# Patient Record
Sex: MALE | Race: WHITE | NOT HISPANIC OR LATINO | Employment: OTHER | URBAN - METROPOLITAN AREA
[De-identification: names, ages, dates, MRNs, and addresses within clinical notes are randomized per-mention and may not be internally consistent; named-entity substitution may affect disease eponyms.]

---

## 2017-01-13 ENCOUNTER — ALLSCRIPTS OFFICE VISIT (OUTPATIENT)
Dept: OTHER | Facility: OTHER | Age: 72
End: 2017-01-13

## 2017-01-13 LAB
BILIRUB UR QL STRIP: NORMAL
CLARITY UR: NORMAL
COLOR UR: YELLOW
GLUCOSE (HISTORICAL): NORMAL
HGB UR QL STRIP.AUTO: NORMAL
KETONES UR STRIP-MCNC: NORMAL MG/DL
LEUKOCYTE ESTERASE UR QL STRIP: NORMAL
NITRITE UR QL STRIP: NORMAL
PH UR STRIP.AUTO: 5 [PH]
PROT UR STRIP-MCNC: NORMAL MG/DL
SP GR UR STRIP.AUTO: 1
UROBILINOGEN UR QL STRIP.AUTO: NORMAL

## 2017-03-03 LAB
INR PPP: 3
PROTHROMBIN TIME: 30.3 SEC (ref 9–11.5)

## 2017-04-12 ENCOUNTER — ALLSCRIPTS OFFICE VISIT (OUTPATIENT)
Dept: OTHER | Facility: OTHER | Age: 72
End: 2017-04-12

## 2017-08-04 LAB
INR PPP: 1.8
PROTHROMBIN TIME: 18 SEC (ref 9–11.5)

## 2017-09-01 LAB
INR PPP: 1.9
PROTHROMBIN TIME: 19.5 SEC (ref 9–11.5)

## 2017-09-30 LAB
INR PPP: 1.7
PROTHROMBIN TIME: 17.6 SEC (ref 9–11.5)

## 2017-10-28 LAB
INR PPP: 1.2
PROTHROMBIN TIME: 12.8 SEC (ref 9–11.5)

## 2017-11-18 LAB
INR PPP: 1.9
PROTHROMBIN TIME: 19.4 SEC (ref 9–11.5)

## 2017-12-11 ENCOUNTER — APPOINTMENT (EMERGENCY)
Dept: CT IMAGING | Facility: HOSPITAL | Age: 72
End: 2017-12-11
Payer: COMMERCIAL

## 2017-12-11 ENCOUNTER — APPOINTMENT (EMERGENCY)
Dept: RADIOLOGY | Facility: HOSPITAL | Age: 72
End: 2017-12-11
Payer: COMMERCIAL

## 2017-12-11 ENCOUNTER — HOSPITAL ENCOUNTER (EMERGENCY)
Facility: HOSPITAL | Age: 72
Discharge: HOME | End: 2017-12-11
Attending: EMERGENCY MEDICINE | Admitting: EMERGENCY MEDICINE
Payer: COMMERCIAL

## 2017-12-11 VITALS
TEMPERATURE: 98 F | SYSTOLIC BLOOD PRESSURE: 144 MMHG | OXYGEN SATURATION: 95 % | RESPIRATION RATE: 18 BRPM | HEART RATE: 88 BPM | DIASTOLIC BLOOD PRESSURE: 74 MMHG

## 2017-12-11 DIAGNOSIS — S09.90XA CLOSED HEAD INJURY, INITIAL ENCOUNTER: ICD-10-CM
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
## 2017-12-11 DIAGNOSIS — S00.81XA ABRASION OF FACE, INITIAL ENCOUNTER: ICD-10-CM

## 2017-12-11 DIAGNOSIS — M25.562 LEFT KNEE PAIN: ICD-10-CM

## 2017-12-11 DIAGNOSIS — S80.212A ABRASION OF LEFT KNEE, INITIAL ENCOUNTER: ICD-10-CM

## 2017-12-11 LAB
APTT PPP: 38 SECONDS (ref 23–35)
INR PPP: 2.64 (ref 0.86–1.16)
PROTHROMBIN TIME: 28.5 SECONDS (ref 12.1–14.4)

## 2017-12-11 PROCEDURE — 85610 PROTHROMBIN TIME: CPT | Performed by: EMERGENCY MEDICINE

## 2017-12-11 PROCEDURE — 70450 CT HEAD/BRAIN W/O DYE: CPT

## 2017-12-11 PROCEDURE — 85730 THROMBOPLASTIN TIME PARTIAL: CPT | Performed by: EMERGENCY MEDICINE

## 2017-12-11 PROCEDURE — 90715 TDAP VACCINE 7 YRS/> IM: CPT | Performed by: EMERGENCY MEDICINE

## 2017-12-11 PROCEDURE — 99284 EMERGENCY DEPT VISIT MOD MDM: CPT

## 2017-12-11 PROCEDURE — 90471 IMMUNIZATION ADMIN: CPT

## 2017-12-11 PROCEDURE — 36415 COLL VENOUS BLD VENIPUNCTURE: CPT | Performed by: EMERGENCY MEDICINE

## 2017-12-11 PROCEDURE — 72125 CT NECK SPINE W/O DYE: CPT

## 2017-12-11 PROCEDURE — 73560 X-RAY EXAM OF KNEE 1 OR 2: CPT

## 2017-12-11 RX ORDER — GINSENG 100 MG
1 CAPSULE ORAL ONCE
Status: COMPLETED | OUTPATIENT
Start: 2017-12-11 | End: 2017-12-11

## 2017-12-11 RX ADMIN — BACITRACIN ZINC 1 SMALL APPLICATION: 500 OINTMENT TOPICAL at 22:25

## 2017-12-11 RX ADMIN — TETANUS TOXOID, REDUCED DIPHTHERIA TOXOID AND ACELLULAR PERTUSSIS VACCINE, ADSORBED 0.5 ML: 5; 2.5; 8; 8; 2.5 SUSPENSION INTRAMUSCULAR at 22:25

## 2017-12-12 NOTE — ED ATTENDING ATTESTATION
IAldona Shone, DO, saw and evaluated the patient  I have discussed the patient with the resident/non-physician practitioner and agree with the resident's/non-physician practitioner's findings, Plan of Care, and MDM as documented in the resident's/non-physician practitioner's note, except where noted  All available labs and Radiology studies were reviewed  At this point I agree with the current assessment done in the Emergency Department  I have conducted an independent evaluation of this patient a history and physical is as follows:    Pt seen and examined  Pt is a 70 yo male on coumadin who was ambulating outside when he missed a curb and tripped and fell  Hit L knee and has superficial abrasion but full ROM and hit forehead/bridge of nose  NO nasal deformity, no epistaxis, no LOC  Agree with CT head/csp, xray L knee, updating tetanus, checking INR  2320 - INR 2 6    2325 - CT shows 1  No evidence of acute intracranial hemorrhage  2  White matter hypodensities could represent microangiopathy and/or old lacunar infarcts although these are nonspecific and other white matter etiologies cannot be excluded  3  Since the prior exam, there is a new hypodensity in the right external capsule/caudate extending to the right putamen likely age-indeterminate (but most likely old) lacunar infarct  2342 - CT csp shows 1  There is anterior vertebral body fusion from C4 to C7  There is posterior laminectomy changes at C4-C6  Severe multilevel facet arthropathy and degenerative changes limits evaluation for nondisplaced fracture  If there is high level clinical concern, follow-up imaging can be performed as clinically warranted  No displaced fracture identified  If there is focal neurologic deficit, follow-up with cervical spine MRI can BE obtained  2  Severe multilevel cervical degenerative changes are noted suspected to be causing multilevel neural foraminal narrowing   There is calcification/ossification of the anterior longitudinal ligament with bridging osteophytes at C3-T1  NO fracture noted on xray  Remains neuro intact and at baseline per pt and relatives in room  Will d/c home with instructions to hold tomorrows coumadin, return if any change in mental status, confusion, or other concerns as he may need repeat CT head       Final diagnoses:   Accident due to mechanical fall without injury, initial encounter   Closed head injury, initial encounter   Left knee pain   Abrasion of face, initial encounter   Abrasion of left knee, initial encounter     Critical Care Time  CritCare Time

## 2017-12-12 NOTE — DISCHARGE INSTRUCTIONS
Please hold Coumadin x1 dose then resume as you were instructed to by providing physician  Please return with uncontrollable nausea vomiting or any new or worsening symptoms  Head Injury   WHAT YOU NEED TO KNOW:   A head injury is most often caused by a blow to the head  This may occur from a fall, bicycle injury, sports injury, being struck in the head, or a motor vehicle accident  DISCHARGE INSTRUCTIONS:   Call 911 or have someone else call for any of the following:   · You cannot be woken  · You have a seizure  · You stop responding to others or you faint  · You have blurry or double vision  · Your speech becomes slurred or confused  · You have arm or leg weakness, loss of feeling, or new problems with coordination  · Your pupils are larger than usual or one pupil is a different size than the other  · You have blood or clear fluid coming out of your ears or nose  Return to the emergency department if:   · You have repeated or forceful vomiting  · You feel confused  · Your headache gets worse or becomes severe  · You or someone caring for you notices that you are harder to wake than usual   Contact your healthcare provider if:   · Your symptoms last longer than 6 weeks after the injury  · You have questions or concerns about your condition or care  Medicines:   · Acetaminophen  decreases pain  Acetaminophen is available without a doctor's order  Ask how much to take and how often to take it  Follow directions  Acetaminophen can cause liver damage if not taken correctly  · Take your medicine as directed  Contact your healthcare provider if you think your medicine is not helping or if you have side effects  Tell him or her if you are allergic to any medicine  Keep a list of the medicines, vitamins, and herbs you take  Include the amounts, and when and why you take them  Bring the list or the pill bottles to follow-up visits   Carry your medicine list with you in case of an emergency  Self-care:   · Rest  or do quiet activities for 24 to 48 hours  Limit your time watching TV, using the computer, or doing tasks that require a lot of thinking  Slowly return to your normal activities as directed  Do not play sports or do activities that may cause you to get hit in the head  Ask your healthcare provider when you can return to sports  · Apply ice  on your head for 15 to 20 minutes every hour or as directed  Use an ice pack, or put crushed ice in a plastic bag  Cover it with a towel before you apply it to your skin  Ice helps prevent tissue damage and decreases swelling and pain  · Have someone stay with you for 24 hours  or as directed  This person can monitor you for complications and call 350  When you are awake the person should ask you a few questions to see if you are thinking clearly  An example would be to ask your name or your address  Prevent another head injury:   · Wear a helmet that fits properly  Do this when you play sports, or ride a bike, scooter, or skateboard  Helmets help decrease your risk of a serious head injury  Talk to your healthcare provider about other ways you can protect yourself if you play sports  · Wear your seat belt every time you are in a car  This helps to decrease your risk for a head injury if you are in a car accident  Follow up with your healthcare provider as directed:  Write down your questions so you remember to ask them during your visits  © 2017 [address]  Information is for End User's use only and may not be sold, redistributed or otherwise used for commercial purposes  All illustrations and images included in CareNotes® are the copyrighted property of [employer]  or [address]  The above information is an  only  It is not intended as medical advice for individual conditions or treatments   Talk to your doctor, nurse or pharmacist before following any medical regimen to see if it is safe and effective for you  Knee Pain   WHAT YOU NEED TO KNOW:   Knee pain may start suddenly, or it may be a long-term problem  You may have pain on the side, front, or back of your knee  You may have knee stiffness and swelling  You may hear popping sounds or feel like your knee is giving way or locking up as you walk  You may feel pain when you sit, stand, walk, or climb up and down stairs  Knee pain can be caused by conditions such as obesity, inflammation, or strains or tears in ligaments or tendons  DISCHARGE INSTRUCTIONS:   Follow up with your healthcare provider within 24 hours or as directed: You may need follow-up treatments, such as steroid injections to decrease pain  Write down your questions so you remember to ask them during your visits  Self-care:   · Rest  your knee so it can heal  Limit activities that increase your pain  · Ice  can help reduce swelling  Wrap ice in a towel and put it on your knee for as long and as often as directed  · Compression  with a brace or bandage can help reduce swelling  Use a brace or bandage only as directed  · Elevation  helps decrease pain and swelling  Elevate your knee while you are sitting or lying down  Prop your leg on pillows to keep your knee above the level of your heart  Medicines:   · NSAIDs  help decrease swelling and pain or fever  This medicine is available with or without a doctor's order  NSAIDs can cause stomach bleeding or kidney problems in certain people  If you take blood thinner medicine, always ask your healthcare provider if NSAIDs are safe for you  Always read the medicine label and follow directions  · Acetaminophen  decreases pain and fever  It is available without a doctor's order  Ask how much to take and when to take it  Follow directions  Acetaminophen can cause liver damage if not taken correctly  · Take your medicine as directed    Contact your healthcare provider if you think your medicine is not helping or if you have side effects  Tell him or her if you are allergic to any medicine  Keep a list of the medicines, vitamins, and herbs you take  Include the amounts, and when and why you take them  Bring the list or the pill bottles to follow-up visits  Carry your medicine list with you in case of an emergency  Exercise as directed: You may need to see a physical therapist or do recommended exercises to improve movement and decrease your pain  You may be directed to walk, swim, or ride a bike  Follow your exercise plan exactly as directed to avoid further injury  Contact your healthcare provider if:   · You have questions or concerns about your condition or care  Return to the emergency department if:   · Your pain is worse, even after treatment  · You cannot bend or straighten your leg completely  · The swelling around your knee does not go down even with treatment  · Your knee is painful and hot to the touch  © 2017 [address] Information is for End User's use only and may not be sold, redistributed or otherwise used for commercial purposes  All illustrations and images included in CareNotes® are the copyrighted property of [employer]  or [employer]  The above information is an  only  It is not intended as medical advice for individual conditions or treatments  Talk to your doctor, nurse or pharmacist before following any medical regimen to see if it is safe and effective for you

## 2017-12-12 NOTE — ED PROVIDER NOTES
History  Chief Complaint   Patient presents with    Head Injury     states missed step, walking up stairs struck head on step  denies loc  pt on coumadin     Patient states missed step off the pavement when he therefore fell forward and struck the front of his face off the ground  Is on Coumadin and has not missed any doses; or taken any extra  He had no loss of consciousness  He has 2 family members at bedside; they state he was talking immediately  They state and he states there is no chest pain; shortness of breath lightheadedness; or any other symptoms preceding this fall; at the time happened today  Takes Coumadin for prior DVT  Denies nausea vomiting; changes in vision  Abrasion forehead and nose  No pain on palpation of the nose; no defect  Unsure when last tetanus was  GCS 15  Moves all extremities  Follows all commands  Small abrasion left knee  CT head and neck  Will update tetanus  Will check INR  Will also get plain film of left knee  Denies headache            Prior to Admission Medications   Prescriptions Last Dose Informant Patient Reported? Taking? AMLODIPINE BESYLATE PO   Yes No   Sig: Take by mouth   FLUOXETINE HCL PO   Yes No   Sig: Take by mouth   RisperiDONE (RISPERDAL PO)   Yes No   Sig: Take by mouth   TAMSULOSIN HCL PO   Yes No   Sig: Take by mouth   Warfarin Sodium (COUMADIN PO)   Yes No   Sig: Take by mouth     benztropine (COGENTIN) 2 mg tablet   Yes No   Sig: Take 2 mg by mouth 2 (two) times a day   losartan (COZAAR) 50 mg tablet   Yes No   Sig: Take 50 mg by mouth daily     trifluoperazine (STELAZINE) 5 mg tablet   Yes No   Sig: Take 5 mg by mouth 2 (two) times a day      Facility-Administered Medications: None       Past Medical History:   Diagnosis Date    Cardiac disease     Chronic pain     DVT (deep venous thrombosis) ([facility] [address] )     Hypertension        Past Surgical History:   Procedure Laterality Date    REPLACEMENT TOTAL HIP W/  RESURFACING IMPLANTS      TOTAL KNEE ARTHROPLASTY         History reviewed  No pertinent family history  I have reviewed and agree with the history as documented  Social History   Substance Use Topics    Smoking status: Never Smoker    Smokeless tobacco: Never Used    Alcohol use No        Review of Systems   Constitutional: Negative for activity change, appetite change, chills and fever  HENT: Negative for congestion, rhinorrhea and sore throat  Eyes: Negative for photophobia, pain and visual disturbance  Respiratory: Negative for cough, chest tightness, shortness of breath and wheezing  Cardiovascular: Negative for chest pain, palpitations and leg swelling  Gastrointestinal: Negative for abdominal distention, abdominal pain, constipation, diarrhea, nausea and vomiting  Genitourinary: Negative for difficulty urinating, dysuria, flank pain, frequency and hematuria  Musculoskeletal: Positive for arthralgias and myalgias  Negative for back pain, neck pain and neck stiffness  Skin: Negative for color change and rash  Neurological: Negative for seizures, syncope, speech difficulty, weakness, light-headedness and headaches  Hematological: Negative for adenopathy  Psychiatric/Behavioral: Negative for agitation, confusion, hallucinations and suicidal ideas  Physical Exam  ED Triage Vitals   Temperature Pulse Respirations Blood Pressure SpO2   12/11/17 2051 12/11/17 2051 12/11/17 2051 12/11/17 2051 12/11/17 2051   98 °F (36 7 °C) 93 18 154/71 94 %      Temp Source Heart Rate Source Patient Position - Orthostatic VS BP Location FiO2 (%)   12/11/17 2051 12/11/17 2253 12/11/17 2253 12/11/17 2253 --   Oral Monitor Lying Right arm       Pain Score       12/11/17 2051       5           Orthostatic Vital Signs  Vitals:    12/11/17 2051 12/11/17 2253   BP: 154/71 144/74   Pulse: 93 88   Patient Position - Orthostatic VS:  Lying       Physical Exam   Constitutional: He is oriented to person, place, and time   He appears well-developed and well-nourished  No distress  HENT:   Head: Normocephalic  Right Ear: External ear normal    Left Ear: External ear normal    Mouth/Throat: Oropharynx is clear and moist  No oropharyngeal exudate  Abrasion of forehead and brim of nose  Eyes: EOM are normal  Pupils are equal, round, and reactive to light  Right eye exhibits no discharge  Left eye exhibits no discharge  Full range of motion  No tenderness around nose  Neck: Normal range of motion  Neck supple  No JVD present  No tracheal deviation present  No midline tenderness   Cardiovascular: Normal rate and normal heart sounds  No murmur heard  Pulmonary/Chest: Effort normal and breath sounds normal  No respiratory distress  He has no wheezes  He has no rales  Clear bilateral   Abdominal: Soft  Bowel sounds are normal  He exhibits no distension  There is no tenderness  There is no rebound and no guarding  Nontender x4   Musculoskeletal: Normal range of motion  He exhibits no edema or deformity  Moves extremities x4  No pain with manipulation of knees  Small abrasion at left knee  Lymphadenopathy:     He has no cervical adenopathy  Neurological: He is alert and oriented to person, place, and time  No cranial nerve deficit  He exhibits normal muscle tone  Skin: Skin is warm and dry  Capillary refill takes less than 2 seconds  Rash noted  He is not diaphoretic  No erythema  Forehead/nose/left knee   Psychiatric: He has a normal mood and affect   His behavior is normal  Judgment and thought content normal        ED Medications  Medications   tetanus-diphtheria-acellular pertussis (BOOSTRIX) IM injection 0 5 mL (0 5 mL Intramuscular Given 12/11/17 2225)   bacitracin topical ointment 1 small application (1 small application Topical Given 12/11/17 2225)       Diagnostic Studies  Results Reviewed     Procedure Component Value Units Date/Time    APTT [[id number]]  (Abnormal) Collected:  12/11/17 2149    Lab Status:  Final result Specimen:  Blood from Arm, Left Updated:  12/11/17 2218     PTT 38 (H) seconds     Narrative: Therapeutic Heparin Range = 60-90 seconds    Protime-INR [[id number]]  (Abnormal) Collected:  12/11/17 2149    Lab Status:  Final result Specimen:  Blood from Arm, Left Updated:  12/11/17 2218     Protime 28 5 (H) seconds      INR 2 64 (H)                 CT spine cervical without contrast   Final Result by Lester Hendrix MD (12/11 2240)         1  There is anterior vertebral body fusion from C4 to C7  There is posterior laminectomy changes at C4-C6  Severe multilevel facet arthropathy and degenerative changes limits evaluation for nondisplaced fracture  If there is high level clinical concern,    follow-up imaging can be performed as clinically warranted  No displaced fracture identified  If there is focal neurologic deficit, follow-up with cervical spine MRI can BE obtained  2  Severe multilevel cervical degenerative changes are noted suspected to be causing multilevel neural foraminal narrowing  There is calcification/ossification of the anterior longitudinal ligament with bridging osteophytes at C3-T1  Workstation performed: [id number]         XR knee 1 or 2 vw left   ED Interpretation by Fredi Hernadez DO (12/11 2238)   Wet read: No acute findings does have calcification of the patellar tendon      CT head without contrast   Final Result by Lester Hendrix MD (12/11 2230)         1  No evidence of acute intracranial hemorrhage  2  White matter hypodensities could represent microangiopathy and/or old lacunar infarcts although these are nonspecific and other white matter etiologies cannot be excluded  3  Since the prior exam, there is a new hypodensity in the right external capsule/caudate extending to the right putamen likely age-indeterminate (but most likely old) lacunar infarct           Workstation performed: [id number]               Procedures  Procedures      Phone Consults  ED Phone Contact    ED Course  ED Course as of Dec 12 0324   Mon Dec 11, 2017   2237 INR: (!) 2 64   2238 Will advise hold 1 dose Coumadin    2242 INR: (!) 2 64                               MDM  Number of Diagnoses or Management Options  Abrasion of face, initial encounter:   Abrasion of left knee, initial encounter:   Accident due to mechanical fall without injury, initial encounter:   Closed head injury, initial encounter:   Left knee pain:   Diagnosis management comments: Fall on thinners  Abrasions to forehead  Bacitracin to area of wound  No deformity 2 abrasion of nose  CT head and neck no acute findings  Left knee x-ray no acute findings  Patient able to ambulate  Patient GCS 15  Patient instructed to hold 1 days Coumadin dose and then resume  CritCare Time    Disposition  Final diagnoses:   Accident due to mechanical fall without injury, initial encounter   Closed head injury, initial encounter   Left knee pain   Abrasion of face, initial encounter   Abrasion of left knee, initial encounter     Time reflects when diagnosis was documented in both MDM as applicable and the Disposition within this note     Time User Action Codes Description Comment    12/11/2017 10:43 PM Josefa Keto Add  Letters  XXXA] Accident due to mechanical fall without injury, initial encounter     12/11/2017 10:43 PM Josefa Keto Add [S09 90XA] Closed head injury, initial encounter     12/11/2017 10:44 PM Josefa Keto Add [M25 [id number]] Left knee pain     12/11/2017 10:46 PM Simran Innocent M Add [S00 81XA] Abrasion of face, initial encounter     12/11/2017 10:46 PM Izetta Innocent M Add [S80 212A] Abrasion of left knee, initial encounter       ED Disposition     ED Disposition Condition Comment    Discharge  Susu Blanco  discharge to home/self care      Condition at discharge: Good        Follow-up Information     Follow up With Specialties Details Why Contact Yvette Armijo  Schedule an appointment as soon as possible for a visit ER follow-up visit [address]  [phone]          Discharge Medication List as of 12/11/2017 10:44 PM      CONTINUE these medications which have NOT CHANGED    Details   AMLODIPINE BESYLATE PO Take by mouth, Until Discontinued, Historical Med      benztropine (COGENTIN) 2 mg tablet Take 2 mg by mouth 2 (two) times a day, Until Discontinued, Historical Med      FLUOXETINE HCL PO Take by mouth, Until Discontinued, Historical Med      losartan (COZAAR) 50 mg tablet Take 50 mg by mouth daily  , Until Discontinued, Historical Med      RisperiDONE (RISPERDAL PO) Take by mouth, Until Discontinued, Historical Med      TAMSULOSIN HCL PO Take by mouth, Until Discontinued, Historical Med      trifluoperazine (STELAZINE) 5 mg tablet Take 5 mg by mouth 2 (two) times a day, Until Discontinued, Historical Med      Warfarin Sodium (COUMADIN PO) Take by mouth  , Until Discontinued, Historical Med           No discharge procedures on file  ED Provider  Attending physically available and evaluated Mackenzie Chow I managed the patient along with the ED Attending      Electronically Signed by         Laura Cuellar DO  Resident  12/12/17 2994

## 2018-01-04 LAB
ALBUMIN SERPL-MCNC: 4 G/DL (ref 3.6–5.1)
ALBUMIN/GLOB SERPL: 1.5 (CALC) (ref 1–2.5)
ALP SERPL-CCNC: 88 U/L (ref 40–115)
ALT SERPL-CCNC: 24 U/L (ref 9–46)
AST SERPL-CCNC: 28 U/L (ref 10–35)
BILIRUB SERPL-MCNC: 0.4 MG/DL (ref 0.2–1.2)
BUN SERPL-MCNC: 21 MG/DL (ref 7–25)
BUN/CREAT SERPL: NORMAL (CALC) (ref 6–22)
CALCIUM SERPL-MCNC: 9.2 MG/DL (ref 8.6–10.3)
CHLORIDE SERPL-SCNC: 103 MMOL/L (ref 98–110)
CO2 SERPL-SCNC: 26 MMOL/L (ref 20–31)
CREAT SERPL-MCNC: 1.01 MG/DL (ref 0.7–1.18)
GLOBULIN SER CALC-MCNC: 2.7 G/DL (CALC) (ref 1.9–3.7)
GLUCOSE SERPL-MCNC: 79 MG/DL (ref 65–99)
INR PPP: 2.8
POTASSIUM SERPL-SCNC: 3.8 MMOL/L (ref 3.5–5.3)
PROT SERPL-MCNC: 6.7 G/DL (ref 6.1–8.1)
PROTHROMBIN TIME: 28.1 SEC (ref 9–11.5)
SERVICE CMNT-IMP: NORMAL
SL AMB EGFR AFRICAN AMERICAN: 86 ML/MIN/1.73M2
SL AMB EGFR NON AFRICAN AMERICAN: 74 ML/MIN/1.73M2
SODIUM SERPL-SCNC: 142 MMOL/L (ref 135–146)

## 2018-01-12 VITALS
WEIGHT: 205 LBS | DIASTOLIC BLOOD PRESSURE: 60 MMHG | SYSTOLIC BLOOD PRESSURE: 120 MMHG | HEART RATE: 78 BPM | RESPIRATION RATE: 18 BRPM | BODY MASS INDEX: 35 KG/M2 | HEIGHT: 64 IN

## 2018-01-13 VITALS — DIASTOLIC BLOOD PRESSURE: 80 MMHG | HEART RATE: 88 BPM | WEIGHT: 207 LBS | SYSTOLIC BLOOD PRESSURE: 150 MMHG

## 2018-01-14 NOTE — RESULT NOTES
Verified Results  CT ABDOMEN PELVIS W WO CONTRAST [id number] 11:43AM Michael Campos Order Number: [id number]   Performing Comments: CT of the abdomen and pelvis with and without IV contrast followed by delayed images  No oral contrast needed  Urogram/hematuria protocol    - Patient Instructions: To schedule this appointment, please contact Central Scheduling at    [phone]  **  [facility] **                  NOTHING TO EAT 4 HOURS PRIOR TO TEST                  CLEAR LIQUIDS ARE OK     Test Name Result Flag Reference   CT ABDOMEN PELVIS W WO CONTRAST (Report)     CT ABDOMEN AND PELVIS WITH AND WITHOUT IV CONTRAST     INDICATION: Painless hematuria     COMPARISON: Chest CT from 7/21/2016     TECHNIQUE: CT of the kidneys was performed without intravenous contrast  Dynamic postcontrast CT evaluation of the abdomen and pelvis was performed in both nephrographic and delayed phases after the administration of intravenous contrast  This    examination, like all CT scans performed in the [facility], was performed utilizing techniques to minimize radiation dose exposure, including the use of iterative reconstruction and automated exposure control  Axial, sagittal and    coronal reformatted images were submitted for interpretation  IV Contrast: iohexol (OMNIPAQUE) 350 MG/ML injection (MULTI-DOSE) 100 mL Note: (SINGLE DOSE/MULTI DOSE) information refers to the container from which the contrast was acquired  Contrast was injected one time intravenously without immediate    complication  Enteric Contrast: Enteric contrast was administered  FINDINGS:     ABDOMEN     There is a horseshoe kidney  RIGHT MOIETY AND URETER:   No solid renal mass  No detectable ureteral mass  No hydronephrosis or hydroureter  There is a 7 mm nonobstructing right renal calculus  No perinephric collection  LEFT MOIETY AND URETER:   No solid renal mass   No detectable ureteral mass  No hydronephrosis or hydroureter  No urinary tract calculi  No perinephric collection  URINARY BLADDER:   No bladder wall mass  No calculi  LUNG BASES: There is stable elevation of the left hemidiaphragm  LIVER/BILIARY TREE: There is a hepatic cyst      GALLBLADDER: No calcified gallstones  No pericholecystic inflammatory change  SPLEEN: Unremarkable  PANCREAS: Unremarkable  ADRENAL GLANDS: There is nonspecific surrounding stranding  This is of uncertain etiology though present on the prior CTs  There is a stable left adrenal calcification  No adrenal nodules are seen  STOMACH, BOWEL AND APPENDIX: There is a lipoma at the hepatic flexure of the colon  ABDOMINOPELVIC CAVITY: No ascites  No free intraperitoneal air  No lymphadenopathy  VESSELS: Unremarkable for patient's age  PELVIS     REPRODUCTIVE ORGANS: Unremarkable for patient's age  ABDOMINAL WALL/INGUINAL REGIONS: Unremarkable  OSSEOUS STRUCTURES: No acute fracture or destructive osseous lesion  IMPRESSION:     1  Horseshoe kidney with 7 mm nonobstructing right sided calculus  Otherwise normal urinary tract  2  Nonspecific bilateral periadrenal stranding, stable the old uncertain etiology as the adrenals are unremarkable  3  Stable elevation of the left hemidiaphragm  4  Colonic lipoma         Workstation performed: [id number]     Signed by:   Onel Diehl MD   12/22/16

## 2018-01-17 NOTE — RESULT NOTES
Verified Results  (1) BASIC METABOLIC PROFILE [id number] 03:16PM Shade Bobby Order Number: [id number]     Test Name Result Flag Reference   GLUCOSE,RANDM 87 mg/dL     If the patient is fasting, the ADA then defines impaired fasting glucose as > 100 mg/dL and diabetes as > or equal to 123 mg/dL  SODIUM 146 mmol/L H 136-145   POTASSIUM 4 5 mmol/L  3 5-5 3   CHLORIDE 105 mmol/L  100-108   CARBON DIOXIDE 34 mmol/L H 21-32   ANION GAP (CALC) 7 mmol/L  4-13   BLOOD UREA NITROGEN 15 mg/dL  5-25   CREATININE 0 90 mg/dL  0 60-1 30   Standardized to IDMS reference method   CALCIUM 9 3 mg/dL  8 3-10 1   eGFR Non-African American      >60 0 ml/min/1 [address] Disease Education Program recommendations are as follows:  GFR calculation is accurate only with a steady state creatinine  Chronic Kidney disease less than 60 ml/min/1 73 sq  meters  Kidney failure less than 15 ml/min/1 73 sq  meters  (1) PSA (SCREEN) (Dx V76 44 Screen for Prostate Cancer) [id number] 03:16PM Shade Bobyb Order Number: [id number]     Test Name Result Flag Reference   PROSTATE SPECIFIC ANTIGEN 0 2 ng/mL  0 0-4 0   American Urological Association Guidelines define biochemical recurrence of prostate cancer as a detectable or rising PSA value post-radical prostatectomy that is greater than or equal to 0 2 ng/mL with a second confirmatory level of greater than or equal to 0 2 ng/mL  - Patient Instructions: This test is non-fasting  Please drink two glasses of water morning of bloodwork  - Patient Instructions: This test is non-fasting  Please drink two glasses of water morning of bloodwork

## 2018-02-10 LAB
INR PPP: 3.7
PROTHROMBIN TIME: 37.7 SEC (ref 9–11.5)

## 2018-02-15 LAB
INR PPP: 4.2
PROTHROMBIN TIME: 42.5 SEC (ref 9–11.5)

## 2018-03-01 LAB
INR PPP: 2.9
PROTHROMBIN TIME: 29.5 SEC (ref 9–11.5)

## 2018-04-05 LAB
INR PPP: 3.3
PROTHROMBIN TIME: 33.9 SEC (ref 9–11.5)

## 2018-05-04 LAB
INR PPP: 1.5
PROTHROMBIN TIME: 14.9 SEC (ref 9–11.5)

## 2018-05-24 LAB
INR PPP: 3.2
PROTHROMBIN TIME: 32.3 SEC (ref 9–11.5)

## 2018-06-01 LAB
PT - I.N. RATIO (HISTORICAL): 1.8 RATIO (INR) (ref 0.89–1.1)
PT, PATIENT (HISTORICAL): 18.3 SEC (ref 9.5–11.6)

## 2018-06-11 LAB
AMORPHOUS MATERIAL (HISTORICAL): ABNORMAL
ANION GAP SERPL CALCULATED.3IONS-SCNC: 9 MMOL/L (ref 5–14)
BACTERIA UR QL AUTO: ABNORMAL
BILIRUB UR QL STRIP: NEGATIVE MG/DL
BUN SERPL-MCNC: 15 MG/DL (ref 5–25)
CALCIUM SERPL-MCNC: 9.3 MG/DL (ref 8.4–10.2)
CASTS/CASTS TYPE (HISTORICAL): ABNORMAL /LPF
CHLORIDE SERPL-SCNC: 101 MEQ/L (ref 97–108)
CLARITY UR: CLEAR
CO2 SERPL-SCNC: 33 MMOL/L (ref 22–30)
COLOR UR: ABNORMAL
CREATINE, SERUM (HISTORICAL): 0.78 MG/DL (ref 0.7–1.5)
CRYSTAL TYPE (HISTORICAL): ABNORMAL /HPF
EGFR (HISTORICAL): >60 ML/MIN/1.73 M2
GLUCOSE SERPL-MCNC: 98 MG/DL (ref 70–99)
GLUCOSE UR STRIP-MCNC: NEGATIVE MG/DL
HGB UR QL STRIP.AUTO: ABNORMAL
KETONES UR STRIP-MCNC: NEGATIVE MG/DL
LEUKOCYTE ESTERASE UR QL STRIP: NEGATIVE
MUCOUS THREADS URNS QL MICRO: ABNORMAL
NITRITE UR QL STRIP: NEGATIVE
NON-SQ EPI CELLS URNS QL MICRO: ABNORMAL
OTHER STN SPEC: ABNORMAL
PH UR STRIP.AUTO: 7 [PH] (ref 4.5–8)
POTASSIUM SERPL-SCNC: 3.8 MEQ/L (ref 3.6–5)
PROT UR STRIP-MCNC: NEGATIVE MG/DL
PT - I.N. RATIO (HISTORICAL): 3.9 RATIO (INR) (ref 0.89–1.1)
PT, PATIENT (HISTORICAL): 38 SEC (ref 9.5–11.6)
RBC #/AREA URNS AUTO: 2 /HPF
SODIUM SERPL-SCNC: 143 MEQ/L (ref 137–147)
SP GR UR STRIP.AUTO: 1.01 (ref 1–1.04)
TSH SERPL DL<=0.05 MIU/L-ACNC: 3.08 UIU/ML (ref 0.47–4.68)
UROBILINOGEN UR QL STRIP.AUTO: NEGATIVE MG/DL (ref 0–1)
WBC #/AREA URNS AUTO: ABNORMAL /HPF

## 2018-06-19 LAB
ABSOL LYMPHOCYTES (HISTORICAL): 1 K/UL (ref 0.5–4)
BASOPHILS # BLD AUTO: 0 K/UL (ref 0–0.1)
BASOPHILS # BLD AUTO: 1 % (ref 0–1)
DEPRECATED RDW RBC AUTO: 15 %
EOSINOPHIL # BLD AUTO: 0.2 K/UL (ref 0–0.4)
EOSINOPHIL NFR BLD AUTO: 3 % (ref 0–6)
FOLATE SERPL-MCNC: >20 NG/ML
HCT VFR BLD AUTO: 43.5 % (ref 41–53)
HGB BLD-MCNC: 14.3 G/DL (ref 13.5–17.5)
LYMPHOCYTES NFR BLD AUTO: 19 % (ref 25–45)
MCH RBC QN AUTO: 29.4 PG (ref 26–34)
MCHC RBC AUTO-ENTMCNC: 32.9 % (ref 31–36)
MCV RBC AUTO: 90 FL (ref 80–100)
MONOCYTES # BLD AUTO: 0.5 K/UL (ref 0.2–0.9)
MONOCYTES NFR BLD AUTO: 10 % (ref 1–10)
NEUTROPHILS ABS COUNT (HISTORICAL): 3.6 K/UL (ref 1.8–7.8)
NEUTS SEG NFR BLD AUTO: 67 % (ref 45–65)
PLATELET # BLD AUTO: 195 K/MCL (ref 150–450)
RBC # BLD AUTO: 4.87 M/MCL (ref 4.5–5.9)
VIT B12 SERPL-MCNC: 650 PG/ML (ref 239–931)
WBC # BLD AUTO: 5.3 K/MCL (ref 4.5–11)

## 2018-06-26 DIAGNOSIS — N40.0 BENIGN PROSTATIC HYPERPLASIA, UNSPECIFIED WHETHER LOWER URINARY TRACT SYMPTOMS PRESENT: Primary | ICD-10-CM

## 2018-06-26 RX ORDER — TAMSULOSIN HYDROCHLORIDE 0.4 MG/1
0.4 CAPSULE ORAL
Qty: 90 CAPSULE | Refills: 3 | Status: SHIPPED | OUTPATIENT
Start: 2018-06-26 | End: 2019-07-20 | Stop reason: SDUPTHER

## 2018-06-26 RX ORDER — TAMSULOSIN HYDROCHLORIDE 0.4 MG/1
CAPSULE ORAL
COMMUNITY
Start: 2017-05-31 | End: 2018-06-26 | Stop reason: SDUPTHER

## 2018-07-12 RX ORDER — WARFARIN SODIUM 6 MG/1
1 TABLET ORAL AS NEEDED
COMMUNITY
Start: 2018-06-04 | End: 2018-10-22 | Stop reason: SDUPTHER

## 2018-07-12 RX ORDER — WARFARIN SODIUM 5 MG/1
TABLET ORAL AS NEEDED
COMMUNITY
Start: 2018-06-04 | End: 2018-10-22 | Stop reason: SDUPTHER

## 2018-07-13 ENCOUNTER — OFFICE VISIT (OUTPATIENT)
Dept: FAMILY MEDICINE CLINIC | Facility: CLINIC | Age: 73
End: 2018-07-13
Payer: COMMERCIAL

## 2018-07-13 VITALS
DIASTOLIC BLOOD PRESSURE: 74 MMHG | HEART RATE: 87 BPM | SYSTOLIC BLOOD PRESSURE: 118 MMHG | OXYGEN SATURATION: 94 % | BODY MASS INDEX: 35.45 KG/M2 | WEIGHT: 206.5 LBS | TEMPERATURE: 97.3 F | RESPIRATION RATE: 20 BRPM

## 2018-07-13 DIAGNOSIS — G95.19 NEUROGENIC CLAUDICATION (HCC): Primary | ICD-10-CM

## 2018-07-13 DIAGNOSIS — I10 BENIGN ESSENTIAL HYPERTENSION: Chronic | ICD-10-CM

## 2018-07-13 DIAGNOSIS — R26.2 DISABILITY OF WALKING: Chronic | ICD-10-CM

## 2018-07-13 PROBLEM — I87.2 CHRONIC VENOUS STASIS DERMATITIS OF BOTH LOWER EXTREMITIES: Status: ACTIVE | Noted: 2017-04-12

## 2018-07-13 PROBLEM — E78.2 MIXED HYPERLIPIDEMIA: Status: ACTIVE | Noted: 2018-07-13

## 2018-07-13 PROCEDURE — 3078F DIAST BP <80 MM HG: CPT | Performed by: FAMILY MEDICINE

## 2018-07-13 PROCEDURE — 99214 OFFICE O/P EST MOD 30 MIN: CPT | Performed by: FAMILY MEDICINE

## 2018-07-13 PROCEDURE — 3074F SYST BP LT 130 MM HG: CPT | Performed by: FAMILY MEDICINE

## 2018-07-13 RX ORDER — AMLODIPINE BESYLATE 5 MG/1
1 TABLET ORAL DAILY
COMMUNITY
Start: 2018-06-25 | End: 2018-08-07 | Stop reason: SDUPTHER

## 2018-07-13 RX ORDER — AMANTADINE HYDROCHLORIDE 100 MG/1
1 CAPSULE, GELATIN COATED ORAL 2 TIMES DAILY
COMMUNITY
Start: 2018-07-02 | End: 2020-01-01

## 2018-07-13 RX ORDER — FLUVOXAMINE MALEATE 50 MG/1
1 TABLET, COATED ORAL DAILY
COMMUNITY
Start: 2018-06-13 | End: 2019-01-01 | Stop reason: DRUGHIGH

## 2018-07-13 NOTE — PATIENT INSTRUCTIONS
I am I evaluated the patient today including physical exam-see the results  In 2007 he had a CT scan of lumbosacral spine and showed moderate to severe spinal stenosis at that time  It certainly probably has progressed in the last 11 years and I think he does have neurogenic claudication  He has no evidence of peripheral arterial disease  He has venous stasis dermatitis but that is from his chronic edema in his lower extremities  I think his gait imbalance is multifactorial and related to his psych medications as well as aging and he probably has some peripheral neuropathy  I agree with his use of a single-point cane and he has to be careful about falls  His blood pressure is under good control and has no reason to change his medications

## 2018-07-13 NOTE — PROGRESS NOTES
Assessment/Plan:    Neurogenic claudication  Has seen Dr Lomas about this  He is scheduled for an MRI of the LSS  Disability of walking  Walks with SPC  Labs reviewed today - all are WNL  Benign essential hypertension  Does not do HBPM  BP=WNL today  No change in the amlodipine and losartan  Diagnoses and all orders for this visit:    Neurogenic claudication    Benign essential hypertension    Disability of walking  Comments:  Gets symptoms of weakness and numbness of his legs when walking a block or less  However, also has a feeling of IMBALANCE when transfering & walking  Other orders  -     warfarin (COUMADIN) 6 mg tablet; Take 1 tablet by mouth as needed  -     warfarin (COUMADIN) 5 mg tablet; as needed  -     amLODIPine (NORVASC) 5 mg tablet; Take 1 tablet by mouth daily  -     fluvoxaMINE (LUVOX) 50 mg tablet; Take 1 tablet by mouth daily Take 1 tablet by oral route 5 times a day   -     amantadine (SYMMETREL) 100 mg capsule; Take 1 capsule by mouth 2 (two) times a day            Subjective:     Chief Complaint   Patient presents with    Weakness - Generalized    Cerumen Impaction        Patient ID: Amanda Gipson  is a 71 y o  male  HPI    The following portions of the patient's history were reviewed and updated as appropriate: allergies, current medications, past family history, past medical history, past social history, past surgical history and problem list     Review of Systems      Objective:  Vitals:    07/13/18 1054   BP: 118/74   BP Location: Left arm   Patient Position: Sitting   Cuff Size: Standard   Pulse: 87   Resp: 20   Temp: (!) 97 3 °F (36 3 °C)   TempSrc: Temporal   SpO2: 94%   Weight: 93 7 kg (206 lb 8 oz)      Physical Exam   Constitutional: He is oriented to person, place, and time  He appears well-developed and well-nourished  HENT:   Head: Normocephalic     Right Ear: External ear normal    Left Ear: External ear normal    Nose: Nose normal    Mouth/Throat: Oropharynx is clear and moist    Eyes: Conjunctivae are normal  Pupils are equal, round, and reactive to light  Right eye exhibits no discharge  Left eye exhibits no discharge  Neck: Normal range of motion  Neck supple  No thyromegaly present  Cardiovascular: Normal rate, regular rhythm and normal heart sounds  No murmur heard  Pulses:       Femoral pulses are 2+ on the right side, and 2+ on the left side  Popliteal pulses are 2+ on the right side, and 2+ on the left side  Dorsalis pedis pulses are 2+ on the right side, and 2+ on the left side  Posterior tibial pulses are 2+ on the right side, and 2+ on the left side  Pulmonary/Chest: Effort normal and breath sounds normal  No respiratory distress  He has no wheezes  He has no rales  Abdominal: Soft  Bowel sounds are normal  There is no tenderness  Musculoskeletal: Normal range of motion  He exhibits no edema, tenderness or deformity  Lymphadenopathy:     He has no cervical adenopathy  Neurological: He is alert and oriented to person, place, and time  He displays a negative Romberg sign  Reflex Scores:       Patellar reflexes are 3+ on the right side and 3+ on the left side  Achilles reflexes are 0 on the right side and 0 on the left side  No tremors or cogwheeling on exam    Skin: Skin is warm and dry  No rash noted  No erythema  Psychiatric: He has a normal mood and affect   His behavior is normal  Judgment and thought content normal

## 2018-07-25 ENCOUNTER — CLINICAL SUPPORT (OUTPATIENT)
Dept: FAMILY MEDICINE CLINIC | Facility: CLINIC | Age: 73
End: 2018-07-25
Payer: COMMERCIAL

## 2018-07-25 DIAGNOSIS — I82.5Y3 CHRONIC EMBOLISM AND THROMBOSIS OF DEEP VEIN OF BOTH PROXIMAL LOWER EXTREMITIES (HCC): Primary | ICD-10-CM

## 2018-07-25 LAB
INR PPP: 3.33 (ref 0.86–1.17)
PROTHROMBIN TIME: 33.8 SECONDS (ref 11.8–14.2)

## 2018-07-25 PROCEDURE — 36415 COLL VENOUS BLD VENIPUNCTURE: CPT

## 2018-07-25 PROCEDURE — 85610 PROTHROMBIN TIME: CPT | Performed by: FAMILY MEDICINE

## 2018-07-26 ENCOUNTER — TELEPHONE (OUTPATIENT)
Dept: FAMILY MEDICINE CLINIC | Facility: CLINIC | Age: 73
End: 2018-07-26

## 2018-07-26 ENCOUNTER — ANTICOAG VISIT (OUTPATIENT)
Dept: FAMILY MEDICINE CLINIC | Facility: CLINIC | Age: 73
End: 2018-07-26

## 2018-07-30 ENCOUNTER — TELEPHONE (OUTPATIENT)
Dept: FAMILY MEDICINE CLINIC | Facility: CLINIC | Age: 73
End: 2018-07-30

## 2018-07-30 NOTE — TELEPHONE ENCOUNTER
Sanaz from [facility] called she said patient is scheduled on 8/9/18 for lumbar epidural steroid injection she needs to know if his coumadin can be annie 5 days prior to the procedure sanaz can be reached at [phone] [facility]

## 2018-07-31 ENCOUNTER — TELEPHONE (OUTPATIENT)
Dept: FAMILY MEDICINE CLINIC | Facility: CLINIC | Age: 73
End: 2018-07-31

## 2018-07-31 ENCOUNTER — ANTICOAG VISIT (OUTPATIENT)
Dept: FAMILY MEDICINE CLINIC | Facility: CLINIC | Age: 73
End: 2018-07-31

## 2018-07-31 DIAGNOSIS — I82.409 DEEP VEIN THROMBOSIS (DVT) OF LOWER EXTREMITY, UNSPECIFIED CHRONICITY, UNSPECIFIED LATERALITY, UNSPECIFIED VEIN (HCC): ICD-10-CM

## 2018-07-31 NOTE — TELEPHONE ENCOUNTER
Call the patient  Continue the same dose of warfarin: 6mg PO on M-W-F and 5mg PO on the other days  Recheck PT/INR in 3 weeks

## 2018-08-01 ENCOUNTER — TELEPHONE (OUTPATIENT)
Dept: FAMILY MEDICINE CLINIC | Facility: CLINIC | Age: 73
End: 2018-08-01

## 2018-08-07 DIAGNOSIS — I10 ESSENTIAL HYPERTENSION, BENIGN: Primary | ICD-10-CM

## 2018-08-07 RX ORDER — AMLODIPINE BESYLATE 5 MG/1
5 TABLET ORAL DAILY
Qty: 90 TABLET | Refills: 3 | Status: SHIPPED | OUTPATIENT
Start: 2018-08-07 | End: 2018-12-03 | Stop reason: SDUPTHER

## 2018-08-10 DIAGNOSIS — I10 HYPERTENSION, UNSPECIFIED TYPE: Primary | ICD-10-CM

## 2018-08-12 RX ORDER — LOSARTAN POTASSIUM 50 MG/1
50 TABLET ORAL DAILY
Qty: 90 TABLET | Refills: 3 | Status: SHIPPED | OUTPATIENT
Start: 2018-08-12 | End: 2018-12-03 | Stop reason: SDUPTHER

## 2018-08-14 ENCOUNTER — CLINICAL SUPPORT (OUTPATIENT)
Dept: FAMILY MEDICINE CLINIC | Facility: CLINIC | Age: 73
End: 2018-08-14
Payer: COMMERCIAL

## 2018-08-14 ENCOUNTER — TELEPHONE (OUTPATIENT)
Dept: FAMILY MEDICINE CLINIC | Facility: CLINIC | Age: 73
End: 2018-08-14

## 2018-08-14 DIAGNOSIS — I26.99 OTHER PULMONARY EMBOLISM WITHOUT ACUTE COR PULMONALE, UNSPECIFIED CHRONICITY (HCC): Primary | ICD-10-CM

## 2018-08-14 LAB
INR PPP: 1.53 (ref 0.86–1.17)
INR PPP: 1.53 (ref 0.86–1.17)
PROTHROMBIN TIME: 18.5 SECONDS (ref 11.8–14.2)

## 2018-08-14 PROCEDURE — 85610 PROTHROMBIN TIME: CPT | Performed by: FAMILY MEDICINE

## 2018-08-14 PROCEDURE — 36415 COLL VENOUS BLD VENIPUNCTURE: CPT

## 2018-08-15 ENCOUNTER — TELEPHONE (OUTPATIENT)
Dept: FAMILY MEDICINE CLINIC | Facility: CLINIC | Age: 73
End: 2018-08-15

## 2018-08-15 ENCOUNTER — ANTICOAG VISIT (OUTPATIENT)
Dept: FAMILY MEDICINE CLINIC | Facility: CLINIC | Age: 73
End: 2018-08-15

## 2018-08-15 DIAGNOSIS — I82.409 DEEP VEIN THROMBOSIS (DVT) OF LOWER EXTREMITY, UNSPECIFIED CHRONICITY, UNSPECIFIED LATERALITY, UNSPECIFIED VEIN (HCC): ICD-10-CM

## 2018-08-27 ENCOUNTER — TELEPHONE (OUTPATIENT)
Dept: FAMILY MEDICINE CLINIC | Facility: CLINIC | Age: 73
End: 2018-08-27

## 2018-08-27 NOTE — TELEPHONE ENCOUNTER
DR Gael Alcantar [facility]  CAN HE STOP HIS COUMADIN 5 DAYS PRIOR TO EPIDURAL STEROID INJECTION on September 11 TH   ?   [address] [place] [address] Gael Alcantar [phone]

## 2018-08-28 ENCOUNTER — DOCUMENTATION (OUTPATIENT)
Dept: FAMILY MEDICINE CLINIC | Facility: CLINIC | Age: 73
End: 2018-08-28

## 2018-08-28 NOTE — PROGRESS NOTES
We had a request from [facility] concerning this patient was is who he is scheduled for bilateral L4 transforaminal DONNY on 09/11/2018  It will be done by Dr Cele Kuo  They want to withhold his warfarin for 5 days prior to the procedure and I am in agreement with that  I have sent them clearance for the surgery and the with holding of the Coumadin prior to the procedure

## 2018-09-12 ENCOUNTER — ANTICOAG VISIT (OUTPATIENT)
Dept: FAMILY MEDICINE CLINIC | Facility: CLINIC | Age: 73
End: 2018-09-12

## 2018-09-12 ENCOUNTER — CLINICAL SUPPORT (OUTPATIENT)
Dept: FAMILY MEDICINE CLINIC | Facility: CLINIC | Age: 73
End: 2018-09-12
Payer: COMMERCIAL

## 2018-09-12 ENCOUNTER — TELEPHONE (OUTPATIENT)
Dept: FAMILY MEDICINE CLINIC | Facility: CLINIC | Age: 73
End: 2018-09-12

## 2018-09-12 DIAGNOSIS — I82.409 DEEP VEIN THROMBOSIS (DVT) OF LOWER EXTREMITY, UNSPECIFIED CHRONICITY, UNSPECIFIED LATERALITY, UNSPECIFIED VEIN (HCC): ICD-10-CM

## 2018-09-12 DIAGNOSIS — I82.4Z9 DEEP VEIN THROMBOSIS (DVT) OF DISTAL VEIN OF LOWER EXTREMITY, UNSPECIFIED CHRONICITY, UNSPECIFIED LATERALITY (HCC): Primary | ICD-10-CM

## 2018-09-12 LAB
INR PPP: 1.16 (ref 0.86–1.17)
INR PPP: 1.16 (ref 0.86–1.17)
PROTHROMBIN TIME: 14.9 SECONDS (ref 11.8–14.2)

## 2018-09-12 PROCEDURE — 85610 PROTHROMBIN TIME: CPT | Performed by: FAMILY MEDICINE

## 2018-09-12 PROCEDURE — 36415 COLL VENOUS BLD VENIPUNCTURE: CPT

## 2018-09-12 NOTE — TELEPHONE ENCOUNTER
Continue the same dose of Coumadin  I understand that the warfarin was on hold for 5 days so that he could have an injection of his back  Recheck protime in 3 weeks  2

## 2018-09-21 ENCOUNTER — TELEPHONE (OUTPATIENT)
Dept: FAMILY MEDICINE CLINIC | Facility: CLINIC | Age: 73
End: 2018-09-21

## 2018-10-03 ENCOUNTER — ANTICOAG VISIT (OUTPATIENT)
Dept: FAMILY MEDICINE CLINIC | Facility: CLINIC | Age: 73
End: 2018-10-03

## 2018-10-03 ENCOUNTER — CLINICAL SUPPORT (OUTPATIENT)
Dept: FAMILY MEDICINE CLINIC | Facility: CLINIC | Age: 73
End: 2018-10-03
Payer: COMMERCIAL

## 2018-10-03 DIAGNOSIS — I82.4Y9 DEEP VEIN THROMBOSIS (DVT) OF PROXIMAL LOWER EXTREMITY, UNSPECIFIED CHRONICITY, UNSPECIFIED LATERALITY (HCC): ICD-10-CM

## 2018-10-03 DIAGNOSIS — I82.4Z9 DEEP VEIN THROMBOSIS (DVT) OF DISTAL VEIN OF LOWER EXTREMITY, UNSPECIFIED CHRONICITY, UNSPECIFIED LATERALITY (HCC): Primary | ICD-10-CM

## 2018-10-03 LAB
INR PPP: 2.78 (ref 0.86–1.17)
INR PPP: 2.78 (ref 0.86–1.17)
PROTHROMBIN TIME: 29.4 SECONDS (ref 11.8–14.2)

## 2018-10-03 PROCEDURE — 85610 PROTHROMBIN TIME: CPT | Performed by: FAMILY MEDICINE

## 2018-10-03 PROCEDURE — 36415 COLL VENOUS BLD VENIPUNCTURE: CPT

## 2018-10-04 ENCOUNTER — TELEPHONE (OUTPATIENT)
Dept: FAMILY MEDICINE CLINIC | Facility: CLINIC | Age: 73
End: 2018-10-04

## 2018-10-04 LAB — INR PPP: 2.78 (ref 0.86–1.17)

## 2018-10-12 ENCOUNTER — TELEPHONE (OUTPATIENT)
Dept: FAMILY MEDICINE CLINIC | Facility: CLINIC | Age: 73
End: 2018-10-12

## 2018-10-22 DIAGNOSIS — I82.4Z9 DEEP VEIN THROMBOSIS (DVT) OF DISTAL VEIN OF LOWER EXTREMITY, UNSPECIFIED CHRONICITY, UNSPECIFIED LATERALITY (HCC): Primary | ICD-10-CM

## 2018-10-22 RX ORDER — WARFARIN SODIUM 6 MG/1
6 TABLET ORAL AS NEEDED
Qty: 30 TABLET | Refills: 5 | Status: SHIPPED | OUTPATIENT
Start: 2018-10-22 | End: 2019-03-03 | Stop reason: ALTCHOICE

## 2018-10-29 ENCOUNTER — OFFICE VISIT (OUTPATIENT)
Dept: FAMILY MEDICINE CLINIC | Facility: CLINIC | Age: 73
End: 2018-10-29
Payer: COMMERCIAL

## 2018-10-29 VITALS
DIASTOLIC BLOOD PRESSURE: 68 MMHG | WEIGHT: 199 LBS | OXYGEN SATURATION: 97 % | BODY MASS INDEX: 34.16 KG/M2 | TEMPERATURE: 97.9 F | SYSTOLIC BLOOD PRESSURE: 126 MMHG | RESPIRATION RATE: 18 BRPM | HEART RATE: 95 BPM

## 2018-10-29 DIAGNOSIS — R26.2 DISABILITY OF WALKING: ICD-10-CM

## 2018-10-29 DIAGNOSIS — M54.2 NECK PAIN WITHOUT INJURY: ICD-10-CM

## 2018-10-29 DIAGNOSIS — F32.A CHRONIC DEPRESSION: ICD-10-CM

## 2018-10-29 DIAGNOSIS — E66.09 CLASS 1 OBESITY DUE TO EXCESS CALORIES WITHOUT SERIOUS COMORBIDITY WITH BODY MASS INDEX (BMI) OF 34.0 TO 34.9 IN ADULT: ICD-10-CM

## 2018-10-29 DIAGNOSIS — I50.32 CHRONIC DIASTOLIC HEART FAILURE (HCC): ICD-10-CM

## 2018-10-29 DIAGNOSIS — I10 BENIGN ESSENTIAL HYPERTENSION: Primary | ICD-10-CM

## 2018-10-29 DIAGNOSIS — G95.19 NEUROGENIC CLAUDICATION (HCC): ICD-10-CM

## 2018-10-29 PROCEDURE — 99214 OFFICE O/P EST MOD 30 MIN: CPT | Performed by: FAMILY MEDICINE

## 2018-10-29 RX ORDER — ACETAMINOPHEN 325 MG/1
TABLET ORAL
Qty: 100 TABLET | Refills: 0 | Status: ON HOLD | OUTPATIENT
Start: 2018-10-29 | End: 2019-03-19 | Stop reason: SDUPTHER

## 2018-10-29 NOTE — ASSESSMENT & PLAN NOTE
No HBPM  Labs reviewed from 6/2018  Patient's blood pressure is controlled  Patient denies any side effects with medications  Patient educated on the importance of weight loss, and appropriate dieting  Patient admits to be compliant with medications

## 2018-10-29 NOTE — PROGRESS NOTES
Assessment/Plan:    Benign essential hypertension  No HBPM  Labs reviewed from 6/2018  Patient's blood pressure is controlled  Patient denies any side effects with medications  Patient educated on the importance of weight loss, and appropriate dieting  Patient admits to be compliant with medications  Diastolic heart failure ([address] )  Has CELIS with mild exertion  Can walk across the room w/o CELIS, but cannot walk a block w/o SOB  Will check labs, chest x-ray, and echocardiogram   I will see him back for review  Class 1 obesity due to excess calories without serious comorbidity with body mass index (BMI) of 34 0 to 34 9 in adult  Has lost 7# over the past 3 months  Neurogenic claudication  Had seen Dr Cm José for orthopedic evaluation of his chronic low back pain  He does carry a diagnosis of spinal stenosis  Dr Cm José had ordered a MRI of lumbosacral spine  Patient was then referred to Dr Horacio Snider and Dr Clayton Rm has been giving cortisone shots into his lower back  Not much change  Disability of walking  Again c/o of this gait abnormality  Unsure how much is due to lightheadedness and how much to leg weakness or instability  Does admit to mild postural dizziness but no vertigo  Cannot walk very far due to leg weakness and CELIS  The longer that he walks, the weaker his legs feel  Had a minor fall yesterday and skinned his left knee - got out of the car and walking up the pavement, made the first step,  and fell to his side  This has happened before  No LOC  Walks with a SPC in and out of the house  He has not discussed this with PSYCH  I will see him back for further testing  I think his issues are multifactorial including deconditioning, obesity, use of psychotropic medications including major tranquilizers, and possibly peripheral neuropathy  We have checked labs on him within the last 6 months  This included a U57 level and folic acid level    These were normal   I will see him back and do a more thorough examination neurologically  Chronic depression  Followed by [facility]  Neck pain without injury  No need for further imaging  Use APAP 650mg PO BID, every day  Diagnoses and all orders for this visit:    Benign essential hypertension  -     Comprehensive metabolic panel; Future    Chronic diastolic heart failure ([address] )  -     Echo complete with contrast if indicated; Future  -     XR chest pa & lateral; Future    Class 1 obesity due to excess calories without serious comorbidity with body mass index (BMI) of 34 0 to 34 9 in adult    Neurogenic claudication  -     Protime-INR; Future    Disability of walking    Chronic depression    Neck pain without injury  -     acetaminophen (TYLENOL) 325 mg tablet; Take 2 tablets, orally, twice a day, 6 hours apart, every day  Subjective:     Chief Complaint   Patient presents with    Hypertension        Patient ID: Elsy Bowen  is a 68 y o  male  HPI    The following portions of the patient's history were reviewed and updated as appropriate: allergies, current medications, past family history, past medical history, past social history, past surgical history and problem list     Review of Systems   Constitutional: Negative for activity change, appetite change, fatigue, fever and unexpected weight change  HENT: Negative for congestion, dental problem and sneezing  Eyes: Negative for discharge and visual disturbance  Respiratory: Positive for shortness of breath  Negative for cough, chest tightness and wheezing  Cardiovascular: Positive for leg swelling  Negative for chest pain and palpitations  Gastrointestinal: Negative for abdominal pain, constipation, diarrhea, nausea and vomiting  Endocrine: Negative for polydipsia and polyuria  Genitourinary: Negative for dysuria and frequency  Musculoskeletal: Positive for gait problem and neck pain  Negative for arthralgias  Skin: Negative for rash  Allergic/Immunologic: Negative for environmental allergies and food allergies  Neurological: Positive for tremors and light-headedness  Negative for dizziness, syncope and headaches  Hematological: Negative for adenopathy  Psychiatric/Behavioral: Negative for behavioral problems and sleep disturbance  Objective:  Vitals:    10/29/18 1306   BP: 126/68   BP Location: Left arm   Patient Position: Sitting   Cuff Size: Standard   Pulse: 95   Resp: 18   Temp: 97 9 °F (36 6 °C)   TempSrc: Temporal   SpO2: 97%   Weight: 90 3 kg (199 lb)      Physical Exam   Constitutional: He is oriented to person, place, and time  He appears well-developed and well-nourished  HENT:   Head: Normocephalic  Right Ear: External ear normal    Left Ear: External ear normal    Nose: Nose normal    Mouth/Throat: Oropharynx is clear and moist    Eyes: Pupils are equal, round, and reactive to light  Conjunctivae are normal  Right eye exhibits no discharge  Left eye exhibits no discharge  Neck: Normal range of motion  Neck supple  No thyromegaly present  Cardiovascular: Normal rate, regular rhythm and normal heart sounds  No murmur heard  Pulmonary/Chest: Effort normal and breath sounds normal  No respiratory distress  He has no wheezes  He has no rales  He exhibits no tenderness  Abdominal: Soft  Bowel sounds are normal  There is no tenderness  Musculoskeletal: Normal range of motion  He exhibits edema  He exhibits no tenderness  Legs:  Lymphadenopathy:     He has no cervical adenopathy  Neurological: He is alert and oriented to person, place, and time  He displays no tremor  Minimal cogwheeling of BUE, on exam today  Skin: Skin is warm and dry  No rash noted  Psychiatric: He has a normal mood and affect   His behavior is normal  Judgment and thought content normal

## 2018-10-29 NOTE — ASSESSMENT & PLAN NOTE
Has CELIS with mild exertion  Can walk across the room w/o CELIS, but cannot walk a block w/o SOB  Will check labs, chest x-ray, and echocardiogram   I will see him back for review

## 2018-10-29 NOTE — ASSESSMENT & PLAN NOTE
Again c/o of this gait abnormality  Unsure how much is due to lightheadedness and how much to leg weakness or instability  Does admit to mild postural dizziness but no vertigo  Cannot walk very far due to leg weakness and CELIS  The longer that he walks, the weaker his legs feel  Had a minor fall yesterday and skinned his left knee - got out of the car and walking up the pavement, made the first step,  and fell to his side  This has happened before  No LOC  Walks with a SPC in and out of the house  He has not discussed this with PSYCH  I will see him back for further testing  I think his issues are multifactorial including deconditioning, obesity, use of psychotropic medications including major tranquilizers, and possibly peripheral neuropathy  We have checked labs on him within the last 6 months  This included a B14 level and folic acid level  These were normal   I will see him back and do a more thorough examination neurologically

## 2018-10-29 NOTE — ASSESSMENT & PLAN NOTE
Had seen Dr Jagdeep Lincoln for orthopedic evaluation of his chronic low back pain  He does carry a diagnosis of spinal stenosis  Dr Jagdeep Lincoln had ordered a MRI of lumbosacral spine  Patient was then referred to Dr Maira Emery and Dr Brinda Monk has been giving cortisone shots into his lower back  Not much change

## 2018-11-02 ENCOUNTER — HOSPITAL ENCOUNTER (OUTPATIENT)
Dept: RADIOLOGY | Facility: HOSPITAL | Age: 73
Discharge: HOME | End: 2018-11-02
Payer: COMMERCIAL

## 2018-11-02 DIAGNOSIS — I50.32 CHRONIC DIASTOLIC HEART FAILURE (HCC): ICD-10-CM

## 2018-11-02 PROCEDURE — 71046 X-RAY EXAM CHEST 2 VIEWS: CPT

## 2018-11-08 ENCOUNTER — HOSPITAL ENCOUNTER (OUTPATIENT)
Dept: NON INVASIVE DIAGNOSTICS | Facility: HOSPITAL | Age: 73
Discharge: HOME | End: 2018-11-08
Payer: COMMERCIAL

## 2018-11-08 DIAGNOSIS — I50.32 CHRONIC DIASTOLIC HEART FAILURE (HCC): ICD-10-CM

## 2018-11-08 PROCEDURE — 93306 TTE W/DOPPLER COMPLETE: CPT

## 2018-11-09 PROCEDURE — 93306 TTE W/DOPPLER COMPLETE: CPT | Performed by: INTERNAL MEDICINE

## 2018-11-23 ENCOUNTER — CLINICAL SUPPORT (OUTPATIENT)
Dept: FAMILY MEDICINE CLINIC | Facility: CLINIC | Age: 73
End: 2018-11-23
Payer: COMMERCIAL

## 2018-11-23 DIAGNOSIS — G95.19 NEUROGENIC CLAUDICATION (HCC): ICD-10-CM

## 2018-11-23 DIAGNOSIS — I10 BENIGN ESSENTIAL HYPERTENSION: ICD-10-CM

## 2018-11-23 LAB
ALBUMIN SERPL BCP-MCNC: 3.7 G/DL (ref 3.5–5)
ALP SERPL-CCNC: 95 U/L (ref 46–116)
ALT SERPL W P-5'-P-CCNC: 29 U/L (ref 12–78)
ANION GAP SERPL CALCULATED.3IONS-SCNC: 7 MMOL/L (ref 4–13)
AST SERPL W P-5'-P-CCNC: 30 U/L (ref 5–45)
BILIRUB SERPL-MCNC: 0.56 MG/DL (ref 0.2–1)
BUN SERPL-MCNC: 14 MG/DL (ref 5–25)
CALCIUM SERPL-MCNC: 8.7 MG/DL (ref 8.3–10.1)
CHLORIDE SERPL-SCNC: 104 MMOL/L (ref 100–108)
CO2 SERPL-SCNC: 29 MMOL/L (ref 21–32)
CREAT SERPL-MCNC: 0.9 MG/DL (ref 0.6–1.3)
GFR SERPL CREATININE-BSD FRML MDRD: 85 ML/MIN/1.73SQ M
GLUCOSE SERPL-MCNC: 95 MG/DL (ref 65–140)
INR PPP: 2.19 (ref 0.86–1.17)
INR PPP: 2.19 (ref 0.86–1.17)
POTASSIUM SERPL-SCNC: 3.8 MMOL/L (ref 3.5–5.3)
PROT SERPL-MCNC: 7.5 G/DL (ref 6.4–8.2)
PROTHROMBIN TIME: 24.4 SECONDS (ref 11.8–14.2)
SODIUM SERPL-SCNC: 140 MMOL/L (ref 136–145)

## 2018-11-23 PROCEDURE — 80053 COMPREHEN METABOLIC PANEL: CPT | Performed by: FAMILY MEDICINE

## 2018-11-23 PROCEDURE — 36415 COLL VENOUS BLD VENIPUNCTURE: CPT | Performed by: FAMILY MEDICINE

## 2018-11-23 PROCEDURE — 85610 PROTHROMBIN TIME: CPT | Performed by: FAMILY MEDICINE

## 2018-11-26 ENCOUNTER — ANTICOAG VISIT (OUTPATIENT)
Dept: FAMILY MEDICINE CLINIC | Facility: CLINIC | Age: 73
End: 2018-11-26

## 2018-11-26 DIAGNOSIS — I82.4Y9 DEEP VEIN THROMBOSIS (DVT) OF PROXIMAL LOWER EXTREMITY, UNSPECIFIED CHRONICITY, UNSPECIFIED LATERALITY (HCC): ICD-10-CM

## 2018-11-30 ENCOUNTER — OFFICE VISIT (OUTPATIENT)
Dept: FAMILY MEDICINE CLINIC | Facility: CLINIC | Age: 73
End: 2018-11-30
Payer: COMMERCIAL

## 2018-11-30 VITALS
HEART RATE: 100 BPM | WEIGHT: 193.2 LBS | BODY MASS INDEX: 33.16 KG/M2 | RESPIRATION RATE: 18 BRPM | SYSTOLIC BLOOD PRESSURE: 120 MMHG | DIASTOLIC BLOOD PRESSURE: 70 MMHG

## 2018-11-30 DIAGNOSIS — R26.2 DISABILITY OF WALKING: ICD-10-CM

## 2018-11-30 DIAGNOSIS — E66.09 CLASS 1 OBESITY DUE TO EXCESS CALORIES WITHOUT SERIOUS COMORBIDITY WITH BODY MASS INDEX (BMI) OF 34.0 TO 34.9 IN ADULT: ICD-10-CM

## 2018-11-30 DIAGNOSIS — I10 HYPERTENSION, UNSPECIFIED TYPE: ICD-10-CM

## 2018-11-30 DIAGNOSIS — I10 BENIGN ESSENTIAL HYPERTENSION: ICD-10-CM

## 2018-11-30 DIAGNOSIS — I25.10 CORONARY ARTERY DISEASE INVOLVING NATIVE CORONARY ARTERY OF NATIVE HEART WITHOUT ANGINA PECTORIS: ICD-10-CM

## 2018-11-30 DIAGNOSIS — I50.32 CHRONIC DIASTOLIC HEART FAILURE (HCC): Primary | ICD-10-CM

## 2018-11-30 DIAGNOSIS — I10 ESSENTIAL HYPERTENSION, BENIGN: ICD-10-CM

## 2018-11-30 DIAGNOSIS — F20.0 PARANOID SCHIZOPHRENIA (HCC): ICD-10-CM

## 2018-11-30 PROCEDURE — 99214 OFFICE O/P EST MOD 30 MIN: CPT | Performed by: FAMILY MEDICINE

## 2018-11-30 PROCEDURE — 3078F DIAST BP <80 MM HG: CPT | Performed by: FAMILY MEDICINE

## 2018-11-30 PROCEDURE — 3074F SYST BP LT 130 MM HG: CPT | Performed by: FAMILY MEDICINE

## 2018-11-30 PROCEDURE — 4040F PNEUMOC VAC/ADMIN/RCVD: CPT | Performed by: FAMILY MEDICINE

## 2018-11-30 PROCEDURE — 1036F TOBACCO NON-USER: CPT | Performed by: FAMILY MEDICINE

## 2018-11-30 PROCEDURE — 1160F RVW MEDS BY RX/DR IN RCRD: CPT | Performed by: FAMILY MEDICINE

## 2018-11-30 RX ORDER — BENZTROPINE MESYLATE 2 MG/1
2 TABLET ORAL DAILY
Qty: 30 TABLET | Refills: 0
Start: 2018-11-30 | End: 2019-02-18 | Stop reason: ALTCHOICE

## 2018-11-30 RX ORDER — AMLODIPINE BESYLATE 5 MG/1
5 TABLET ORAL DAILY
Qty: 90 TABLET | Refills: 0 | Status: CANCELLED | OUTPATIENT
Start: 2018-11-30

## 2018-11-30 RX ORDER — WARFARIN SODIUM 5 MG/1
TABLET ORAL
COMMUNITY
Start: 2018-11-03 | End: 2019-03-20 | Stop reason: HOSPADM

## 2018-11-30 RX ORDER — LOSARTAN POTASSIUM 50 MG/1
50 TABLET ORAL DAILY
Qty: 90 TABLET | Refills: 0 | Status: CANCELLED | OUTPATIENT
Start: 2018-11-30

## 2018-11-30 NOTE — ASSESSMENT & PLAN NOTE
Walks with a SPC  Complains of imbalance and has occasional minor fall  Legs feel weak after walking a distance but motor testing of BLE, today, shows normal muscle strength, proximally and distally  He carries a diagnosis of lumbar spinal stenosis  Imbalance can be caused by his Psych meds  I see no need for any further studies at this point in time  He is followed by Dr Justine Corral

## 2018-11-30 NOTE — ASSESSMENT & PLAN NOTE
Complaints of CELIS  Chest x-ray and echocardiogram were within normal limits  Concern is that this is an angina equivalent and to rule out significant CAD  Scheduled for pharmacologic nuclear stress test   I will call him with that result

## 2018-11-30 NOTE — PROGRESS NOTES
Assessment/Plan:    Diastolic heart failure [facility])  He tells me that his CELIS is better  On 11/8/18, had a normal CXR  The echocardiogram on 11/18/18, showed EF = 68%  No wall motion abnormalities  Stage 1 diastolic dysfunction  Benign essential hypertension  No HBPM  Avoids salt  CMP reviewed from 11/23/18  Patient's blood pressure is controlled  Patient denies any side effects with medications  Patient educated on the importance of weight loss, and appropriate dieting  Patient admits to be compliant with medications  No change in meds  Disability of walking  Walks with a SPC  Complains of imbalance and has occasional minor fall  Legs feel weak after walking a distance but motor testing of BLE, today, shows normal muscle strength, proximally and distally  He carries a diagnosis of lumbar spinal stenosis  Imbalance can be caused by his Psych meds  I see no need for any further studies at this point in time  He is followed by Dr Tahira Mckeon  Coronary artery disease involving native coronary artery of native heart without angina pectoris  Complaints of CELIS  Chest x-ray and echocardiogram were within normal limits  Concern is that this is an angina equivalent and to rule out significant CAD  Scheduled for pharmacologic nuclear stress test   I will call him with that result  Class 1 obesity due to excess calories without serious comorbidity with body mass index (BMI) of 34 0 to 34 9 in adult  Patient is educated on the importance of portion control and dieting  Patient is also educated on the importance of exercise  Patient is encouraged to walk 30 min at least 5 times a week  Discussed about benefits of losing weight  Patient acknowledges that they understood what is discussed  Diagnoses and all orders for this visit:    Chronic diastolic heart failure (HCC)    Benign essential hypertension    Paranoid schizophrenia (HCC)  -     benztropine (COGENTIN) 2 mg tablet;  Take 1 tablet (2 mg total) by mouth daily    Disability of walking    Coronary artery disease involving native coronary artery of native heart without angina pectoris    Class 1 obesity due to excess calories without serious comorbidity with body mass index (BMI) of 34 0 to 34 9 in adult    Other orders  -     warfarin (COUMADIN) 5 mg tablet;           Subjective:     Chief Complaint   Patient presents with    Follow-up     1 month        Patient ID: Zhanna Goldman  is a 68 y o  male  HPI    The following portions of the patient's history were reviewed and updated as appropriate: allergies, current medications, past family history, past medical history, past social history, past surgical history and problem list     Review of Systems   Constitutional: Negative for activity change, appetite change, fatigue, fever and unexpected weight change  HENT: Negative for congestion, dental problem and sneezing  Eyes: Negative for discharge and visual disturbance  Respiratory: Positive for shortness of breath  Negative for cough and wheezing  Cardiovascular: Negative for chest pain, palpitations and leg swelling  Gastrointestinal: Positive for constipation  Negative for abdominal pain, diarrhea, nausea and vomiting  Needs to use the Miralax that had been described  Endocrine: Negative for polydipsia and polyuria  Genitourinary: Negative for dysuria and frequency  Musculoskeletal: Positive for arthralgias  Skin: Negative for rash  Allergic/Immunologic: Negative for environmental allergies and food allergies  Neurological: Negative for headaches  Hematological: Negative for adenopathy  Psychiatric/Behavioral: Negative for behavioral problems and sleep disturbance           Objective:  Vitals:    11/30/18 1008   BP: 120/70   BP Location: Left arm   Patient Position: Sitting   Cuff Size: Standard   Pulse: 100   Resp: 18   Weight: 87 6 kg (193 lb 3 2 oz)      Physical Exam   Constitutional: He is oriented to person, place, and time  He appears well-developed and well-nourished  HENT:   Head: Normocephalic  Right Ear: External ear normal    Left Ear: External ear normal    Nose: Nose normal    Mouth/Throat: Oropharynx is clear and moist    Eyes: Pupils are equal, round, and reactive to light  Conjunctivae are normal  Right eye exhibits no discharge  Left eye exhibits no discharge  Neck: Normal range of motion  Neck supple  No thyromegaly present  Cardiovascular: Normal rate, regular rhythm and normal heart sounds  No murmur heard  Pulmonary/Chest: Effort normal and breath sounds normal  No respiratory distress  He has no wheezes  He has no rales  Abdominal: Soft  Bowel sounds are normal  There is no tenderness  Musculoskeletal: Normal range of motion  He exhibits edema  Feet:    Lymphadenopathy:     He has no cervical adenopathy  Neurological: He is alert and oriented to person, place, and time  He has normal strength  Reflex Scores:       Patellar reflexes are 2+ on the right side and 2+ on the left side  Achilles reflexes are 1+ on the right side and 1+ on the left side  Motor testing of lower extremities is within normal limits both proximally and distally in both legs  Skin: Skin is warm and dry  No rash noted  Psychiatric: He has a normal mood and affect   His behavior is normal  Judgment and thought content normal

## 2018-11-30 NOTE — ASSESSMENT & PLAN NOTE
He tells me that his CELIS is better  On 11/8/18, had a normal CXR  The echocardiogram on 11/18/18, showed EF = 68%  No wall motion abnormalities  Stage 1 diastolic dysfunction

## 2018-11-30 NOTE — ASSESSMENT & PLAN NOTE
Patient is educated on the importance of portion control and dieting  Patient is also educated on the importance of exercise  Patient is encouraged to walk 30 min at least 5 times a week  Discussed about benefits of losing weight  Patient acknowledges that they understood what is discussed

## 2018-11-30 NOTE — ASSESSMENT & PLAN NOTE
No HBPM  Avoids salt  CMP reviewed from 11/23/18  Patient's blood pressure is controlled  Patient denies any side effects with medications  Patient educated on the importance of weight loss, and appropriate dieting  Patient admits to be compliant with medications  No change in meds

## 2018-12-03 DIAGNOSIS — I10 HYPERTENSION, UNSPECIFIED TYPE: ICD-10-CM

## 2018-12-03 DIAGNOSIS — I10 ESSENTIAL HYPERTENSION, BENIGN: ICD-10-CM

## 2018-12-03 RX ORDER — AMLODIPINE BESYLATE 5 MG/1
5 TABLET ORAL DAILY
Qty: 90 TABLET | Refills: 1 | Status: SHIPPED | OUTPATIENT
Start: 2018-12-03 | End: 2019-01-01 | Stop reason: SDUPTHER

## 2018-12-03 RX ORDER — LOSARTAN POTASSIUM 50 MG/1
50 TABLET ORAL DAILY
Qty: 90 TABLET | Refills: 0 | Status: SHIPPED | OUTPATIENT
Start: 2018-12-03 | End: 2019-03-20 | Stop reason: HOSPADM

## 2018-12-06 ENCOUNTER — HOSPITAL ENCOUNTER (OUTPATIENT)
Dept: NUCLEAR MEDICINE | Facility: HOSPITAL | Age: 73
Discharge: HOME | End: 2018-12-06
Payer: COMMERCIAL

## 2018-12-06 ENCOUNTER — HOSPITAL ENCOUNTER (OUTPATIENT)
Dept: NON INVASIVE DIAGNOSTICS | Facility: HOSPITAL | Age: 73
Discharge: HOME | End: 2018-12-06
Payer: COMMERCIAL

## 2018-12-06 DIAGNOSIS — I50.32 CHRONIC DIASTOLIC HEART FAILURE (HCC): ICD-10-CM

## 2018-12-06 LAB
CHEST PAIN STATEMENT: NORMAL
MAX DIASTOLIC BP: 69 MMHG
MAX HEART RATE: 90 BPM
MAX PREDICTED HEART RATE: 148 BPM
MAX. SYSTOLIC BP: 131 MMHG
PROTOCOL NAME: NORMAL
REASON FOR TERMINATION: NORMAL
TARGET HR FORMULA: NORMAL
TEST INDICATION: NORMAL
TIME IN EXERCISE PHASE: NORMAL

## 2018-12-06 PROCEDURE — A9502 TC99M TETROFOSMIN: HCPCS

## 2018-12-06 PROCEDURE — 93016 CV STRESS TEST SUPVJ ONLY: CPT | Performed by: INTERNAL MEDICINE

## 2018-12-06 PROCEDURE — 78452 HT MUSCLE IMAGE SPECT MULT: CPT | Performed by: INTERNAL MEDICINE

## 2018-12-06 PROCEDURE — 93018 CV STRESS TEST I&R ONLY: CPT | Performed by: INTERNAL MEDICINE

## 2018-12-06 PROCEDURE — 93017 CV STRESS TEST TRACING ONLY: CPT

## 2018-12-06 PROCEDURE — 78452 HT MUSCLE IMAGE SPECT MULT: CPT

## 2018-12-06 RX ADMIN — REGADENOSON 0.4 MG: 0.08 INJECTION, SOLUTION INTRAVENOUS at 10:44

## 2018-12-18 ENCOUNTER — ANTICOAG VISIT (OUTPATIENT)
Dept: FAMILY MEDICINE CLINIC | Facility: CLINIC | Age: 73
End: 2018-12-18

## 2018-12-18 ENCOUNTER — TELEPHONE (OUTPATIENT)
Dept: FAMILY MEDICINE CLINIC | Facility: CLINIC | Age: 73
End: 2018-12-18

## 2018-12-18 DIAGNOSIS — I82.4Y9 DEEP VEIN THROMBOSIS (DVT) OF PROXIMAL LOWER EXTREMITY, UNSPECIFIED CHRONICITY, UNSPECIFIED LATERALITY (HCC): ICD-10-CM

## 2018-12-18 NOTE — TELEPHONE ENCOUNTER
Pt called back stating he has a 3 inch black and blue cory around his stomach to his back  He states he can't see the whole area  He states it does not hurt but looks bad and noticed this yesterday  He also stated he fell about a week ago laying on his side in bed and rolled off  Then about 2 nights ago he slipped while putting his slippers on  He states he did hit anything but the floor  He thinks the black and blue cory could be from his coumadin  Pt did schedule appt with Dr Renetta Medina tomorrow to get checked

## 2018-12-19 ENCOUNTER — OFFICE VISIT (OUTPATIENT)
Dept: FAMILY MEDICINE CLINIC | Facility: CLINIC | Age: 73
End: 2018-12-19
Payer: COMMERCIAL

## 2018-12-19 VITALS
SYSTOLIC BLOOD PRESSURE: 140 MMHG | WEIGHT: 200 LBS | OXYGEN SATURATION: 96 % | HEART RATE: 88 BPM | TEMPERATURE: 97.5 F | BODY MASS INDEX: 34.33 KG/M2 | DIASTOLIC BLOOD PRESSURE: 80 MMHG

## 2018-12-19 DIAGNOSIS — S30.1XXA ABDOMINAL WALL HEMATOMA, INITIAL ENCOUNTER: Primary | ICD-10-CM

## 2018-12-19 LAB
BASOPHILS # BLD AUTO: 0.05 THOUSANDS/ΜL (ref 0–0.1)
BASOPHILS NFR BLD AUTO: 1 % (ref 0–1)
EOSINOPHIL # BLD AUTO: 0.17 THOUSAND/ΜL (ref 0–0.61)
EOSINOPHIL NFR BLD AUTO: 3 % (ref 0–6)
ERYTHROCYTE [DISTWIDTH] IN BLOOD BY AUTOMATED COUNT: 14.6 % (ref 11.6–15.1)
HCT VFR BLD AUTO: 40.5 % (ref 36.5–49.3)
HGB BLD-MCNC: 12.9 G/DL (ref 12–17)
IMM GRANULOCYTES # BLD AUTO: 0.01 THOUSAND/UL (ref 0–0.2)
IMM GRANULOCYTES NFR BLD AUTO: 0 % (ref 0–2)
INR PPP: 3.74 (ref 0.86–1.17)
INR PPP: 3.74 (ref 0.86–1.17)
LYMPHOCYTES # BLD AUTO: 1.1 THOUSANDS/ΜL (ref 0.6–4.47)
LYMPHOCYTES NFR BLD AUTO: 20 % (ref 14–44)
MCH RBC QN AUTO: 29.7 PG (ref 26.8–34.3)
MCHC RBC AUTO-ENTMCNC: 31.9 G/DL (ref 31.4–37.4)
MCV RBC AUTO: 93 FL (ref 82–98)
MONOCYTES # BLD AUTO: 0.52 THOUSAND/ΜL (ref 0.17–1.22)
MONOCYTES NFR BLD AUTO: 9 % (ref 4–12)
NEUTROPHILS # BLD AUTO: 3.69 THOUSANDS/ΜL (ref 1.85–7.62)
NEUTS SEG NFR BLD AUTO: 67 % (ref 43–75)
NRBC BLD AUTO-RTO: 0 /100 WBCS
PLATELET # BLD AUTO: 250 THOUSANDS/UL (ref 149–390)
PMV BLD AUTO: 9.3 FL (ref 8.9–12.7)
PROTHROMBIN TIME: 37 SECONDS (ref 11.8–14.2)
RBC # BLD AUTO: 4.34 MILLION/UL (ref 3.88–5.62)
WBC # BLD AUTO: 5.54 THOUSAND/UL (ref 4.31–10.16)

## 2018-12-19 PROCEDURE — 99214 OFFICE O/P EST MOD 30 MIN: CPT | Performed by: FAMILY MEDICINE

## 2018-12-19 PROCEDURE — 1160F RVW MEDS BY RX/DR IN RCRD: CPT | Performed by: FAMILY MEDICINE

## 2018-12-19 PROCEDURE — 85610 PROTHROMBIN TIME: CPT | Performed by: FAMILY MEDICINE

## 2018-12-19 PROCEDURE — 36415 COLL VENOUS BLD VENIPUNCTURE: CPT | Performed by: FAMILY MEDICINE

## 2018-12-19 PROCEDURE — 85025 COMPLETE CBC W/AUTO DIFF WBC: CPT | Performed by: FAMILY MEDICINE

## 2018-12-19 PROCEDURE — 1036F TOBACCO NON-USER: CPT | Performed by: FAMILY MEDICINE

## 2018-12-19 NOTE — PATIENT INSTRUCTIONS
MEASURES THAT HELP PREVENT FALLS:    DRINK PLENTY OF FLUIDS : 2-3 QTS PER DAY : URINE SHOULD BE LIGHT COLOR  GET REGULAR EYE CHECK UP  USE NIGHT LIGHTS  ELIMINATE ALL THROW RUGS  DO SOME WALKING EVERY DAY AND BALANCE EXERCISES  USE NON SLIP FOOT WEARBATH MATS AND GRAB BARS HELP  WHEN RISING: ALWAYS WAIT FOR 15-20 SECONDS BEFORE INITIATING WALK  CONSIDER DOING YOGA OR [facility]     Call if dizziness or lightheadedness gets worse ; No warfarin until we tell you

## 2018-12-19 NOTE — PROGRESS NOTES
Assessment/Plan:    No problem-specific Assessment & Plan notes found for this encounter  Diagnoses and all orders for this visit:    Abdominal wall hematoma, initial encounter  -     CBC and differential  -     Protime-INR          Subjective:      Patient ID: Parminder Barboza  is a 68 y o  male  Patient comes for evaluation of abdominal wall hematoma  He noticed this about a week ago it does not cause him much pain 2 weeks ago he sustained a non injury is fall and 3 weeks ago a similar occasion occurred  Both times were lost footing not due to dizziness or lightheadedness  He had no syncopal episodes  He is on warfarin  He denies use of any anti-inflammatories or aspirin products  No new medications are reported and his INR has been within therapeutic range over the past several times  The following portions of the patient's history were reviewed and updated as appropriate: allergies, current medications, past family history, past medical history, past social history, past surgical history and problem list     Review of Systems   Gastrointestinal: Negative for blood in stool  Genitourinary: Negative for hematuria  Musculoskeletal:        Mild R lower lateral wall rib pain   Neurological: Negative for dizziness  Objective:  Vitals:    12/19/18 1025   BP: 140/80   BP Location: Left arm   Patient Position: Sitting   Cuff Size: Adult   Pulse: 88   Temp: 97 5 °F (36 4 °C)   SpO2: 96%   Weight: 90 7 kg (200 lb)      Physical Exam   Skin:   lg circumferential hematoma around abdome  R back and L lateral abd        No evidence of hemodynamic compromise  Will check CBC INR  Hold warfarin until further discussion

## 2018-12-20 ENCOUNTER — TELEPHONE (OUTPATIENT)
Dept: FAMILY MEDICINE CLINIC | Facility: CLINIC | Age: 73
End: 2018-12-20

## 2018-12-20 ENCOUNTER — ANTICOAG VISIT (OUTPATIENT)
Dept: FAMILY MEDICINE CLINIC | Facility: CLINIC | Age: 73
End: 2018-12-20

## 2018-12-20 DIAGNOSIS — I82.4Y9 DEEP VEIN THROMBOSIS (DVT) OF PROXIMAL LOWER EXTREMITY, UNSPECIFIED CHRONICITY, UNSPECIFIED LATERALITY (HCC): ICD-10-CM

## 2018-12-20 NOTE — PROGRESS NOTES
Left message to call back   HOLD coumadin for now then restart on Monday with 5mg daily and repeat in 2 weeks

## 2018-12-20 NOTE — TELEPHONE ENCOUNTER
Hold Coumadin for now  Restart on Monday but it a dosage of 5 mg every day  Repeat protime in 2 weeks

## 2019-01-01 ENCOUNTER — OFFICE VISIT (OUTPATIENT)
Dept: FAMILY MEDICINE CLINIC | Facility: CLINIC | Age: 74
End: 2019-01-01
Payer: COMMERCIAL

## 2019-01-01 ENCOUNTER — ANESTHESIA (OUTPATIENT)
Dept: GASTROENTEROLOGY | Facility: HOSPITAL | Age: 74
End: 2019-01-01

## 2019-01-01 ENCOUNTER — TELEPHONE (OUTPATIENT)
Dept: FAMILY MEDICINE CLINIC | Facility: CLINIC | Age: 74
End: 2019-01-01

## 2019-01-01 ENCOUNTER — ANESTHESIA EVENT (OUTPATIENT)
Dept: GASTROENTEROLOGY | Facility: HOSPITAL | Age: 74
End: 2019-01-01

## 2019-01-01 ENCOUNTER — CLINICAL SUPPORT (OUTPATIENT)
Dept: FAMILY MEDICINE CLINIC | Facility: CLINIC | Age: 74
End: 2019-01-01
Payer: COMMERCIAL

## 2019-01-01 ENCOUNTER — HOSPITAL ENCOUNTER (OUTPATIENT)
Dept: GASTROENTEROLOGY | Facility: HOSPITAL | Age: 74
Setting detail: OUTPATIENT SURGERY
Discharge: HOME | End: 2019-09-24
Attending: INTERNAL MEDICINE | Admitting: INTERNAL MEDICINE
Payer: COMMERCIAL

## 2019-01-01 ENCOUNTER — ANTICOAG VISIT (OUTPATIENT)
Dept: FAMILY MEDICINE CLINIC | Facility: CLINIC | Age: 74
End: 2019-01-01

## 2019-01-01 VITALS
TEMPERATURE: 97.9 F | HEART RATE: 74 BPM | BODY MASS INDEX: 33.89 KG/M2 | OXYGEN SATURATION: 96 % | WEIGHT: 191.3 LBS | HEIGHT: 63 IN | DIASTOLIC BLOOD PRESSURE: 74 MMHG | SYSTOLIC BLOOD PRESSURE: 128 MMHG | RESPIRATION RATE: 18 BRPM

## 2019-01-01 VITALS
HEIGHT: 63 IN | WEIGHT: 188 LBS | DIASTOLIC BLOOD PRESSURE: 58 MMHG | SYSTOLIC BLOOD PRESSURE: 124 MMHG | TEMPERATURE: 97.7 F | RESPIRATION RATE: 18 BRPM | BODY MASS INDEX: 33.31 KG/M2 | OXYGEN SATURATION: 94 % | HEART RATE: 81 BPM

## 2019-01-01 VITALS
HEIGHT: 63 IN | HEART RATE: 88 BPM | WEIGHT: 188 LBS | BODY MASS INDEX: 33.31 KG/M2 | RESPIRATION RATE: 18 BRPM | TEMPERATURE: 98 F | OXYGEN SATURATION: 94 % | DIASTOLIC BLOOD PRESSURE: 68 MMHG | SYSTOLIC BLOOD PRESSURE: 124 MMHG

## 2019-01-01 DIAGNOSIS — N40.0 BENIGN PROSTATIC HYPERPLASIA, UNSPECIFIED WHETHER LOWER URINARY TRACT SYMPTOMS PRESENT: ICD-10-CM

## 2019-01-01 DIAGNOSIS — Z86.718 HISTORY OF DVT OF LOWER EXTREMITY: ICD-10-CM

## 2019-01-01 DIAGNOSIS — K59.00 CONSTIPATION: ICD-10-CM

## 2019-01-01 DIAGNOSIS — I10 ESSENTIAL HYPERTENSION, BENIGN: ICD-10-CM

## 2019-01-01 DIAGNOSIS — I50.32 CHRONIC DIASTOLIC HEART FAILURE (HCC): Primary | ICD-10-CM

## 2019-01-01 DIAGNOSIS — I10 BENIGN ESSENTIAL HYPERTENSION: Primary | ICD-10-CM

## 2019-01-01 DIAGNOSIS — I10 ESSENTIAL HYPERTENSION: ICD-10-CM

## 2019-01-01 DIAGNOSIS — E66.09 CLASS 1 OBESITY DUE TO EXCESS CALORIES WITHOUT SERIOUS COMORBIDITY WITH BODY MASS INDEX (BMI) OF 34.0 TO 34.9 IN ADULT: ICD-10-CM

## 2019-01-01 DIAGNOSIS — Z12.11 ENCOUNTER FOR SCREENING FOR MALIGNANT NEOPLASM OF COLON: ICD-10-CM

## 2019-01-01 DIAGNOSIS — R26.2 DISABILITY OF WALKING: ICD-10-CM

## 2019-01-01 DIAGNOSIS — F20.0 PARANOID SCHIZOPHRENIA (HCC): ICD-10-CM

## 2019-01-01 DIAGNOSIS — Z86.718 HISTORY OF DVT OF LOWER EXTREMITY: Primary | ICD-10-CM

## 2019-01-01 DIAGNOSIS — R51.9 PRESSURE IN HEAD: Primary | ICD-10-CM

## 2019-01-01 DIAGNOSIS — Z79.01 ANTICOAGULATED ON WARFARIN: ICD-10-CM

## 2019-01-01 DIAGNOSIS — E87.6 HYPOKALEMIA: ICD-10-CM

## 2019-01-01 DIAGNOSIS — Z23 NEED FOR INFLUENZA VACCINATION: ICD-10-CM

## 2019-01-01 DIAGNOSIS — F32.A CHRONIC DEPRESSION: ICD-10-CM

## 2019-01-01 DIAGNOSIS — I87.2 CHRONIC VENOUS STASIS DERMATITIS OF BOTH LOWER EXTREMITIES: ICD-10-CM

## 2019-01-01 LAB
INR PPP: 2.18 (ref 0.84–1.19)
INR PPP: 2.18 (ref 0.84–1.19)
INR PPP: 2.23 (ref 0.84–1.19)
INR PPP: 2.23 (ref 0.84–1.19)
INR PPP: 2.84 (ref 0.84–1.19)
INR PPP: 2.84 (ref 0.84–1.19)
PROTHROMBIN TIME: 23.8 SECONDS (ref 11.6–14.5)
PROTHROMBIN TIME: 24.2 SECONDS (ref 11.6–14.5)
PROTHROMBIN TIME: 29.3 SECONDS (ref 11.6–14.5)

## 2019-01-01 PROCEDURE — 90662 IIV NO PRSV INCREASED AG IM: CPT

## 2019-01-01 PROCEDURE — 99214 OFFICE O/P EST MOD 30 MIN: CPT | Performed by: NURSE PRACTITIONER

## 2019-01-01 PROCEDURE — 85610 PROTHROMBIN TIME: CPT | Performed by: FAMILY MEDICINE

## 2019-01-01 PROCEDURE — 36415 COLL VENOUS BLD VENIPUNCTURE: CPT | Performed by: FAMILY MEDICINE

## 2019-01-01 PROCEDURE — 3078F DIAST BP <80 MM HG: CPT | Performed by: FAMILY MEDICINE

## 2019-01-01 PROCEDURE — 3008F BODY MASS INDEX DOCD: CPT | Performed by: FAMILY MEDICINE

## 2019-01-01 PROCEDURE — 3074F SYST BP LT 130 MM HG: CPT | Performed by: FAMILY MEDICINE

## 2019-01-01 PROCEDURE — 1160F RVW MEDS BY RX/DR IN RCRD: CPT | Performed by: NURSE PRACTITIONER

## 2019-01-01 PROCEDURE — G0008 ADMIN INFLUENZA VIRUS VAC: HCPCS

## 2019-01-01 PROCEDURE — 3074F SYST BP LT 130 MM HG: CPT | Performed by: NURSE PRACTITIONER

## 2019-01-01 PROCEDURE — 1036F TOBACCO NON-USER: CPT | Performed by: NURSE PRACTITIONER

## 2019-01-01 PROCEDURE — 99214 OFFICE O/P EST MOD 30 MIN: CPT | Performed by: FAMILY MEDICINE

## 2019-01-01 PROCEDURE — 3008F BODY MASS INDEX DOCD: CPT | Performed by: NURSE PRACTITIONER

## 2019-01-01 PROCEDURE — 36415 COLL VENOUS BLD VENIPUNCTURE: CPT

## 2019-01-01 RX ORDER — POTASSIUM CHLORIDE 750 MG/1
10 TABLET, EXTENDED RELEASE ORAL DAILY
Qty: 30 TABLET | Refills: 5 | Status: SHIPPED | OUTPATIENT
Start: 2019-01-01 | End: 2020-01-01

## 2019-01-01 RX ORDER — SODIUM CHLORIDE 9 MG/ML
125 INJECTION, SOLUTION INTRAVENOUS CONTINUOUS
Status: DISCONTINUED | OUTPATIENT
Start: 2019-01-01 | End: 2019-01-01 | Stop reason: HOSPADM

## 2019-01-01 RX ORDER — FLUPHENAZINE HYDROCHLORIDE 10 MG/1
10 TABLET ORAL DAILY
COMMUNITY

## 2019-01-01 RX ORDER — WEIGH SCALE
MISCELLANEOUS MISCELLANEOUS DAILY
Qty: 1 EACH | Refills: 0 | Status: SHIPPED | OUTPATIENT
Start: 2019-01-01

## 2019-01-01 RX ORDER — TAMSULOSIN HYDROCHLORIDE 0.4 MG/1
0.4 CAPSULE ORAL
Qty: 90 CAPSULE | Refills: 3 | Status: SHIPPED | OUTPATIENT
Start: 2019-01-01

## 2019-01-01 RX ORDER — AMLODIPINE BESYLATE 5 MG/1
TABLET ORAL
Qty: 90 TABLET | Refills: 1 | Status: SHIPPED | OUTPATIENT
Start: 2019-01-01 | End: 2020-01-01

## 2019-01-01 RX ORDER — FLUVOXAMINE MALEATE 50 MG/1
50 TABLET, COATED ORAL
COMMUNITY

## 2019-01-01 RX ORDER — PROPOFOL 10 MG/ML
INJECTION, EMULSION INTRAVENOUS AS NEEDED
Status: DISCONTINUED | OUTPATIENT
Start: 2019-01-01 | End: 2019-01-01 | Stop reason: SURG

## 2019-01-01 RX ADMIN — PROPOFOL 20 MG: 10 INJECTION, EMULSION INTRAVENOUS at 13:30

## 2019-01-01 RX ADMIN — PROPOFOL 30 MG: 10 INJECTION, EMULSION INTRAVENOUS at 13:15

## 2019-01-01 RX ADMIN — PROPOFOL 30 MG: 10 INJECTION, EMULSION INTRAVENOUS at 13:37

## 2019-01-01 RX ADMIN — PROPOFOL 30 MG: 10 INJECTION, EMULSION INTRAVENOUS at 13:24

## 2019-01-01 RX ADMIN — SODIUM CHLORIDE 125 ML/HR: 0.9 INJECTION, SOLUTION INTRAVENOUS at 12:52

## 2019-01-01 RX ADMIN — PROPOFOL 20 MG: 10 INJECTION, EMULSION INTRAVENOUS at 13:12

## 2019-01-01 RX ADMIN — PROPOFOL 30 MG: 10 INJECTION, EMULSION INTRAVENOUS at 13:19

## 2019-01-01 RX ADMIN — PROPOFOL 50 MG: 10 INJECTION, EMULSION INTRAVENOUS at 13:09

## 2019-01-02 ENCOUNTER — CLINICAL SUPPORT (OUTPATIENT)
Dept: FAMILY MEDICINE CLINIC | Facility: CLINIC | Age: 74
End: 2019-01-02
Payer: COMMERCIAL

## 2019-01-02 DIAGNOSIS — I26.99 OTHER PULMONARY EMBOLISM WITHOUT ACUTE COR PULMONALE, UNSPECIFIED CHRONICITY (HCC): Primary | ICD-10-CM

## 2019-01-02 LAB
INR PPP: 1.61 (ref 0.86–1.17)
INR PPP: 1.61 (ref 0.86–1.17)
PROTHROMBIN TIME: 19.2 SECONDS (ref 11.8–14.2)

## 2019-01-02 PROCEDURE — 85610 PROTHROMBIN TIME: CPT | Performed by: FAMILY MEDICINE

## 2019-01-02 PROCEDURE — 36415 COLL VENOUS BLD VENIPUNCTURE: CPT | Performed by: FAMILY MEDICINE

## 2019-01-03 ENCOUNTER — ANTICOAG VISIT (OUTPATIENT)
Dept: FAMILY MEDICINE CLINIC | Facility: CLINIC | Age: 74
End: 2019-01-03

## 2019-01-03 ENCOUNTER — TELEPHONE (OUTPATIENT)
Dept: FAMILY MEDICINE CLINIC | Facility: CLINIC | Age: 74
End: 2019-01-03

## 2019-01-03 DIAGNOSIS — I82.4Y9 DEEP VEIN THROMBOSIS (DVT) OF PROXIMAL LOWER EXTREMITY, UNSPECIFIED CHRONICITY, UNSPECIFIED LATERALITY (HCC): ICD-10-CM

## 2019-02-04 ENCOUNTER — TELEPHONE (OUTPATIENT)
Dept: FAMILY MEDICINE CLINIC | Facility: CLINIC | Age: 74
End: 2019-02-04

## 2019-02-04 NOTE — TELEPHONE ENCOUNTER
Call from [facility] states that pt is scheduled for a [place]umber epidural steroid injection on 02/14/19  Office wants to know if pt can hold warfarin for 5 days prior to procedure? Please advise [facility] at [phone] ext [phone]   Pt see Femi Patel thank you

## 2019-02-06 ENCOUNTER — TELEPHONE (OUTPATIENT)
Dept: FAMILY MEDICINE CLINIC | Facility: CLINIC | Age: 74
End: 2019-02-06

## 2019-02-18 ENCOUNTER — OFFICE VISIT (OUTPATIENT)
Dept: FAMILY MEDICINE CLINIC | Facility: CLINIC | Age: 74
End: 2019-02-18
Payer: COMMERCIAL

## 2019-02-18 VITALS
HEART RATE: 86 BPM | BODY MASS INDEX: 33.36 KG/M2 | WEIGHT: 200.2 LBS | TEMPERATURE: 98.1 F | DIASTOLIC BLOOD PRESSURE: 66 MMHG | HEIGHT: 65 IN | RESPIRATION RATE: 18 BRPM | SYSTOLIC BLOOD PRESSURE: 130 MMHG

## 2019-02-18 DIAGNOSIS — F20.0 PARANOID SCHIZOPHRENIA (HCC): ICD-10-CM

## 2019-02-18 DIAGNOSIS — R26.2 DISABILITY OF WALKING: ICD-10-CM

## 2019-02-18 DIAGNOSIS — I27.82 OTHER CHRONIC PULMONARY EMBOLISM WITHOUT ACUTE COR PULMONALE (HCC): ICD-10-CM

## 2019-02-18 DIAGNOSIS — I87.2 CHRONIC VENOUS STASIS DERMATITIS OF BOTH LOWER EXTREMITIES: ICD-10-CM

## 2019-02-18 DIAGNOSIS — Z11.59 SCREENING FOR VIRAL DISEASE: Primary | ICD-10-CM

## 2019-02-18 DIAGNOSIS — I50.32 CHRONIC DIASTOLIC HEART FAILURE (HCC): ICD-10-CM

## 2019-02-18 DIAGNOSIS — E66.09 CLASS 1 OBESITY DUE TO EXCESS CALORIES WITHOUT SERIOUS COMORBIDITY WITH BODY MASS INDEX (BMI) OF 34.0 TO 34.9 IN ADULT: ICD-10-CM

## 2019-02-18 DIAGNOSIS — D17.5 LIPOMA OF COLON: ICD-10-CM

## 2019-02-18 DIAGNOSIS — I10 BENIGN ESSENTIAL HYPERTENSION: ICD-10-CM

## 2019-02-18 DIAGNOSIS — I25.10 CORONARY ARTERY DISEASE INVOLVING NATIVE CORONARY ARTERY OF NATIVE HEART WITHOUT ANGINA PECTORIS: ICD-10-CM

## 2019-02-18 PROBLEM — M48.062 SPINAL STENOSIS OF LUMBAR REGION WITH NEUROGENIC CLAUDICATION: Status: ACTIVE | Noted: 2019-02-18

## 2019-02-18 LAB
EST. AVERAGE GLUCOSE BLD GHB EST-MCNC: 111 MG/DL
HBA1C MFR BLD: 5.5 % (ref 4.2–6.3)
INR PPP: 1.27 (ref 0.86–1.17)
INR PPP: 1.27 (ref 0.86–1.17)
PROTHROMBIN TIME: 16 SECONDS (ref 11.8–14.2)
TSH SERPL DL<=0.05 MIU/L-ACNC: 2.53 UIU/ML (ref 0.36–3.74)

## 2019-02-18 PROCEDURE — 1125F AMNT PAIN NOTED PAIN PRSNT: CPT | Performed by: FAMILY MEDICINE

## 2019-02-18 PROCEDURE — 1160F RVW MEDS BY RX/DR IN RCRD: CPT | Performed by: FAMILY MEDICINE

## 2019-02-18 PROCEDURE — 83036 HEMOGLOBIN GLYCOSYLATED A1C: CPT | Performed by: FAMILY MEDICINE

## 2019-02-18 PROCEDURE — 1170F FXNL STATUS ASSESSED: CPT | Performed by: FAMILY MEDICINE

## 2019-02-18 PROCEDURE — 85610 PROTHROMBIN TIME: CPT | Performed by: FAMILY MEDICINE

## 2019-02-18 PROCEDURE — 99214 OFFICE O/P EST MOD 30 MIN: CPT | Performed by: FAMILY MEDICINE

## 2019-02-18 PROCEDURE — 86803 HEPATITIS C AB TEST: CPT | Performed by: FAMILY MEDICINE

## 2019-02-18 PROCEDURE — 3075F SYST BP GE 130 - 139MM HG: CPT | Performed by: FAMILY MEDICINE

## 2019-02-18 PROCEDURE — 84443 ASSAY THYROID STIM HORMONE: CPT | Performed by: FAMILY MEDICINE

## 2019-02-18 PROCEDURE — 3078F DIAST BP <80 MM HG: CPT | Performed by: FAMILY MEDICINE

## 2019-02-18 PROCEDURE — G0438 PPPS, INITIAL VISIT: HCPCS | Performed by: FAMILY MEDICINE

## 2019-02-18 PROCEDURE — 3008F BODY MASS INDEX DOCD: CPT | Performed by: FAMILY MEDICINE

## 2019-02-18 PROCEDURE — 3044F HG A1C LEVEL LT 7.0%: CPT | Performed by: NURSE PRACTITIONER

## 2019-02-18 PROCEDURE — 36415 COLL VENOUS BLD VENIPUNCTURE: CPT | Performed by: FAMILY MEDICINE

## 2019-02-18 RX ORDER — FLUPHENAZINE HYDROCHLORIDE 5 MG/1
5 TABLET ORAL 3 TIMES DAILY
COMMUNITY
End: 2019-03-03 | Stop reason: ALTCHOICE

## 2019-02-18 NOTE — PROGRESS NOTES
Assessment/Plan:    Class 1 obesity due to excess calories without serious comorbidity with body mass index (BMI) of 34 0 to 34 9 in adult  Does not exercise  Patient is educated on the importance of portion control and dieting  Patient is also educated on the importance of exercise  Patient is encouraged to walk 30 min at least 5 times a week  Discussed about benefits of losing weight  Patient acknowledges that they understood what is discussed  Benign essential hypertension  No HBPM  Labs reviewed from 11/2018 - CBC and CMP  Blood pressure is controlled on present treatment regimen  Patient misses no doses and tolerates the meds without side effects  Patient avoids salt  Discussed diet, exercise and weight control  No change in present meds  Continue HBPM     Diastolic heart failure (HCC)  Controlled  No CELIS  Has daily edema of BLE -- sits a lot during the day  No change in present meds  Avoid salt and continue same meds  Does not have a scale at home to weight himself  Chronic venous stasis dermatitis of both lower extremities  No change  Daily edema and sits a lot during the day  Schizophrenia ([address] )  Followed regularly by Dr Nilda Galvan  Meds were changed  Has a recheck visit today  Pulmonary embolism (HCC)  No recurrence on warfarin - lifelong treatment  Disability of walking  Chronic problem and followed by Pain Management  Has had recurrent pain and radiculopathy  Had DONNY x3 - last one was in 2/2019  Has been going to PT but stopped recently -- went? ? Why the??        Diagnoses and all orders for this visit:    Screening for viral disease  -     Cancel: Hepatitis C antibody;  Future  -     Hepatitis C antibody    Class 1 obesity due to excess calories without serious comorbidity with body mass index (BMI) of 34 0 to 34 9 in adult  -     Hemoglobin A1C    Benign essential hypertension  -     TSH, 3rd generation    Coronary artery disease involving native coronary artery of native heart without angina pectoris    Chronic diastolic heart failure (HCC)    Chronic venous stasis dermatitis of both lower extremities    Paranoid schizophrenia ([facility] [address] )    Other chronic pulmonary embolism without acute cor pulmonale (HCC)  -     Protime-INR    Disability of walking    Lipoma of colon  -     Ambulatory referral to Gastroenterology; Future    Other orders  -     fluPHENAZine (PROLIXIN) 5 mg tablet; Take 5 mg by mouth 3 (three) times a day          Subjective:     Chief Complaint   Patient presents with    Medicare Wellness Visit        Patient ID: Jaki Vivar  is a 68 y o  male  HPI : Medicare AWV and multiple medical problems  The following portions of the patient's history were reviewed and updated as appropriate: allergies, current medications, past family history, past medical history, past social history, past surgical history and problem list     Review of Systems   Constitutional: Negative for activity change, appetite change, fatigue, fever and unexpected weight change  HENT: Negative for congestion, dental problem and sneezing  Eyes: Negative for discharge and visual disturbance  Respiratory: Negative for cough, chest tightness and wheezing  Cardiovascular: Positive for leg swelling  Negative for chest pain and palpitations  Gastrointestinal: Negative for abdominal pain, constipation, diarrhea, nausea and vomiting  BMs are controlled on Miralax  Endocrine: Negative for polydipsia and polyuria  Genitourinary: Negative for dysuria and frequency  Musculoskeletal: Positive for gait problem  Negative for arthralgias  Skin: Negative for rash  Allergic/Immunologic: Negative for environmental allergies and food allergies  Neurological: Negative for speech difficulty, weakness and headaches  Hematological: Negative for adenopathy  Psychiatric/Behavioral: Negative for agitation, behavioral problems and sleep disturbance           Objective:  Vitals: 02/18/19 1329   BP: 130/66   BP Location: Left arm   Patient Position: Sitting   Cuff Size: Standard   Pulse: 86   Resp: 18   Temp: 98 1 °F (36 7 °C)   Weight: 90 8 kg (200 lb 3 2 oz)   Height: 5' 4 5" (1 638 m)      Physical Exam   Constitutional: He is oriented to person, place, and time  He appears well-developed and well-nourished  HENT:   Head: Normocephalic  Nose: Nose normal    Mouth/Throat: Oropharynx is clear and moist    Eyes: Pupils are equal, round, and reactive to light  Conjunctivae are normal  Right eye exhibits no discharge  Left eye exhibits no discharge  Neck: Normal range of motion  Neck supple  No thyromegaly present  Cardiovascular: Normal rate, regular rhythm and normal heart sounds  No murmur heard  Pulmonary/Chest: Effort normal and breath sounds normal    Abdominal: Soft  Bowel sounds are normal  He exhibits no distension and no mass  There is no tenderness  There is no guarding  Musculoskeletal: Normal range of motion  He exhibits edema  Feet:    Lymphadenopathy:     He has no cervical adenopathy  Neurological: He is alert and oriented to person, place, and time  Skin: Skin is warm  Rash noted  Moderate stasis dermatitis of both lower extremities-no evidence of ulcerations or cellulitis  Psychiatric: He has a normal mood and affect  His behavior is normal      BMI Counseling: Body mass index is 33 83 kg/m²  Discussed the patient's BMI with him  The BMI is above average  BMI counseling and education was provided to the patient  Nutrition recommendations include reducing portion sizes, decreasing overall calorie intake, 3-5 servings of fruits/vegetables daily, reducing fast food intake, consuming healthier snacks, decreasing soda and/or juice intake, moderation in carbohydrate intake and reducing intake of saturated fat and trans fat  Exercise recommendations include exercising 3-5 times per week

## 2019-02-18 NOTE — ASSESSMENT & PLAN NOTE
No HBPM  Labs reviewed from 11/2018 - CBC and CMP  Blood pressure is controlled on present treatment regimen  Patient misses no doses and tolerates the meds without side effects  Patient avoids salt  Discussed diet, exercise and weight control  No change in present meds   Continue HBPM

## 2019-02-18 NOTE — ASSESSMENT & PLAN NOTE
Does not exercise  Patient is educated on the importance of portion control and dieting  Patient is also educated on the importance of exercise  Patient is encouraged to walk 30 min at least 5 times a week  Discussed about benefits of losing weight  Patient acknowledges that they understood what is discussed

## 2019-02-18 NOTE — PATIENT INSTRUCTIONS
Labs done today  No change in meds  Check labs, today, including PT/INR  Elevate legs whenever possible     AVOID THESE HIGH SALT FOODS:    SNACKS THAT TASTE SALTY: PRETZELS/CHIPSPOPCORN  CANNED SOUPS AND VEGGIES  FROZEN FOODS/ MICROWAVEABLE FOODS/ CANNED FOODS  AVOID ADDING EXTRA SALT TO THE MEALS

## 2019-02-18 NOTE — ASSESSMENT & PLAN NOTE
Controlled  No CELIS  Has daily edema of BLE -- sits a lot during the day  No change in present meds  Avoid salt and continue same meds  Does not have a scale at home to weight himself

## 2019-02-18 NOTE — PROGRESS NOTES
Assessment and Plan:    Problem List Items Addressed This Visit     None      Visit Diagnoses     Screening for viral disease    -  Primary    Relevant Orders    Hepatitis C antibody        Health Maintenance Due   Topic Date Due    Hepatitis C Screening  1945    Medicare Annual Wellness Visit (AWV)  1945    CRC Screening: Colonoscopy  1945    BMI: Adult  10/01/1964    Pneumococcal PPSV23/PCV13 65+ Years / Low and Medium Risk (2 of 2 - PPSV23) 03/18/2016         HPI:  Pop Higgins  is a 68 y o  male here for his Initial Wellness Visit      Patient Active Problem List   Diagnosis    Benign essential hypertension    Coronary artery disease involving native coronary artery of native heart without angina pectoris    Chronic depression    Chronic venous stasis dermatitis of both lower extremities    Deep venous thrombosis of lower extremity (HCC)    Diastolic heart failure (HCC)    Osteoarthritis    Nocturnal enuresis    Idiopathic trigeminal neuralgia    Lacunar infarction    Class 1 obesity due to excess calories without serious comorbidity with body mass index (BMI) of 34 0 to 34 9 in adult    Schizophrenia ([address] )    Disability of walking    Pulmonary embolism ([address] )    Arthropathy    Mixed hyperlipidemia    Neurogenic claudication    Neck pain without injury    Abdominal wall hematoma     Past Medical History:   Diagnosis Date    Ankle edema 04/2004    chronic    Arthritis 03/16/2011    right knee    Ataxic gait 11/14/2011    Baker's cyst 04/2005    (djd)-left knee    Cardiac disease     Cataract     Chronic pain     Coronary artery disease     Depression     DVT (deep venous thrombosis) (HCC)     Epistaxis 01/09/2015    Hand tingling 02/05/2010    tingling right forearm and hand-CTS on EMG    Hematuria 11/22/2016    ER-3 way sandhu to irrigate bladder with 3 L NS    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    Hypertension     Lipoma 01/11/2011    in hepatic flexure    Migraine headache 02/2007    optic    Obesity     Pulmonary embolism ([facility] [address] ) 09/18/2014    bilateral PE + acute DVT LLE     Rib fracture 03/2007    left 8th rib 2nd degree fall    Schizophrenia ([facility] [address] ) 04/2009    acute exacerbation    Sciatic leg pain 07/20/2011    left    Stenosis of cervix     with cervical radiculomyelopathy    Suicidal ideation 04/2009    Syncope 01/09/2015    Tardive dyskinesia 02/28/2011    Torn meniscus 04/2004    lateral, knee, left    Varicose vein of leg 04/2004    Venous insufficiency of leg 04/2004    chronic    Vertigo 05/12/2008    transient-questionable BPPV     Past Surgical History:   Procedure Laterality Date    CATARACT EXTRACTION Right     10/2/2009    CATARACT EXTRACTION      12/4/2009    COLONOSCOPY      with biopsy    IVC FILTER INSERTION      anticoagulation-9/23/2014    KNEE ARTHROCENTESIS       and cortisone injection    KNEE ARTHROSCOPY Left     5/18/2004    LAMINECTOMY      C3-C6 and left sided cervical foraminotomies-10/14/2008    OTHER SURGICAL HISTORY      DONNY x 1    REPLACEMENT TOTAL HIP W/  RESURFACING IMPLANTS      TOTAL HIP ARTHROPLASTY Right     02/07/2017    TOTAL KNEE ARTHROPLASTY Left     3/21/2005     Family History   Problem Relation Age of Onset    Coronary artery disease Mother     Diabetes Mother     Depression Mother     Other Mother         tobacco abuse    Other Father         tobacco abuse    Coronary artery disease Father     Other Sister         tobacco abuse    Diabetes Sister     Hypertension Brother     Migraines Other     Breast cancer Other         2009-mastectomy+chemo     Social History     Tobacco Use   Smoking Status Never Smoker   Smokeless Tobacco Never Used   Tobacco Comment    no passive smoke exposure     Social History     Substance and Sexual Activity   Alcohol Use No      Social History     Substance and Sexual Activity   Drug Use No       Current Outpatient Medications   Medication Sig Dispense Refill    amantadine (SYMMETREL) 100 mg capsule Take 1 capsule by mouth daily        amLODIPine (NORVASC) 5 mg tablet Take 1 tablet (5 mg total) by mouth daily 90 tablet 1    fluPHENAZine (PROLIXIN) 5 mg tablet Take 5 mg by mouth 3 (three) times a day      fluvoxaMINE (LUVOX) 50 mg tablet Take 1 tablet by mouth daily Take 1 tablet by oral route 5 times a day       losartan (COZAAR) 50 mg tablet Take 1 tablet (50 mg total) by mouth daily 90 tablet 0    risperiDONE (RISPERDAL) 3 mg tablet Take by mouth Take 1 5 tablets by oral route once a day       trifluoperazine (STELAZINE) 5 mg tablet Take 5 mg by mouth 2 (two) times a day Take 2 tablets by oral route twice a day       warfarin (COUMADIN) 5 mg tablet       warfarin (COUMADIN) 6 mg tablet Take 1 tablet (6 mg total) by mouth as needed (every MWF) 30 tablet 5    acetaminophen (TYLENOL) 325 mg tablet Take 2 tablets, orally, twice a day, 6 hours apart, every day  (Patient not taking: Reported on 11/30/2018 ) 100 tablet 0    tamsulosin (FLOMAX) 0 4 mg Take 1 capsule (0 4 mg total) by mouth daily with dinner for 90 days 90 capsule 3     No current facility-administered medications for this visit        Allergies   Allergen Reactions    Lisinopril Cough     Immunization History   Administered Date(s) Administered    INFLUENZA 09/16/2015, 09/14/2016, 10/12/2018    Influenza Split 09/17/2010, 09/28/2011, 01/10/2013, 12/20/2013    Influenza Split High Dose Preservative Free IM 09/16/2015    Influenza TIV (IM) 11/28/2008, 10/15/2009, 10/02/2014    Influenza, high dose seasonal 0 5 mL 10/12/2018, 10/12/2018    Pneumococcal Conjugate 13-Valent 03/18/2015    Pneumococcal Polysaccharide PPV23 11/16/2007    Td (adult), adsorbed 11/09/2011    Tdap 12/11/2017    Zoster 10/24/2013       Patient Care Team:  Lawrence Petersen MD as PCP - Abraham Scales MD  [place], [place] Medicare Screening Tests and Risk Assessments:  Freddy Hart is here for his Initial Wellness visit  Health Risk Assessment:  Patient rates overall health as fair  Patient feels that their physical health rating is Slightly worse  Eyesight was rated as Slightly worse  Hearing was rated as Same  Patient feels that their emotional and mental health rating is Slightly worse  Pain experienced by patient in the last 7 days has been A lot  Patient's pain rating has been 8/10  Patient states that he has experienced weight loss or gain in last 6 months  Emotional/Mental Health:  Patient has been feeling nervous/anxious  PHQ-9 Depression Screening:    Frequency of the following problems over the past two weeks:      1  Little interest or pleasure in doing things: 0 - not at all      2  Feeling down, depressed, or hopeless: 1 - several days      3  Trouble falling or staying asleep, or sleeping too much: 0 - not at all      4  Feeling tired or having little energy: 2 - more than half the days      5  Poor appetite or overeatin - not at all      6  Feeling bad about yourself - or that you are a failure or have let yourself or your family down: 1 - several days      7  Trouble concentrating on things, such as reading the newspaper or watching television: 1 - several days      8  Moving or speaking so slowly that other people could have noticed  Or the opposite - being so fidgety or restless that you have been moving around a lot more than usual: 1 - several days      9  Thoughts that you would be better off dead, or of hurting yourself in some way: 0 - not at all  PHQ-2 Score: 1  PHQ-9 Score: 6    Broken Bones/Falls: Fall Risk Assessment:    In the past year, patient has experienced: History of falling in past year     Number of falls: greater than 3  Patient feels unsteady standing  Patient is taking medication that can cause feelings of lightheadedness or tiredness  Patient often has a need to rush to the toilet       Bladder/Bowel:  Patient has leaked urine accidently in the last six months  Patient reports no loss of bowel control  Immunizations:  Patient has had a flu vaccination within the last year  Patient has received a pneumonia shot  Patient has not received a shingles shot  Patient has received tetanus/diphtheria shot  Date of tetanus/diphtheria shot: 2/11/2017    Home Safety:  Patient has trouble with stairs inside or outside of their home  Patient currently reports that there are safety hazards present in home , working smoke alarms, working carbon monoxide detectors  Preventative Screenings:   no prostate cancer screen performed, no colon cancer screen completed, no cholesterol screen completed, glaucoma eye exam completed, 1/20/2017      Nutrition:  Current diet: Regular with servings of the following:    Medications:  Patient is currently taking over-the-counter supplements  List of OTC medications includes: Tylenol, miralax  Patient is able to manage medications  Lifestyle Choices:  Patient reports no tobacco use  Patient has not smoked or used tobacco in the past   Patient reports no alcohol use  Patient does not drive a vehicle  Patient wears seat belt  Activities of Daily Living:  Can get out of bed by his or her self, able to dress self, able to make own meals, able to do own shopping, able to bathe self, can do own laundry/housekeeping, can manage own money, pay bills and track expenses    Previous Hospitalizations:  No hospitalization or ED visit in past 12 months  Number of hospitalizations within the last year: 1-2        Advanced Directives:  Patient has not decided on power of   Patient has not completed advanced directive          Preventative Screening/Counseling:      Cardiovascular:      General: Risks and Benefits Discussed and Screening Current      Counseling: Healthy Weight and Healthy Diet          Diabetes:      General: Risks and Benefits Discussed and Screening Current      Counseling: Healthy Diet and Healthy Weight          Colorectal Cancer:      General: Risks and Benefits Discussed      Comments: Last colonoscopy was 01/11/2011  Postop diagnosis was a probable lipoma in the hepatic flexure  They recommended repeat colonoscopy in 5 years but that was not done  Prostate Cancer:      General: Screening Not Indicated          Osteoporosis:      General: Screening Not Indicated          AAA:      General: Screening Current      Comments: CT scan of the abdomen was negative for AAA - 12/21/16  Glaucoma:      General: Risks and Benefits Discussed and Screening Current      Comments: Last OPHTH visit was 6 months ago  No glaucoma or ARMD         HIV:      General: Screening Not Indicated          Hepatitis C:      General: Risks and Benefits Discussed      Counseling: has received general HCV counseling        Advanced Directives:   Patient has no living will for healthcare, does not have durable POA for healthcare, patient does not have an advanced directive  5 wishes given  Additional Comments: Discussed Advanced Directive, including a Living Will and the appointment of a Medical POA  I gave the person examples of both  When they have these, we want a copy      Immunizations:      Influenza: Influenza UTD This Year      Pneumococcal: Lifetime Vaccine Completed      Shingrix: Risks & Benefits Discussed

## 2019-02-19 ENCOUNTER — TELEPHONE (OUTPATIENT)
Dept: FAMILY MEDICINE CLINIC | Facility: CLINIC | Age: 74
End: 2019-02-19

## 2019-02-19 ENCOUNTER — ANTICOAG VISIT (OUTPATIENT)
Dept: FAMILY MEDICINE CLINIC | Facility: CLINIC | Age: 74
End: 2019-02-19

## 2019-02-19 DIAGNOSIS — I82.4Y9 DEEP VEIN THROMBOSIS (DVT) OF PROXIMAL LOWER EXTREMITY, UNSPECIFIED CHRONICITY, UNSPECIFIED LATERALITY (HCC): ICD-10-CM

## 2019-02-19 LAB — HCV AB SER QL: NORMAL

## 2019-02-19 NOTE — TELEPHONE ENCOUNTER
----- Message from Chirag Sepulveda MD sent at 2/19/2019 12:53 PM EST -----  Call the patient  The HCV test was negative

## 2019-02-19 NOTE — TELEPHONE ENCOUNTER
His PT INR is low and for no good reason  Find out what he has been doing with his warfarin and let me know

## 2019-02-20 ENCOUNTER — TELEPHONE (OUTPATIENT)
Dept: FAMILY MEDICINE CLINIC | Facility: CLINIC | Age: 74
End: 2019-02-20

## 2019-03-03 ENCOUNTER — APPOINTMENT (EMERGENCY)
Dept: CT IMAGING | Facility: HOSPITAL | Age: 74
DRG: 552 | End: 2019-03-03
Payer: COMMERCIAL

## 2019-03-03 ENCOUNTER — HOSPITAL ENCOUNTER (EMERGENCY)
Facility: HOSPITAL | Age: 74
DRG: 552 | End: 2019-03-04
Attending: EMERGENCY MEDICINE
Payer: COMMERCIAL
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
## 2019-03-03 DIAGNOSIS — S20.219A CHEST WALL CONTUSION: ICD-10-CM

## 2019-03-03 DIAGNOSIS — S12.500A C6 CERVICAL FRACTURE (HCC): Primary | ICD-10-CM

## 2019-03-03 DIAGNOSIS — S12.600A C7 CERVICAL FRACTURE (HCC): ICD-10-CM

## 2019-03-03 DIAGNOSIS — S09.90XA CLOSED HEAD INJURY, INITIAL ENCOUNTER: ICD-10-CM

## 2019-03-03 LAB
ALBUMIN SERPL BCP-MCNC: 4 G/DL (ref 3.5–5)
ALP SERPL-CCNC: 99 U/L (ref 46–116)
ALT SERPL W P-5'-P-CCNC: 41 U/L (ref 12–78)
ANION GAP BLD CALC-SCNC: 17 MMOL/L (ref 4–13)
ANION GAP SERPL CALCULATED.3IONS-SCNC: 11 MMOL/L (ref 4–13)
APTT PPP: 31 SECONDS (ref 26–38)
AST SERPL W P-5'-P-CCNC: 39 U/L (ref 5–45)
BASOPHILS # BLD AUTO: 0.05 THOUSANDS/ΜL (ref 0–0.1)
BASOPHILS NFR BLD AUTO: 1 % (ref 0–1)
BILIRUB SERPL-MCNC: 0.42 MG/DL (ref 0.2–1)
BUN BLD-MCNC: 14 MG/DL (ref 5–25)
BUN SERPL-MCNC: 14 MG/DL (ref 5–25)
CA-I BLD-SCNC: 1.14 MMOL/L (ref 1.12–1.32)
CALCIUM SERPL-MCNC: 9.1 MG/DL (ref 8.3–10.1)
CHLORIDE BLD-SCNC: 100 MMOL/L (ref 100–108)
CHLORIDE SERPL-SCNC: 103 MMOL/L (ref 100–108)
CO2 SERPL-SCNC: 31 MMOL/L (ref 21–32)
CREAT BLD-MCNC: 0.9 MG/DL (ref 0.6–1.3)
CREAT SERPL-MCNC: 0.93 MG/DL (ref 0.6–1.3)
EOSINOPHIL # BLD AUTO: 0.13 THOUSAND/ΜL (ref 0–0.61)
EOSINOPHIL NFR BLD AUTO: 2 % (ref 0–6)
ERYTHROCYTE [DISTWIDTH] IN BLOOD BY AUTOMATED COUNT: 13.9 % (ref 11.6–15.1)
ETHANOL SERPL-MCNC: <3 MG/DL (ref 0–3)
GFR SERPL CREATININE-BSD FRML MDRD: 82 ML/MIN/1.73SQ M
GFR SERPL CREATININE-BSD FRML MDRD: 85 ML/MIN/1.73SQ M
GLUCOSE SERPL-MCNC: 114 MG/DL (ref 65–140)
GLUCOSE SERPL-MCNC: 117 MG/DL (ref 65–140)
HCT VFR BLD AUTO: 46.5 % (ref 36.5–49.3)
HCT VFR BLD CALC: 48 % (ref 36.5–49.3)
HGB BLD-MCNC: 15 G/DL (ref 12–17)
HGB BLDA-MCNC: 16.3 G/DL (ref 12–17)
IMM GRANULOCYTES # BLD AUTO: 0.06 THOUSAND/UL (ref 0–0.2)
IMM GRANULOCYTES NFR BLD AUTO: 1 % (ref 0–2)
INR PPP: 2.12 (ref 0.86–1.17)
LIPASE SERPL-CCNC: 169 U/L (ref 73–393)
LYMPHOCYTES # BLD AUTO: 1.07 THOUSANDS/ΜL (ref 0.6–4.47)
LYMPHOCYTES NFR BLD AUTO: 19 % (ref 14–44)
MCH RBC QN AUTO: 29.7 PG (ref 26.8–34.3)
MCHC RBC AUTO-ENTMCNC: 32.3 G/DL (ref 31.4–37.4)
MCV RBC AUTO: 92 FL (ref 82–98)
MONOCYTES # BLD AUTO: 0.51 THOUSAND/ΜL (ref 0.17–1.22)
MONOCYTES NFR BLD AUTO: 9 % (ref 4–12)
NEUTROPHILS # BLD AUTO: 3.79 THOUSANDS/ΜL (ref 1.85–7.62)
NEUTS SEG NFR BLD AUTO: 68 % (ref 43–75)
NRBC BLD AUTO-RTO: 0 /100 WBCS
PCO2 BLD: 32 MMOL/L (ref 21–32)
PLATELET # BLD AUTO: 205 THOUSANDS/UL (ref 149–390)
PMV BLD AUTO: 9.2 FL (ref 8.9–12.7)
POTASSIUM BLD-SCNC: 3.5 MMOL/L (ref 3.5–5.3)
POTASSIUM SERPL-SCNC: 3.5 MMOL/L (ref 3.5–5.3)
PROT SERPL-MCNC: 7.7 G/DL (ref 6.4–8.2)
PROTHROMBIN TIME: 23.8 SECONDS (ref 11.8–14.2)
RBC # BLD AUTO: 5.05 MILLION/UL (ref 3.88–5.62)
SODIUM BLD-SCNC: 144 MMOL/L (ref 136–145)
SODIUM SERPL-SCNC: 145 MMOL/L (ref 136–145)
SPECIMEN SOURCE: ABNORMAL
TROPONIN I SERPL-MCNC: <0.02 NG/ML
WBC # BLD AUTO: 5.61 THOUSAND/UL (ref 4.31–10.16)

## 2019-03-03 PROCEDURE — G0480 DRUG TEST DEF 1-7 CLASSES: HCPCS | Performed by: EMERGENCY MEDICINE

## 2019-03-03 PROCEDURE — 84484 ASSAY OF TROPONIN QUANT: CPT | Performed by: EMERGENCY MEDICINE

## 2019-03-03 PROCEDURE — 80047 BASIC METABLC PNL IONIZED CA: CPT

## 2019-03-03 PROCEDURE — 86850 RBC ANTIBODY SCREEN: CPT | Performed by: EMERGENCY MEDICINE

## 2019-03-03 PROCEDURE — 85025 COMPLETE CBC W/AUTO DIFF WBC: CPT | Performed by: EMERGENCY MEDICINE

## 2019-03-03 PROCEDURE — 85014 HEMATOCRIT: CPT

## 2019-03-03 PROCEDURE — 86900 BLOOD TYPING SEROLOGIC ABO: CPT | Performed by: EMERGENCY MEDICINE

## 2019-03-03 PROCEDURE — 93005 ELECTROCARDIOGRAM TRACING: CPT

## 2019-03-03 PROCEDURE — 99285 EMERGENCY DEPT VISIT HI MDM: CPT

## 2019-03-03 PROCEDURE — 80320 DRUG SCREEN QUANTALCOHOLS: CPT | Performed by: EMERGENCY MEDICINE

## 2019-03-03 PROCEDURE — 70450 CT HEAD/BRAIN W/O DYE: CPT

## 2019-03-03 PROCEDURE — 85610 PROTHROMBIN TIME: CPT | Performed by: EMERGENCY MEDICINE

## 2019-03-03 PROCEDURE — 86901 BLOOD TYPING SEROLOGIC RH(D): CPT | Performed by: EMERGENCY MEDICINE

## 2019-03-03 PROCEDURE — 96360 HYDRATION IV INFUSION INIT: CPT

## 2019-03-03 PROCEDURE — 83690 ASSAY OF LIPASE: CPT | Performed by: EMERGENCY MEDICINE

## 2019-03-03 PROCEDURE — 71260 CT THORAX DX C+: CPT

## 2019-03-03 PROCEDURE — 36415 COLL VENOUS BLD VENIPUNCTURE: CPT | Performed by: EMERGENCY MEDICINE

## 2019-03-03 PROCEDURE — 72125 CT NECK SPINE W/O DYE: CPT

## 2019-03-03 PROCEDURE — 85730 THROMBOPLASTIN TIME PARTIAL: CPT | Performed by: EMERGENCY MEDICINE

## 2019-03-03 PROCEDURE — 74177 CT ABD & PELVIS W/CONTRAST: CPT

## 2019-03-03 PROCEDURE — 80053 COMPREHEN METABOLIC PANEL: CPT | Performed by: EMERGENCY MEDICINE

## 2019-03-03 RX ORDER — BACITRACIN, NEOMYCIN, POLYMYXIN B 400; 3.5; 5 [USP'U]/G; MG/G; [USP'U]/G
1 OINTMENT TOPICAL ONCE
Status: COMPLETED | OUTPATIENT
Start: 2019-03-03 | End: 2019-03-03

## 2019-03-03 RX ADMIN — BACITRACIN ZINC NEOMYCIN SULFATE POLYMYXIN B SULFATE 1 LARGE APPLICATION: 400; 3.5; 5 OINTMENT TOPICAL at 23:13

## 2019-03-03 RX ADMIN — SODIUM CHLORIDE 500 ML: 0.9 INJECTION, SOLUTION INTRAVENOUS at 23:11

## 2019-03-04 ENCOUNTER — TELEPHONE (OUTPATIENT)
Dept: FAMILY MEDICINE CLINIC | Facility: CLINIC | Age: 74
End: 2019-03-04

## 2019-03-04 ENCOUNTER — HOSPITAL ENCOUNTER (INPATIENT)
Facility: HOSPITAL | Age: 74
LOS: 1 days | DRG: 552 | End: 2019-03-05
Attending: SURGERY | Admitting: SURGERY
Payer: COMMERCIAL

## 2019-03-04 ENCOUNTER — APPOINTMENT (EMERGENCY)
Dept: RADIOLOGY | Facility: HOSPITAL | Age: 74
DRG: 552 | End: 2019-03-04
Payer: COMMERCIAL

## 2019-03-04 ENCOUNTER — APPOINTMENT (INPATIENT)
Dept: RADIOLOGY | Facility: HOSPITAL | Age: 74
DRG: 552 | End: 2019-03-04
Payer: COMMERCIAL

## 2019-03-04 VITALS
RESPIRATION RATE: 22 BRPM | HEART RATE: 106 BPM | SYSTOLIC BLOOD PRESSURE: 134 MMHG | OXYGEN SATURATION: 94 % | TEMPERATURE: 97.8 F | DIASTOLIC BLOOD PRESSURE: 72 MMHG
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
## 2019-03-04 DIAGNOSIS — S12.500A: Primary | ICD-10-CM

## 2019-03-04 DIAGNOSIS — S12.500A CLOSED DISPLACED FRACTURE OF SIXTH CERVICAL VERTEBRA, UNSPECIFIED FRACTURE MORPHOLOGY, INITIAL ENCOUNTER (HCC): ICD-10-CM

## 2019-03-04 PROBLEM — R07.81 RIB PAIN ON LEFT SIDE: Status: ACTIVE | Noted: 2019-03-04

## 2019-03-04 PROBLEM — R68.89 FORGETFULNESS: Status: ACTIVE | Noted: 2019-03-04

## 2019-03-04 PROBLEM — R53.81 PHYSICAL DECONDITIONING: Status: ACTIVE | Noted: 2019-03-04

## 2019-03-04 LAB
ABO GROUP BLD: NORMAL
ATRIAL RATE: 96 BPM
BACTERIA UR QL AUTO: ABNORMAL /HPF
BILIRUB UR QL STRIP: NEGATIVE
BLD GP AB SCN SERPL QL: NEGATIVE
CLARITY UR: CLEAR
COLOR UR: YELLOW
COLOR, POC: YELLOW
GLUCOSE UR STRIP-MCNC: NEGATIVE MG/DL
HGB UR QL STRIP.AUTO: ABNORMAL
KETONES UR STRIP-MCNC: NEGATIVE MG/DL
LEUKOCYTE ESTERASE UR QL STRIP: NEGATIVE
NITRITE UR QL STRIP: NEGATIVE
NON-SQ EPI CELLS URNS QL MICRO: ABNORMAL /HPF
P AXIS: 43 DEGREES
PH UR STRIP.AUTO: 7 [PH] (ref 4.5–8)
PR INTERVAL: 196 MS
PROT UR STRIP-MCNC: NEGATIVE MG/DL
QRS AXIS: -4 DEGREES
QRSD INTERVAL: 94 MS
QT INTERVAL: 356 MS
QTC INTERVAL: 449 MS
RBC #/AREA URNS AUTO: ABNORMAL /HPF
RH BLD: POSITIVE
SP GR UR STRIP.AUTO: 1.01 (ref 1–1.03)
SPECIMEN EXPIRATION DATE: NORMAL
T WAVE AXIS: 65 DEGREES
UROBILINOGEN UR QL STRIP.AUTO: 0.2 E.U./DL
VENTRICULAR RATE: 96 BPM
WBC #/AREA URNS AUTO: ABNORMAL /HPF

## 2019-03-04 PROCEDURE — 81003 URINALYSIS AUTO W/O SCOPE: CPT

## 2019-03-04 PROCEDURE — G8987 SELF CARE CURRENT STATUS: HCPCS

## 2019-03-04 PROCEDURE — G8988 SELF CARE GOAL STATUS: HCPCS

## 2019-03-04 PROCEDURE — 81001 URINALYSIS AUTO W/SCOPE: CPT

## 2019-03-04 PROCEDURE — 97163 PT EVAL HIGH COMPLEX 45 MIN: CPT

## 2019-03-04 PROCEDURE — 99222 1ST HOSP IP/OBS MODERATE 55: CPT | Performed by: NEUROLOGICAL SURGERY

## 2019-03-04 PROCEDURE — 72040 X-RAY EXAM NECK SPINE 2-3 VW: CPT

## 2019-03-04 PROCEDURE — G8979 MOBILITY GOAL STATUS: HCPCS

## 2019-03-04 PROCEDURE — G8978 MOBILITY CURRENT STATUS: HCPCS

## 2019-03-04 PROCEDURE — 99222 1ST HOSP IP/OBS MODERATE 55: CPT | Performed by: INTERNAL MEDICINE

## 2019-03-04 PROCEDURE — 70486 CT MAXILLOFACIAL W/O DYE: CPT

## 2019-03-04 PROCEDURE — 97167 OT EVAL HIGH COMPLEX 60 MIN: CPT

## 2019-03-04 PROCEDURE — 99223 1ST HOSP IP/OBS HIGH 75: CPT | Performed by: SURGERY

## 2019-03-04 PROCEDURE — 99285 EMERGENCY DEPT VISIT HI MDM: CPT

## 2019-03-04 PROCEDURE — 93010 ELECTROCARDIOGRAM REPORT: CPT | Performed by: INTERNAL MEDICINE

## 2019-03-04 RX ORDER — FLUPHENAZINE HYDROCHLORIDE 2.5 MG/1
5 TABLET ORAL DAILY
Status: DISCONTINUED | OUTPATIENT
Start: 2019-03-04 | End: 2019-03-04

## 2019-03-04 RX ORDER — ACETAMINOPHEN 325 MG/1
650 TABLET ORAL EVERY 6 HOURS PRN
Status: DISCONTINUED | OUTPATIENT
Start: 2019-03-04 | End: 2019-03-04

## 2019-03-04 RX ORDER — POLYETHYLENE GLYCOL 3350 17 G/17G
17 POWDER, FOR SOLUTION ORAL DAILY
Status: DISCONTINUED | OUTPATIENT
Start: 2019-03-04 | End: 2019-03-05 | Stop reason: HOSPADM

## 2019-03-04 RX ORDER — FLUPHENAZINE HYDROCHLORIDE 5 MG/1
5 TABLET ORAL 3 TIMES DAILY
COMMUNITY
End: 2019-03-20 | Stop reason: HOSPADM

## 2019-03-04 RX ORDER — RISPERIDONE 0.25 MG/1
0.5 TABLET, FILM COATED ORAL DAILY
Status: DISCONTINUED | OUTPATIENT
Start: 2019-03-04 | End: 2019-03-05 | Stop reason: HOSPADM

## 2019-03-04 RX ORDER — FLUVOXAMINE MALEATE 50 MG/1
50 TABLET, COATED ORAL DAILY
Status: DISCONTINUED | OUTPATIENT
Start: 2019-03-04 | End: 2019-03-05 | Stop reason: HOSPADM

## 2019-03-04 RX ORDER — AMLODIPINE BESYLATE 5 MG/1
5 TABLET ORAL DAILY
Status: DISCONTINUED | OUTPATIENT
Start: 2019-03-04 | End: 2019-03-04

## 2019-03-04 RX ORDER — LIDOCAINE 50 MG/G
1 PATCH TOPICAL DAILY
Status: DISCONTINUED | OUTPATIENT
Start: 2019-03-04 | End: 2019-03-04

## 2019-03-04 RX ORDER — AMLODIPINE BESYLATE 5 MG/1
5 TABLET ORAL DAILY
Status: DISCONTINUED | OUTPATIENT
Start: 2019-03-04 | End: 2019-03-05 | Stop reason: HOSPADM

## 2019-03-04 RX ORDER — FLUPHENAZINE HYDROCHLORIDE 2.5 MG/1
5 TABLET ORAL DAILY
Status: DISCONTINUED | OUTPATIENT
Start: 2019-03-04 | End: 2019-03-05 | Stop reason: HOSPADM

## 2019-03-04 RX ORDER — LIDOCAINE 50 MG/G
1 PATCH TOPICAL DAILY
Status: DISCONTINUED | OUTPATIENT
Start: 2019-03-04 | End: 2019-03-05 | Stop reason: HOSPADM

## 2019-03-04 RX ORDER — ACETAMINOPHEN 325 MG/1
975 TABLET ORAL EVERY 8 HOURS SCHEDULED
Status: DISCONTINUED | OUTPATIENT
Start: 2019-03-04 | End: 2019-03-05 | Stop reason: HOSPADM

## 2019-03-04 RX ORDER — AMANTADINE HYDROCHLORIDE 100 MG/1
100 CAPSULE, GELATIN COATED ORAL DAILY
Status: DISCONTINUED | OUTPATIENT
Start: 2019-03-04 | End: 2019-03-05 | Stop reason: HOSPADM

## 2019-03-04 RX ORDER — LOSARTAN POTASSIUM 50 MG/1
50 TABLET ORAL DAILY
Status: DISCONTINUED | OUTPATIENT
Start: 2019-03-04 | End: 2019-03-05 | Stop reason: HOSPADM

## 2019-03-04 RX ORDER — RISPERIDONE 1 MG/1
1 TABLET, FILM COATED ORAL
Status: DISCONTINUED | OUTPATIENT
Start: 2019-03-04 | End: 2019-03-05 | Stop reason: HOSPADM

## 2019-03-04 RX ORDER — TAMSULOSIN HYDROCHLORIDE 0.4 MG/1
0.4 CAPSULE ORAL
Status: DISCONTINUED | OUTPATIENT
Start: 2019-03-04 | End: 2019-03-05 | Stop reason: HOSPADM

## 2019-03-04 RX ORDER — HEPARIN SODIUM 5000 [USP'U]/ML
5000 INJECTION, SOLUTION INTRAVENOUS; SUBCUTANEOUS EVERY 8 HOURS SCHEDULED
Status: DISCONTINUED | OUTPATIENT
Start: 2019-03-04 | End: 2019-03-04

## 2019-03-04 RX ORDER — SODIUM CHLORIDE 9 MG/ML
75 INJECTION, SOLUTION INTRAVENOUS CONTINUOUS
Status: DISCONTINUED | OUTPATIENT
Start: 2019-03-04 | End: 2019-03-04

## 2019-03-04 RX ORDER — WARFARIN SODIUM 5 MG/1
5 TABLET ORAL
Status: DISCONTINUED | OUTPATIENT
Start: 2019-03-04 | End: 2019-03-05

## 2019-03-04 RX ADMIN — RISPERIDONE 0.5 MG: 0.25 TABLET ORAL at 09:20

## 2019-03-04 RX ADMIN — WARFARIN SODIUM 5 MG: 5 TABLET ORAL at 17:59

## 2019-03-04 RX ADMIN — SODIUM CHLORIDE 75 ML/HR: 0.9 INJECTION, SOLUTION INTRAVENOUS at 09:24

## 2019-03-04 RX ADMIN — ACETAMINOPHEN 650 MG: 325 TABLET ORAL at 09:20

## 2019-03-04 RX ADMIN — IOHEXOL 100 ML: 350 INJECTION, SOLUTION INTRAVENOUS at 00:01

## 2019-03-04 RX ADMIN — AMANTADINE HYDROCHLORIDE 100 MG: 100 CAPSULE, LIQUID FILLED ORAL at 09:20

## 2019-03-04 RX ADMIN — ACETAMINOPHEN 975 MG: 325 TABLET ORAL at 22:20

## 2019-03-04 RX ADMIN — POLYETHYLENE GLYCOL 3350 17 G: 17 POWDER, FOR SOLUTION ORAL at 16:36

## 2019-03-04 RX ADMIN — LIDOCAINE 1 PATCH: 50 PATCH CUTANEOUS at 09:17

## 2019-03-04 RX ADMIN — AMLODIPINE BESYLATE 5 MG: 5 TABLET ORAL at 12:32

## 2019-03-04 RX ADMIN — FLUVOXAMINE MALEATE 50 MG: 50 TABLET, FILM COATED ORAL at 09:20

## 2019-03-04 RX ADMIN — LOSARTAN POTASSIUM 50 MG: 50 TABLET, FILM COATED ORAL at 09:20

## 2019-03-04 RX ADMIN — TAMSULOSIN HYDROCHLORIDE 0.4 MG: 0.4 CAPSULE ORAL at 17:59

## 2019-03-04 RX ADMIN — RISPERIDONE 1 MG: 1 TABLET ORAL at 22:20

## 2019-03-04 RX ADMIN — FLUPHENAZINE HYDROCHLORIDE 5 MG: 2.5 TABLET, FILM COATED ORAL at 12:32

## 2019-03-04 NOTE — CONSULTS
Consultation - Geriatrics   Isis Florence  68 y o  male MRN: [id number]  Unit/Bed#: [facility] 620-01 Encounter: [id number]      Assessment/Plan  1  Fall  Fall precautions  PT/OT  Walker  Rehab  Home meds relatively unremarkable  Takes multiple medications for schizophrenia, suspect benefits outweigh risks    2  Forgetfulness  Patient admits to feeling forgetful but A*O x 4  4/5 mini cog  No signs of macrocytosis will defer B12, Folate assessment  Patient may f/u at [facility] upon d/c for any concerns over memory    3  Ambulatory dysfunction  Multifactorial  PT/OT recommending walker, rehab  Patient will benefit from rehab to improve gait stability and strength    4  Deconditioning  Secondary to injuries, hospital stay  Mobilize frequently to prevent further decline  PT/OT    5  Delirium precautions  Will schedule 975mg Q8  Will add lidoderm patch  Refer to geriatric pain med order set for further pain med recommendations  Will add miralax   Patient at risk for developing delirium, delirium preventing tactics advised  Redirect unwanted patient behaviors as first line tx  Reorient patient frequently  Avoid deliriogenic meds including tramadol, benzodiazepines, benadryl  Good sleep hygiene important, limit night time interruptions  Encourage patient to stay awake during the day  Ensure adequate hydration/nutrition  Mobilize often     6  Schizophrenia  Continue home meds    7  Osteophyte fx of cervical spine  NSx consulted  Continue lidoderm patch      History of Present Illness   Physician Requesting Consult: Jovany Cary DO  Reason for Consult / Principal Problem: isar  Hx and PE limited by: n/a  HPI: Isis Florence  is a 68y o  year old male with history of schizophrenia, who presents to [facility] as transfer from [facility] after falling on Coumadin  Patient has difficulty with ambulating at baseline    Patient was found to have osteophyte fracture of cervical spine, concussion, left-sided musculoskeletal chest pain, abrasion to left back, possible facial fracture  Patient was admitted under the trauma team, Neurosurgery was consulted  Prior to arrival patient lives at home with his wife and son  Independent with ADLS  Shares IADLS with wife  Ambulates with cane  Admits to needing help remembering to take his pills  PT, OT evaluated patient, recommended a walker and post acute inpatient rehab  Patient pleasant  Admits to feeling forgetful at home  A*O x 4  4/5 mini cog  No delirium  Consults    Review of Systems   Constitutional: Negative for fatigue  Eyes: Negative for visual disturbance  Respiratory: Negative for chest tightness and shortness of breath  Cardiovascular: Negative for chest pain and palpitations  Gastrointestinal: Positive for constipation and diarrhea  Negative for abdominal distention, abdominal pain, nausea and vomiting  Genitourinary: Negative for difficulty urinating  Musculoskeletal: Positive for gait problem and myalgias  Neurological: Negative for headaches  Psychiatric/Behavioral: Negative for confusion  Admits to forgetfulness     All other systems reviewed and are negative        Historical Information   Past Medical History:   Diagnosis Date    Ankle edema 04/2004    chronic    Arthritis 03/16/2011    right knee    Ataxic gait 11/14/2011    Baker's cyst 04/2005    (djd)-left knee    Cardiac disease     Cataract     Chronic pain     Coronary artery disease     Depression     DVT (deep venous thrombosis) (HCC)     Epistaxis 01/09/2015    Hand tingling 02/05/2010    tingling right forearm and hand-CTS on EMG    Hematuria 11/22/2016    ER-3 way sandhu to irrigate bladder with 3 L NS    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    Hypertension     Lipoma 01/11/2011    in hepatic flexure    Migraine headache 02/2007    optic    Obesity     Pulmonary embolism ([facility] ) 09/18/2014    bilateral PE + acute DVT LLE     Rib fracture 03/2007 left 8th rib 2nd degree fall    Schizophrenia [facility] ) 04/2009    acute exacerbation    Sciatic leg pain 07/20/2011    left    Stenosis of cervix     with cervical radiculomyelopathy    Suicidal ideation 04/2009    Syncope 01/09/2015    Tardive dyskinesia 02/28/2011    Torn meniscus 04/2004    lateral, knee, left    Varicose vein of leg 04/2004    Venous insufficiency of leg 04/2004    chronic    Vertigo 05/12/2008    transient-questionable BPPV     Past Surgical History:   Procedure Laterality Date    CATARACT EXTRACTION Right     10/2/2009    CATARACT EXTRACTION      12/4/2009    COLONOSCOPY      with biopsy    IVC FILTER INSERTION      anticoagulation-9/23/2014    KNEE ARTHROCENTESIS       and cortisone injection    KNEE ARTHROSCOPY Left     5/18/2004    LAMINECTOMY      C3-C6 and left sided cervical foraminotomies-10/14/2008    OTHER SURGICAL HISTORY      DONNY x 1    REPLACEMENT TOTAL HIP W/  RESURFACING IMPLANTS      TOTAL HIP ARTHROPLASTY Right     02/07/2017    TOTAL KNEE ARTHROPLASTY Left     3/21/2005     Social History   Social History     Substance and Sexual Activity   Alcohol Use No    Frequency: Never     Social History     Substance and Sexual Activity   Drug Use No     Social History     Tobacco Use   Smoking Status Never Smoker   Smokeless Tobacco Never Used   Tobacco Comment    no passive smoke exposure         Family History: non-contributory    Meds/Allergies   Current meds:   Current Facility-Administered Medications   Medication Dose Route Frequency    acetaminophen (TYLENOL) tablet 650 mg  650 mg Oral Q6H PRN    amantadine (SYMMETREL) capsule 100 mg  100 mg Oral Daily    amLODIPine (NORVASC) tablet 5 mg  5 mg Oral Daily    fluPHENAZine (PROLIXIN) tablet 5 mg  5 mg Oral Daily    fluvoxaMINE (LUVOX) tablet 50 mg  50 mg Oral Daily    lidocaine (LIDODERM) 5 % patch 1 patch  1 patch Topical Daily    losartan (COZAAR) tablet 50 mg  50 mg Oral Daily    risperiDONE (RisperDAL) tablet 0 5 mg  0 5 mg Oral Daily    risperiDONE (RisperDAL) tablet 1 mg  1 mg Oral HS    tamsulosin (FLOMAX) capsule 0 4 mg  0 4 mg Oral Daily With Dinner    warfarin (COUMADIN) tablet 5 mg  5 mg Oral Daily (warfarin)      Current PTA meds:  Medications Prior to Admission   Medication    acetaminophen (TYLENOL) 325 mg tablet    amantadine (SYMMETREL) 100 mg capsule    amLODIPine (NORVASC) 5 mg tablet    fluPHENAZine (PROLIXIN) 5 mg tablet    fluvoxaMINE (LUVOX) 50 mg tablet    losartan (COZAAR) 50 mg tablet    risperiDONE (RISPERDAL) 3 mg tablet    warfarin (COUMADIN) 5 mg tablet    tamsulosin (FLOMAX) 0 4 mg        Allergies   Allergen Reactions    Lisinopril Cough       Objective   Vitals: Blood pressure 124/76, pulse 98, temperature 98 7 °F (37 1 °C), temperature source Oral, resp  rate 18, SpO2 93 %  ,There is no height or weight on file to calculate BMI  Physical Exam   Constitutional: He is oriented to person, place, and time  He appears well-developed and well-nourished  No distress  HENT:   Head: Normocephalic and atraumatic  Mouth/Throat: No oropharyngeal exudate  Eyes: Conjunctivae and EOM are normal  No scleral icterus  Neck: Neck supple  No thyromegaly present  Cardiovascular: Normal rate  Pulmonary/Chest: Effort normal and breath sounds normal  He has no wheezes  He has no rales  Abdominal: Soft  Bowel sounds are normal  He exhibits no distension  Musculoskeletal: Normal range of motion  He exhibits no edema  Neurological: He is alert and oriented to person, place, and time  Skin: Skin is warm and dry  Psychiatric: He has a normal mood and affect  His behavior is normal  Judgment and thought content normal  He is not agitated and not actively hallucinating  Cognition and memory are not impaired  He exhibits normal recent memory and normal remote memory     Admits to feeling forgetful, but scores 4/5 on mini cog assessment which is normal   No signs of delirium  Has history of schizophrenia  Patient pleasant  Nursing note and vitals reviewed  Lab Results:   Results from last 7 days   Lab Units 03/03/19 2258 03/03/19  2256   WBC Thousand/uL  --  5 61   HEMOGLOBIN g/dL  --  15 0   I STAT HEMOGLOBIN g/dl 16 3  --    HEMATOCRIT %  --  46 5   HEMATOCRIT, ISTAT % 48  --    PLATELETS Thousands/uL  --  205        Results from last 7 days   Lab Units 03/03/19 2258 03/03/19  2256   POTASSIUM mmol/L  --  3 5   CHLORIDE mmol/L  --  103   CO2 mmol/L  --  31   CO2, I-STAT mmol/L 32  --    BUN mg/dL  --  14   CREATININE mg/dL  --  0 93   CALCIUM mg/dL  --  9 1   ALK PHOS U/L  --  99   ALT U/L  --  41   AST U/L  --  39   GLUCOSE, ISTAT mg/dl 117  --        Imaging Studies: I have personally reviewed pertinent reports  EKG, Pathology, and Other Studies: I have personally reviewed pertinent reports      VTE Prophylaxis: Sequential compression device (Venodyne)     Code Status: Level 1 - Full Code

## 2019-03-04 NOTE — PLAN OF CARE
Problem: OCCUPATIONAL THERAPY ADULT  Goal: Performs self-care activities at highest level of function for planned discharge setting  See evaluation for individualized goals  Description  Treatment Interventions: ADL retraining, Functional transfer training, Endurance training, Cognitive reorientation, Patient/family training, Equipment evaluation/education, Compensatory technique education, Activityengagement          See flowsheet documentation for full assessment, interventions and recommendations  Note:   Limitation: Decreased ADL status, Decreased Safe judgement during ADL, Decreased endurance, Decreased self-care trans, Decreased high-level ADLs  Prognosis: Good, Fair  Assessment: Pt is a 68 y o  male who was admitted to [facility] on 3/4/2019 with Fracture of sixth cervical vertebra (HCC) 2* fall down ~6steps   Pt's problem list also includes PMH of HTN, obesity, previous surgery and cardiac disease, chronic pain, cad, depression, dvt, PE, schizophrenia, cervical stenosis with rediculomyelopathy, SI, tardive dyskinesia, torn meniscus L knee  At baseline pt was completing adls independently and ambulating with [facility] - shares homemaking/iadls with spouse  Pt lives with wife in 2 story home with 4 JOSE JUAN- no BR on first floor  Currently pt requires moderate assist for overall ADLS and min assist for functional mobility/transfers  Pt currently presents with impairments in the following categories -steps to enter environment, difficulty performing ADLS, difficulty performing IADLS , limited insight into deficits, compliance, health management  and environment activity tolerance, endurance, standing balance/tolerance, memory and safety    These impairments, as well as pt's fatigue, pain, spinal precautions, risk for falls and home environment  limit pt's ability to safely engage in all baseline areas of occupation, includinggrooming, bathing, dressing, toileting, functional mobility/transfers, community mobility, house maintenance, social participation  and leisure activities  From OT standpoint, recommend inpt rehab  upon D/C  OT will continue to follow to address the below stated goals        OT Discharge Recommendation: Short Term Rehab

## 2019-03-04 NOTE — SOCIAL WORK
CM met with pt to discuss the role of CM  Pt lives with his wife and son in a 2 story home which has 5STE and 13 steps inside  Pt is on SSD, doesn't drive, but considers himself fully independent PTA  Pt owns a cane and walker  Pt doesn't have a living will  Pt's pharmacy is [facility] in [facility]  Pt reports being dx with Schizophrenia and had 3 IP Psych stays in the past but nothing in the last 10 years  Pt is recommended for IP rehab  CM educated pt on the differences between acute and SNF level rehab  Pt would like referral to [address] [place] [address] as they're his top choices  CM placed referrals  CM reviewed d/c planning process including the following: identifying help at home, patient preference for d/c planning needs, Discharge Lounge, [facility] Meds to Bed program, availability of treatment team to discuss questions or concerns patient and/or family may have regarding understanding medications and recognizing signs and symptoms once discharged  CM also encouraged patient to follow up with all recommended appointments after discharge  Patient advised of importance for patient and family to participate in managing patients medical well being

## 2019-03-04 NOTE — ASSESSMENT & PLAN NOTE
- left sided point tenderness rib cage pain  No evidence of rib fracture  Likely musculoskeletal bruising   No roma hematoma or injury identified  - ECG and troponins negative

## 2019-03-04 NOTE — TELEPHONE ENCOUNTER
----- Message from Wily Cheema MD sent at 3/4/2019 11:40 AM EST -----  Called the patient  Let him know that we saw that he was in the emergency room for a fall with a head laceration and a rib fracture  It did not appear that he was being admitted but we will need to follow up in the office

## 2019-03-04 NOTE — ASSESSMENT & PLAN NOTE
- osteophyte fracture C6-7   - midline tenderness on exam  - no deficits, no paresthesias   - maintain cervical collar   - neurosurgery consultation - plan for collar and upright Xrays   - PT/OT

## 2019-03-04 NOTE — UTILIZATION REVIEW
Network Utilization Review Department  Phone: [phone]; Fax [phone]  [email or web address]  ATTENTION: Please call with any questions or concerns to [phone]  and carefully listen to the prompts so that you are directed to the right person  Send all requests for admission clinical reviews, approved or denied determinations and any other requests to fax [phone]  All voicemails are confidential   ==============================================================================  Initial Clinical Review  Admission: Date/Time/Statement: 3/4/19 @ 0548  Orders Placed This Encounter   Procedures    Inpatient Admission     Standing Status:   Standing     Number of Occurrences:   1     Order Specific Question:   Admitting Physician     Answer:   Maria Esther Carney     Order Specific Question:   Level of Care     Answer:   Med Surg [16]     Order Specific Question:   Bed Type     Answer:   Trauma [7]     Order Specific Question:   Estimated length of stay     Answer:   More than 2 Midnights     Order Specific Question:   Certification     Answer:   I certify that inpatient services are medically necessary for this patient for a duration of greater than two midnights  See H&P and MD Progress Notes for additional information about the patient's course of treatment  Presented to the ED at [facility] [place] [facility]", transferred to [facility] ED via EMS, higher level of care  CT demonstrated fracture for an osteophyte at C6-7 and soft tissue emphysema in right face concerning for facial fracture    He was [facility] to [facility] for the further evaluation and management    ED: Date/Time/Mode of Arrival:   ED Arrival Information     Expected Arrival Acuity Means of Arrival Escorted By Service Admission Type    - 3/4/2019 02:37 Immediate Ambulance [facility] Марина Stubbs) Trauma Urgent    Arrival Complaint    Fall        Chief Complaint:   Chief Complaint   Patient presents with    Fall     pt fell at home while going up the stairs  +LOC, +thinners, +head and neck trauma with c6 and c7 fx      History of Illness: Jaki Vivar  is a 68 y o  male who presents a trauma transfer from [facility] after sustaining a fall on Coumadin  Patient was at his home where he lives with his wife and son when he fell down approximately 6 steps  He does not remember if he hit his head; his son does state that he lost consciousness for a few seconds  Patient does have difficulty ambulating at baseline secondary to neuropathy/radiculopathy which is an ongoing issue for him  He does take Coumadin for DVT/PE  He currently complains of left parasternal pain, neck pain, and back pain  ED Vital Signs:   ED Triage Vitals   Temperature Pulse Respirations Blood Pressure SpO2   03/04/19 0243 03/04/19 0243 03/04/19 0243 03/04/19 0243 03/04/19 0243   98 7 °F (37 1 °C) (!) 108 18 151/80 95 %      Temp Source Heart Rate Source Patient Position - Orthostatic VS BP Location FiO2 (%)   03/04/19 0243 03/04/19 0243 -- -- --   Oral Monitor         Pain Score       03/04/19 0244       4        Wt Readings from Last 1 Encounters:   02/18/19 90 8 kg (200 lb 3 2 oz)     Pertinent Labs/Diagnostic Test Results:  Color, UA Yellow     Clarity, UA Clear    pH, UA 7 0 4 5 - 8 0     Leukocytes, UA Negative Negative     Nitrite, UA Negative Negative     Protein, UA Negative Negative mg/dl     Glucose, UA Negative Negative mg/dl     Ketones, UA Negative Negative mg/dl     Urobilinogen, UA 0 2 0 2, 1 0 E U /dl E U /dl     Bilirubin, UA Negative Negative     Blood, UA TraceAbnormal  Negative     Specific Gravity, UA 1 015 1 003 - 1 030       RBC, UA 2-4Abnormal  None Seen, 0-5 /hpf     WBC, UA 2-4Abnormal  None Seen, 0-5, 5-55, 5-65 /hpf     Epithelial Cells Occasional None Seen, Occasional /hpf     Bacteria, UA Occasional None Seen, Occasional /hpf      Past Medical/Surgical History:    Active Ambulatory Problems     Diagnosis Date Noted    Benign essential hypertension 12/09/2016    Coronary artery disease involving native coronary artery of native heart without angina pectoris 12/09/2016    Chronic depression 09/16/2015    Chronic venous stasis dermatitis of both lower extremities 04/12/2017    Deep venous thrombosis of lower extremity ([facility]) 41/83/8993    Diastolic heart failure ([facility]) 10/02/2014    Osteoarthritis 06/20/2014    Nocturnal enuresis 07/17/2013    Idiopathic trigeminal neuralgia 09/14/2016    Lacunar infarction 05/05/2014    Class 1 obesity due to excess calories without serious comorbidity with body mass index (BMI) of 34 0 to 34 9 in adult 06/20/2014    Schizophrenia ([facility] [address] ) 12/09/2016    Disability of walking 06/20/2014    Pulmonary embolism ([facility] 10/02/2014    Arthropathy 06/20/2014    Mixed hyperlipidemia 07/13/2018    Neurogenic claudication 07/13/2018    Neck pain without injury 10/29/2018    Abdominal wall hematoma 12/19/2018    Spinal stenosis of lumbar region with neurogenic claudication 02/18/2019    Lipoma of colon 01/11/2011     Past Medical History:   Diagnosis Date    Ankle edema 04/2004    Arthritis 03/16/2011    Ataxic gait 11/14/2011    Baker's cyst 04/2005    Cardiac disease     Cataract     Chronic pain     Coronary artery disease     Depression     DVT (deep venous thrombosis) ([facility])     Epistaxis 01/09/2015    Hand tingling 02/05/2010    Hematuria 11/22/2016    Horseshoe kidney 2010    Hypertension     Lipoma 01/11/2011    Migraine headache 02/2007    Obesity     Pulmonary embolism ([facility] [address] ) 09/18/2014    Rib fracture 03/2007    Sch[facility]ia [facility] 04/2009    Sciatic leg pain 07/20/2011    Stenosis of cervix     Suicidal ideation 04/2009    Syncope 01/09/2015    Tardive dyskinesia 02/28/2011    Torn meniscus 04/2004    Varicose vein of leg 04/2004    Venous insufficiency of leg 04/2004    Vertigo 05/12/2008     Admitting Diagnosis: Head injury [S09 90XA]  Closed fracture of sixth cervical vertebra without spinal cord injury, initial encounter [facility]) [S12 590A]  Fall, initial encounter [W19  XXXA]  Age/Sex: 68 y o  male  Assessment/Plan:   Trauma Active Problems:   1) L-sided musculoskeletal chest pain  2) fall  3) coumadin coagulopathy  4) concussion  5) cervical spine osteophyte fracture  6) abrasion to L back  7) possible facial fracture     Trauma Plan:   - admit to trauma  - neurosx consult for evaluation of c-spine, psb MRI (does have L knee implant)  - hold coumadin  - geriatrics consult  - cognitive eval  - local wound care to abrasion  - CT face for eval of facial fractures  - PT/OT  Admission Orders:  Scheduled Meds:   Current Facility-Administered Medications:  acetaminophen 650 mg Oral Q6H PRN Cyn West, MD   amantadine 100 mg Oral Daily Cyn Reading, MD   amLODIPine 5 mg Oral Daily Cyn Reading, MD   fluPHENAZine 5 mg Oral Daily Cyn Reading, MD   fluvoxaMINE 50 mg Oral Daily Cyn Reading, MD   losartan 50 mg Oral Daily Cyn Reading, MD   risperiDONE 1 5 mg Oral Daily Cyn Reading, MD   sodium chloride 75 mL/hr Intravenous Continuous Cyn Reading, MD   tamsulosin 0 4 mg Oral Daily With Dinner MATY Lambert     Continuous Infusions:   sodium chloride 75 mL/hr     NPO sips with meds  C Spine precautions / C Collar  Neuro checks q2h  Consu PT/OT  Consult Gerontology  Consult Neurosurgery: fall closed fx 6th C vertebra

## 2019-03-04 NOTE — PLAN OF CARE
Problem: PHYSICAL THERAPY ADULT  Goal: Performs mobility at highest level of function for planned discharge setting  See evaluation for individualized goals  Description  Treatment/Interventions: Functional transfer training, LE strengthening/ROM, Elevations, Therapeutic exercise, Endurance training, Cognitive reorientation, Patient/family training, Equipment eval/education, Bed mobility, Gait training, Compensatory technique education, Continued evaluation, Spoke to nursing  Equipment Recommended: Dallas Asher       See flowsheet documentation for full assessment, interventions and recommendations     Note:   Prognosis: Good  Problem List: Decreased strength, Decreased endurance, Decreased range of motion, Impaired balance, Decreased mobility, Decreased coordination, Impaired judgement, Decreased safety awareness, Decreased skin integrity, Orthopedic restrictions, Pain  Assessment: Pt is a 68 y o  male admitted to [facility] on 3/4/2019 s/p fall - mech v syncope w/ Fracture of sixth cervical vertebra (HCC) currently in Ccollar and appropriate to mobilize per TR;  Pt exhibits significant impairments with weakness, decreased ROM, impaired balance, decreased endurance, impaired coordination, gait deviations, pain, decreased activity tolerance, decreased functional mobility tolerance, decreased safety awareness, impaired judgement, fall risk, orthopedic restrictions and decreased skin integrity; these impact independence with mobility, ADLs, and IADLs; requires min assist w transf sit-stand - requiring >1 attempt and then needed to stabilize stance w LE push back against bed surface; c/o miled dizziness in stand, BP monitored and unemarkable; amb 50 ft x2 w min assist using RW- gait pattern w forward flexed posture, decreased stride length and foot clearance, walker reliant + falls risk- tended to run into environ obstacles  w RW; + falls risk; also requires min assist w manual and rail support for 3 steps ascent descent- significant LE noted RLE >LLE - hogh falls risk; objective measures on the Barthel Index also reveal limitations;  therapy prognosis is impacted by relevant co morbidities as noted in evaluation; clinical presentation is currently unstable/unpredictable - pt is on cont pulse oximetry, neuro checks, Cspine precautions, presents w pain, complex hx incl multiple falls and phys impairments as noted- a regression from baseline; PTA, pt was ambulatory- pt reports sedentary lifestyle; fam present in room and PT d/w fam pt's funct status and rec; skilled PT is indicated to to optimize functional independence and discharge planning; pending functional progress, PT recommendation at discharge is IP rehab         Recommendation: Post acute IP rehab          See flowsheet documentation for full assessment

## 2019-03-04 NOTE — OCCUPATIONAL THERAPY NOTE
[address]  Evaluation     Patient Name: Heather Lennon    Today's Date: 3/4/2019  Problem List  Patient Active Problem List   Diagnosis    Benign essential hypertension    Coronary artery disease involving native coronary artery of native heart without angina pectoris    Chronic depression    Chronic venous stasis dermatitis of both lower extremities    Deep venous thrombosis of lower extremity (HCC)    Diastolic heart failure (HCC)    Osteoarthritis    Nocturnal enuresis    Idiopathic trigeminal neuralgia    Lacunar infarction    Class 1 obesity due to excess calories without serious comorbidity with body mass index (BMI) of 34 0 to 34 9 in adult    Schizophrenia ([address] )    Disability of walking    Pulmonary embolism ([address] )    Arthropathy    Mixed hyperlipidemia    Neurogenic claudication    Neck pain without injury    Abdominal wall hematoma    Spinal stenosis of lumbar region with neurogenic claudication    Lipoma of colon    Fracture of sixth cervical vertebra ([address] )    Rib pain on left side    Fall    Forgetfulness    Physical deconditioning     Past Medical History  Past Medical History:   Diagnosis Date    Ankle edema 04/2004    chronic    Arthritis 03/16/2011    right knee    Ataxic gait 11/14/2011    Baker's cyst 04/2005    (djd)-left knee    Cardiac disease     Cataract     Chronic pain     Coronary artery disease     Depression     DVT (deep venous thrombosis) (HCC)     Epistaxis 01/09/2015    Forgetfulness 3/4/2019    Hand tingling 02/05/2010    tingling right forearm and hand-CTS on EMG    Hematuria 11/22/2016    ER-3 way sandhu to irrigate bladder with 3 L NS    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    Hypertension     Lipoma 01/11/2011    in hepatic flexure    Migraine headache 02/2007    optic    Obesity     Pulmonary embolism ([address] ) 09/18/2014    bilateral PE + acute DVT LLE     Rib fracture 03/2007    left 8th rib 2nd degree fall    Schizophrenia ([facility] ) 04/2009    acute exacerbation    Sciatic leg pain 07/20/2011    left    Stenosis of cervix     with cervical radiculomyelopathy    Suicidal ideation 04/2009    Syncope 01/09/2015    Tardive dyskinesia 02/28/2011    Torn meniscus 04/2004    lateral, knee, left    Varicose vein of leg 04/2004    Venous insufficiency of leg 04/2004    chronic    Vertigo 05/12/2008    transient-questionable BPPV     Past Surgical History  Past Surgical History:   Procedure Laterality Date    CATARACT EXTRACTION Right     10/2/2009    CATARACT EXTRACTION      12/4/2009    COLONOSCOPY      with biopsy    IVC FILTER INSERTION      anticoagulation-9/23/2014    KNEE ARTHROCENTESIS       and cortisone injection    KNEE ARTHROSCOPY Left     5/18/2004    LAMINECTOMY      C3-C6 and left sided cervical foraminotomies-10/14/2008    OTHER SURGICAL HISTORY      DONNY x 1    REPLACEMENT TOTAL HIP W/  RESURFACING IMPLANTS      TOTAL HIP ARTHROPLASTY Right     02/07/2017    TOTAL KNEE ARTHROPLASTY Left     3/21/2005           03/04/19 1101   Note Type   Note type Eval/Treat   Restrictions/Precautions   Weight Bearing Precautions Per Order No   Braces or Orthoses C/S Collar   Other Precautions Cognitive; Chair Alarm; Fall Risk;Pain   Pain Assessment   Pain Assessment 0-10   Pain Score 7   Pain Type Acute pain   Pain Location Neck   Pain Orientation Posterior   Hospital Pain Intervention(s) Repositioned; Ambulation/increased activity; Emotional support   Home Living   Type of [address] Multi-level;Stairs to enter without rails   Bathroom Shower/Tub Tub/shower unit   Bathroom Toilet Standard   Bathroom Equipment Shower chair   Home Equipment Walker;Cane   Prior Function   Level of [place] Independent with ADLs and functional mobility; Needs assistance with IADLs   Lives With Spouse; Son   Ranulfo Liao Help From Family   ADL Assistance Independent   IADLs Needs assistance   Falls in the last 6 months 1 to 4  (AT LEAST 2 PER SON - 5 TOTAL RECENT FALLS )   Vocational Retired   [address]ALL[address]- WIFE MANAGES MAJORITY OF IADLS   Reciprocal Relationships SUPPORTIVE FAMILY    Service to Others RETIRED   Intrinsic Gratification SEDENTARY   Subjective   Subjective C/O NECK PAIN   ADL   Eating Assistance 4  Minimal Assistance   Grooming Assistance 4  Minimal Assistance   UB Bathing Assistance 3  Moderate Assistance   LB Bathing Assistance 3  Moderate Assistance   UB Dressing Assistance 3  Moderate Assistance   LB Dressing Assistance 3  Moderate Assistance   Toileting Assistance  3  Moderate Assistance   Bed Mobility   Supine to Sit 3  Moderate assistance   Transfers   Sit to Stand 4  Minimal assistance   Stand to Sit 4  Minimal assistance   Stand pivot 4  Minimal assistance   Functional Mobility   Functional Mobility 4  Minimal assistance   Additional items Rolling walker   Balance   Static Sitting Fair   Dynamic Sitting Fair -   Static Standing Fair -   Dynamic Standing Poor +   Ambulatory Poor +   Activity Tolerance   Activity Tolerance Patient limited by fatigue;Patient limited by pain;Treatment limited secondary to medical complications (Comment)   RUE Assessment   RUE Assessment WFL   LUE Assessment   LUE Assessment WFL   Cognition   Arousal/Participation Cooperative; Alert   Attention Attends with cues to redirect   Orientation Level Oriented to person;Oriented to place;Oriented to time;Oriented to situation  (GENERAL TIME)   Memory Decreased short term memory;Decreased recall of precautions   Following Commands Follows one step commands with increased time or repetition   Assessment   Limitation Decreased ADL status; Decreased Safe judgement during ADL;Decreased endurance;Decreased self-care trans;Decreased high-level ADLs   Prognosis Good;Fair   Assessment Pt is a 68 y o  male who was admitted to [facility] on 3/4/2019 with Fracture of sixth cervical vertebra (HCC) 2* fall down ~6steps   Pt's problem list also includes PMH of HTN, obesity, previous surgery and cardiac disease, chronic pain, cad, depression, dvt, PE, schizophrenia, cervical stenosis with rediculomyelopathy, SI, tardive dyskinesia, torn meniscus L knee  At baseline pt was completing adls independently and ambulating with [employer] - shares homemaking/iadls with spouse  Pt lives with wife in 2 story home with [address]- no BR on first floor  Currently pt requires moderate assist for overall ADLS and min assist for functional mobility/transfers  Pt currently presents with impairments in the following categories -steps to enter environment, difficulty performing ADLS, difficulty performing IADLS , limited insight into deficits, compliance, health management  and environment activity tolerance, endurance, standing balance/tolerance, memory and safety   These impairments, as well as pt's fatigue, pain, spinal precautions, risk for falls and home environment  limit pt's ability to safely engage in all baseline areas of occupation, includinggrooming, bathing, dressing, toileting, functional mobility/transfers, community mobility, house maintenance, social participation  and leisure activities  From OT standpoint, recommend inpt rehab  upon D/C  OT will continue to follow to address the below stated goals  Goals   Patient Goals go home    LTG Time Frame 10-14   Long Term Goal #1 refer to established goals below   Plan   Treatment Interventions ADL retraining;Functional transfer training; Endurance training;Cognitive reorientation;Patient/family training;Equipment evaluation/education; Compensatory technique education; Activityengagement   Goal Expiration Date 03/18/19   OT Frequency 3-5x/wk   Recommendation   OT Discharge Recommendation Short Term Rehab   Barthel Index   Feeding 5   Bathing 0   Grooming Score 0   Dressing Score 5   Bladder Score 10   Bowels Score 10   Toilet Use Score 5   Transfers (Bed/Chair) Score 5   Mobility (Level Surface) Score 0   Stairs Score 0   Barthel Index Score 40       OCCUPATIONAL THERAPY GOALS:    *SBA ADLS AFTER SETUP WITH USE OF ADAPTIVE DEVICES PRN  *SBA  TOILETING AND CLOTHING MANAGEMENT   *SBAFUNCTIONAL MOB AND TRANSFERS TO ALL SURFACES WITH FAIR TO FAIR+ BALANCE/SAFETY FOR PARTICIPATION IN DYNAMIC ADLS   *DEMONSTRATE FAIR+ CARRYOVER WITH SAFE USE OF RW, MANAGEMENT OF CERVICAL COLLAR AND CARRYOVER OF SPINAL PRECAUTIONS/USE OF PROPER BODY MECHANICS DURING FUNCTIONAL TASKS  *ASSESS DME NEEDS  *INCREASE ACTIVITY TOLERANCE TO 35-40 MIN FOR PARTICIPATION IN ADLS AND ENJOYABLE ACTIVITIES     *PT TO PARTICIPATE IN ONGOING FUNCTIONAL COGNITIVE ASSESSMENT WITH GOOD ATTENTION/PARTICIPATION TO ASSIST WITH SAFE D/C RECOMMENDATIONS     Kell Burns, OT

## 2019-03-04 NOTE — ED PROVIDER NOTES
History  Chief Complaint   Patient presents with    Head Injury     fell down approx 6 steps, pt unsure what caused him to fall and has no memory of fall son reports +LOC  takes coumadin  pt also relates cp     Patient had a was unwitnessed fall down 6 wooden steps  When his son found him he was unresponsive for another few minutes  The son was unsure if he passed out 1st or if he hit his head first and then passed out  He has a scratch/laceration to his right posterior neck  Patient's only complaint is left-sided rib pain  He is on Coumadin  No headache neck pain, no abdominal pain,  no nausea/vomiting          Prior to Admission Medications   Prescriptions Last Dose Informant Patient Reported? Taking?   acetaminophen (TYLENOL) 325 mg tablet  Self No Yes   Sig: Take 2 tablets, orally, twice a day, 6 hours apart, every day     amLODIPine (NORVASC) 5 mg tablet  Self No Yes   Sig: Take 1 tablet (5 mg total) by mouth daily   amantadine (SYMMETREL) 100 mg capsule  Self Yes Yes   Sig: Take 1 capsule by mouth 2 (two) times a day    fluPHENAZine (PROLIXIN) 5 mg tablet   Yes Yes   Sig: Take 5 mg by mouth 3 (three) times a day    fluvoxaMINE (LUVOX) 50 mg tablet  Self Yes Yes   Sig: Take 1 tablet by mouth daily Take 1 tablet by oral route 5 times a day    losartan (COZAAR) 50 mg tablet  Self No Yes   Sig: Take 1 tablet (50 mg total) by mouth daily   risperiDONE (RISPERDAL) 3 mg tablet  Self Yes Yes   Sig: Take by mouth Take 1 5 tablets by oral route once a day    tamsulosin (FLOMAX) 0 4 mg  Self No Yes   Sig: Take 1 capsule (0 4 mg total) by mouth daily with dinner for 90 days   warfarin (COUMADIN) 5 mg tablet  Self Yes Yes      Facility-Administered Medications: None       Past Medical History:   Diagnosis Date    Ankle edema 04/2004    chronic    Arthritis 03/16/2011    right knee    Ataxic gait 11/14/2011    Baker's cyst 04/2005    (djd)-left knee    Cardiac disease     Cataract     Chronic pain     Coronary artery disease     Depression     DVT (deep venous thrombosis) (HCC)     Epistaxis 01/09/2015    Forgetfulness 3/4/2019    Hand tingling 02/05/2010    tingling right forearm and hand-CTS on EMG    Hematuria 11/22/2016    ER-3 way sandhu to irrigate bladder with 3 L NS    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 [address] ) 09/18/2014    bilateral PE + acute DVT LLE     Rib fracture 03/2007    left 8th rib 2nd degree fall    Schizophrenia ([facility] [address] ) 04/2009    acute exacerbation    Sciatic leg pain 07/20/2011    left    Stenosis of cervix     with cervical radiculomyelopathy    Suicidal ideation 04/2009    Syncope 01/09/2015    Tardive dyskinesia 02/28/2011    Torn meniscus 04/2004    lateral, knee, left    Varicose vein of leg 04/2004    Venous insufficiency of leg 04/2004    chronic    Vertigo 05/12/2008    transient-questionable BPPV       Past Surgical History:   Procedure Laterality Date    CATARACT EXTRACTION Right     10/2/2009    CATARACT EXTRACTION      12/4/2009    COLONOSCOPY      with biopsy    IVC FILTER INSERTION      anticoagulation-9/23/2014    KNEE ARTHROCENTESIS       and cortisone injection    KNEE ARTHROSCOPY Left     5/18/2004    LAMINECTOMY      C3-C6 and left sided cervical foraminotomies-10/14/2008    OTHER SURGICAL HISTORY      DONNY x 1    REPLACEMENT TOTAL HIP W/  RESURFACING IMPLANTS      TOTAL HIP ARTHROPLASTY Right     02/07/2017    TOTAL KNEE ARTHROPLASTY Left     3/21/2005       Family History   Problem Relation Age of Onset    Coronary artery disease Mother     Diabetes Mother     Depression Mother     Other Mother         tobacco abuse    Other Father         tobacco abuse    Coronary artery disease Father     Other Sister         tobacco abuse    Diabetes Sister     Hypertension Brother     Migraines Other     Breast cancer Other         2009-mastectomy+chemo     I have reviewed and agree with the history as documented  Social History     Tobacco Use    Smoking status: Never Smoker    Smokeless tobacco: Never Used    Tobacco comment: no passive smoke exposure   Substance Use Topics    Alcohol use: No     Frequency: Never    Drug use: No        Review of Systems   Constitutional: Negative for appetite change, fatigue and fever  HENT: Negative for rhinorrhea and sore throat  Respiratory: Negative for cough, shortness of breath and wheezing  Cardiovascular: Negative for chest pain and leg swelling  Gastrointestinal: Negative for abdominal pain, diarrhea and vomiting  Genitourinary: Negative for dysuria and flank pain  Musculoskeletal: Negative for back pain and neck pain  Skin: Negative for rash  Neurological: Negative for syncope and headaches  Psychiatric/Behavioral:        Mood normal       Physical Exam  Physical Exam   Constitutional: He is oriented to person, place, and time  He appears well-developed and well-nourished  HENT:   Head: Normocephalic and atraumatic  Mouth/Throat: Oropharynx is clear and moist    Eyes: Pupils are equal, round, and reactive to light  Neck: Neck supple  No midline C-spine tenderness   Cardiovascular: Normal rate and regular rhythm  Left anterior lower rib tenderness   Pulmonary/Chest: Effort normal and breath sounds normal  No respiratory distress  Abdominal: Soft  There is no tenderness  Musculoskeletal: Normal range of motion  All extremities nontender and have full range of motion, +n/v intact   Neurological: He is alert and oriented to person, place, and time  Skin:   7 centimeter superficial scratch to the right posterior neck  No sutures needed  No active bleeding      Abrasion to left scapular area   Nursing note and vitals reviewed        Vital Signs  ED Triage Vitals   Temperature Pulse Respirations Blood Pressure SpO2   03/03/19 2226 03/03/19 2225 03/03/19 2225 03/03/19 2226 03/03/19 2225   97 8 °F (36 6 °C) 97 16 169/80 98 %      Temp Source Heart Rate Source Patient Position - Orthostatic VS BP Location FiO2 (%)   03/03/19 2226 03/03/19 2322 03/03/19 2226 03/03/19 2226 --   Temporal Monitor Sitting Left arm       Pain Score       03/03/19 2225       6           Vitals:    03/04/19 0020 03/04/19 0100 03/04/19 0130 03/04/19 0200   BP: 162/79 140/71 115/59 134/72   Pulse: 105 100 104 (!) 106   Patient Position - Orthostatic VS: Sitting Sitting Sitting Sitting       Visual Acuity      ED Medications  Medications   sodium chloride 0 9 % bolus 500 mL (0 mL Intravenous Stopped 3/4/19 0029)   neomycin-bacitracin-polymyxin b (NEOSPORIN) ointment 1 large application (1 large application Topical Given 3/3/19 2313)   iohexol (OMNIPAQUE) 350 MG/ML injection (MULTI-DOSE) 100 mL (100 mL Intravenous Given 3/4/19 0001)       Diagnostic Studies  Results Reviewed     Procedure Component Value Units Date/Time    Urine Microscopic [[id number]]  (Abnormal) Collected:  03/04/19 0009    Lab Status:  Final result Specimen:  Urine, Clean Catch Updated:  03/04/19 0042     RBC, UA 2-4 /hpf      WBC, UA 2-4 /hpf      Epithelial Cells Occasional /hpf      Bacteria, UA Occasional /hpf     POCT urinalysis dipstick [[id number]]  (Normal) Resulted:  03/04/19 0005    Lab Status:  Final result Specimen:  Urine Updated:  03/04/19 0035     Color, UA yellow    ED Urine Macroscopic [[id number]]  (Abnormal) Collected:  03/04/19 0009    Lab Status:  Final result Specimen:  Urine Updated:  03/04/19 0008     Color, UA Yellow     Clarity, UA Clear     pH, UA 7 0     Leukocytes, UA Negative     Nitrite, UA Negative     Protein, UA Negative mg/dl      Glucose, UA Negative mg/dl      Ketones, UA Negative mg/dl      Urobilinogen, UA 0 2 E U /dl      Bilirubin, UA Negative     Blood, UA Trace     Specific [place], UA 1 015    Narrative:       CLINITEK RESULT    Ethanol [[id number]]  (Normal) Collected: 03/03/19 2256    Lab Status:  Final result Specimen:  Blood from Arm, Left Updated:  03/03/19 2339     Ethanol Lvl <3 mg/dL     Protime-INR [[id number]]  (Abnormal) Collected:  03/03/19 2256    Lab Status:  Final result Specimen:  Blood from Arm, Left Updated:  03/03/19 2338     Protime 23 8 seconds      INR 2 12    APTT [[id number]]  (Normal) Collected:  03/03/19 2256    Lab Status:  Final result Specimen:  Blood from Arm, Left Updated:  03/03/19 2338     PTT 31 seconds     Troponin I [[id number]]  (Normal) Collected:  03/03/19 2256    Lab Status:  Final result Specimen:  Blood from Arm, Left Updated:  03/03/19 2337     Troponin I <0 02 ng/mL     Comprehensive metabolic panel [[id number]] Collected:  03/03/19 2256    Lab Status:  Final result Specimen:  Blood from Arm, Left Updated:  03/03/19 2329     Sodium 145 mmol/L      Potassium 3 5 mmol/L      Chloride 103 mmol/L      CO2 31 mmol/L      ANION GAP 11 mmol/L      BUN 14 mg/dL      Creatinine 0 93 mg/dL      Glucose 114 mg/dL      Calcium 9 1 mg/dL      AST 39 U/L      ALT 41 U/L      Alkaline Phosphatase 99 U/L      Total Protein 7 7 g/dL      Albumin 4 0 g/dL      Total Bilirubin 0 42 mg/dL      eGFR 82 ml/min/1 73sq m     Narrative:       National Kidney Disease Education Program recommendations are as follows:  GFR calculation is accurate only with a steady state creatinine  Chronic Kidney disease less than 60 ml/min/1 73 sq  meters  Kidney failure less than 15 ml/min/1 73 sq  meters      Lipase [[id number]]  (Normal) Collected:  03/03/19 2256    Lab Status:  Final result Specimen:  Blood from Arm, Left Updated:  03/03/19 2329     Lipase 169 u/L     CBC and differential [[id number]] Collected:  03/03/19 2256    Lab Status:  Final result Specimen:  Blood from Arm, Left Updated:  03/03/19 2310     WBC 5 61 Thousand/uL      RBC 5 05 Million/uL      Hemoglobin 15 0 g/dL      Hematocrit 46 5 %      MCV 92 fL      MCH 29 7 pg      MCHC 32 3 g/dL      RDW 13 9 %      MPV 9 2 fL      Platelets 518 Thousands/uL      nRBC 0 /100 WBCs      Neutrophils Relative 68 %      Immat GRANS % 1 %      Lymphocytes Relative 19 %      Monocytes Relative 9 %      Eosinophils Relative 2 %      Basophils Relative 1 %      Neutrophils Absolute 3 79 Thousands/µL      Immature Grans Absolute 0 06 Thousand/uL      Lymphocytes Absolute 1 07 Thousands/µL      Monocytes Absolute 0 51 Thousand/µL      Eosinophils Absolute 0 13 Thousand/µL      Basophils Absolute 0 05 Thousands/µL     POCT Chem 8+ [[id number]]  (Abnormal) Collected:  03/03/19 2258    Lab Status:  Final result Specimen:  Venous Updated:  03/03/19 2305     SODIUM, I-STAT 144 mmol/l      Potassium, i-STAT 3 5 mmol/L      Chloride, istat 100 mmol/L      CO2, i-STAT 32 mmol/L      Anion Gap, i-STAT 17 mmol/L      Calcium, Ionized i-STAT 1 14 mmol/L      BUN, I-STAT 14 mg/dl      Creatinine, i-STAT 0 9 mg/dl      eGFR 85 ml/min/1 73sq m      Glucose, i-STAT 117 mg/dl      Hct, i-STAT 48 %      Hgb, i-STAT 16 3 g/dl      Specimen Type VENOUS                 CT recon only lumbar spine   Final Result by Eliane Matos MD (03/04 0118)      No fracture or traumatic subluxation  Ankylosing spondylitis       I personally discussed this study with ANMOL Karimi on 3/4/2019 at 1:17 AM                Workstation performed: [id number]         CT recon only thoracic spine   Final Result by Eliane Matos MD (03/04 0118)      No fracture or traumatic subluxation  Ankylosing spondylitis               I personally discussed this study with ANMOL Karimi on 3/4/2019 at 1:18 AM                Workstation performed: [id number]         CT chest abdomen pelvis w contrast   Final Result by Eliane Matos MD (03/04 0117)         No acute pathology visualized on CT of the chest, abdomen and pelvis with IV without oral contrast              I personally discussed this study with ANMOL Karimi on 3/4/2019 at 1:17 AM  Workstation performed: [id number]         CT cervical spine without contrast   Final Result by Michelle Lan MD (03/04 0122)      Extensive surgical changes detailed above     Fracture through an osteophyte at C6-7 level is noted; considering altered anatomy with appearance of a long bone type C-spine, MRI is recommended for further evaluation and cervical spine stabilization is    recommended until MRI is performed  I personally discussed this study with ANMOL Bennett on 3/4/2019 at 1:21 AM                Workstation performed: [id number]         CT head without contrast   Final Result by Michelle Lan MD (03/04 0120)      No acute intracranial abnormality  Soft tissue emphysema is noted in the right  and pterygoid regions concerning for a facial bones fracture          I personally discussed this study with ANMOL Bennett on 3/4/2019 at 1:18 AM                Workstation performed: [id number]                    Procedures  ECG 12 Lead Documentation  Date/Time: 3/3/2019 10:32 PM  Performed by: Barrera Malloy MD  Authorized by: Barrera Malloy MD     Rate:     ECG rate:  96    ECG rate assessment: normal    Rhythm:     Rhythm: sinus rhythm    Comments:      No st elevation or depression    CriticalCare Time  Performed by: Barrera Malloy MD  Authorized by: Barrera Malloy MD     Critical care provider statement:     Critical care time (minutes):  60    Critical care time was exclusive of:  Separately billable procedures and treating other patients and teaching time    Critical care was necessary to treat or prevent imminent or life-threatening deterioration of the following conditions:  Trauma    Critical care was time spent personally by me on the following activities:  Blood draw for specimens, obtaining history from patient or surrogate, development of treatment plan with patient or surrogate, discussions with consultants, evaluation of patient's response to treatment, examination of patient, interpretation of cardiac output measurements, ordering and review of laboratory studies, ordering and review of radiographic studies and re-evaluation of patient's condition           Phone Contacts  ED Phone Contact    ED Course                               MDM  Number of Diagnoses or Management Options  C6 cervical fracture [facility]):   Chest wall contusion:   Closed head injury, initial encounter:   Fall, initial encounter:      Amount and/or Complexity of Data Reviewed  Clinical lab tests: ordered and reviewed  Tests in the radiology section of CPT®: ordered and reviewed    Risk of Complications, Morbidity, and/or Mortality  Presenting problems: moderate  General comments: Transfer arranged to [address] [place] [address] for C6/C7 fx        Disposition  Final diagnoses:   C6 cervical fracture ([facility] [address] )   Closed head injury, initial encounter   Chest wall contusion   Fall, initial encounter   C7 cervical fracture ([facility] [address] )     Time reflects when diagnosis was documented in both MDM as applicable and the Disposition within this note     Time User Action Codes Description Comment    3/4/2019  1:21 AM Roro Allen Add [S12 500A] C6 cervical fracture ([facility] [address] )     3/4/2019  1:32 AM Roro Allen Add [S09 90XA] Closed head injury, initial encounter     3/4/2019  1:32 AM Roro Allen Add [S20 219A] Chest wall contusion     3/4/2019  1:33 AM Desmond Allen Add [X32  XXXA] Fall, initial encounter     3/6/2019  7:13 AM Roro Allen Add [S12 600A] C7 cervical fracture [facility])       ED Disposition     ED Disposition Condition Date/Time Comment    Transfer to Another Facility-In Network  Mon Mar 4, 2019  1:20 AM Stephen Sal  should be transferred out to [address] [place] [address]      MD Documentation      Most Recent Value   Patient Condition  The patient has been stabilized such that within reasonable medical probability, no material deterioration of the patient condition or the condition of the unborn child(akash) is likely to result from the transfer   Reason for Transfer  Level of Care needed not available at this facility   Benefits of Transfer  Specialized equipment and/or services available at the receiving facility (Include comment)________________________   Risks of Transfer  Potential for delay in receiving treatment   Accepting Physician  Dr Andrea Murillo Name, [address] by (Company and Unit #)  Jamie Sy   Sending MD Dr Jayant Oswald   Provider Certification  General risk, such as traffic hazards, adverse weather conditions, rough terrain or turbulence, possible failure of equipment (including vehicle or aircraft), or consequences of actions of persons outside the control of the transport personnel      RN Documentation      [facility] Name, Lynn Osgood   Transport Mode  Ambulance   Transported by (Company and Unit #)  [employer]   Level of Care  Basic life support      Follow-up Information    None         Discharge Medication List as of 3/4/2019  2:15 AM      CONTINUE these medications which have NOT CHANGED    Details   acetaminophen (TYLENOL) 325 mg tablet Take 2 tablets, orally, twice a day, 6 hours apart, every day , Print      amantadine (SYMMETREL) 100 mg capsule Take 1 capsule by mouth daily  , Starting Mon 7/2/2018, Historical Med      amLODIPine (NORVASC) 5 mg tablet Take 1 tablet (5 mg total) by mouth daily, Starting Mon 12/3/2018, Normal      fluPHENAZine (PROLIXIN) 5 mg tablet Take 5 mg by mouth daily, Historical Med      fluvoxaMINE (LUVOX) 50 mg tablet Take 1 tablet by mouth daily Take 1 tablet by oral route 5 times a day , Starting Wed 6/13/2018, Historical Med      losartan (COZAAR) 50 mg tablet Take 1 tablet (50 mg total) by mouth daily, Starting Mon 12/3/2018, Normal      risperiDONE (RISPERDAL) 3 mg tablet Take by mouth Take 1 5 tablets by oral route once a day , Historical Med      tamsulosin (FLOMAX) 0 4 mg Take 1 capsule (0 4 mg total) by mouth daily with dinner for 90 days, Starting Tue 6/26/2018, Until Sun 3/3/2019, Normal      warfarin (COUMADIN) 5 mg tablet Historical Med           No discharge procedures on file      ED Provider  Electronically Signed by           Guero Rivas MD  03/06/19 8778

## 2019-03-04 NOTE — PHYSICAL THERAPY NOTE
Physical Therapy Evaluation    Patient's Name:Jesus Vitale      Today's Date:03/04/19    Patient Active Problem List   Diagnosis    Benign essential hypertension    Coronary artery disease involving native coronary artery of native heart without angina pectoris    Chronic depression    Chronic venous stasis dermatitis of both lower extremities    Deep venous thrombosis of lower extremity (HCC)    Diastolic heart failure (HCC)    Osteoarthritis    Nocturnal enuresis    Idiopathic trigeminal neuralgia    Lacunar infarction    Class 1 obesity due to excess calories without serious comorbidity with body mass index (BMI) of 34 0 to 34 9 in adult    Schizophrenia ([address] )    Disability of walking    Pulmonary embolism ([address] )    Arthropathy    Mixed hyperlipidemia    Neurogenic claudication    Neck pain without injury    Abdominal wall hematoma    Spinal stenosis of lumbar region with neurogenic claudication    Lipoma of colon    Fracture of sixth cervical vertebra ([address] )       Past Medical History:   Diagnosis Date    Ankle edema 04/2004    chronic    Arthritis 03/16/2011    right knee    Ataxic gait 11/14/2011    Baker's cyst 04/2005    (djd)-left knee    Cardiac disease     Cataract     Chronic pain     Coronary artery disease     Depression     DVT (deep venous thrombosis) (HCC)     Epistaxis 01/09/2015    Hand tingling 02/05/2010    tingling right forearm and hand-CTS on EMG    Hematuria 11/22/2016    ER-3 way sandhu to irrigate bladder with 3 L NS    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    Hypertension     Lipoma 01/11/2011    in hepatic flexure    Migraine headache 02/2007    optic    Obesity     Pulmonary embolism ([address] ) 09/18/2014    bilateral PE + acute DVT LLE     Rib fracture 03/2007    left 8th rib 2nd degree fall    Schizophrenia ([address] ) 04/2009    acute exacerbation    Sciatic leg pain 07/20/2011    left    Stenosis of cervix     with cervical radiculomyelopathy    Suicidal ideation 04/2009    Syncope 01/09/2015    Tardive dyskinesia 02/28/2011    Torn meniscus 04/2004    lateral, knee, left    Varicose vein of leg 04/2004    Venous insufficiency of leg 04/2004    chronic    Vertigo 05/12/2008    transient-questionable BPPV       Past Surgical History:   Procedure Laterality Date    CATARACT EXTRACTION Right     10/2/2009    CATARACT EXTRACTION      12/4/2009    COLONOSCOPY      with biopsy    IVC FILTER INSERTION      anticoagulation-9/23/2014    KNEE ARTHROCENTESIS       and cortisone injection    KNEE ARTHROSCOPY Left     5/18/2004    LAMINECTOMY      C3-C6 and left sided cervical foraminotomies-10/14/2008    OTHER SURGICAL HISTORY      DONNY x 1    REPLACEMENT TOTAL HIP W/  RESURFACING IMPLANTS      TOTAL HIP ARTHROPLASTY Right     02/07/2017    TOTAL KNEE ARTHROPLASTY Left     3/21/2005          03/04/19 1100   Pain Assessment   Pain Assessment 0-10   Pain Score 7   Pain Type Acute pain   Pain Location Neck   Hospital Pain Intervention(s) Ambulation/increased activity   Response to Interventions tolerated   Home Living   Type of Home House   Home Layout Multi-level  (4 JOSE JUAN w wall support)   Prior Function   Level of [place]   (ambulatory, assisted by shabana prn)   Lives With Spouse; Son   Shane Mckeon Help From [facility] in the last 6 months 1 to 4   Restrictions/Precautions   Braces or Orthoses C/S Collar  (aspen )   Cognition   Arousal/Participation Alert   Orientation Level Oriented to person;Oriented to place   Following Commands Follows one step commands with increased time or repetition   RUE Assessment   RUE Assessment   (funct propulsion of RW)   LUE Assessment   LUE Assessment   (funct propulsion of RW)   RLE Assessment   RLE Assessment X   Strength RLE   RLE Overall Strength 4-/5   LLE Assessment   LLE Assessment X   Strength LLE   LLE Overall Strength 4-/5   Coordination   Movements are Fluid and Coordinated 0   Coordination and Movement Description LE instability   Bed Mobility   Additional Comments bed mob NT- pt sitting EOB w OT present on PT  arrival   Transfers   Sit to Stand 4  Minimal assistance   Additional items Increased time required;Verbal cues   Stand to Sit 4  Minimal assistance   Additional items Increased time required;Verbal cues   Stand pivot 4  Minimal assistance   Additional items Increased time required;Verbal cues  (RW)   Ambulation/Elevation   Gait pattern Improper Weight shift; Forward Flexion;Decreased foot clearance; Short stride; Excessively slow   Gait Assistance 4  Minimal assist   Additional items Verbal cues; Tactile cues   Assistive Device Rolling walker   Distance 50 ft x2   Stair Management Assistance 4  Minimal assist   Additional items Verbal cues; Tactile cues   Stair Management Technique Step to pattern  (rail and manual support)   Number of Stairs 4   Balance   Dynamic Sitting Fair  (forward reach)   Static Standing Fair -  (RW)   Dynamic Standing Poor +  (RW)   Endurance Deficit   Endurance Deficit Yes   Endurance Deficit Description fatigue   Activity Tolerance   Activity Tolerance Patient limited by fatigue  (LE instability)   Nurse Made Aware yes   Assessment   Prognosis Good   Problem List Decreased strength;Decreased endurance;Decreased range of motion; Impaired balance;Decreased mobility; Decreased coordination; Impaired judgement;Decreased safety awareness;Decreased skin integrity;Orthopedic restrictions;Pain   Assessment Pt is a 68 y o  male admitted [facility]/2019 s/p fall - mech v syncope w/ Fracture of sixth cervical vertebra (HCC) currently in Ccollar and appropriate to mobilize per TR;  Pt exhibits significant impairments with weakness, decreased ROM, impaired balance, decreased endurance, impaired coordination, gait deviations, pain, decreased activity tolerance, decreased functional mobility tolerance, decreased safety awareness, impaired judgement, fall risk, orthopedic restrictions and decreased skin integrity; these impact independence with mobility, ADLs, and IADLs; requires min assist w transf sit-stand - requiring >1 attempt and then needed to stabilize stance w LE push back against bed surface; c/o miled dizziness in stand, BP monitored and unemarkable; amb 50 ft x2 w min assist using RW- gait pattern w forward flexed posture, decreased stride length and foot clearance, walker reliant + falls risk- tended to run into environ obstacles  w RW; + falls risk; also requires min assist w manual and rail support for 3 steps ascent descent- significant LE noted RLE >LLE - [facility] falls risk; objective measures on the Barthel Index also reveal limitations;  therapy prognosis is impacted by relevant co morbidities as noted in evaluation; clinical presentation is currently unstable/unpredictable - pt is on cont pulse oximetry, neuro checks, Cspine precautions, presents w pain, complex hx incl multiple falls and phys impairments as noted- a regression from baseline; PTA, pt was ambulatory- pt reports sedentary lifestyle; fam present in room and PT d/w fam pt's funct status and rec; skilled PT is indicated to to optimize functional independence and discharge planning; pending functional progress, PT recommendation at discharge is IP rehab    Goals   Patient Goals agreeable to rehab to get stronger   STG Expiration Date 03/14/19   Short Term Goal #1 1  Modified Independent with Bed Mobility Rolling Right and Left     2  Modified Independent with Bed Mobility Supine-Sit     3  Supervision with Transfer Bed-Chair     4  Increase Dynamic Sitting Balance at least 1 Grade for improved stability with functional reach activities     5  Increase Dynamic Standing Balance at least 1 Grade for improved ease with Activities of Daily Living     6  Increase Lower Extremity Strength at least 1 Grade for improved ease mobility tasks     7   Supervision with  Ambulation 150 feet using RW Ccollar donned to facilitate home and community mobility 8  Minimum assist with Ascending/Descending 14 steps to facilitate home and community accessibility  Plan   Treatment/Interventions Functional transfer training;LE strengthening/ROM; Elevations; Therapeutic exercise; Endurance training;Cognitive reorientation;Patient/family training;Equipment eval/education; Bed mobility;Gait training; Compensatory technique education;Continued evaluation;Spoke to nursing   PT Frequency   (4-6x/wk)   Recommendation   Recommendation Post acute IP rehab   Equipment Recommended Walker   Barthel Index   Feeding 10   Bathing 0   Grooming Score 0   Dressing Score 5   Bladder Score 10   Bowels Score 10   Toilet Use Score 5   Transfers (Bed/Chair) Score 10   Mobility (Level Surface) Score 0   Stairs Score 5   Barthel Index Score 55

## 2019-03-04 NOTE — CONSULTS
Consultation - Neurosurgery   Milady Parrish  68 y o  male MRN: [id number]  Unit/Bed#: [facility] 620-01 Encounter: [id number]        Attending discussed patient during morning rounds on 03/04/2019 at 7:15 a m  Patient personally accessed and examined along with images reviewed on 03/04/2019 at 10:00 a m  Inpatient consult to Neurosurgery  Consult performed by: Ray Yu PA-C  Consult ordered by: Prudence Pallas, MD        Assessment/Plan     Assessment:  1  C6-C7 osteophyte fracture  2  S/P PCDF C4-C6 ( about 10 years ago)  3  Chronic L3 compression fracture   4  Chronic pain  5  Arthritis  6  Venous insufficiency  7  Varicose vein to clinic  8  History of pulmonary embolism  9  Hypertension  10  History of DVT  11  Coronary artery disease  12  Depression  13  Schizophrenia    Plan:    · Exam revealed GCS 15, AAO X 3, mild diffuse TTP of thoracolumbar spine, no point tenderness  Strength 5/5 throughout except plantar flexion 4/5 bilaterally, sensation intact to LT/PP X 4, reflexes 2+ BUE, 1+ BLE, mild clonus bilaterally, mild thomas's bilaterally, no drift bilaterally  · Continue regular neurological checks  · Imaging reviewed personally and by attending  Results listed below:   Ct Chest Abdomen Pelvis W Contrast, Result Date: 3/4/2019: No acute pathology visualized on CT of the chest, abdomen and pelvis with IV without oral contrast  Chronic L3 compression fracture  Comparison with CT CAP on 12/21/16 shows there is no change in the L3 compression fracture  CT chest, abd pelvis 12/21/19: L3 compression fracture noted  Xray of Cervical spine is ordered  Will review when completed  MRI of cervical spine is not required  Pt is expected to have some cervical stenosis from degenerative changes s/p posterior cervical laminectomy C4- C6  In addition, pt has no cervical neurological deficits      CTA neck is not indicated to check for vascular injury since the fracture is through the anterior osteophytes of C6/C7 and does not go through the transverse foramen  · Ongoing medical management by primary team - Trauma service  · Pain control per primary team  · Eval and mobilize per PT/OT  · DVT ppx: SCDs, Pt was on Coumadin, can consider pharm dvt ppx/restarting coumadin  · Cervical brace in place  Cervical aspen  Vista brace to be worn at all times, for showers switch to the [place] collar that is at bedside  · No neurosurgical intervention is anticipated at this time  · Neurosurgery will continue to follow in the setting of:  · C6/C7 anterior osteophyte fracture:  Xr Cervical ordered  · Chronic L3 compression fracture: No brace needed  conservative management with pain management/PT/OT  · Please call with any questions or concerns  History of Present Illness   History, ROS and PFSH obtainable from pt and his son, wife and daughter who are present during this assessment  HPI: Milady Parrish  is a 68 y o  male with PMH including prior posterior cervical laminectomy surgery for Cervical stenosis w/myelopathy, venous insufficiency of bilateral legs, varicose venis, Hx of PE, Hx of DVT, arthritis, CAD, Depression, Schizophrenia who presented initially to [facility] and later transferred to [facility] s/p fall down the stairs on Coumadin  Wife reports that she heard patient fall  Son reports he found his father down the stairs with his cane nearby  They think he was going up the stairs when he slipped and fell down a few wooden stairs  Patient's son reports for a min or 2 his father was not responsive to questions and was moaning, then he became responsive and began to ask questions such as what day it was  The son helped his father up to a chair and then they helped him to the car and drove him to the hospital  Pt reports he lives at home with his wife  His bedroom is on the second floor and he is usually able to use his cane on one hand, hold on the railing with the other hand and walk up the stairs   Pt admits to chronic mild weakness on bilateral legs and gait issues  Family reports that in the last 6 months he has fallen at least twice  Today patient admits to having constant neck pain that he rates as 8/10 on the pain scale  Patient also admits to back pain that he rates as 6/10 on the pain scale  He reports his back pain is intermittent 'comes and goes" He reports his pain is made worse with movement and improves at rest  Pt denies weakness in bilateral arms or legs  He denies numbness or tingling in bilateral arms or legs  Pt reports that at times he gets pain in his leg  He also admitted to occasional leg cramps  CT of cervical spine showed a C6/C7 anterior osteophyte fracture  Pt and his family report that pt had a posterior cervical surgery about 10 yrs ago for C4, C5, C6 laminectomy  Pt and family reported pt has not had an anterior cervical surgery in the past  A review of patient's chart showed that CT of chest, abdomen and pelvis in 12/12/2016 showed an  L3 comp fracture was present then  Review of Systems   Constitutional: Negative for chills and fever  HENT: Negative for ear pain and trouble swallowing  Eyes: Negative for photophobia and pain  Respiratory: Negative for chest tightness and shortness of breath  Cardiovascular: Negative for chest pain and palpitations  Gastrointestinal: Negative for abdominal pain, nausea and vomiting  Genitourinary: Negative for difficulty urinating and dysuria  Musculoskeletal: Positive for back pain and neck pain  Mild diffuse back pain  Skin: Negative for color change, rash and wound  Right posterior neck abrasion and abrasion around left shoulder blade  Neurological: Negative for dizziness, seizures, speech difficulty, light-headedness and headaches  Psychiatric/Behavioral: Negative for confusion and decreased concentration         Historical Information   Past Medical History:   Diagnosis Date    Ankle edema 04/2004    chronic  Arthritis 03/16/2011    right knee    Ataxic gait 11/14/2011    Baker's cyst 04/2005    (djd)-left knee    Cardiac disease     Cataract     Chronic pain     Coronary artery disease     Depression     DVT (deep venous thrombosis) (HCC)     Epistaxis 01/09/2015    Hand tingling 02/05/2010    tingling right forearm and hand-CTS on EMG    Hematuria 11/22/2016    ER-3 way sandhu to irrigate bladder with 3 L NS    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    Hypertension     Lipoma 01/11/2011    in hepatic flexure    Migraine headache 02/2007    optic    Obesity     Pulmonary embolism ([facility] [address] ) 09/18/2014    bilateral PE + acute DVT LLE     Rib fracture 03/2007    left 8th rib 2nd degree fall    Schizophrenia ([facility] [address] ) 04/2009    acute exacerbation    Sciatic leg pain 07/20/2011    left    Stenosis of cervix     with cervical radiculomyelopathy    Suicidal ideation 04/2009    Syncope 01/09/2015    Tardive dyskinesia 02/28/2011    Torn meniscus 04/2004    lateral, knee, left    Varicose vein of leg 04/2004    Venous insufficiency of leg 04/2004    chronic    Vertigo 05/12/2008    transient-questionable BPPV     Past Surgical History:   Procedure Laterality Date    CATARACT EXTRACTION Right     10/2/2009    CATARACT EXTRACTION      12/4/2009    COLONOSCOPY      with biopsy    IVC FILTER INSERTION      anticoagulation-9/23/2014    KNEE ARTHROCENTESIS       and cortisone injection    KNEE ARTHROSCOPY Left     5/18/2004    LAMINECTOMY      C3-C6 and left sided cervical foraminotomies-10/14/2008    OTHER SURGICAL HISTORY      DONNY x 1    REPLACEMENT TOTAL HIP W/  RESURFACING IMPLANTS      TOTAL HIP ARTHROPLASTY Right     02/07/2017    TOTAL KNEE ARTHROPLASTY Left     3/21/2005     Social History     Substance and Sexual Activity   Alcohol Use No     Social History     Substance and Sexual Activity   Drug Use No     Social History     Tobacco Use   Smoking Status Never Smoker   Smokeless Tobacco Never Used   Tobacco Comment    no passive smoke exposure     Family History   Problem Relation Age of Onset    Coronary artery disease Mother     Diabetes Mother     Depression Mother     Other Mother         tobacco abuse    Other Father         tobacco abuse    Coronary artery disease Father     Other Sister         tobacco abuse    Diabetes Sister     Hypertension Brother     Migraines Other     Breast cancer Other         2009-mastectomy+chemo       Meds/Allergies   all current active meds have been reviewed, current meds:   Current Facility-Administered Medications   Medication Dose Route Frequency    acetaminophen (TYLENOL) tablet 650 mg  650 mg Oral Q6H PRN    amantadine (SYMMETREL) capsule 100 mg  100 mg Oral Daily    amLODIPine (NORVASC) tablet 5 mg  5 mg Oral Daily    fluPHENAZine (PROLIXIN) tablet 5 mg  5 mg Oral Daily    fluvoxaMINE (LUVOX) tablet 50 mg  50 mg Oral Daily    lidocaine (LIDODERM) 5 % patch 1 patch  1 patch Topical Daily    losartan (COZAAR) tablet 50 mg  50 mg Oral Daily    risperiDONE (RisperDAL) tablet 0 5 mg  0 5 mg Oral Daily    risperiDONE (RisperDAL) tablet 1 mg  1 mg Oral HS    tamsulosin (FLOMAX) capsule 0 4 mg  0 4 mg Oral Daily With Dinner    and PTA meds:   Prior to Admission Medications   Prescriptions Last Dose Informant Patient Reported? Taking?   acetaminophen (TYLENOL) 325 mg tablet 3/3/2019 at Unknown time Self No Yes   Sig: Take 2 tablets, orally, twice a day, 6 hours apart, every day     amLODIPine (NORVASC) 5 mg tablet 3/3/2019 at Unknown time Self No Yes   Sig: Take 1 tablet (5 mg total) by mouth daily   amantadine (SYMMETREL) 100 mg capsule 3/3/2019 at Unknown time Self Yes Yes   Sig: Take 1 capsule by mouth daily     fluPHENAZine (PROLIXIN) 5 mg tablet 3/3/2019 at Unknown time  Yes Yes   Sig: Take 5 mg by mouth daily   fluvoxaMINE (LUVOX) 50 mg tablet 3/3/2019 at Unknown time Self Yes Yes   Sig: Take 1 tablet by mouth daily Take 1 tablet by oral route 5 times a day    losartan (COZAAR) 50 mg tablet 3/3/2019 at Unknown time Self No Yes   Sig: Take 1 tablet (50 mg total) by mouth daily   risperiDONE (RISPERDAL) 3 mg tablet 3/3/2019 at Unknown time Self Yes Yes   Sig: Take by mouth Take 1 5 tablets by oral route once a day    tamsulosin (FLOMAX) 0 4 mg  Self No No   Sig: Take 1 capsule (0 4 mg total) by mouth daily with dinner for 90 days   warfarin (COUMADIN) 5 mg tablet 3/3/2019 at Unknown time Self Yes Yes      Facility-Administered Medications: None     Allergies   Allergen Reactions    Lisinopril Cough       Objective   I/O     None          Physical Exam   Constitutional: He is oriented to person, place, and time  He appears well-developed and well-nourished  HENT:   Head: Normocephalic and atraumatic  Eyes: Pupils are equal, round, and reactive to light  Conjunctivae and EOM are normal  No scleral icterus  Neck: Neck supple  No tracheal deviation present  Cardiovascular: Normal rate  Pulmonary/Chest: Effort normal    Abdominal: Soft  There is no tenderness  There is no guarding  Musculoskeletal: He exhibits no edema  TTP of cervical spine, mild diffuse TTP of thoracic/upper lumbar spine, no point tenderness  Neurological: He is alert and oriented to person, place, and time  He has normal strength  Reflex Scores:       Tricep reflexes are 2+ on the right side and 2+ on the left side  Bicep reflexes are 2+ on the right side and 2+ on the left side  Brachioradialis reflexes are 2+ on the right side and 2+ on the left side  Patellar reflexes are 1+ on the right side and 1+ on the left side  Achilles reflexes are 1+ on the right side and 1+ on the left side  Skin: Skin is warm and dry  No rash noted  No pallor  abrasion to right posterior neck and abrasion near left posterior shoulder blade  Psychiatric: He has a normal mood and affect   His speech is normal and behavior is normal      Neurologic Exam     Mental Status   Oriented to person, place, and time  Follows 2 step commands  Attention: normal  Concentration: normal    Speech: speech is normal   Level of consciousness: alert  Knowledge: good  Able to read  Normal comprehension  Cranial Nerves     CN II   Visual fields full to confrontation  Right visual field deficit: none  Left visual field deficit: none     CN III, IV, VI   Pupils are equal, round, and reactive to light  Extraocular motions are normal    Right pupil: Size: 3 mm  Shape: regular  Reactivity: brisk  Consensual response: intact  Left pupil: Size: 3 mm  Shape: regular  Reactivity: brisk  Consensual response: intact  CN III: no CN III palsy  CN VI: no CN VI palsy  Nystagmus: none   Upgaze: normal  Downgaze: normal  Conjugate gaze: present    CN V   Facial sensation intact  CN VII   Facial expression full, symmetric  CN VIII   CN VIII normal    Hearing: intact    CN IX, X   CN IX normal      CN XI   CN XI normal    Right sternocleidomastoid strength: normal  Right trapezius strength: normal    CN XII   CN XII normal    Tongue: not atrophic  Fasciculations: absent  Tongue deviation: none    Motor Exam   Muscle bulk: normal  Overall muscle tone: normal  Right arm tone: normal  Left arm tone: normal  Right arm pronator drift: absent  Left arm pronator drift: absent  Right leg tone: normal  Left leg tone: normal    Strength   Strength 5/5 throughout       Sensory Exam   Light touch normal    Proprioception normal    Pinprick normal      Gait, Coordination, and Reflexes     Tremor   Resting tremor: absent  Intention tremor: absent  Action tremor: absent    Reflexes   Right brachioradialis: 2+  Left brachioradialis: 2+  Right biceps: 2+  Left biceps: 2+  Right triceps: 2+  Left triceps: 2+  Right patellar: 1+  Left patellar: 1+  Right achilles: 1+  Left achilles: 1+  Right Quiñonez: present  Left Quiñonez: present  Right ankle clonus: present  Left ankle clonus: present      Vitals:Blood pressure 148/75, pulse (!) 106, temperature 98 7 °F (37 1 °C), temperature source Oral, resp  rate 18, SpO2 95 %  ,There is no height or weight on file to calculate BMI  Lab Results:   Results from last 7 days   Lab Units 03/03/19 2258 03/03/19 2256   WBC Thousand/uL  --  5 61   HEMOGLOBIN g/dL  --  15 0   I STAT HEMOGLOBIN g/dl 16 3  --    HEMATOCRIT %  --  46 5   HEMATOCRIT, ISTAT % 48  --    PLATELETS Thousands/uL  --  205   NEUTROS PCT %  --  68   MONOS PCT %  --  9     Results from last 7 days   Lab Units 03/03/19 2258 03/03/19 2256   POTASSIUM mmol/L  --  3 5   CHLORIDE mmol/L  --  103   CO2 mmol/L  --  31   CO2, I-STAT mmol/L 32  --    BUN mg/dL  --  14   CREATININE mg/dL  --  0 93   CALCIUM mg/dL  --  9 1   ALK PHOS U/L  --  99   ALT U/L  --  41   AST U/L  --  39   GLUCOSE, ISTAT mg/dl 117  --              Results from last 7 days   Lab Units 03/03/19 2256   INR  2 12*   PTT seconds 31     Imaging Studies: I have personally reviewed pertinent reports  and I have personally reviewed pertinent films in PACS     Ct Facial Bones Wo Contrast    Result Date: 3/4/2019  Impression: No facial fracture is identified  Sinus disease as described  Workstation performed: [id number]     Ct Chest Abdomen Pelvis W Contrast    Result Date: 3/4/2019  Impression: No acute pathology visualized on CT of the chest, abdomen and pelvis with IV without oral contrast   I personally discussed this study with ANMOL Potts on 3/4/2019 at 1:17 AM  Workstation performed: [id number]     Ct Recon Only Lumbar Spine    Result Date: 3/4/2019  Impression: No fracture or traumatic subluxation  Ankylosing spondylitis  I personally discussed this study with ANMOL Potts on 3/4/2019 at 1:17 AM  Workstation performed: [id number]     Ct Recon Only Thoracic Spine    Result Date: 3/4/2019  Impression: No fracture or traumatic subluxation  Ankylosing spondylitis    I personally discussed this study with Yoan Graff Rachel Siemens on 3/4/2019 at 1:18 AM  Workstation performed: [id number] '    EKG, Pathology, and Other Studies: I have personally reviewed pertinent reports  VTE Prophylaxis: Sequential compression device (Venodyne)  and Reason for no pharmacologic prophylaxis none ordered yet  Pt was on Coumadin before, could consider Coumadin/other dvt ppx  Code Status: Level 1 - Full Code  Advance Directive and Living Will:      Power of :    POLST:      Counseling / Coordination of Care  I spent 45 minutes with the patient  PLEASE NOTE:  This encounter was completed utilizing the [email or web address] Direct Speech Voice Recognition Software  Grammatical errors, random word insertions, pronoun errors and incomplete sentences are occasional consequences of the system due to software limitations, ambient noise and hardware issues  These may be missed by proof reading prior to affixing electronic signature  Any questions or concerns about the content, text or information contained within the body of this dictation should be directly addressed to the advanced practitioner or physician for clarification  Please do not hesitate to call me directly if you have any questions or concerns

## 2019-03-04 NOTE — RESTORATIVE TECHNICIAN NOTE
Restorative Specialist Mobility Note       Activity: Dangle, Ambulate in shen, Chair(Assisted PT/OT, please refer to PT/OT notes for all information )     Assistive Device: Front wheel walker     Ambulation Response: Tolerated fairly well  Repositioned: Sitting, Up in chair           Range of Motion: Active, All extremities      Patient left resting comfortably in bedside recliner, with PT/OT present in room for end of session

## 2019-03-04 NOTE — H&P
H&P Exam - Trauma   Evonnie Essex  68 y o  male MRN: [id number]  Unit/Bed#: ED 10 Encounter: [id number]    Assessment/Plan   Trauma Alert: Evaluation  Model of Arrival: Transfer Val[place]  Trauma Team: Attending Rao Lopez and Residents Mallory Gibbs  Consultants: Neurosurgery: Delilah Gonzalez  Time Called not called    Trauma Active Problems:   1) L-sided musculoskeletal chest pain  2) fall  3) coumadin coagulopathy  4) concussion  5) cervical spine osteophyte fracture  6) abrasion to L back  7) possible facial fracture    Trauma Plan:   - admit to trauma  - neurosx consult for evaluation of c-spine, psb MRI (does have L knee implant)  - hold coumadin  - geriatrics consult  - cognitive eval  - local wound care to abrasion  - CT face for eval of facial fractures  - PT/OT      Chief Complaint: neck pain    History of Present Illness   HPI:  Evonnie Essex  is a 68 y o  male who presents a trauma transfer from [facility] after sustaining a fall on Coumadin  Patient was at his home where he lives with his wife and son when he fell down approximately 6 steps  He does not remember if he hit his head; his son does state that he lost consciousness for a few seconds  Patient does have difficulty ambulating at baseline secondary to neuropathy/radiculopathy which is an ongoing issue for him  He does take Coumadin for DVT/PE  He currently complains of left parasternal pain, neck pain, and back pain  EKG and troponin were negative at [facility]  He has a history of left knee replacement, right total hip replacement, and cervical laminectomy at [facility] in 2008  CT demonstrated fracture for an osteophyte at C6-7 and soft tissue emphysema in right face concerning for facial fracture  He was transferred to [facility] for the further evaluation and management  Mechanism:Fall    Review of Systems   Constitutional: Negative  Negative for chills and fever  HENT: Negative for facial swelling and nosebleeds      Eyes: Negative  Respiratory: Negative  Negative for cough, shortness of breath and wheezing  Cardiovascular: Positive for chest pain (left sided along sternum "soreness")  Negative for palpitations  Gastrointestinal: Negative  Negative for abdominal pain, constipation, diarrhea, nausea and vomiting  Endocrine: Negative  Genitourinary: Negative  Musculoskeletal: Positive for back pain and neck pain  Skin: Positive for wound (on scalp)  Allergic/Immunologic: Negative  Neurological: Negative for dizziness, weakness, light-headedness, numbness and headaches  Hematological: Negative  Psychiatric/Behavioral: Negative          Historical Information     Past Medical History:   Diagnosis Date    Ankle edema 04/2004    chronic    Arthritis 03/16/2011    right knee    Ataxic gait 11/14/2011    Baker's cyst 04/2005    (djd)-left knee    Cardiac disease     Cataract     Chronic pain     Coronary artery disease     Depression     DVT (deep venous thrombosis) (HCC)     Epistaxis 01/09/2015    Hand tingling 02/05/2010    tingling right forearm and hand-CTS on EMG    Hematuria 11/22/2016    ER-3 way sandhu to irrigate bladder with 3 L NS    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    Hypertension     Lipoma 01/11/2011    in hepatic flexure    Migraine headache 02/2007    optic    Obesity     Pulmonary embolism ([facility] [address] ) 09/18/2014    bilateral PE + acute DVT LLE     Rib fracture 03/2007    left 8th rib 2nd degree fall    Schizophrenia ([facility] [address] ) 04/2009    acute exacerbation    Sciatic leg pain 07/20/2011    left    Stenosis of cervix     with cervical radiculomyelopathy    Suicidal ideation 04/2009    Syncope 01/09/2015    Tardive dyskinesia 02/28/2011    Torn meniscus 04/2004    lateral, knee, left    Varicose vein of leg 04/2004    Venous insufficiency of leg 04/2004    chronic    Vertigo 05/12/2008    transient-questionable BPPV     Past Surgical History:   Procedure Laterality Date    CATARACT EXTRACTION Right     10/2/2009    CATARACT EXTRACTION      12/4/2009    COLONOSCOPY      with biopsy    IVC FILTER INSERTION      anticoagulation-9/23/2014    KNEE ARTHROCENTESIS       and cortisone injection    KNEE ARTHROSCOPY Left     5/18/2004    LAMINECTOMY      C3-C6 and left sided cervical foraminotomies-10/14/2008    OTHER SURGICAL HISTORY      DONNY x 1    REPLACEMENT TOTAL HIP W/  RESURFACING IMPLANTS      TOTAL HIP ARTHROPLASTY Right     02/07/2017    TOTAL KNEE ARTHROPLASTY Left     3/21/2005     Social History   Social History     Substance and Sexual Activity   Alcohol Use No     Social History     Substance and Sexual Activity   Drug Use No     Social History     Tobacco Use   Smoking Status Never Smoker   Smokeless Tobacco Never Used   Tobacco Comment    no passive smoke exposure     Immunization History   Administered Date(s) Administered    INFLUENZA 09/16/2015, 09/14/2016, 10/12/2018    Influenza Split 09/17/2010, 09/28/2011, 01/10/2013, 12/20/2013    Influenza Split High Dose Preservative Free IM 09/16/2015    Influenza TIV (IM) 11/28/2008, 10/15/2009, 10/02/2014    Influenza, high dose seasonal 0 5 mL 10/12/2018, 10/12/2018    Pneumococcal Conjugate 13-Valent 03/18/2015    Pneumococcal Polysaccharide PPV23 11/16/2007    Td (adult), adsorbed 11/09/2011    Tdap 12/11/2017    Zoster 10/24/2013     Last Tetanus: updated in 12/2017  Family History: Non-contributory      Meds/Allergies   all current active meds have been reviewed    Allergies   Allergen Reactions    Lisinopril Cough         PHYSICAL EXAM    Objective   Vitals:   First set: Temperature: 98 7 °F (37 1 °C) (03/04/19 0243)  Pulse: (!) 108 (03/04/19 0243)  Respirations: 18 (03/04/19 0243)  Blood Pressure: 151/80 (03/04/19 0243)    Primary Survey:   (A) Airway: intact  (B) Breathing: bilaterally  (C) Circulation: Pulses:   carotid  2/4 and pedal  2/4  (D) Disabliity:  GCS Total:  15  (E) Expose: Completed    Secondary Survey: (Click on Physical Exam tab above)  Physical Exam   Constitutional: He is oriented to person, place, and time  He appears well-developed and well-nourished  No distress  HENT:   Head: Normocephalic and atraumatic  Superficial laceration approximately 3cm on R parietal/occipital region, no active bleeding w/associated small abrasion  No tenderness on palpation face   Eyes: Pupils are equal, round, and reactive to light  EOM are normal  No scleral icterus  Neck: No JVD present    +midline cervical spine tenderness  Ecchymosis on R neck   Cardiovascular: Normal rate, regular rhythm and normal heart sounds  Pulmonary/Chest: Effort normal  No stridor  No respiratory distress  Abdominal: Soft  He exhibits no distension  There is no tenderness  There is no rebound and no guarding  Musculoskeletal: Normal range of motion  He exhibits edema  C and T spine tenderness, no L spine tenderness  5/5 strength throughout  BLE edema   Neurological: He is alert and oriented to person, place, and time  No cranial nerve deficit  GCS 15, slowed response time   Skin: Skin is warm and dry  He is not diaphoretic  Abrasion with ecchymosis to L upper  to palpation  Chronic venous stasis changes bilateral lower extremities   Psychiatric: He has a normal mood and affect         Invasive Devices     Peripheral Intravenous Line            Peripheral IV 03/03/19 Left Hand less than 1 day                Lab Results:   BMP/CMP:   Lab Results   Component Value Date    SODIUM 145 03/03/2019    K 3 5 03/03/2019     03/03/2019    CO2 32 03/03/2019    BUN 14 03/03/2019    CREATININE 0 93 03/03/2019    GLUCOSE 117 03/03/2019    CALCIUM 9 1 03/03/2019    AST 39 03/03/2019    ALT 41 03/03/2019    ALKPHOS 99 03/03/2019    EGFR 85 03/03/2019   , CBC:   Lab Results   Component Value Date    WBC 5 61 03/03/2019    HGB 16 3 03/03/2019    HCT 48 03/03/2019    MCV 92 03/03/2019     03/03/2019 MCH 29 7 03/03/2019    MCHC 32 3 03/03/2019    RDW 13 9 03/03/2019    MPV 9 2 03/03/2019    NRBC 0 03/03/2019    and Coagulation:   Lab Results   Component Value Date    INR 2 12 (H) 03/03/2019     Imaging/EKG Studies: T-Spine: neg, L-Spine: neg, CT Scan Head: soft tissue emphysema, CT Scan C-Spine: fracture through osteophyte C6-7, CT Chest: neg, CT Scan Abdomen/Pelvis: neg, CT Scan Face: pending  Other Studies: none    Code Status: No Order  Advance Directive and Living Will:      Power of :    POLST:

## 2019-03-04 NOTE — EMTALA/ACUTE CARE TRANSFER
[address] [place] [address] EMERGENCY DEPARTMENT  [address] [place] [id number]  Dept: [phone]      EMTALA TRANSFER CONSENT    NAME Carolann ARREAGA 1945                              MRN [id number]    I have been informed of my rights regarding examination, treatment, and transfer   by Dr Chely Pemberton: Specialized equipment and/or services available at the receiving facility (Include comment)________________________    Risks: Potential for delay in receiving treatment      Consent for Transfer:  I acknowledge that my medical condition has been evaluated and explained to me by the emergency department physician or other qualified medical person and/or my attending physician, who has recommended that I be transferred to the service of  Accepting Physician:   [facility] AT [facility] at [address] Name, [address] : [address]  The above potential benefits of such transfer, the potential risks associated with such transfer, and the probable risks of not being transferred have been explained to me, and I fully understand them  The doctor has explained that, in my case, the benefits of transfer outweigh the risks  I agree to be transferred  I authorize the performance of emergency medical procedures and treatments upon me in both transit and upon arrival at the receiving facility  Additionally, I authorize the release of any and all medical records to the receiving facility and request they be transported with me, if possible  I understand that the safest mode of transportation during a medical emergency is an ambulance and that the Hospital advocates the use of this mode of transport  Risks of traveling to the receiving facility by car, including absence of medical control, life sustaining equipment, such as oxygen, and medical personnel has been explained to me and I fully understand them      ([place] BELOW)  [  ]  I consent to the stated transfer and to be transported by ambulance/helicopter  [  ]  I consent to the stated transfer, but refuse transportation by ambulance and accept full responsibility for my transportation by car  I understand the risks of non-ambulance transfers and I exonerate the Hospital and its staff from any deterioration in my condition that results from this refusal     X___________________________________________    DATE  19  TIME________  Signature of patient or legally responsible individual signing on patient behalf           RELATIONSHIP TO PATIENT_________________________          Provider Certification    NAME Parminder Barboza   1945                              MRN [id number]    A medical screening exam was performed on the above named patient  Based on the examination:    Condition Necessitating Transfer The primary encounter diagnosis was C6 cervical fracture ([facility] [address] )  Diagnoses of Closed head injury, initial encounter, Chest wall contusion, and Fall, initial encounter were also pertinent to this visit      Patient Condition: The patient has been stabilized such that within reasonable medical probability, no material deterioration of the patient condition or the condition of the unborn child(akash) is likely to result from the transfer    Reason for Transfer: Level of Care needed not available at this facility    Transfer Requirements: [place] [address]   · Space available and qualified personnel available for treatment as acknowledged by    · Agreed to accept transfer and to provide appropriate medical treatment as acknowledged by       Dr Bony Chavez  · Appropriate medical records of the examination and treatment of the patient are provided at the time of transfer   [address] _______  · Transfer will be performed by qualified personnel from [facility]  and appropriate transfer equipment as required, including the use of necessary and appropriate life support measures  Provider Certification: I have examined the patient and explained the following risks and benefits of being transferred/refusing transfer to the patient/family:  General risk, such as traffic hazards, adverse weather conditions, rough terrain or turbulence, possible failure of equipment (including vehicle or aircraft), or consequences of actions of persons outside the control of the transport personnel      Based on these reasonable risks and benefits to the patient and/or the unborn child(akash), and based upon the information available at the time of the patients examination, I certify that the medical benefits reasonably to be expected from the provision of appropriate medical treatments at another medical facility outweigh the increasing risks, if any, to the individuals medical condition, and in the case of labor to the unborn child, from effecting the transfer      X____________________________________________ DATE 03/04/19        TIME_______      ORIGINAL - SEND TO MEDICAL RECORDS   COPY - SEND WITH PATIENT DURING TRANSFER

## 2019-03-04 NOTE — EMTALA/ACUTE CARE TRANSFER
[address] EMERGENCY DEPARTMENT  [address] [place] [id number]  Dept: [phone]      EMTALA TRANSFER CONSENT    NAME Milady ARREAGA 1945                              MRN [id number]    I have been informed of my rights regarding examination, treatment, and transfer   by Dr Kasey Guerra: Specialized equipment and/or services available at the receiving facility (Include comment)________________________    Risks: Potential for delay in receiving treatment      Consent for Transfer:  I acknowledge that my medical condition has been evaluated and explained to me by the emergency department physician or other qualified medical person and/or my attending physician, who has recommended that I be transferred to the service of  Accepting Physician: (Dr Cookie Luis) at [address] Name, [address] : ([facility])  The above potential benefits of such transfer, the potential risks associated with such transfer, and the probable risks of not being transferred have been explained to me, and I fully understand them  The doctor has explained that, in my case, the benefits of transfer outweigh the risks  I agree to be transferred  I authorize the performance of emergency medical procedures and treatments upon me in both transit and upon arrival at the receiving facility  Additionally, I authorize the release of any and all medical records to the receiving facility and request they be transported with me, if possible  I understand that the safest mode of transportation during a medical emergency is an ambulance and that the Hospital advocates the use of this mode of transport  Risks of traveling to the receiving facility by car, including absence of medical control, life sustaining equipment, such as oxygen, and medical personnel has been explained to me and I fully understand them      ([place] BELOW)  [  ]  I consent to the stated transfer and to be transported by ambulance/helicopter  [  ]  I consent to the stated transfer, but refuse transportation by ambulance and accept full responsibility for my transportation by car  I understand the risks of non-ambulance transfers and I exonerate the Hospital and its staff from any deterioration in my condition that results from this refusal     X___________________________________________    DATE  19  TIME________  Signature of patient or legally responsible individual signing on patient behalf           RELATIONSHIP TO PATIENT_________________________          Provider Certification    NAME Cheryl Patel   1945                              MRN [id number]    A medical screening exam was performed on the above named patient  Based on the examination:    Condition Necessitating Transfer The primary encounter diagnosis was C6 cervical fracture ([facility] [address] )  Diagnoses of Closed head injury, initial encounter, Chest wall contusion, and Fall, initial encounter were also pertinent to this visit      Patient Condition: The patient has been stabilized such that within reasonable medical probability, no material deterioration of the patient condition or the condition of the unborn child(akash) is likely to result from the transfer    Reason for Transfer: Level of Care needed not available at this facility    Transfer Requirements: Facility ([facility])   · Space available and qualified personnel available for treatment as acknowledged by    · Agreed to accept transfer and to provide appropriate medical treatment as acknowledged by       (Dr Perlita Strong)  · Appropriate medical records of the examination and treatment of the patient are provided at the time of transfer   [address] _______  · Transfer will be performed by qualified personnel from    and appropriate transfer equipment as required, including the use of necessary and appropriate life support measures  Provider Certification: I have examined the patient and explained the following risks and benefits of being transferred/refusing transfer to the patient/family:  General risk, such as traffic hazards, adverse weather conditions, rough terrain or turbulence, possible failure of equipment (including vehicle or aircraft), or consequences of actions of persons outside the control of the transport personnel      Based on these reasonable risks and benefits to the patient and/or the unborn child(akash), and based upon the information available at the time of the patients examination, I certify that the medical benefits reasonably to be expected from the provision of appropriate medical treatments at another medical facility outweigh the increasing risks, if any, to the individuals medical condition, and in the case of labor to the unborn child, from effecting the transfer      X____________________________________________ DATE 03/04/19        TIME_______      ORIGINAL - SEND TO MEDICAL RECORDS   COPY - SEND WITH PATIENT DURING TRANSFER

## 2019-03-04 NOTE — ORTHOTIC NOTE
Orthotic Note            Date: 3/4/2019      Patient Name: Edilma Dubois  20 mins         Reason for Consult:  Patient Active Problem List   Diagnosis    Benign essential hypertension    Coronary artery disease involving native coronary artery of native heart without angina pectoris    Chronic depression    Chronic venous stasis dermatitis of both lower extremities    Deep venous thrombosis of lower extremity (HCC)    Diastolic heart failure (HCC)    Osteoarthritis    Nocturnal enuresis    Idiopathic trigeminal neuralgia    Lacunar infarction    Class 1 obesity due to excess calories without serious comorbidity with body mass index (BMI) of 34 0 to 34 9 in adult    Schizophrenia (HCC)    Disability of walking    Pulmonary embolism (HCC)    Arthropathy    Mixed hyperlipidemia    Neurogenic claudication    Neck pain without injury    Abdominal wall hematoma    Spinal stenosis of lumbar region with neurogenic claudication    Lipoma of colon    Fracture of sixth cervical vertebra (HCC)    Rib pain on left side    Fall    Forgetfulness    Physical deconditioning   D[place]ess [place] Collar with Replacement Pads  Per Natasha Pu CRNp    Per trauma, orthotech delivered and fit Dyess [place] Collar to patient while sitting up in bedside recliner  Anterior chin piece was set to second highest setting  Patient tolerated fitting well and is without questions or concerns at this time  MOHINDER Tapia aware of fitting  Recommendations:  Please call  in regards to bracing instructions and/or adjustments    Cecil MARTINEZ, Restorative Technician, [employer]

## 2019-03-04 NOTE — ASSESSMENT & PLAN NOTE
- recently started on fluphenazine 3 weeks ago  Reports he has intermittent dizziness but this is on-going for past several years  Admits it may be more frequent since starting this medication  - he denies presyncope symptoms  - he is unsure what happened, reports he was at the top of the stairs and then he came to at the bottom, clearly after falling down  It is unclear if he is amnestic from LOC/concussion vs if he had syncope causing fall  Recent ECHO shows normal EF and no significant valvule disease  ECG compared to July 2016 without any significant changes and no evidence of ischemia  Recent stress test noted as well    - will order orthostatic BPs, PT/OT  - monitor for s/s concussion but to date denies any

## 2019-03-04 NOTE — ED NOTES
Patient placed on 2L oxygen at this time  Provider aware       Ebony Garrett, RN  03/03/19 [address], RN  03/04/19 0005

## 2019-03-04 NOTE — PROGRESS NOTES
Progress Note - Hernandez Martínez 1945, 68 y o  male MRN: [id number]    Unit/Bed#: [facility] 620-01 Encounter: [id number]    Primary Care Provider: Tatiana Polanco MD   Date and time admitted to hospital: 3/4/2019  2:37 AM        Rib pain on left side  Assessment & Plan  - left sided point tenderness rib cage pain  No evidence of rib fracture  Likely musculoskeletal bruising  No roma hematoma or injury identified  - ECG and troponins negative     Pulmonary embolism ([facility], [place] [address] )  Assessment & Plan  - history - takes coumadin  Has IVC filter noted on CT imagining as well   - resume coumadin today pending neurosurgery recommendations  - risk benefits of continued AC should be discussed with PCP as patient remains high risk for falls given underlying gait disturbances     Schizophrenia ([facility], [place] [address] )  Assessment & Plan  - recently started on fluphenazine 3 weeks ago  Reports he has intermittent dizziness but this is on-going for past several years  Admits it may be more frequent since starting this medication  - he denies presyncope symptoms  - he is unsure what happened, reports he was at the top of the stairs and then he came to at the bottom, clearly after falling down  It is unclear if he is amnestic from LOC/concussion vs if he had syncope causing fall  Recent ECHO shows normal EF and no significant valvule disease  ECG compared to July 2016 without any significant changes and no evidence of ischemia  Recent stress test noted as well  - will order orthostatic BPs, PT/OT  - monitor for s/s concussion but to date denies any     * Fracture of sixth cervical vertebra (HCC)  Assessment & Plan  - osteophyte fracture C6-7   - midline tenderness on exam  - no deficits, no paresthesias   - maintain cervical collar   - neurosurgery consultation - plan for collar and upright Xrays   - PT/OT           Bedside nurse rounds completed with nurse Katia Burgos       Prophylaxis: Sequential compression device (Venodyne)  and Heparin    Disposition: c-collar, upright Xrays, orthostatic BPs, PT/OT  Possible DC this afternoon pending those evaluations     Code status:  Level 1 - Full Code    Consultants:   Neurosurgery  PT/OT  Geriatrics     Is the patient 65 years or older?: YES:    1  Before the illness or injury that brought you to the Emergency, did you need someone to help you on a regular basis? 0=No   2  Since the illness or injury that brought you to the Emergency, have you needed more help than usual to take care of yourself? 1=Yes   3  Have you been hospitalized for one or more nights during the past 6 months (excluding a stay in the Emergency Department)? 0=No   4  In general, do you see well? 0=Yes   5  In general, do you have serious problems with your memory? 0=No   6  Do you take more than three different medications everyday? 1=Yes   TOTAL   2     Did you order a geriatric consult if the score was 2 or greater?: yes    SUBJECTIVE:     Transfer from: na  Outside Films Received: no  Tertiary Exam Due on: today     Mechanism of Injury:Fall    Details related to Injury: +LOC:  yes    Chief Complaint: "I have some pain in my neck and my left side here (rib cage)"    HPI/Last 24 hour events: Patient reports he believes he "passed out" and fell down the stairs  He is unclear if he fell and then passed out or if he passed out prior to falling  He denies any dizziness or presyncope immediately prior to the fall  He reports chronic dizziness intermittently but never presyncope or syncope symptoms  Had a recent med change 3 weeks ago but has overall been tolerating this well, admits to perhaps some increased in frequency of the dizziness since starting it  He denies headache, nausea, vision changes, or pain anywhere else besides his neck and his left anterior/lateral chest wall   He reportedly lives with his wife and his son and has gait disturbances at baseline with frequent falls though denies any increase in falling recently as compared to previous months  He takes coumadin for history of DVT and PE  Active medications:           Current Facility-Administered Medications:     acetaminophen (TYLENOL) tablet 650 mg, 650 mg, Oral, Q6H PRN, 650 mg at 03/04/19 0920    amantadine (SYMMETREL) capsule 100 mg, 100 mg, Oral, Daily, 100 mg at 03/04/19 0920    amLODIPine (NORVASC) tablet 5 mg, 5 mg, Oral, Daily    fluPHENAZine (PROLIXIN) tablet 5 mg, 5 mg, Oral, Daily    fluvoxaMINE (LUVOX) tablet 50 mg, 50 mg, Oral, Daily, 50 mg at 03/04/19 0920    heparin (porcine) subcutaneous injection 5,000 Units, 5,000 Units, Subcutaneous, Q8H BLANK    lidocaine (LIDODERM) 5 % patch 1 patch, 1 patch, Topical, Daily, 1 patch at 03/04/19 0917    losartan (COZAAR) tablet 50 mg, 50 mg, Oral, Daily, 50 mg at 03/04/19 0920    risperiDONE (RisperDAL) tablet 0 5 mg, 0 5 mg, Oral, Daily, 0 5 mg at 03/04/19 0920    risperiDONE (RisperDAL) tablet 1 mg, 1 mg, Oral, HS    tamsulosin (FLOMAX) capsule 0 4 mg, 0 4 mg, Oral, Daily With Dinner      OBJECTIVE:     Vitals:   Vitals:    03/04/19 1110   BP: 128/74   Pulse: 102   Resp:    Temp:    SpO2: 92%       Physical Exam:   Physical Exam   Constitutional: He is oriented to person, place, and time  No distress  HENT:   Head: Normocephalic and atraumatic  Right Ear: External ear normal    Left Ear: External ear normal    Eyes: Pupils are equal, round, and reactive to light  Conjunctivae and EOM are normal  Right eye exhibits no discharge  Left eye exhibits no discharge  Neck: No tracheal deviation present  c-collar maintained  Midline tenderness and left sided paraspinal tenderness   Cardiovascular: Normal rate, regular rhythm, normal heart sounds and intact distal pulses  Pulmonary/Chest: Effort normal and breath sounds normal  No stridor  No respiratory distress  He has no wheezes  He has no rales  He exhibits tenderness (left sided point tenderness, anterior/lateral chest wall   no external evidence of injury )  Abdominal: Soft  Bowel sounds are normal  He exhibits no distension and no mass  There is no tenderness  There is no rebound and no guarding  Musculoskeletal: Normal range of motion  He exhibits no edema, tenderness or deformity  Neurological: He is alert and oriented to person, place, and time  No cranial nerve deficit  Speech is slow  Exam is non-focal, non-lateralizing  Strength is equal bilaterally in BUE and BLE   Skin: Skin is warm and dry  Capillary refill takes less than 2 seconds  He is not diaphoretic  Psychiatric: He has a normal mood and affect  His behavior is normal            I/O:   I/O       03/02 0701 - 03/03 0700 03/03 0701 - 03/04 0700 03/04 0701 - 03/05 0700    I V    65    Total Intake   65    Urine   275    Total Output   275    Net   -210                 Invasive Devices: Invasive Devices     Peripheral Intravenous Line            Peripheral IV 03/03/19 Left Hand less than 1 day                  Imaging:   Ct Facial Bones Wo Contrast    Result Date: 3/4/2019  Impression: No facial fracture is identified  Sinus disease as described  Workstation performed: [id number]     Ct Chest Abdomen Pelvis W Contrast    Result Date: 3/4/2019  Impression: No acute pathology visualized on CT of the chest, abdomen and pelvis with IV without oral contrast   I personally discussed this study with ANMOL Baltazar on 3/4/2019 at 1:17 AM  Workstation performed: [id number]     Ct Recon Only Lumbar Spine    Result Date: 3/4/2019  Impression: No fracture or traumatic subluxation  Ankylosing spondylitis  I personally discussed this study with ANMOL Baltazar on 3/4/2019 at 1:17 AM  Workstation performed: [id number]     Ct Recon Only Thoracic Spine    Result Date: 3/4/2019  Impression: No fracture or traumatic subluxation  Ankylosing spondylitis    I personally discussed this study with ANMOL Baltazar on 3/4/2019 at 1:18 AM  Workstation performed: [id number]       Labs: CBC:   Lab Results   Component Value Date    WBC 5 61 03/03/2019    HGB 16 3 03/03/2019    HCT 48 03/03/2019    MCV 92 03/03/2019     03/03/2019    MCH 29 7 03/03/2019    MCHC 32 3 03/03/2019    RDW 13 9 03/03/2019    MPV 9 2 03/03/2019    NRBC 0 03/03/2019     CMP:   Lab Results   Component Value Date     03/03/2019    CO2 32 03/03/2019    BUN 14 03/03/2019    CREATININE 0 93 03/03/2019    GLUCOSE 117 03/03/2019    CALCIUM 9 1 03/03/2019    AST 39 03/03/2019    ALT 41 03/03/2019    ALKPHOS 99 03/03/2019    EGFR 85 03/03/2019     Coagulation:   Lab Results   Component Value Date    INR 2 12 (H) 03/03/2019

## 2019-03-04 NOTE — PLAN OF CARE
Problem: Potential for Falls  Goal: Patient will remain free of falls  Description  INTERVENTIONS:  - Assess patient frequently for physical needs  -  Identify cognitive and physical deficits and behaviors that affect risk of falls    -  [place] fall precautions as indicated by assessment   - Educate patient/family on patient safety including physical limitations  - Instruct patient to call for assistance with activity based on assessment  - Modify environment to reduce risk of injury  - Consider OT/PT consult to assist with strengthening/mobility  Outcome: Progressing     Problem: PAIN - ADULT  Goal: Verbalizes/displays adequate comfort level or baseline comfort level  Description  Interventions:  - Encourage patient to monitor pain and request assistance  - Assess pain using appropriate pain scale  - Administer analgesics based on type and severity of pain and evaluate response  - Implement non-pharmacological measures as appropriate and evaluate response  - Consider cultural and social influences on pain and pain management  - Notify physician/advanced practitioner if interventions unsuccessful or patient reports new pain  Outcome: Progressing     Problem: SAFETY ADULT  Goal: Maintain or return to baseline ADL function  Description  INTERVENTIONS:  -  Assess patient's ability to carry out ADLs; assess patient's baseline for ADL function and identify physical deficits which impact ability to perform ADLs (bathing, care of mouth/teeth, toileting, grooming, dressing, etc )  - Assess/evaluate cause of self-care deficits   - Assess range of motion  - Assess patient's mobility; develop plan if impaired  - Assess patient's need for assistive devices and provide as appropriate  - Encourage maximum independence but intervene and supervise when necessary  ¯ Involve family in performance of ADLs  ¯ Assess for home care needs following discharge   ¯ Request OT consult to assist with ADL evaluation and planning for discharge  ¯ Provide patient education as appropriate  Outcome: Progressing  Goal: Maintain or return mobility status to optimal level  Description  INTERVENTIONS:  - Assess patient's baseline mobility status (ambulation, transfers, stairs, etc )    - Identify cognitive and physical deficits and behaviors that affect mobility  - Identify mobility aids required to assist with transfers and/or ambulation (gait belt, sit-to-stand, lift, walker, cane, etc )  - [place] fall precautions as indicated by assessment  - Record patient progress and toleration of activity level on Mobility SBAR; progress patient to next Phase/Stage  - Instruct patient to call for assistance with activity based on assessment  - Request Rehabilitation consult to assist with strengthening/weightbearing, etc   Outcome: Progressing     Problem: DISCHARGE PLANNING  Goal: Discharge to home or other facility with appropriate resources  Description  INTERVENTIONS:  - Identify barriers to discharge w/patient and caregiver  - Arrange for needed discharge resources and transportation as appropriate  - Identify discharge learning needs (meds, wound care, etc )  - Arrange for interpretive services to assist at discharge as needed  - Refer to Case Management Department for coordinating discharge planning if the patient needs post-hospital services based on physician/advanced practitioner order or complex needs related to functional status, cognitive ability, or social support system  Outcome: Progressing     Problem: Knowledge Deficit  Goal: Patient/family/caregiver demonstrates understanding of disease process, treatment plan, medications, and discharge instructions  Description  Complete learning assessment and assess knowledge base    Interventions:  - Provide teaching at level of understanding  - Provide teaching via preferred learning methods  Outcome: Progressing

## 2019-03-04 NOTE — ASSESSMENT & PLAN NOTE
- history - takes coumadin   Has IVC filter noted on CT imagining as well   - resume coumadin today pending neurosurgery recommendations  - risk benefits of continued AC should be discussed with PCP as patient remains high risk for falls given underlying gait disturbances

## 2019-03-05 ENCOUNTER — HOSPITAL ENCOUNTER (INPATIENT)
Facility: HOSPITAL | Age: 74
LOS: 15 days | Discharge: HOME HEALTH | DRG: 949 | End: 2019-03-20
Payer: COMMERCIAL

## 2019-03-05 VITALS
HEIGHT: 65 IN | SYSTOLIC BLOOD PRESSURE: 127 MMHG | RESPIRATION RATE: 18 BRPM | BODY MASS INDEX: 33.35 KG/M2 | WEIGHT: 200.18 LBS | DIASTOLIC BLOOD PRESSURE: 70 MMHG | HEART RATE: 89 BPM | OXYGEN SATURATION: 96 % | TEMPERATURE: 97.2 F

## 2019-03-05 DIAGNOSIS — F20.0 PARANOID SCHIZOPHRENIA (HCC): ICD-10-CM

## 2019-03-05 DIAGNOSIS — S09.90XD ACUTE HEAD INJURY, SUBSEQUENT ENCOUNTER: ICD-10-CM

## 2019-03-05 DIAGNOSIS — I10 BENIGN ESSENTIAL HYPERTENSION: Primary | ICD-10-CM

## 2019-03-05 DIAGNOSIS — Z86.718 HISTORY OF DVT OF LOWER EXTREMITY: ICD-10-CM

## 2019-03-05 DIAGNOSIS — F41.9 ANXIETY AND DEPRESSION: ICD-10-CM

## 2019-03-05 DIAGNOSIS — Z86.711 HISTORY OF PULMONARY EMBOLUS (PE): ICD-10-CM

## 2019-03-05 DIAGNOSIS — E55.9 VITAMIN D INSUFFICIENCY: ICD-10-CM

## 2019-03-05 DIAGNOSIS — E87.6 HYPOKALEMIA: ICD-10-CM

## 2019-03-05 DIAGNOSIS — F32.A ANXIETY AND DEPRESSION: ICD-10-CM

## 2019-03-05 DIAGNOSIS — M54.2 NECK PAIN WITHOUT INJURY: ICD-10-CM

## 2019-03-05 DIAGNOSIS — K59.00 CONSTIPATION: ICD-10-CM

## 2019-03-05 DIAGNOSIS — S12.500A CLOSED DISPLACED FRACTURE OF SIXTH CERVICAL VERTEBRA, UNSPECIFIED FRACTURE MORPHOLOGY, INITIAL ENCOUNTER (HCC): ICD-10-CM

## 2019-03-05 DIAGNOSIS — I87.2 CHRONIC VENOUS STASIS DERMATITIS OF BOTH LOWER EXTREMITIES: ICD-10-CM

## 2019-03-05 PROBLEM — S09.90XA HEAD INJURY, ACUTE: Status: ACTIVE | Noted: 2019-03-04

## 2019-03-05 PROBLEM — S09.90XA HEAD INJURY, ACUTE: Status: ACTIVE | Noted: 2019-03-05

## 2019-03-05 LAB
ANION GAP SERPL CALCULATED.3IONS-SCNC: 3 MMOL/L (ref 4–13)
BASOPHILS # BLD AUTO: 0.04 THOUSANDS/ΜL (ref 0–0.1)
BASOPHILS NFR BLD AUTO: 1 % (ref 0–1)
BUN SERPL-MCNC: 13 MG/DL (ref 5–25)
CALCIUM SERPL-MCNC: 8.4 MG/DL (ref 8.3–10.1)
CHLORIDE SERPL-SCNC: 105 MMOL/L (ref 100–108)
CO2 SERPL-SCNC: 34 MMOL/L (ref 21–32)
CREAT SERPL-MCNC: 0.73 MG/DL (ref 0.6–1.3)
EOSINOPHIL # BLD AUTO: 0.08 THOUSAND/ΜL (ref 0–0.61)
EOSINOPHIL NFR BLD AUTO: 1 % (ref 0–6)
ERYTHROCYTE [DISTWIDTH] IN BLOOD BY AUTOMATED COUNT: 14.2 % (ref 11.6–15.1)
GFR SERPL CREATININE-BSD FRML MDRD: 93 ML/MIN/1.73SQ M
GLUCOSE SERPL-MCNC: 94 MG/DL (ref 65–140)
HCT VFR BLD AUTO: 41.4 % (ref 36.5–49.3)
HGB BLD-MCNC: 13.2 G/DL (ref 12–17)
IMM GRANULOCYTES # BLD AUTO: 0.02 THOUSAND/UL (ref 0–0.2)
IMM GRANULOCYTES NFR BLD AUTO: 0 % (ref 0–2)
INR PPP: 2.79 (ref 0.86–1.17)
LYMPHOCYTES # BLD AUTO: 0.88 THOUSANDS/ΜL (ref 0.6–4.47)
LYMPHOCYTES NFR BLD AUTO: 13 % (ref 14–44)
MCH RBC QN AUTO: 29.5 PG (ref 26.8–34.3)
MCHC RBC AUTO-ENTMCNC: 31.9 G/DL (ref 31.4–37.4)
MCV RBC AUTO: 92 FL (ref 82–98)
MONOCYTES # BLD AUTO: 0.91 THOUSAND/ΜL (ref 0.17–1.22)
MONOCYTES NFR BLD AUTO: 13 % (ref 4–12)
NEUTROPHILS # BLD AUTO: 5.12 THOUSANDS/ΜL (ref 1.85–7.62)
NEUTS SEG NFR BLD AUTO: 72 % (ref 43–75)
NRBC BLD AUTO-RTO: 0 /100 WBCS
PLATELET # BLD AUTO: 158 THOUSANDS/UL (ref 149–390)
PMV BLD AUTO: 9.6 FL (ref 8.9–12.7)
POTASSIUM SERPL-SCNC: 3.3 MMOL/L (ref 3.5–5.3)
PROTHROMBIN TIME: 29.5 SECONDS (ref 11.8–14.2)
RBC # BLD AUTO: 4.48 MILLION/UL (ref 3.88–5.62)
SODIUM SERPL-SCNC: 142 MMOL/L (ref 136–145)
WBC # BLD AUTO: 7.05 THOUSAND/UL (ref 4.31–10.16)

## 2019-03-05 PROCEDURE — 99232 SBSQ HOSP IP/OBS MODERATE 35: CPT | Performed by: NEUROLOGICAL SURGERY

## 2019-03-05 PROCEDURE — 85025 COMPLETE CBC W/AUTO DIFF WBC: CPT | Performed by: STUDENT IN AN ORGANIZED HEALTH CARE EDUCATION/TRAINING PROGRAM

## 2019-03-05 PROCEDURE — 80048 BASIC METABOLIC PNL TOTAL CA: CPT | Performed by: STUDENT IN AN ORGANIZED HEALTH CARE EDUCATION/TRAINING PROGRAM

## 2019-03-05 PROCEDURE — 97535 SELF CARE MNGMENT TRAINING: CPT

## 2019-03-05 PROCEDURE — 85610 PROTHROMBIN TIME: CPT | Performed by: STUDENT IN AN ORGANIZED HEALTH CARE EDUCATION/TRAINING PROGRAM

## 2019-03-05 PROCEDURE — G0515 COGNITIVE SKILLS DEVELOPMENT: HCPCS

## 2019-03-05 PROCEDURE — 97530 THERAPEUTIC ACTIVITIES: CPT

## 2019-03-05 PROCEDURE — 99238 HOSP IP/OBS DSCHRG MGMT 30/<: CPT | Performed by: SURGERY

## 2019-03-05 PROCEDURE — 99223 1ST HOSP IP/OBS HIGH 75: CPT

## 2019-03-05 RX ORDER — TAMSULOSIN HYDROCHLORIDE 0.4 MG/1
0.4 CAPSULE ORAL
Status: DISCONTINUED | OUTPATIENT
Start: 2019-03-06 | End: 2019-03-20 | Stop reason: HOSPADM

## 2019-03-05 RX ORDER — RISPERIDONE 0.25 MG/1
0.5 TABLET, FILM COATED ORAL DAILY
Status: CANCELLED | OUTPATIENT
Start: 2019-03-06

## 2019-03-05 RX ORDER — ACETAMINOPHEN 325 MG/1
975 TABLET ORAL EVERY 8 HOURS SCHEDULED
Status: DISCONTINUED | OUTPATIENT
Start: 2019-03-05 | End: 2019-03-05

## 2019-03-05 RX ORDER — RISPERIDONE 1 MG/1
2 TABLET, FILM COATED ORAL
Status: DISCONTINUED | OUTPATIENT
Start: 2019-03-05 | End: 2019-03-07

## 2019-03-05 RX ORDER — LOSARTAN POTASSIUM 50 MG/1
50 TABLET ORAL DAILY
Status: CANCELLED | OUTPATIENT
Start: 2019-03-06

## 2019-03-05 RX ORDER — ONDANSETRON 4 MG/1
4 TABLET, ORALLY DISINTEGRATING ORAL EVERY 8 HOURS PRN
Status: DISCONTINUED | OUTPATIENT
Start: 2019-03-05 | End: 2019-03-20 | Stop reason: HOSPADM

## 2019-03-05 RX ORDER — LIDOCAINE 50 MG/G
1 PATCH TOPICAL DAILY
Status: CANCELLED | OUTPATIENT
Start: 2019-03-06

## 2019-03-05 RX ORDER — FLUVOXAMINE MALEATE 50 MG/1
50 TABLET, COATED ORAL DAILY
Status: DISCONTINUED | OUTPATIENT
Start: 2019-03-06 | End: 2019-03-05

## 2019-03-05 RX ORDER — RISPERIDONE 1 MG/1
1 TABLET, FILM COATED ORAL
Status: DISCONTINUED | OUTPATIENT
Start: 2019-03-05 | End: 2019-03-05

## 2019-03-05 RX ORDER — DOCUSATE SODIUM 100 MG/1
100 CAPSULE, LIQUID FILLED ORAL 2 TIMES DAILY
Status: DISCONTINUED | OUTPATIENT
Start: 2019-03-05 | End: 2019-03-20 | Stop reason: HOSPADM

## 2019-03-05 RX ORDER — RISPERIDONE 1 MG/1
1 TABLET, FILM COATED ORAL
Status: CANCELLED | OUTPATIENT
Start: 2019-03-05

## 2019-03-05 RX ORDER — AMANTADINE HYDROCHLORIDE 100 MG/1
100 CAPSULE, GELATIN COATED ORAL DAILY
Status: DISCONTINUED | OUTPATIENT
Start: 2019-03-06 | End: 2019-03-05

## 2019-03-05 RX ORDER — POTASSIUM CHLORIDE 20 MEQ/1
40 TABLET, EXTENDED RELEASE ORAL ONCE
Status: COMPLETED | OUTPATIENT
Start: 2019-03-05 | End: 2019-03-05

## 2019-03-05 RX ORDER — FLUVOXAMINE MALEATE 50 MG/1
50 TABLET, COATED ORAL 4 TIMES DAILY
Status: DISCONTINUED | OUTPATIENT
Start: 2019-03-05 | End: 2019-03-07

## 2019-03-05 RX ORDER — WARFARIN SODIUM 5 MG/1
5 TABLET ORAL
Status: DISCONTINUED | OUTPATIENT
Start: 2019-03-05 | End: 2019-03-05 | Stop reason: HOSPADM

## 2019-03-05 RX ORDER — LIDOCAINE 50 MG/G
1 PATCH TOPICAL DAILY
Status: DISCONTINUED | OUTPATIENT
Start: 2019-03-06 | End: 2019-03-20 | Stop reason: HOSPADM

## 2019-03-05 RX ORDER — FLUPHENAZINE HYDROCHLORIDE 2.5 MG/1
5 TABLET ORAL DAILY
Status: CANCELLED | OUTPATIENT
Start: 2019-03-06

## 2019-03-05 RX ORDER — WARFARIN SODIUM 5 MG/1
5 TABLET ORAL
Status: DISCONTINUED | OUTPATIENT
Start: 2019-03-06 | End: 2019-03-08

## 2019-03-05 RX ORDER — AMANTADINE HYDROCHLORIDE 100 MG/1
100 CAPSULE, GELATIN COATED ORAL DAILY
Status: CANCELLED | OUTPATIENT
Start: 2019-03-06

## 2019-03-05 RX ORDER — WARFARIN SODIUM 5 MG/1
5 TABLET ORAL
Status: CANCELLED | OUTPATIENT
Start: 2019-03-05

## 2019-03-05 RX ORDER — SENNOSIDES 8.6 MG
1 TABLET ORAL ONCE
Status: COMPLETED | OUTPATIENT
Start: 2019-03-05 | End: 2019-03-05

## 2019-03-05 RX ORDER — MECLIZINE HCL 12.5 MG/1
12.5 TABLET ORAL EVERY 8 HOURS SCHEDULED
Status: DISCONTINUED | OUTPATIENT
Start: 2019-03-05 | End: 2019-03-05 | Stop reason: HOSPADM

## 2019-03-05 RX ORDER — ACETAMINOPHEN 325 MG/1
975 TABLET ORAL EVERY 8 HOURS SCHEDULED
Status: DISCONTINUED | OUTPATIENT
Start: 2019-03-05 | End: 2019-03-13

## 2019-03-05 RX ORDER — POLYETHYLENE GLYCOL 3350 17 G/17G
17 POWDER, FOR SOLUTION ORAL DAILY
Status: CANCELLED | OUTPATIENT
Start: 2019-03-06

## 2019-03-05 RX ORDER — TAMSULOSIN HYDROCHLORIDE 0.4 MG/1
0.4 CAPSULE ORAL
Status: CANCELLED | OUTPATIENT
Start: 2019-03-05

## 2019-03-05 RX ORDER — FLUPHENAZINE HYDROCHLORIDE 2.5 MG/1
5 TABLET ORAL DAILY
Status: DISCONTINUED | OUTPATIENT
Start: 2019-03-06 | End: 2019-03-05

## 2019-03-05 RX ORDER — RISPERIDONE 0.25 MG/1
0.5 TABLET, FILM COATED ORAL DAILY
Status: DISCONTINUED | OUTPATIENT
Start: 2019-03-06 | End: 2019-03-05

## 2019-03-05 RX ORDER — MECLIZINE HCL 12.5 MG/1
12.5 TABLET ORAL EVERY 8 HOURS SCHEDULED
Status: CANCELLED | OUTPATIENT
Start: 2019-03-05

## 2019-03-05 RX ORDER — ACETAMINOPHEN 325 MG/1
975 TABLET ORAL EVERY 8 HOURS SCHEDULED
Status: CANCELLED | OUTPATIENT
Start: 2019-03-05

## 2019-03-05 RX ORDER — LOSARTAN POTASSIUM 50 MG/1
50 TABLET ORAL DAILY
Status: DISCONTINUED | OUTPATIENT
Start: 2019-03-06 | End: 2019-03-20 | Stop reason: HOSPADM

## 2019-03-05 RX ORDER — AMLODIPINE BESYLATE 5 MG/1
5 TABLET ORAL DAILY
Status: DISCONTINUED | OUTPATIENT
Start: 2019-03-06 | End: 2019-03-20 | Stop reason: HOSPADM

## 2019-03-05 RX ORDER — WARFARIN SODIUM 5 MG/1
5 TABLET ORAL
Status: DISCONTINUED | OUTPATIENT
Start: 2019-03-05 | End: 2019-03-05

## 2019-03-05 RX ORDER — FLUVOXAMINE MALEATE 50 MG/1
50 TABLET, COATED ORAL DAILY
Status: CANCELLED | OUTPATIENT
Start: 2019-03-06

## 2019-03-05 RX ORDER — AMLODIPINE BESYLATE 5 MG/1
5 TABLET ORAL DAILY
Status: CANCELLED | OUTPATIENT
Start: 2019-03-06

## 2019-03-05 RX ORDER — RISPERIDONE 0.25 MG/1
0.5 TABLET, FILM COATED ORAL 2 TIMES DAILY PRN
Status: DISCONTINUED | OUTPATIENT
Start: 2019-03-05 | End: 2019-03-20 | Stop reason: HOSPADM

## 2019-03-05 RX ORDER — AMANTADINE HYDROCHLORIDE 100 MG/1
100 CAPSULE, GELATIN COATED ORAL 2 TIMES DAILY
Status: DISCONTINUED | OUTPATIENT
Start: 2019-03-05 | End: 2019-03-20 | Stop reason: HOSPADM

## 2019-03-05 RX ORDER — BISACODYL 10 MG
10 SUPPOSITORY, RECTAL RECTAL DAILY PRN
Status: DISCONTINUED | OUTPATIENT
Start: 2019-03-05 | End: 2019-03-06

## 2019-03-05 RX ORDER — BISACODYL 10 MG
10 SUPPOSITORY, RECTAL RECTAL ONCE
Status: COMPLETED | OUTPATIENT
Start: 2019-03-05 | End: 2019-03-05

## 2019-03-05 RX ORDER — FLUPHENAZINE HYDROCHLORIDE 2.5 MG/1
5 TABLET ORAL EVERY 8 HOURS SCHEDULED
Status: DISCONTINUED | OUTPATIENT
Start: 2019-03-05 | End: 2019-03-13

## 2019-03-05 RX ORDER — POLYETHYLENE GLYCOL 3350 17 G/17G
17 POWDER, FOR SOLUTION ORAL DAILY PRN
Status: DISCONTINUED | OUTPATIENT
Start: 2019-03-05 | End: 2019-03-20 | Stop reason: HOSPADM

## 2019-03-05 RX ORDER — RISPERIDONE 1 MG/1
1 TABLET, FILM COATED ORAL DAILY
Status: DISCONTINUED | OUTPATIENT
Start: 2019-03-06 | End: 2019-03-07

## 2019-03-05 RX ORDER — RISPERIDONE 0.25 MG/1
0.5 TABLET, FILM COATED ORAL ONCE
Status: COMPLETED | OUTPATIENT
Start: 2019-03-05 | End: 2019-03-05

## 2019-03-05 RX ADMIN — FLUVOXAMINE MALEATE 50 MG: 50 TABLET, FILM COATED ORAL at 22:00

## 2019-03-05 RX ADMIN — Medication 10 MG: at 20:38

## 2019-03-05 RX ADMIN — ACETAMINOPHEN 975 MG: 325 TABLET ORAL at 14:19

## 2019-03-05 RX ADMIN — LOSARTAN POTASSIUM 50 MG: 50 TABLET, FILM COATED ORAL at 08:58

## 2019-03-05 RX ADMIN — ACETAMINOPHEN 975 MG: 325 TABLET ORAL at 05:33

## 2019-03-05 RX ADMIN — POTASSIUM CHLORIDE 40 MEQ: 1500 TABLET, EXTENDED RELEASE ORAL at 08:56

## 2019-03-05 RX ADMIN — RISPERIDONE 0.5 MG: 0.25 TABLET ORAL at 08:57

## 2019-03-05 RX ADMIN — RISPERIDONE 0.5 MG: 0.25 TABLET ORAL at 18:24

## 2019-03-05 RX ADMIN — AMANTADINE HYDROCHLORIDE 100 MG: 100 CAPSULE, LIQUID FILLED ORAL at 18:27

## 2019-03-05 RX ADMIN — FLUVOXAMINE MALEATE 50 MG: 50 TABLET, FILM COATED ORAL at 18:26

## 2019-03-05 RX ADMIN — AMLODIPINE BESYLATE 5 MG: 5 TABLET ORAL at 08:58

## 2019-03-05 RX ADMIN — AMANTADINE HYDROCHLORIDE 100 MG: 100 CAPSULE, LIQUID FILLED ORAL at 08:59

## 2019-03-05 RX ADMIN — DOCUSATE SODIUM 100 MG: 100 CAPSULE, LIQUID FILLED ORAL at 18:27

## 2019-03-05 RX ADMIN — FLUPHENAZINE HYDROCHLORIDE 5 MG: 2.5 TABLET, FILM COATED ORAL at 08:59

## 2019-03-05 RX ADMIN — MECLIZINE HCL 12.5 MG 12.5 MG: 12.5 TABLET ORAL at 14:19

## 2019-03-05 RX ADMIN — STANDARDIZED SENNA CONCENTRATE 8.6 MG: 8.6 TABLET ORAL at 18:25

## 2019-03-05 RX ADMIN — RISPERIDONE 2 MG: 1 TABLET ORAL at 22:00

## 2019-03-05 RX ADMIN — FLUVOXAMINE MALEATE 50 MG: 50 TABLET, FILM COATED ORAL at 08:59

## 2019-03-05 RX ADMIN — FLUPHENAZINE HYDROCHLORIDE 5 MG: 2.5 TABLET, FILM COATED ORAL at 22:00

## 2019-03-05 RX ADMIN — LIDOCAINE 1 PATCH: 50 PATCH CUTANEOUS at 08:58

## 2019-03-05 RX ADMIN — POLYETHYLENE GLYCOL 3350 17 G: 17 POWDER, FOR SOLUTION ORAL at 08:59

## 2019-03-05 RX ADMIN — ACETAMINOPHEN 975 MG: 325 TABLET, FILM COATED ORAL at 22:00

## 2019-03-05 NOTE — ASSESSMENT & PLAN NOTE
- osteophyte fracture C6-7   - midline tenderness on exam  - no deficits, no paresthesias   - maintain cervical collar   - neurosurgery consultation - upright Xrays reviewed, stable   Stable for DC with outpatient follow-up  - PT/OT recommending inpatient rehab

## 2019-03-05 NOTE — PROGRESS NOTES
Pre trauma,  Inform if pt diastolic BP elevates over 100  Monitor for now  Will continue to treat within prescribed plan of care

## 2019-03-05 NOTE — ASSESSMENT & PLAN NOTE
- recently started on fluphenazine 3 weeks ago  Reports he has intermittent dizziness but this is on-going for past several years  Admits it may be more frequent since starting this medication - had more syncope this morning though his med is scheduled to be given at noon, dizziness does not appear to correlate with the new medication but will need to continue to monitor and encouraged patient to follow-up with his psychiatrist if he continues to feel that he may be experiencing side effects  He reports to me that he feels that his hallucinations are improved and he feels overall better on this new medication    - he denies presyncope symptoms  - he is unsure what happened, reports he was at the top of the stairs and then he came to at the bottom, clearly after falling down  It is unclear if he is amnestic from LOC/concussion vs if he had syncope causing fall  Recent ECHO shows normal EF and no significant valvule disease  ECG compared to July 2016 without any significant changes and no evidence of ischemia  Recent stress test noted as well    - orthostatic BPs WNL, no evidence of orthostatic hypotension  - no symptoms of concussion  - will order antivert 12 5 q 8 and have him follow-up with OT vestibular therapy and his PCP for possible inner ear pathology as cause especially given chronicity of symptom

## 2019-03-05 NOTE — RESTORATIVE TECHNICIAN NOTE
Restorative Specialist Mobility Note       Activity: Ambulate in shen, Chair     Assistive Device: Front wheel walker     Ambulation Response: Tolerated fairly well  Repositioned: Sitting, Up in chair           Range of Motion: Active, All extremities      Patient left resting comfortably in bedside recliner, with chair alarm activated and call bell/table within reach

## 2019-03-05 NOTE — ASSESSMENT & PLAN NOTE
- history - takes coumadin  Has IVC filter noted on CT imagining as well   - INR 2 79   Cont home coumadin 5 mg daily   - risk benefits of continued AC should be discussed with PCP as patient remains high risk for falls given underlying gait disturbances

## 2019-03-05 NOTE — DISCHARGE INSTRUCTIONS
Neurosurgery discharge instructions following neck fracture:     VISTA collar at all times except for showering change to jayme (peach) collar  No bending, twisting or heavy lifting  No pushing or pulling over 10lbs  No strenuous activities  NO DRIVING  Follow up with the neurosurgical group in 2 weeks  2-3 days prior to your appointment complete upright x-rays at any [facility]  X-ray scripts are now electronic and do not require an appointment  **Please notify MD immediately if you have increased neck or arm pain  New numbness and/or weakness in your arm  Difficulty swallowing or breathing especially while lying down  Numbness or weakness in arms or legs   Increased difficulty walking **

## 2019-03-05 NOTE — PLAN OF CARE
Problem: Potential for Falls  Goal: Patient will remain free of falls  Description  INTERVENTIONS:  - Assess patient frequently for physical needs  -  Identify cognitive and physical deficits and behaviors that affect risk of falls    -  [place] fall precautions as indicated by assessment   - Educate patient/family on patient safety including physical limitations  - Instruct patient to call for assistance with activity based on assessment  - Modify environment to reduce risk of injury  - Consider OT/PT consult to assist with strengthening/mobility  Outcome: Progressing     Problem: PAIN - ADULT  Goal: Verbalizes/displays adequate comfort level or baseline comfort level  Description  Interventions:  - Encourage patient to monitor pain and request assistance  - Assess pain using appropriate pain scale  - Administer analgesics based on type and severity of pain and evaluate response  - Implement non-pharmacological measures as appropriate and evaluate response  - Consider cultural and social influences on pain and pain management  - Notify physician/advanced practitioner if interventions unsuccessful or patient reports new pain  Outcome: Progressing     Problem: SAFETY ADULT  Goal: Maintain or return to baseline ADL function  Description  INTERVENTIONS:  -  Assess patient's ability to carry out ADLs; assess patient's baseline for ADL function and identify physical deficits which impact ability to perform ADLs (bathing, care of mouth/teeth, toileting, grooming, dressing, etc )  - Assess/evaluate cause of self-care deficits   - Assess range of motion  - Assess patient's mobility; develop plan if impaired  - Assess patient's need for assistive devices and provide as appropriate  - Encourage maximum independence but intervene and supervise when necessary  ¯ Involve family in performance of ADLs  ¯ Assess for home care needs following discharge   ¯ Request OT consult to assist with ADL evaluation and planning for discharge  ¯ Provide patient education as appropriate  Outcome: Progressing  Goal: Maintain or return mobility status to optimal level  Description  INTERVENTIONS:  - Assess patient's baseline mobility status (ambulation, transfers, stairs, etc )    - Identify cognitive and physical deficits and behaviors that affect mobility  - Identify mobility aids required to assist with transfers and/or ambulation (gait belt, sit-to-stand, lift, walker, cane, etc )  - [place] fall precautions as indicated by assessment  - Record patient progress and toleration of activity level on Mobility SBAR; progress patient to next Phase/Stage  - Instruct patient to call for assistance with activity based on assessment  - Request Rehabilitation consult to assist with strengthening/weightbearing, etc   Outcome: Progressing     Problem: DISCHARGE PLANNING  Goal: Discharge to home or other facility with appropriate resources  Description  INTERVENTIONS:  - Identify barriers to discharge w/patient and caregiver  - Arrange for needed discharge resources and transportation as appropriate  - Identify discharge learning needs (meds, wound care, etc )  - Arrange for interpretive services to assist at discharge as needed  - Refer to Case Management Department for coordinating discharge planning if the patient needs post-hospital services based on physician/advanced practitioner order or complex needs related to functional status, cognitive ability, or social support system  Outcome: Progressing     Problem: Knowledge Deficit  Goal: Patient/family/caregiver demonstrates understanding of disease process, treatment plan, medications, and discharge instructions  Description  Complete learning assessment and assess knowledge base    Interventions:  - Provide teaching at level of understanding  - Provide teaching via preferred learning methods  Outcome: Progressing     Problem: DISCHARGE PLANNING - CARE MANAGEMENT  Goal: Discharge to post-acute care or home with appropriate resources  Description  INTERVENTIONS:  - Conduct assessment to determine patient/family and health care team treatment goals, and need for post-acute services based on payer coverage, community resources, and patient preferences, and barriers to discharge  - Address psychosocial, clinical, and financial barriers to discharge as identified in assessment in conjunction with the patient/family and health care team  - Arrange appropriate level of post-acute services according to patient?s   needs and preference and payer coverage in collaboration with the physician and health care team  - Communicate with and update the patient/family, physician, and health care team regarding progress on the discharge plan  - Arrange appropriate transportation to post-acute venues  Outcome: Progressing

## 2019-03-05 NOTE — ASSESSMENT & PLAN NOTE
- left sided point tenderness rib cage pain  No evidence of rib fracture  Likely musculoskeletal bruising   No roma hematoma or injury identified  - ECG and troponins negative   - pain improved today

## 2019-03-05 NOTE — PROGRESS NOTES
Progress Note - Tg Murrell 1945, 68 y o  male MRN: [id number]    Unit/Bed#: [facility] 620-01 Encounter: [id number]    Primary Care Provider: Ericka Fowler MD   Date and time admitted to hospital: 3/4/2019  2:37 AM        Rib pain on left side  Assessment & Plan  - left sided point tenderness rib cage pain  No evidence of rib fracture  Likely musculoskeletal bruising  No roma hematoma or injury identified  - ECG and troponins negative   - pain improved today     Pulmonary embolism ([facility] )  Assessment & Plan  - history - takes coumadin  Has IVC filter noted on CT imagining as well   - INR 2 79  Cont home coumadin 5 mg daily   - risk benefits of continued AC should be discussed with PCP as patient remains high risk for falls given underlying gait disturbances     Schizophrenia ([facility] )  Assessment & Plan  - recently started on fluphenazine 3 weeks ago  Reports he has intermittent dizziness but this is on-going for past several years  Admits it may be more frequent since starting this medication - had more syncope this morning though his med is scheduled to be given at noon, dizziness does not appear to correlate with the new medication but will need to continue to monitor and encouraged patient to follow-up with his psychiatrist if he continues to feel that he may be experiencing side effects  He reports to me that he feels that his hallucinations are improved and he feels overall better on this new medication    - he denies presyncope symptoms  - he is unsure what happened, reports he was at the top of the stairs and then he came to at the bottom, clearly after falling down  It is unclear if he is amnestic from LOC/concussion vs if he had syncope causing fall  Recent ECHO shows normal EF and no significant valvule disease  ECG compared to July 2016 without any significant changes and no evidence of ischemia  Recent stress test noted as well    - orthostatic BPs WNL, no evidence of orthostatic hypotension  - no symptoms of concussion  - will order antivert 12 5 q 8 and have him follow-up with OT vestibular therapy and his PCP for possible inner ear pathology as cause especially given chronicity of symptom     * Fracture of sixth cervical vertebra (HCC)  Assessment & Plan  - osteophyte fracture C6-7   - midline tenderness on exam  - no deficits, no paresthesias   - maintain cervical collar   - neurosurgery consultation - upright Xrays reviewed, stable  Stable for DC with outpatient follow-up  - PT/OT recommending inpatient rehab              Subjective/Objective   Chief Complaint: "I slept well "    Subjective: pt reports pain is controlled  Denies weakness, numbness, tingling  Denies headache, vision changes, nausae  Admits to intermittent dizziness which he again reports is on-going and chronic  Objective:     Meds/Allergies   Medications Prior to Admission   Medication Sig Dispense Refill Last Dose    acetaminophen (TYLENOL) 325 mg tablet Take 2 tablets, orally, twice a day, 6 hours apart, every day   100 tablet 0 3/3/2019 at Unknown time    amantadine (SYMMETREL) 100 mg capsule Take 1 capsule by mouth daily     3/3/2019 at Unknown time    amLODIPine (NORVASC) 5 mg tablet Take 1 tablet (5 mg total) by mouth daily 90 tablet 1 3/3/2019 at Unknown time    fluPHENAZine (PROLIXIN) 5 mg tablet Take 5 mg by mouth daily   3/3/2019 at Unknown time    fluvoxaMINE (LUVOX) 50 mg tablet Take 1 tablet by mouth daily Take 1 tablet by oral route 5 times a day    3/3/2019 at Unknown time    losartan (COZAAR) 50 mg tablet Take 1 tablet (50 mg total) by mouth daily 90 tablet 0 3/3/2019 at Unknown time    risperiDONE (RISPERDAL) 3 mg tablet Take by mouth Take 1 5 tablets by oral route once a day    3/3/2019 at Unknown time    warfarin (COUMADIN) 5 mg tablet    3/3/2019 at Unknown time    tamsulosin (FLOMAX) 0 4 mg Take 1 capsule (0 4 mg total) by mouth daily with dinner for 90 days 90 capsule 3 Taking       Vitals: Blood pressure 127/70, pulse 89, temperature (!) 97 2 °F (36 2 °C), resp  rate 18, height 5' 4 5" (1 638 m), weight 90 8 kg (200 lb 2 8 oz), SpO2 96 %  Body mass index is 33 83 kg/m²  SpO2: SpO2: 96 %    ABG: No results found for: PHART, WCF7LPR, PO2ART, EPP8HNN, W8TJGURD, BEART, SOURCE      Intake/Output Summary (Last 24 hours) at 3/5/2019 1433  Last data filed at 3/5/2019 1418  Gross per 24 hour   Intake 580 ml   Output 1150 ml   Net -570 ml       Invasive Devices     Peripheral Intravenous Line            Peripheral IV 03/03/19 Left Hand 1 day                Nutrition/GI Proph/Bowel Reg: regular    Physical Exam:   Physical Exam   Constitutional: He is oriented to person, place, and time  No distress  HENT:   Head: Normocephalic and atraumatic  Right Ear: External ear normal    Left Ear: External ear normal    Eyes: Pupils are equal, round, and reactive to light  Conjunctivae and EOM are normal  Right eye exhibits no discharge  Left eye exhibits no discharge  Neck: No tracheal deviation present  c-collar intact   Cardiovascular: Normal rate, regular rhythm, normal heart sounds and intact distal pulses  Pulmonary/Chest: Effort normal and breath sounds normal  No stridor  No respiratory distress  He has no wheezes  He has no rales  He exhibits no tenderness  Abdominal: Soft  Bowel sounds are normal  He exhibits no distension and no mass  There is no tenderness  There is no rebound and no guarding  Musculoskeletal: He exhibits no edema, tenderness or deformity  Neurological: He is alert and oriented to person, place, and time  No cranial nerve deficit  Equal strength bilaterally BUE and BLE  Speech is slow but clear    Skin: Skin is warm and dry  Capillary refill takes less than 2 seconds  He is not diaphoretic  Psychiatric: He has a normal mood and affect  His behavior is normal          Lab Results:   Results: I have personally reviewed pertinent reports     and I have personally reviewed pertinent films in PACS, BMP/CMP:   Lab Results   Component Value Date    SODIUM 142 03/05/2019    K 3 3 (L) 03/05/2019     03/05/2019    CO2 34 (H) 03/05/2019    BUN 13 03/05/2019    CREATININE 0 73 03/05/2019    CALCIUM 8 4 03/05/2019    EGFR 93 03/05/2019    and CBC:   Lab Results   Component Value Date    WBC 7 05 03/05/2019    HGB 13 2 03/05/2019    HCT 41 4 03/05/2019    MCV 92 03/05/2019     03/05/2019    MCH 29 5 03/05/2019    MCHC 31 9 03/05/2019    RDW 14 2 03/05/2019    MPV 9 6 03/05/2019    NRBC 0 03/05/2019     Imaging/EKG Studies: Results: I have personally reviewed pertinent reports     and I have personally reviewed pertinent films in PACS  Other Studies:    VTE Prophylaxis: SCDs, Coumadin

## 2019-03-05 NOTE — OCCUPATIONAL THERAPY NOTE
Occupational Therapy Treatment Note:       03/05/19 1051   Restrictions/Precautions   Braces or Orthoses C/S Collar   Other Precautions Cognitive; Chair Alarm;Telemetry;O2;Fall Risk;Pain   Pain Assessment   Pain Assessment No/denies pain   Pain Score No Pain   ADL   Where Assessed Other (Comment)  (seated and in stance)   Grooming Assistance 4  Minimal Assistance   Grooming Deficit Setup;Supervision/safety; Increased time to complete;Standing with assistive device; Other (Comment)  (oral care/mouthwash)   Grooming Comments   (in stance at sink with CGA)   UB Bathing Assistance 3  Moderate Assistance   UB Bathing Deficit Setup; Increased time to complete; Chest;Right arm;Left arm   LB Bathing Assistance 3  Moderate Assistance   LB Bathing Deficit Setup;Verbal cueing; Increased time to complete; Buttocks;Right lower leg including foot; Left lower leg including foot   Toileting Assistance  4  Minimal Assistance   Toileting Deficit Setup;Verbal cueing; Increased time to complete;Clothing management up   Functional Standing Tolerance   Time 5 minutes   Activity in stance at sink for oral care   Comments CGA provided throughout task   Transfers   Sit to Stand 4  Minimal assistance   Additional items Assist x 1; Armrests; Increased time required;Verbal cues   Stand to Sit 4  Minimal assistance   Additional items Assist x 1; Armrests; Increased time required;Verbal cues   Stand pivot 4  Minimal assistance   Additional items Assist x 1; Increased time required;Verbal cues   Functional Mobility   Functional Mobility 4  Minimal assistance   Additional items Rolling walker   Cognition   Overall Cognitive Status Impaired   Arousal/Participation Alert; Cooperative   Attention Attends with cues to redirect   Orientation Level Oriented to person;Oriented to place;Oriented to time   Memory Decreased recall of recent events;Decreased recall of precautions   Following Commands Follows one step commands with increased time or repetition   Activity Tolerance   Activity Tolerance Patient tolerated treatment well   Medical Staff Made Aware ok to see per RN Mary   Assessment   Assessment Patient participated in skilled OT with focus on ADL skill training, transfer skill training, cognitive reorientation skills, functional mobiilty with use of RW within room and bathroom, bathing, dressing, activity tolerance  Patient presents seated in recliner with C/S collar on and O2 in place  Patient agreeable to engage in OT  Patient identified by name and   Patient pleasant throughout session at times verbalizing concerns about "ADHD" and other cognitive issues which patient was encouraged to speak with doctor regarding these issues  Patient is motivated and responds well to position reinforcement  Patient requires assist levels as document providing min to CGA for functional mobiity skills from chair to bathroom  Patient would benefit from [address] with focus on increasing functional strength and endurance, increasing functional independence with transfer skill training, increasing independence with self care/adl skills for carryover into his daily routine      Plan   Treatment Interventions ADL retraining;Functional transfer training;Cognitive reorientation   Goal Expiration Date 19   Treatment Day 1   OT Frequency 3-5x/wk   Recommendation   OT Discharge Recommendation Short Term Rehab   OT - OK to Discharge   (when medically cleared)   Barthel Index   Feeding 5   Bathing 0   Grooming Score 0   Dressing Score 5   Bladder Score 10   Bowels Score 10   Toilet Use Score 5   Transfers (Bed/Chair) Score 10   Mobility (Level Surface) Score 0   Stairs Score 0   Barthel Index Score [address] [facility] [facility], [place]ARE[place]

## 2019-03-05 NOTE — PROGRESS NOTES
Progress Note - Neurosurgery   Stephen Sal  68 y o  male MRN: [id number]  Unit/Bed#: [facility] 620-01 Encounter: [id number]    Assessment:  1  C6-C7 osteophyte fracture  2  S/P PCDF C4-C6 ( about 10 years ago)  3  Chronic L3 compression fracture   4  Chronic pain  5  Arthritis  6  Venous insufficiency  7  Varicose vein to clinic  8  History of pulmonary embolism  9  Hypertension  10  History of DVT   11  Coronary artery disease  12  Depression  13  Schizophrenia    Plan:  · Exam revealed GCS 15, AAO X 3, mild diffuse TTP of thoracolumbar spine, no point tenderness  Strength 5/5 throughout except plantar flexion 4/5 bilaterally, sensation intact to LT/PP X 4, reflexes 2+ BUE, 1+ BLE, mild clonus bilaterally, mild thomas's bilaterally, no drift bilaterally  · Continue regular neurological checks  · Imaging reviewed personally and by attending  Results listed below:   · Xray of Cervical spine 3/4/19 Known fracture through anterior osteophyte is better visualized on cervical spine CT  No subluxation  2  Partial fusion from C4 to C6  Marked degenerative disc disease at C2-C3 and C3-C4    · Ct Chest Abdomen Pelvis W Contrast, Result Date: 3/4/2019: No acute pathology visualized on CT of the chest, abdomen and pelvis with IV without oral contrast  Chronic L3 compression fracture  Comparison with CT CAP on 12/21/16 shows there is no change in the L3 compression fracture  · CT chest, abd pelvis 12/21/19: L3 compression fracture noted  · MRI of cervical spine is not required  Pt is expected to have some cervical stenosis from degenerative changes s/p posterior cervical laminectomy C4- C6  In addition, pt has no cervical neurological deficits  · CTA neck is not indicated to check for vascular injury since the fracture is through the anterior osteophytes of C6/C7 and does not go through the transverse medardo    · Ongoing medical management by primary team - Trauma service     · Pain control per primary team  · Eval and mobilize per PT/OT  · DVT ppx: SCDs, Pt was on Coumadin, can consider pharm dvt ppx/restarting coumadin  · Cervical brace in place  Cervical aspen  Vista brace to be worn at all times, for showers switch to the [place] collar that is at bedside  · No neurosurgical intervention is anticipated at this time  · Neurosurgery will continue to follow in the setting of:  ? C6/C7 anterior osteophyte fracture: Pt has a scheduled follow up apt with neurosurgery in 2 weeks on March 19 th with repeat Xr Cervical spine that has been ordered  ? Chronic L3 compression fracture: No brace needed  conservative management with pain management/PT/OT  ? Please call with any questions or concerns  Subjective/Objective   Chief Complaint: "I have pain in my neck"    Subjective: Pt reports he continues to have pain in his neck  He denies weakness in bilateral upper extremities or lower extremities  He denies changes in sensation  He reports he was able to ambulate with physical therapy using a walker  Objective: Alert and awake, sitting in chair  I/O       03/03 0701 - 03/04 0700 03/04 0701 - 03/05 0700 03/05 0701 - 03/06 0700    P  O   100 240    I V   65     Total Intake  165 240    Urine  1125 300    Total Output  1125 300    Net  -960 -60           Unmeasured Urine Occurrence  1 x           Invasive Devices     Peripheral Intravenous Line            Peripheral IV 03/03/19 Left Hand 1 day                Physical Exam:  Vitals: Blood pressure 150/74, pulse 88, temperature 99 1 °F (37 3 °C), resp  rate 18, SpO2 96 %  ,There is no height or weight on file to calculate BMI      General appearance: alert, appears stated age, cooperative and no distress  Head: Normocephalic, without obvious abnormality, atraumatic  Eyes: EOMI, PERRL  Neck: supple, symmetrical, trachea midline   Back: no kyphosis present, no tenderness to percussion or palpation  Lungs: non labored breathing  Heart: regular heart rate  Neurologic:   Mental status: Alert, oriented, thought content appropriate  Cranial nerves: grossly intact (Cranial nerves II-XII)  Motor: moving all extremities without focal weakness, Strength 5/5 throughout except plantar flexion 4/5 bilaterally  Sensation intact to LT/PP X 4   Reflexes 2+ BUE, 1+ BLE, mild clonus bilaterally, mild thomas's bilaterally, no drift bilaterally  Coordination:no drift bilaterally      Lab Results:  Results from last 7 days   Lab Units 03/05/19 0515 03/03/19 2258 03/03/19  2256   WBC Thousand/uL 7 05  --  5 61   HEMOGLOBIN g/dL 13 2  --  15 0   I STAT HEMOGLOBIN g/dl  --  16 3  --    HEMATOCRIT % 41 4  --  46 5   HEMATOCRIT, ISTAT %  --  48  --    PLATELETS Thousands/uL 158  --  205   NEUTROS PCT % 72  --  68   MONOS PCT % 13*  --  9     Results from last 7 days   Lab Units 03/05/19 0515 03/03/19 2258 03/03/19  2256   POTASSIUM mmol/L 3 3*  --  3 5   CHLORIDE mmol/L 105  --  103   CO2 mmol/L 34*  --  31   CO2, I-STAT mmol/L  --  32  --    BUN mg/dL 13  --  14   CREATININE mg/dL 0 73  --  0 93   CALCIUM mg/dL 8 4  --  9 1   ALK PHOS U/L  --   --  99   ALT U/L  --   --  41   AST U/L  --   --  39   GLUCOSE, ISTAT mg/dl  --  117  --              Results from last 7 days   Lab Units 03/05/19 0515 03/03/19  2256   INR  2 79* 2 12*   PTT seconds  --  31         EKG, Pathology, and Other Studies: I have personally reviewed pertinent reports  VTE Pharmacologic Prophylaxis: Reason for no pharmacologic prophylaxis can consider pharm dvt while pt is hospitalized  VTE Mechanical Prophylaxis: sequential compression device    PLEASE NOTE:  This encounter was completed utilizing the [employer] Direct Speech Voice Recognition Software  Grammatical errors, random word insertions, pronoun errors and incomplete sentences are occasional consequences of the system due to software limitations, ambient noise and hardware issues  These may be missed by proof reading prior to affixing electronic signature  Any questions or concerns about the content, text or information contained within the body of this dictation should be directly addressed to the advanced practitioner or physician for clarification  Please do not hesitate to call me directly if you have any questions or concerns

## 2019-03-05 NOTE — DISCHARGE SUMMARY
Discharge Summary - Carlo Echeverria  68 y o  male MRN: [id number]    Unit/Bed#: [facility] 620-01 Encounter: [id number]    Admission Date:   Admission Orders (From admission, onward)    Ordered        03/04/19 0548  Inpatient Admission  Once     Order ID Start Status   [id number] 03/04/19 0546 Completed                Admitting Diagnosis: Head injury [S09 90XA]  Closed fracture of sixth cervical vertebra without spinal cord injury, initial encounter [facility]) [S12 590A]    HPI: Per Dr Juan Telles on admission "Carlo Echeverria  is a 68 y o  male who presents a trauma transfer from [facility] after sustaining a fall on Coumadin  Patient was at his home where he lives with his wife and son when he fell down approximately 6 steps  He does not remember if he hit his head; his son does state that he lost consciousness for a few seconds  Patient does have difficulty ambulating at baseline secondary to neuropathy/radiculopathy which is an ongoing issue for him  He does take Coumadin for DVT/PE  He currently complains of left parasternal pain, neck pain, and back pain  EKG and troponin were negative at [facility]  He has a history of left knee replacement, right total hip replacement, and cervical laminectomy at [facility] in 2008  CT demonstrated fracture for an osteophyte at C6-7 and soft tissue emphysema in right face concerning for facial fracture  He was transferred to [facility] for the further evaluation and management "    Procedures Performed: No orders of the defined types were placed in this encounter  Summary of Hospital Course: Patient was seen by neurosurgery and they recommended collar with outpatient follow up to treat his cervical osteophyte fracture  He was seen by therapy, and rehab was recommended so he was set up for transition to [facility]  Due to his recent psych medication changes he had been complaining of dizziness on admission   He was started on antivert and outpatient vestibular rehab was ordered  Geriatrics also followed with patient while he was hospitalized and recommended follow [facility] with [facility] as well as delirium precautions  He was tolerating diet and pain was controlled  Significant Findings, Care, Treatment and Services Provided: Xr Spine Cervical 2 Or 3 Vw Injury    Result Date: 3/4/2019  Impression: 1  Known fracture through anterior osteophyte is better visualized on cervical spine CT  No subluxation  2   Partial fusion from C4 to C6  Marked degenerative disc disease at C2-C3 and C3-C4  Workstation performed: [id number]     Ct Facial Bones Wo Contrast    Result Date: 3/4/2019  Impression: No facial fracture is identified  Sinus disease as described  Workstation performed: [id number]     Ct Chest Abdomen Pelvis W Contrast    Result Date: 3/4/2019  Impression: No acute pathology visualized on CT of the chest, abdomen and pelvis with IV without oral contrast   I personally discussed this study with ANMOL Hameed on 3/4/2019 at 1:17 AM  Workstation performed: [id number]     Ct Recon Only Lumbar Spine    Result Date: 3/4/2019  Impression: No fracture or traumatic subluxation  Ankylosing spondylitis  I personally discussed this study with ANMOL Hameed on 3/4/2019 at 1:17 AM  Workstation performed: [id number]     Ct Recon Only Thoracic Spine    Result Date: 3/4/2019  Impression: No fracture or traumatic subluxation  Ankylosing spondylitis    I personally discussed this study with ANMOL Hameed on 3/4/2019 at 1:18 AM  Workstation performed: [id number]       Complications: none    Discharge Diagnosis:   Patient Active Problem List   Diagnosis    Benign essential hypertension    Coronary artery disease involving native coronary artery of native heart without angina pectoris    Chronic depression    Chronic venous stasis dermatitis of both lower extremities    Deep venous thrombosis of lower extremity (HCC)    Diastolic heart failure ([email or web address] )    Osteoarthritis    Nocturnal enuresis    Idiopathic trigeminal neuralgia    Lacunar infarction    Class 1 obesity due to excess calories without serious comorbidity with body mass index (BMI) of 34 0 to 34 9 in adult    Schizophrenia (HCC)    Disability of walking    Pulmonary embolism (HCC)    Arthropathy    Mixed hyperlipidemia    Neurogenic claudication    Neck pain without injury    Abdominal wall hematoma    Spinal stenosis of lumbar region with neurogenic claudication    Lipoma of colon    Fracture of sixth cervical vertebra (HCC)    Rib pain on left side    Fall    Forgetfulness    Physical deconditioning         Resolved Problems  Date Reviewed: 3/5/2019    None          Condition at Discharge: fair         Discharge instructions/Information to patient and family:   See after visit summary for information provided to patient and family  Provisions for Follow-Up Care:  See after visit summary for information related to follow-up care and any pertinent home health orders  PCP: Jenni Bowles MD    Disposition: ARC    Planned Readmission: No    Discharge Statement   I spent 16 minutes discharging the patient  This time was spent on the day of discharge  I had direct contact with the patient on the day of discharge  Additional documentation is required if more than 30 minutes were spent on discharge  Discharge Medications:  See after visit summary for reconciled discharge medications provided to patient and family

## 2019-03-05 NOTE — PLAN OF CARE
Problem: OCCUPATIONAL THERAPY ADULT  Goal: Performs self-care activities at highest level of function for planned discharge setting  See evaluation for individualized goals  Description  Treatment Interventions: ADL retraining, Functional transfer training, Endurance training, Cognitive reorientation, Patient/family training, Equipment evaluation/education, Compensatory technique education, Activityengagement          See flowsheet documentation for full assessment, interventions and recommendations      Outcome: [address] Claudio

## 2019-03-06 ENCOUNTER — TRANSITIONAL CARE MANAGEMENT (OUTPATIENT)
Dept: FAMILY MEDICINE CLINIC | Facility: CLINIC | Age: 74
End: 2019-03-06

## 2019-03-06 PROBLEM — R41.89 COGNITIVE IMPAIRMENT: Status: ACTIVE | Noted: 2019-03-06

## 2019-03-06 PROBLEM — R52 PAIN: Status: ACTIVE | Noted: 2019-03-06

## 2019-03-06 PROBLEM — Z86.711 HISTORY OF PULMONARY EMBOLISM: Status: ACTIVE | Noted: 2019-03-06

## 2019-03-06 PROBLEM — R42 LIGHTHEADEDNESS: Status: ACTIVE | Noted: 2019-03-06

## 2019-03-06 PROBLEM — Z86.73 HISTORY OF STROKE: Status: ACTIVE | Noted: 2019-03-06

## 2019-03-06 LAB
ANION GAP SERPL CALCULATED.3IONS-SCNC: 5 MMOL/L (ref 4–13)
BUN SERPL-MCNC: 16 MG/DL (ref 5–25)
CALCIUM SERPL-MCNC: 8.4 MG/DL (ref 8.3–10.1)
CHLORIDE SERPL-SCNC: 105 MMOL/L (ref 100–108)
CO2 SERPL-SCNC: 32 MMOL/L (ref 21–32)
CREAT SERPL-MCNC: 0.79 MG/DL (ref 0.6–1.3)
ERYTHROCYTE [DISTWIDTH] IN BLOOD BY AUTOMATED COUNT: 14.1 % (ref 11.6–15.1)
GFR SERPL CREATININE-BSD FRML MDRD: 90 ML/MIN/1.73SQ M
GLUCOSE SERPL-MCNC: 86 MG/DL (ref 65–140)
HCT VFR BLD AUTO: 40.8 % (ref 36.5–49.3)
HGB BLD-MCNC: 13 G/DL (ref 12–17)
INR PPP: 2.65 (ref 0.86–1.17)
MCH RBC QN AUTO: 29.5 PG (ref 26.8–34.3)
MCHC RBC AUTO-ENTMCNC: 31.9 G/DL (ref 31.4–37.4)
MCV RBC AUTO: 93 FL (ref 82–98)
PLATELET # BLD AUTO: 149 THOUSANDS/UL (ref 149–390)
PMV BLD AUTO: 10 FL (ref 8.9–12.7)
POTASSIUM SERPL-SCNC: 3.8 MMOL/L (ref 3.5–5.3)
PROTHROMBIN TIME: 28.3 SECONDS (ref 11.8–14.2)
RBC # BLD AUTO: 4.41 MILLION/UL (ref 3.88–5.62)
SODIUM SERPL-SCNC: 142 MMOL/L (ref 136–145)
WBC # BLD AUTO: 5.82 THOUSAND/UL (ref 4.31–10.16)

## 2019-03-06 PROCEDURE — 92610 EVALUATE SWALLOWING FUNCTION: CPT

## 2019-03-06 PROCEDURE — 97530 THERAPEUTIC ACTIVITIES: CPT

## 2019-03-06 PROCEDURE — 97116 GAIT TRAINING THERAPY: CPT

## 2019-03-06 PROCEDURE — 85610 PROTHROMBIN TIME: CPT

## 2019-03-06 PROCEDURE — 97166 OT EVAL MOD COMPLEX 45 MIN: CPT

## 2019-03-06 PROCEDURE — 85027 COMPLETE CBC AUTOMATED: CPT

## 2019-03-06 PROCEDURE — 97162 PT EVAL MOD COMPLEX 30 MIN: CPT

## 2019-03-06 PROCEDURE — 92523 SPEECH SOUND LANG COMPREHEN: CPT

## 2019-03-06 PROCEDURE — 99232 SBSQ HOSP IP/OBS MODERATE 35: CPT

## 2019-03-06 PROCEDURE — 97535 SELF CARE MNGMENT TRAINING: CPT

## 2019-03-06 PROCEDURE — G0515 COGNITIVE SKILLS DEVELOPMENT: HCPCS

## 2019-03-06 PROCEDURE — 80048 BASIC METABOLIC PNL TOTAL CA: CPT

## 2019-03-06 RX ORDER — BISACODYL 10 MG
10 SUPPOSITORY, RECTAL RECTAL DAILY PRN
Status: DISCONTINUED | OUTPATIENT
Start: 2019-03-06 | End: 2019-03-20 | Stop reason: HOSPADM

## 2019-03-06 RX ORDER — MELATONIN
1000 DAILY
Status: DISCONTINUED | OUTPATIENT
Start: 2019-03-06 | End: 2019-03-08

## 2019-03-06 RX ADMIN — ACETAMINOPHEN 975 MG: 325 TABLET, FILM COATED ORAL at 05:20

## 2019-03-06 RX ADMIN — FLUPHENAZINE HYDROCHLORIDE 5 MG: 2.5 TABLET, FILM COATED ORAL at 05:20

## 2019-03-06 RX ADMIN — DOCUSATE SODIUM 100 MG: 100 CAPSULE, LIQUID FILLED ORAL at 17:44

## 2019-03-06 RX ADMIN — WARFARIN SODIUM 5 MG: 5 TABLET ORAL at 17:44

## 2019-03-06 RX ADMIN — FLUVOXAMINE MALEATE 50 MG: 50 TABLET, FILM COATED ORAL at 17:44

## 2019-03-06 RX ADMIN — AMANTADINE HYDROCHLORIDE 100 MG: 100 CAPSULE, LIQUID FILLED ORAL at 17:44

## 2019-03-06 RX ADMIN — RISPERIDONE 1 MG: 1 TABLET ORAL at 08:20

## 2019-03-06 RX ADMIN — LIDOCAINE 1 PATCH: 50 PATCH CUTANEOUS at 08:20

## 2019-03-06 RX ADMIN — ACETAMINOPHEN 975 MG: 325 TABLET, FILM COATED ORAL at 21:19

## 2019-03-06 RX ADMIN — FLUPHENAZINE HYDROCHLORIDE 5 MG: 2.5 TABLET, FILM COATED ORAL at 21:19

## 2019-03-06 RX ADMIN — FLUVOXAMINE MALEATE 50 MG: 50 TABLET, FILM COATED ORAL at 21:19

## 2019-03-06 RX ADMIN — FLUPHENAZINE HYDROCHLORIDE 5 MG: 2.5 TABLET, FILM COATED ORAL at 14:00

## 2019-03-06 RX ADMIN — AMANTADINE HYDROCHLORIDE 100 MG: 100 CAPSULE, LIQUID FILLED ORAL at 08:17

## 2019-03-06 RX ADMIN — FLUVOXAMINE MALEATE 50 MG: 50 TABLET, FILM COATED ORAL at 08:18

## 2019-03-06 RX ADMIN — DOCUSATE SODIUM 100 MG: 100 CAPSULE, LIQUID FILLED ORAL at 08:17

## 2019-03-06 RX ADMIN — ACETAMINOPHEN 975 MG: 325 TABLET, FILM COATED ORAL at 13:59

## 2019-03-06 RX ADMIN — AMLODIPINE BESYLATE 5 MG: 5 TABLET ORAL at 08:23

## 2019-03-06 RX ADMIN — FLUVOXAMINE MALEATE 50 MG: 50 TABLET, FILM COATED ORAL at 12:15

## 2019-03-06 RX ADMIN — LOSARTAN POTASSIUM 50 MG: 50 TABLET, FILM COATED ORAL at 08:17

## 2019-03-06 RX ADMIN — TAMSULOSIN HYDROCHLORIDE 0.4 MG: 0.4 CAPSULE ORAL at 16:24

## 2019-03-06 RX ADMIN — VITAMIN D, TAB 1000IU (100/BT) 1000 UNITS: 25 TAB at 14:01

## 2019-03-06 RX ADMIN — RISPERIDONE 2 MG: 1 TABLET ORAL at 21:18

## 2019-03-06 NOTE — ASSESSMENT & PLAN NOTE
And DVT on chronic anticoagulation with Coumadin  Medicine consult to manage during acute rehab course  Regimen adjusted

## 2019-03-06 NOTE — ASSESSMENT & PLAN NOTE
Likely premorbid; chronic generalized microvascular changes with possible old right lacunar internal capsule infarct and possible right frontal WM   >speech management  >Follow-up with Neurology after discharge  >On chronic home amantadine   >Recommend family assist with med mgmt after d/c

## 2019-03-06 NOTE — ASSESSMENT & PLAN NOTE
Chronic venous insufficiency of BLE with some edema  >Podiatry c/s appreciated; EHSAN hose during day; Elevate when possible  >Lac-Hytrin cream BID  >Monitor for skin breakdown

## 2019-03-06 NOTE — ASSESSMENT & PLAN NOTE
Medicine c/s to assist with mgmt during course  No evidence of orthostasis during course  D/C on home Amlodipine 5 mg daily, losartan 50 mg daily

## 2019-03-06 NOTE — TREATMENT PLAN
[facility] [address] [facility]  68 y o  male MRN: [id number]  Unit/Bed#: -01 Encounter: [id number]     PATIENT INFORMATION  ADMISSION DATE: 3/5/2019  5:07 PM SAIDA CATEGORY:Brain Dysfunction:  02 22  Traumatic, Closed Injury   ADMISSION DIAGNOSIS:  Closed head injury with brief loss of consciousness EXPECTED LOS: 10 days     MEDICAL/FUNCTIONAL PROGNOSIS  Pre-admit screens and post-admit evaluations reviewed and are consistent  Based on my assessment of the patient's medical conditions and current functional status, the prognosis for attaining medical and functional goals or the IRF stay is:  Good  Patient is appropriate for acute rehabilitation  Medical Goals:   Patient will be medically stable for discharge back to community setting upon completion or acute rehab program and patient/family will be able to manage medical conditions and comorbid conditions with medications and appropriate follow up upon completion of rehab program       Cardiopulmonary function: Ensure cardiopulmonary stability and optimize cardiopulmonary function not only at rest but with activity as patient's activity level significantly increases in acute rehab compared with prior to transfer in preparation for safe discharge from [facility]  Must closely and frequently monitor blood pressure and HR to ensure adequate cardiac output during ADLs and ambulation as patient is at increased risk for orthostatic hypotension/syncope and potential injury if not monitored for and managed adequately  Blood pressure management:    Frequent monitoring of blood pressure with appropriate adjustments in blood pressure medication management to optimize blood pressure control and prevent/limit renal complications  Monitoring impact of blood pressure and side-effects of blood pressure medications at rest and with activity    Hypoxia prevention: Ensure appropriate level of oxygenation at rest and with activity to avoid symptomatic hypoxia, maximize functional performance, and decrease risk of atelectasis/pneumonia through close and frequent monitoring, providing appropriate respiratory treatments (such as incentive spirometry), and when necessary provide/adjust respiratory medications  Pain management:  Pain will improve with frequent evaluation of pain, careful adjustments in medications, frequent re-evaluation of patient's pain and medical/neurologic status to ensure optimal pain control, avoidance of potential serious and even life-threatening side-effects and drug interactions, as well as weaning pain medications as soon as possible to decrease risk of short and long-term use  Brain injury:  Patient's cognitive status is significantly impaired and neurologic status can fluctuate particularly early in rehabilitation process  Patient requires frequent rehab physician and rehab nursing neurologic monitoring for subtle and more significant changes in mentation and neurologic function  Labs must be monitored closely along with hydration and nutritional status  Low threshold to obtain brain imaging  Medications must be carefully monitored and adjusted to optimize functional recovery while limit cognitive impairments  Goal to improve cognitive and neurologic function, provide appropriate patient (and/or family education), and to ensure appropriate optimal discharge plan  Dysphagia: improve swallowing through SLP evaluation with intensive treatments, optimal nutrition, and fluid intake  Adjust diet and swallowing precautions as indicated to decrease risk of aspiration pneumonia and respiratory distress  Close oversight and management by rehab specialized physician  Inpatient rehabilitation education/teaching:   To be provided to patient and typically family/caregiver (if able to be identified) by all skilled therapists, rehab nursing, case management, and rehab specialized physician to ensure optimal recovery and decrease risks of complications in both acute rehabilitation setting as well as after discharge  Schizophrenia/Anxiety: Patient's schizophrenia, anxiety, and insomnia and it's impact on therapy participation and functional recovery will improve during course with supportive counseling, relaxation/breathing techniques and if necessary medication management  Requires frequent re-assessment and close management to ensure anxiety/depression management during acute rehab course with planning for appropriate outpatient management to ensure optimal mental health and functional recovery  Obesity:  Close monitoring of nutrition status, nutrition specialist with adjustments in diet   on appropriate short and long term nutrition and activity  Obesity increases complexity of patient's overall condition and causes unique challenges during this part of patient's recovery process  Supervise and if necessary make adjustments in rehab nursing care and skilled therapy care to ensure appropriate toileting, bed mobility, other ADLs, and ambulation to decrease risk of falls/injuries, VTE, skin breakdown/ulceration and optimize functional recovery           ANTICIPATED DISCHARGE DISPOSITION AND SERVICES  COMMUNITY SETTING: Home - Assistance  Is a 24-hr caregiver available? Yes  Has discharge plan been discussed with primary caregiver? Yes     ANTICIPATED FOLLOW-UP SERVICE:   Outpatient Therapy PT, ST      DISCIPLINE SPECIFIC PLANS:  Required Disciplines & Services: PT, OT, ST, Rehabilitation Physician, Rehabillitation Nursing, Case Management, Psychology, Dietary, Respiratory    REQUIRED THERAPY (expected): Therapy Hours per Day Days per Week Tx Days (Days in ARF)   Physical Therapy 1 5 10   Occupational Therapy 1 5 10   Speech/Language Therapy 1 5 10   NOTE: Additional therapy time(s) may be added as appropriate to meet patient needs and to achieve functional goals      ANTICIPATED FUNCTIONAL OUTCOMES:  ADL: Patient will be supervision-modified independent with ADLs upon completion of rehab program   Bladder/Bowel: Patient will be modified independent with bladder/bowel management upon completion of rehab program   Transfers: Patient will be supervision-modified independent with transfers upon completion of rehab program   Locomotion: Patient will be supervision-modified independent with locomotion upon completion of rehab program   Cognitive: Patient will be near prior level of cognitive function upon completion of rehab program     DISCHARGE PLANNING NEEDS  Equipment needs: To be determined at team conference  REHAB ANTICIPATED PARTICIPATION RESTRICTIONS:  Uncertain at this time  To be determined closer to discharge

## 2019-03-06 NOTE — PCC PHYSICAL THERAPY
3/13/19  Patient making good progress thus far with skilled PT intervention  Patient overall S with bed mobility, transfers and gait using RW  Patient's goal at discharge is to utilize [facility] in which he is able to demonstrate ability to perform safely at CS/CGA however, CGA requires at times 2* recent LOB with poor righting reactions noted  At this time, RW provides patient with the most (I) and safety  Discussed safety precautions this session especially with pacing during activity in event of urgency  Patient is intrusted on stay present and concerntrated during times for potential fall risk as he was observed trailing off during  step negotiation  Patient also able to initiate FF stairs this session but required verbal instruction for proper LE and SPC sequencing to increase safety  Spoke with OT who set up family training 3/18/19  Anticipate pending d/c home next week, home with family support and current recommendation for using RW      3/6/19  On evaluation, patient presenting with the following deficits: impaired strength and ROM, impaired static and dynamic sitting and standing balance, poor standing tolerance, and decreased overall functional mobility  Patient wearing CS collar during session in which he required education on spinal precautions  Patient is overall flat and tangential but is able to be redirected onto task  He perseverates on his mental health throughout session and would benefit from neuropsych consult (previously placed)  During functional assessment, patient able to perform bed mobility, transfers, gait and stairs with overall Stephy, mod A at times  He is a good rehab candidate with goals of Supervision; expected LOS 2 weeks  He requires skilled PT intervention at this time to maximize function and reduce caregiver burden

## 2019-03-06 NOTE — PROGRESS NOTES
SLP TAA     03/06/19 1030   Patient Data   Rehab Impairment Other Orthopedic   Etiologic Diagnosis Osteophyte Fracture of C6-C7   Date of Onset 03/03/19   Prior Level of Function   Functional Cognition 3  Independent - Patient completed the activities by him/herself, with or without an assistive device, with no assistance from a helper  Restrictions/Precautions   Precautions Bed/chair alarms;Cognitive; Fall Risk;Supervision on toilet/commode;Spinal precautions   Pain Assessment   Pain Assessment No/denies pain   Pain Score No Pain   QI: Eating   Assistance Needed Set-up / clean-up;Supervision   Assistance Provided by [place] No physical assistance   Eating CARE Score 4   Eating Assessment   Swallow Precautions Yes   Bedside Swallow Results No   VBS Study Results No   Food To Mouth Yes   Able To Cut   (not assessed)   Positioning Upright;Out of Bed   Safety Needs Increase Time;Cues   Meal Assessed Lunch   QI: Swallowing/Nutritional Status Supervision during eating;Modified food consistency   Current Diet Dysphia III; Thin   Intake Mode PO   Dentures   (edentulous, pt not noting use of dentures)   Finishes Timely Yes   Opens Packages   (not assessed)   Findings Pt demonstrating mild oral dysphagia at this time  Recommending level 3 diet with thin liquids at this time  Refer to [address] [place] [address]  rehab note for details  Eating (FIM) 5 - Patient needs help to open contianers or set up tray   Comprehension   Auditory Basic;Complex   Visual Basic;Complex   Findings Pt completing portions of the IGNACIO  Refer to [address] [place] [address] Dr mccullough note for details  QI: Comprehension 3  Usually Understands: Understands most conversations, but misses some part/intent of message  Requires cues at times to understand  Comprehension (FIM) 4 - Understands basic info/conversation 75-90% of time   Expression   Verbal Complex   Non-Verbal Complex   Intelligibility Sentence   Findings Pt completing portions of the IGNACIO  Refer to [address] [place] [address] Dr mccullough note for details     QI: Expression 3  Exhibits some difficulty with expressing needs and ideas (e g , some words or finishing thoughts) or speech is not clear   Expression (FIM) 5 - Needs help/cues only RARELY (< 10% of the time)   Social Interaction   Cooperation with staff   Participation Individual   Medications needed to control mood/behavior? Yes   Behaviors observed Appropriate   Findings Pt completing portions of the IGNACIO  Refer to [address] [place] [address]  rehab note for details  Social Interaction (FIM) 5 - Interacts appropriately with others 90% of time   Problem Solving   Routine Manages call bell   Findings Pt completing portions of the IGNACIO  Refer to [address] [place] [address]  rehab note for details  Problem solving (FIM) 3 - Solves basic problmes 50-74% of time   Memory   Recognize People Yes   Remember Routine Yes   Initiates Tasks Yes   Short-Term Impaired   Long Term Impaired   Findings Pt completing portions of the IGNACIO  Refer to [address] [place] [address]  rehab note for details  Memory (FIM) 3 - Recognizes, recalls/performs 50-74%   Discharge Information   Patient's Discharge Plan home w/ family support   Patient's Rehab Expectations To get better   Barriers to Discharge Home Decreased Cognitive Function;Decreased Strength;Decreased Endurance; Safety Considerations   Impressions Pt presents as good rehab candidate to meet supervision goals for cognitive linguistic skills and tolerance of safest least restrictive diet at time of d/c home w/ family support  Pt will benefit from skilled ST services to maximize cognitive linguistic skills and tolerance of safest least restrictive diet at this time     SLP Therapy Minutes   SLP Time In 1030   SLP Time Out 1130   SLP Total Time (minutes) 60   SLP Mode of treatment - Individual (minutes) 60   SLP Mode of treatment - Concurrent (minutes) 0   SLP Mode of treatment - Group (minutes) 0   SLP Mode of treatment - Co-treat (minutes) 0   SLP Mode of Teatment - Total time(minutes) 60 minutes

## 2019-03-06 NOTE — PCC SPEECH THERAPY
Pt able to complete portions of the IGNACIO (Assessment of Language Related Functional Activities), where at this time, pt is demonstrating mild-moderate cognitive linguistic deficits, but will continue to benefit from completing assessment to further determine needs to be addressed by SLP services while on the ARC  Of note, attention was poor at times, where pt was unable "retrace" thoughts and elaborate further  Additionally noted was tangentile speech patterns and flight of thoughts during assessments, but was able to be re-directed back to activities appropriately  Pt also assessed for dysphagia, in which pt is edentulous, but pt denies food "avoidances" of certain regular food items  Current diet is level 3 w/ thin liquids at this time, in which pt does demonstrate mildly slowed mastication of consistencies, but does not pose to be an increased risk for aspiration at this time  At this time, pt will continue to benefit from SLP services to finish cognitive linguistic assessment to further determine focus of cognitive tx sessions and will f/u to establish safest least restrictive diet at this time  Update from week 3/14/19: Pt continues to be followed for dysphagia and cognitive linguistic tx sessions  Pt's current barriers remains decreased attention, which impacts STM recall given a number of activities  Pt does continue to be tangential at times during sessions, but remains easily re-directed back to tasks at hand  As for swallow function, pt has made progress and despite edentulous state as well as current C-collar placement, pt is demonstrating tolerance of regular diet w/ thin liquids at this time w/o increased risk of aspiration  Will continue to benefit from brief f/u for dysphagia tx and continue f/u for cognitive linguistic tx to maximize skills at this time

## 2019-03-06 NOTE — ASSESSMENT & PLAN NOTE
Improved from admission and recently stable degree of paranoid/delusional thoughts largely regarding feeling like he is a bad person or something bad might happen; patient is appropriately functional with family at this time without S/Is; he will likely need additional adjustments after d/c with his outpatient psychiatrist, Dr Myrna Thakur       >Suspect recent acute worsening at time of transfer to rehab related to be off home regimen as well as as head injury; however patient was having some increased paranoia and perseveration which started a few days prior to fall (3 weeks after switching from from Trifluoperazine to fluphenazine and Cogentin stopped; family states he has been trialed in past off Trifluoperazine and could not tolerate switch to other meds)  >Discussed with patient's outpt psych Dr Mary Lopez 3/6  Patient presented c/ fair amount of paranoid delusions, anxiety, difficulty sleeping with occasional panic attacks off home regimen since recent admission prior, with recent fall/hospitalization possible head injury    >Fluphenazine was increased to QID on 3/14 for sub-optimal control of above; some mild improvements since  >Sleep significantly improved from early course  >CBT with psychology and myself helpful and recommended to continue after d/c    D/C meds   >Fluphenazine increased to 5mg QID 3/14   >Risperdone 1 5mg qAM and 3mg QHS    >Fluvoxamine 50mg 5x/day    >Amantadine at home 100mg BID   Follow-up   >Psychiatry, Dr Myrna Thakur   >Psychology, Dr Billy Gabriel

## 2019-03-06 NOTE — PCC NURSING
Pt admitted to [facility] s/p fall with  C6-C7 osteophyte fx- non operable  Pt wears Collar at all times  Scratch to rt side of neck under collar  Pt also has Large bruise with redness to left shoulder & And to buttocks is resolving    Pt has a Hx schizophrenia  &  OCD, currently on prolixin, Luvox, Risperdal  Pt states that he can be anxious and have panic attacks  Pain is managed with lidocaine patch, tylenol  Pt is on coumadin for anti-coag  Bp managed with Cozaar, Norvasc  Pt has Parkinsons and is on Symmetrel  He is continent of bladder and bowel  Constipation was relieved with suppository  Pt requires alarms for safety  This week we will monitor lab values, vital signs and work on pain management  We will monitor moods, restlessness and sleep patterns  We will prevent skin breakdown and teach pt to turn and reposition and weight shift, and perform routine skin checks  We will teach patient to check collar for any breakdown and keep clean  We will monitor for constipation and medicate per bowel regimen  Pt will practice safety awareness and remain free from falls

## 2019-03-06 NOTE — SOCIAL WORK
Met with Pt to review rehab routine, and CM role  Pt reports residing with his wife, and his son, Maeve Campbell  Pt also has two daughters, Latanya Yusuf, who reside in [place]  Pt has 1 [address] then 13 to the second floor  Pt states there is not a full bath on the first floor of his home  Pt was unclear about previous [place] [id number] or outpt services, but states he has been hospitalized before  DME at Pts home includes a cane, with three feet, and a walker  Pt denies use of the walker  Pt utilizes [facility] for his pharmacy, and was informed about [facility]  Pt reports his wife and son will provide transportation at d/c  Pt was informed about the team meeting process, as well as potential LOS  Pts clinical update is due 3 12  Following to assist with d/c planning needs, and continued stay review

## 2019-03-06 NOTE — ASSESSMENT & PLAN NOTE
Fall with likely head trauma and brief loss of consciousness and acute osteophyte C6-C7 fracture  Mild increased confusion after fall also with some worsening mood/schizophrenia symptoms > improving  Completed skilled therapies in ARC setting   > overstimulation precautions, ensure optimal sleep wake cycle  > optimize neuroleptic medication  > try to avoid sedative medications  > evaluate and manage fall risks    No Driving  Follow-up PMR, psychology and HH PT, OT, ST

## 2019-03-06 NOTE — PLAN OF CARE
Problem: Potential for Falls  Goal: Patient will remain free of falls  Description  INTERVENTIONS:  - Assess patient frequently for physical needs  -  Identify cognitive and physical deficits and behaviors that affect risk of falls    -  [place] fall precautions as indicated by assessment   - Educate patient/family on patient safety including physical limitations  - Instruct patient to call for assistance with activity based on assessment  - Modify environment to reduce risk of injury  - Consider OT/PT consult to assist with strengthening/mobility  Outcome: Progressing     Problem: Prexisting or High Potential for Compromised Skin Integrity  Goal: Skin integrity is maintained or improved  Description  INTERVENTIONS:  - Identify patients at risk for skin breakdown  - Assess and monitor skin integrity  - Assess and monitor nutrition and hydration status  - Monitor labs (i e  albumin)  - Assess for incontinence   - Turn and reposition patient  - Assist with mobility/ambulation  - Relieve pressure over bony prominences  - Avoid friction and shearing  - Provide appropriate hygiene as needed including keeping skin clean and dry  - Evaluate need for skin moisturizer/barrier cream  - Collaborate with interdisciplinary team (i e  Nutrition, Rehabilitation, etc )   - Patient/family teaching  Outcome: Progressing     Problem: PAIN - ADULT  Goal: Verbalizes/displays adequate comfort level or baseline comfort level  Description  Interventions:  - Encourage patient to monitor pain and request assistance  - Assess pain using appropriate pain scale  - Administer analgesics based on type and severity of pain and evaluate response  - Implement non-pharmacological measures as appropriate and evaluate response  - Consider cultural and social influences on pain and pain management  - Notify physician/advanced practitioner if interventions unsuccessful or patient reports new pain  Outcome: Progressing     Problem: SAFETY ADULT  Goal: Maintain or return to baseline ADL function  Description  INTERVENTIONS:  -  Assess patient's ability to carry out ADLs; assess patient's baseline for ADL function and identify physical deficits which impact ability to perform ADLs (bathing, care of mouth/teeth, toileting, grooming, dressing, etc )  - Assess/evaluate cause of self-care deficits   - Assess range of motion  - Assess patient's mobility; develop plan if impaired  - Assess patient's need for assistive devices and provide as appropriate  - Encourage maximum independence but intervene and supervise when necessary  ¯ Involve family in performance of ADLs  ¯ Assess for home care needs following discharge   ¯ Request OT consult to assist with ADL evaluation and planning for discharge  ¯ Provide patient education as appropriate  Outcome: Progressing  Goal: Maintain or return mobility status to optimal level  Description  INTERVENTIONS:  - Assess patient's baseline mobility status (ambulation, transfers, stairs, etc )    - Identify cognitive and physical deficits and behaviors that affect mobility  - Identify mobility aids required to assist with transfers and/or ambulation (gait belt, sit-to-stand, lift, walker, cane, etc )  - [place] fall precautions as indicated by assessment  - Record patient progress and toleration of activity level on Mobility SBAR; progress patient to next Phase/Stage  - Instruct patient to call for assistance with activity based on assessment  - Request Rehabilitation consult to assist with strengthening/weightbearing, etc   Outcome: Progressing     Problem: DISCHARGE PLANNING  Goal: Discharge to home or other facility with appropriate resources  Description  INTERVENTIONS:  - Identify barriers to discharge w/patient and caregiver  - Arrange for needed discharge resources and transportation as appropriate  - Identify discharge learning needs (meds, wound care, etc )  - Arrange for interpretive services to assist at discharge as needed  - Refer to Case Management Department for coordinating discharge planning if the patient needs post-hospital services based on physician/advanced practitioner order or complex needs related to functional status, cognitive ability, or social support system  Outcome: Progressing     Problem: GASTROINTESTINAL - ADULT  Goal: Maintains or returns to baseline bowel function  Description  INTERVENTIONS:  - Assess bowel function  - Encourage oral fluids to ensure adequate hydration  - Administer IV fluids as ordered to ensure adequate hydration  - Administer ordered medications as needed  - Encourage mobilization and activity  - Nutrition services referral to assist patient with appropriate food choices  Outcome: Progressing     Problem: SKIN/TISSUE INTEGRITY - ADULT  Goal: Skin integrity remains intact  Description  INTERVENTIONS  - Identify patients at risk for skin breakdown  - Assess and monitor skin integrity  - Assess and monitor nutrition and hydration status  - Monitor labs (i e  albumin)  - Assess for incontinence   - Turn and reposition patient  - Assist with mobility/ambulation  - Relieve pressure over bony prominences  - Avoid friction and shearing  - Provide appropriate hygiene as needed including keeping skin clean and dry  - Evaluate need for skin moisturizer/barrier cream  - Collaborate with interdisciplinary team (i e  Nutrition, Rehabilitation, etc )   - Patient/family teaching  Outcome: Progressing     Problem: MUSCULOSKELETAL - ADULT  Goal: Maintain or return mobility to safest level of function  Description  INTERVENTIONS:  - Assess patient's ability to carry out ADLs; assess patient's baseline for ADL function and identify physical deficits which impact ability to perform ADLs (bathing, care of mouth/teeth, toileting, grooming, dressing, etc )  - Assess/evaluate cause of self-care deficits   - Assess range of motion  - Assess patient's mobility; develop plan if impaired  - Assess patient's need for assistive devices and provide as appropriate  - Encourage maximum independence but intervene and supervise when necessary  - Involve family in performance of ADLs  - Assess for home care needs following discharge   - Request OT consult to assist with ADL evaluation and planning for discharge  - Provide patient education as appropriate  Outcome: Progressing  Goal: Maintain proper alignment of affected body part  Description  INTERVENTIONS:  - Support, maintain and protect limb and body alignment  - Provide pt/fam with appropriate education  Outcome: Progressing

## 2019-03-06 NOTE — PROGRESS NOTES
PT EVALUATION       03/06/19 5099   Patient Data   Rehab Impairment  Impairment of mobility, safety and Activities of Daily Living (ADLs) due to Orthopedic Disorders: Other Orthopedic     Etiologic Diagnosis osteophyte fracture of C6-C7   Date of Onset 03/03/19   Support System   Name Patient lives at home with spouse and son  Spouse is present 24/7  Son works Wednesday 1/2 day and Thursday-Saturday  Able to provide 24 hour supervision Yes   Able to provide physical help?   (unknown at this time )   Home Setup   Type of Home Multi Level   Method of Entry Stairs   Number of Stairs 5  (1 platform + 4 consecutive JOSE JUAN with "brick supports" )   Number of Stairs in Home   (Full flight )   In Home Hand Rail Left  (for ascension )   First Floor Bathroom   (denies)   Second Floor Bathroom Full;Tub; Shower;Curtain  (step over tub with curtain )   Second Floor Bathroom Accessibility Shower chair;Commode  [facility] (currently in basement) and shower seat do not fit )   First Floor Setup Available No   Home Modifications Necessary? 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  )   Transportation Family/friends drive  (spouse )   Prior Device(s) Used Single Point Cane  (reoports using SPC since LUIS 1 year ago )   Prior IADL Participation   Meal Preparation Partial Participation  (reprots wife performs but he "helps" )   Laundry Other  (wife performs)   Home Cleaning Other  (wife performs )   Prior Level of Function   Self-Care 3  Independent - Patient completed the activities by him/herself, with or without an assistive device, with no assistance from a helper  Indoor-Mobility (Ambulation) 3   Independent - Patient completed the activities by him/herself, with or without an assistive device, with no assistance from a helper  Stairs 3  Independent - Patient completed the activities by him/herself, with or without an assistive device, with no assistance from a helper  Functional Cognition 3  Independent - Patient completed the activities by him/herself, with or without an assistive device, with no assistance from a helper  Prior Assistance Needed for Household Chores/Cleaning;Driving;Meal Preparation; Shopping  (reports using pill box to assist with medications )   Prior Device Used Z  [facility])   Patient Preference   Nickname (Patient Preference) Natalie David    Psychosocial   Psychosocial (WDL) X   Patient Behaviors/Mood Depressed;Flat affect   Needs Expressed Emotional   Restrictions/Precautions   Precautions Bed/chair alarms;Cognitive; Fall Risk;Pain;Spinal precautions;Supervision on toilet/commode   Weight Bearing Restrictions No   ROM Restrictions Yes  (cervical spine precautions )   Braces or Orthoses C/S Collar   Pain Assessment   Pain Assessment No/denies pain   Pain Score No Pain   Effect of Pain on Daily Activities patient denied pain at rest but later reported mild discomfort in back towards end of session    QI: Picking Up Object   Reason if not Attempted Safety concerns   Picking Up Object CARE Score 88   QI: Roll Left and Right   Assistance Needed Physical assistance   Assistance Provided by [place] 25%-49%   Comment without use of bed rails as patient sleeps in regular full bed at home    Roll Left and Right CARE Score 3   Bed Mobility   Able to Roll Left to Right;Right to Left   QI: Sit to Lying   Assistance Needed Physical assistance   Assistance Provided by [place] 50%-74%   Comment patient educated on log roll technique; required assist for BLE without use of railing    Sit to Lying CARE Score 2   QI: Lying to Sitting on Side of Bed   Assistance Needed Physical assistance   Assistance Provided by [place] 50%-74%   Comment patient educated on log roll technique; required assist for BLE without use of railing    Lying to Sitting on Side of Bed CARE Score 2   QI: Sit to Stand   Assistance Needed Physical assistance   Assistance Provided by [place] 25%-49%   Sit to Stand CARE Score 3   QI: Chair/Bed-to-Chair Transfer   Assistance Needed Physical assistance   Assistance Provided by [place] 25%-49%   Chair/Bed-to-Chair Transfer CARE Score 3   QI: Car Transfer   Assistance Needed Physical assistance   Assistance Provided by [place] 25%-49%   Car Transfer CARE Score 3   Transfer Bed/Chair/Wheelchair   Positioning Concerns Other  (spinal precautions )   Limitations Noted In Balance;Confidence; Coordination;Problem Solving; Sequencing;UE Strength;LE Strength   Adaptive Equipment Walker   Stand Pivot Minimal Assist   Sit to Stand Minimal   Stand to Sit Minimal   Supine to Sit Moderate Assist   Sit to Supine Moderate Assist   Bed, Chair, Wheelchair Transfer (FIM) 3 - [place] needs to lift, boost or assist to stand OR sit   QI: Toilet Transfer   Assistance Needed Physical assistance   Assistance Provided by [place] 25%-49%   Toilet Transfer CARE Score 3   Toilet Transfer   Surface Assessed Standard Commode   Transfer Technique Standard   Limitations Noted In Balance;Confidence;Problem Solving; Sequencing;LE Strength   Adaptive Equipment Grab Bar   Toilet Transfer (FIM) 3 - [place] needs to lift, boost or assist to stand OR sit   QI: Walk 10 Feet   Assistance Needed Physical assistance   Assistance Provided by [place] 25%-49%   Walk 10 Feet CARE Score 3   QI: Walk 50 Feet with Two Turns   Assistance Needed Physical assistance   Assistance Provided by [place] 25%-49%   Walk 50 Feet with Two Turns CARE Score 3   QI: Walk 150 Feet   Assistance Needed Physical assistance   Assistance Provided by [place] Total assistance   Comment mod A with chair follow    Walk 150 Feet CARE Score 1   QI: Walking 10 Feet on Uneven Surfaces   Reason if not Attempted Safety concerns   Walking 10 Feet on Uneven Surfaces CARE Score 88   Ambulation   Does the patient walk? 2  Yes   Primary Discharge Mode of Locomotion Walk   Walk Assist Level Moderate Assist;Minimum Assist   Gait Pattern Inconsistant Shilpa;Decreased foot clearance; Improper weight shift   Assist Device Walker   Distance Walked (feet) 150 ft  (+ in room and gym short distances )   Limitations Noted In Balance; Coordination;Device Management; Endurance; Safety; Sequencing;Speed;Strength;Swing   Walking (FIM) 1 - Patient requires assist of two people   QI: Wheel 50 Feet with Two Turns   Reason if not Attempted Activity not applicable   Wheel 50 Feet with Two Turns CARE Score 9   QI: Wheel 150 Feet   Reason if not Attempted Activity not applicable   Wheel 797 Feet CARE Score 9   Wheelchair mobility   QI: Does the patient use a wheelchair? 0  No   QI: 1 Step (Curb)   Assistance Needed Physical assistance   Assistance Provided by [place] 25%-49%   Comment with RW   1 Step (Curb) CARE Score 3   QI: 4 Steps   Assistance Needed Physical assistance   Assistance Provided by [place] Less than 25%   Comment using B handrails    4 Steps CARE Score 3   QI: 12 Steps   Reason if not Attempted Safety concerns   12 Steps CARE Score 88   Stairs   Type Trainer Steps;Curb   # of Steps 6  (+ curb )   Weight Bearing Precautions Fall Risk   Assist Devices Roller Walker   Stairs (FIM) 2 - Patient goes up and down 4 - 11 stairs regardless of assist/device/setup   Comprehension   QI: Comprehension 3  Usually Understands: Understands most conversations, but misses some part/intent of message  Requires cues at times to understand  Comprehension (FIM) 4 - Understands basic info/conversation 75-90% of time   Expression   QI: Expression 3   Exhibits some difficulty with expressing needs and ideas (e g , some words or finishing thoughts) or speech is not clear   Expression (FIM) 5 - Needs help/cues only RARELY (< 10% of the time)   Social Interaction   Social Interaction (FIM) 4 - Interacts 75-89% of time   Problem Solving   Problem solving (FIM) 3 - Solves basic problmes 50-74% of time   Memory   Memory (FIM) 3 - Recognizes, recalls/performs 50-74%   RLE Assessment   RLE Assessment X   Strength RLE   R Hip Flexion 3+/5   R Hip ABduction 3+/5   R Hip ADduction 3+/5   R Knee Flexion 3+/5   R Knee Extension 3+/5   R Ankle Dorsiflexion 4+/5   R Ankle Plantar Flexion 4+/5   LLE Assessment   LLE Assessment X   Strength LLE   L Hip Flexion 4-/5   L Hip ABduction 4-/5   L Hip ADduction 4-/5   L Knee Flexion 4-/5   L Knee Extension 4-/5   L Ankle Dorsiflexion 4+/5   L Ankle Plantar Flexion 4+/5   RUE Assessment   RUE Assessment WFL   LUE Assessment   LUE Assessment WFL   Coordination   Movements are Fluid and Coordinated 0   Coordination and Movement Description antalgic; bradykinetic    Sensation   Light Touch No apparent deficits   Propioception Partial deficits in the RLE;Partial deficits in the LLE   Additional Comments baseline neuropathy    Cognition   Overall Cognitive Status Impaired   Arousal/Participation Cooperative   Attention Attends with cues to redirect   Orientation Level Oriented X4   Memory Decreased short term memory;Decreased recall of recent events   Following Commands Follows one step commands with increased time or repetition   Comments Patient overall flat but cooperative and willing to particiapte in PT session  Patient is tangential and perseverates on his mental health histroy and how it affected his childhood and relationship with his family  He would benefit from neuropsychilogy consult and pastroal care  Able to be redirected but requires more time to perform all tasks    Vision   Vision Comments patients wears glasses- reports his fmaily will bring them in    Discharge [address] Retired/not working   Patient's Discharge Plan To return home with S from spouse and son    Patient's Rehab Expectations "To become closer to God"; "To go home"    Barriers to Discharge Nas[place]; Decreased Cognitive Function;Decreased Strength;Decreased Endurance;Depression;Pain; Safety Considerations   Impressions Patient is a 68year old male presenting s/p fall, mechanical vs syncope, down stairs in which he sustained a C6-7 osteophyte fracture with possible facet fractures and acute closed head injury with brief LOC  Patient with an additional problem list which includes HTN, chronic depression, ankylosing spondylitis (indicated via XRAY), CAD, chronic venous stasis of BLE, schizophrenia, PE, and arthropathy  Please see above for a more comprehensive list  Patient subsequently recommended for rehab to maximize function and reduce caregiver burden  PTA, patient living home with his spouse and son in a 2 [address] with JOSE JUAN and a full flight to full bathroom and bedroom  Patient reports owning a SPC and RW and admits to 5 falls in the last 6 months  Patient reports wife able to provide 24 hour S at discharge however, he did not comment definitely on her ability to provide any physical assistance  On evaluation, patient presenting with the following deficits: impaired strength and ROM, impaired static and dynamic sitting and standing balance, poor standing tolerance, and decreased overall functional mobility  Patient wearing CS collar during session in which he required education on spinal precautions  Patient is overall flat and tangential but is able to be redirected onto task  He perseverates on his mental health throughout session and would benefit from neuropsych consult (previously placed)  During functional assessment, patient able to perform bed mobility, transfers, gait and stairs with overall Ste[place], mod A at times  He is a good rehab candidate with goals of Supervision; expected LOS 2 weeks  He requires skilled PT intervention at this time to maximize function and reduce caregiver burden      PT Therapy Minutes   PT Time In 0830   PT Time Out 1000   PT Total Time (minutes) 90   PT Mode of treatment - Individual (minutes) 90 PT Mode of treatment - Concurrent (minutes) 0   PT Mode of treatment - Group (minutes) 0   PT Mode of treatment - Co-treat (minutes) 0   PT Mode of Teatment - Total time(minutes) 90 minutes

## 2019-03-06 NOTE — ASSESSMENT & PLAN NOTE
on appropriate nutrition and activity  Adjustments accordingly during skilled therapy and with rehab nursing  Monitor skin closely for breakdown, wounds, rashes; keep clean and dry, turning Q2H   Follow-up with PCP after d/c

## 2019-03-06 NOTE — PLAN OF CARE
Problem: Potential for Falls  Goal: Patient will remain free of falls  Description  INTERVENTIONS:  - Assess patient frequently for physical needs  -  Identify cognitive and physical deficits and behaviors that affect risk of falls    -  [place] fall precautions as indicated by assessment   - Educate patient/family on patient safety including physical limitations  - Instruct patient to call for assistance with activity based on assessment  - Modify environment to reduce risk of injury  - Consider OT/PT consult to assist with strengthening/mobility  Outcome: Progressing     Problem: Prexisting or High Potential for Compromised Skin Integrity  Goal: Skin integrity is maintained or improved  Description  INTERVENTIONS:  - Identify patients at risk for skin breakdown  - Assess and monitor skin integrity  - Assess and monitor nutrition and hydration status  - Monitor labs (i e  albumin)  - Assess for incontinence   - Turn and reposition patient  - Assist with mobility/ambulation  - Relieve pressure over bony prominences  - Avoid friction and shearing  - Provide appropriate hygiene as needed including keeping skin clean and dry  - Evaluate need for skin moisturizer/barrier cream  - Collaborate with interdisciplinary team (i e  Nutrition, Rehabilitation, etc )   - Patient/family teaching  Outcome: Progressing     Problem: PAIN - ADULT  Goal: Verbalizes/displays adequate comfort level or baseline comfort level  Description  Interventions:  - Encourage patient to monitor pain and request assistance  - Assess pain using appropriate pain scale  - Administer analgesics based on type and severity of pain and evaluate response  - Implement non-pharmacological measures as appropriate and evaluate response  - Consider cultural and social influences on pain and pain management  - Notify physician/advanced practitioner if interventions unsuccessful or patient reports new pain  Outcome: Progressing     Problem: SAFETY ADULT  Goal: Maintain or return to baseline ADL function  Description  INTERVENTIONS:  -  Assess patient's ability to carry out ADLs; assess patient's baseline for ADL function and identify physical deficits which impact ability to perform ADLs (bathing, care of mouth/teeth, toileting, grooming, dressing, etc )  - Assess/evaluate cause of self-care deficits   - Assess range of motion  - Assess patient's mobility; develop plan if impaired  - Assess patient's need for assistive devices and provide as appropriate  - Encourage maximum independence but intervene and supervise when necessary  ¯ Involve family in performance of ADLs  ¯ Assess for home care needs following discharge   ¯ Request OT consult to assist with ADL evaluation and planning for discharge  ¯ Provide patient education as appropriate  Outcome: Progressing  Goal: Maintain or return mobility status to optimal level  Description  INTERVENTIONS:  - Assess patient's baseline mobility status (ambulation, transfers, stairs, etc )    - Identify cognitive and physical deficits and behaviors that affect mobility  - Identify mobility aids required to assist with transfers and/or ambulation (gait belt, sit-to-stand, lift, walker, cane, etc )  - [place] fall precautions as indicated by assessment  - Record patient progress and toleration of activity level on Mobility SBAR; progress patient to next Phase/Stage  - Instruct patient to call for assistance with activity based on assessment  - Request Rehabilitation consult to assist with strengthening/weightbearing, etc   Outcome: Progressing     Problem: DISCHARGE PLANNING  Goal: Discharge to home or other facility with appropriate resources  Description  INTERVENTIONS:  - Identify barriers to discharge w/patient and caregiver  - Arrange for needed discharge resources and transportation as appropriate  - Identify discharge learning needs (meds, wound care, etc )  - Arrange for interpretive services to assist at discharge as needed  - Refer to Case Management Department for coordinating discharge planning if the patient needs post-hospital services based on physician/advanced practitioner order or complex needs related to functional status, cognitive ability, or social support system  Outcome: Progressing     Problem: GASTROINTESTINAL - ADULT  Goal: Maintains or returns to baseline bowel function  Description  INTERVENTIONS:  - Assess bowel function  - Encourage oral fluids to ensure adequate hydration  - Administer IV fluids as ordered to ensure adequate hydration  - Administer ordered medications as needed  - Encourage mobilization and activity  - Nutrition services referral to assist patient with appropriate food choices  Outcome: Progressing     Problem: SKIN/TISSUE INTEGRITY - ADULT  Goal: Skin integrity remains intact  Description  INTERVENTIONS  - Identify patients at risk for skin breakdown  - Assess and monitor skin integrity  - Assess and monitor nutrition and hydration status  - Monitor labs (i e  albumin)  - Assess for incontinence   - Turn and reposition patient  - Assist with mobility/ambulation  - Relieve pressure over bony prominences  - Avoid friction and shearing  - Provide appropriate hygiene as needed including keeping skin clean and dry  - Evaluate need for skin moisturizer/barrier cream  - Collaborate with interdisciplinary team (i e  Nutrition, Rehabilitation, etc )   - Patient/family teaching  Outcome: Progressing     Problem: MUSCULOSKELETAL - ADULT  Goal: Maintain or return mobility to safest level of function  Description  INTERVENTIONS:  - Assess patient's ability to carry out ADLs; assess patient's baseline for ADL function and identify physical deficits which impact ability to perform ADLs (bathing, care of mouth/teeth, toileting, grooming, dressing, etc )  - Assess/evaluate cause of self-care deficits   - Assess range of motion  - Assess patient's mobility; develop plan if impaired  - Assess patient's need for assistive devices and provide as appropriate  - Encourage maximum independence but intervene and supervise when necessary  - Involve family in performance of ADLs  - Assess for home care needs following discharge   - Request OT consult to assist with ADL evaluation and planning for discharge  - Provide patient education as appropriate  Outcome: Progressing  Goal: Maintain proper alignment of affected body part  Description  INTERVENTIONS:  - Support, maintain and protect limb and body alignment  - Provide pt/fam with appropriate education  Outcome: Progressing

## 2019-03-06 NOTE — PROGRESS NOTES
Internal Medicine Progress Note  Patient: Milady Parrish  Age/sex: 68 y o  male  Medical Record #: [id number]      ASSESSMENT/PLAN:  Milady Parrish  is seen and examined and management for following issues:      C6-7 osteophyte fracture: Pain control per primary service  Reason for fall unclear      HTN: stable; continue Amlodipine 5mg daily and Losartan 50mg daily  No changes today     History of DVT/PE/IVC filter: on life long AC; Continue Coumadin = dose was skipped last night since big jump in INR  Initial DVT provoked due to knee surgery  PE not provoked  Family Medicine manages [facility] as OP and was on 5mg qd  INR in AM     BPH: Continue Flomax      Paranoid Schizophrenia: sees Dr Dominique Yun as OP  Continue Risperdal, Luvox, Prolixin  Prolixin was added 3 weeks ago  monitor for side effects      Possible HS hypoxia:  Not sure if this AMs sat was accurate; will watch    Hx Diastolic CHF:  Controlled w/o diuretics; per PCPs note, he has chronic edema      Subjective: offers no complaints    ROS:   GI: denies abdominal pain, change bowel habits or reflux symptoms  Neuro: No new neurologic changes  Respiratory: No Cough, SOB  Cardiovascular: No CP, palpitations     Scheduled Meds:    Current Facility-Administered Medications:  acetaminophen 975 mg Oral [facility] Narda Lower, MD   amantadine 100 mg Oral BID Narda Lower, MD   amLODIPine 5 mg Oral Daily Narda Lower, MD   bisacodyl 10 mg Rectal Daily PRN Chuck Malagon,    docusate sodium 100 mg Oral BID Narda Lower, MD   fluPHENAZine 5 mg Oral [facility] Narda Lower, MD   fluvoxaMINE 50 mg Oral 4x Daily [place] Lower, MD   lidocaine 1 patch Topical Daily [place] Lower, MD   losartan 50 mg Oral Daily Narda Lower, MD   ondansetron 4 mg Oral Q8H PRN [place] Lower, MD   polyethylene glycol 17 g Oral Daily PRN Narda Lower, MD   risperiDONE 0 5 mg Oral BID PRN Narda Lower, MD   risperiDONE 1 mg Oral Daily [place] Lower, MD   risperiDONE 2 mg Oral HS Fab Broussard Mary Jane Mayfield MD   tamsulosin 0 4 mg Oral Daily With Sia Mascorro MD   warfarin 5 mg Oral Daily (warfarin) Christa Santoyo MD       Labs:     Results from last 7 days   Lab Units 03/06/19  0508 03/05/19  0515   WBC Thousand/uL 5 82 7 05   HEMOGLOBIN g/dL 13 0 13 2   HEMATOCRIT % 40 8 41 4   PLATELETS Thousands/uL 149 158     Results from last 7 days   Lab Units 03/06/19  0508 03/05/19  0515 03/03/19  2258   SODIUM mmol/L 142 142  --    POTASSIUM mmol/L 3 8 3 3*  --    CHLORIDE mmol/L 105 105  --    CO2 mmol/L 32 34*  --    CO2, I-STAT mmol/L  --   --  32   BUN mg/dL 16 13  --    CREATININE mg/dL 0 79 0 73  --    GLUCOSE, ISTAT mg/dl  --   --  117   CALCIUM mg/dL 8 4 8 4  --          Results from last 7 days   Lab Units 03/06/19  0508 03/05/19  0515   INR  2 65* 2 79*            [unfilled]    Labs reviewed    Physical Examination:  Vitals:   Vitals:    03/05/19 1640 03/05/19 2010 03/06/19 0501   BP: 136/66 139/70 156/80   BP Location: Left arm Left arm Right arm   Pulse: 93 96 89   Resp: 18 20 16   Temp: 98 8 °F (37 1 °C) 98 °F (36 7 °C) 98 9 °F (37 2 °C)   TempSrc: Oral Oral Oral   SpO2: 94% 92% (!) 88%   Weight: 89 kg (196 lb 3 4 oz)  84 8 kg (186 lb 15 2 oz)   Height: 5' 4 5" (1 638 m)       Constitutional:  NAD; pleasant; nontoxic  HEENT:  AT/NC; oropharynx negative for thrush on tongue   Neck: collar on  CV:  +S1, S2;  RRR; no rub/murmur  Pulmonary:  BBS without crackles/wheeze/rhonci; resp are unlabored  Abdominal:  soft, +BS, ND/NT  Skin:  no rashes  Musculoskeletal:  Mild LE edema  :  no sandhu  Neurological/Psych:  AAO;  BURGOS 5/5; no depression/anxiety        [x ] Total time spent: 30 Mins and greater than 50% of this time was spent counseling/coordinating care  ** Please Note: Dragon 360 Dictation voice to text software may have been used in the creation of this document   **

## 2019-03-06 NOTE — CONSULTS
Consultation - Parminder Barboza  68 y o  male MRN: [id number]    Unit/Bed#: -01 Encounter: [id number]        History of Present Illness     HPI: Parminder Barboza  is a 68 y o  male, with PMH of HTN, BPH, DVT/PE, depression, schizophrenia and CAD, who presented 3/4/19 after a fall down 6 steps  He did hit his head and had a + LOC  He complained of neck and back pain on admission  He was wound to have an osteophyte fracture at C6-7 and possible Rt facial fracture  Follow-up CT revealed there was no facial fracture  Pt was placed in an [place] collar  ROS:  Constitutional: Negative  HENT: Negative  Respiratory: Negative  Cardiovascular: Negative  Gastrointestinal: Negative  Musculoskeletal: Neck pain  Neurological: chronic dizziness  Psychiatric/Behavioral: Negative          Historical Information   Past Medical History:   Diagnosis Date    Ankle edema 04/2004    chronic    Arthritis 03/16/2011    right knee    Ataxic gait 11/14/2011    Baker's cyst 04/2005    (djd)-left knee    Cardiac disease     Cataract     Chronic pain     Coronary artery disease     Depression     DVT (deep venous thrombosis) (HCC)     Epistaxis 01/09/2015    Forgetfulness 3/4/2019    Hand tingling 02/05/2010    tingling right forearm and hand-CTS on EMG    Hematuria 11/22/2016    ER-3 way sandhu to irrigate bladder with 3 L NS    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    Hypertension     Lipoma 01/11/2011    in hepatic flexure    Migraine headache 02/2007    optic    Obesity     Pulmonary embolism ([facility] [address] ) 09/18/2014    bilateral PE + acute DVT LLE     Rib fracture 03/2007    left 8th rib 2nd degree fall    Schizophrenia ([facility] [address] ) 04/2009    acute exacerbation    Sciatic leg pain 07/20/2011    left    Stenosis of cervix     with cervical radiculomyelopathy    Suicidal ideation 04/2009    Syncope 01/09/2015    Tardive dyskinesia 02/28/2011    Torn meniscus 04/2004    lateral, knee, left    Varicose vein of leg 04/2004    Venous insufficiency of leg 04/2004    chronic    Vertigo 05/12/2008    transient-questionable BPPV     Past Surgical History:   Procedure Laterality Date    CATARACT EXTRACTION Right     10/2/2009    CATARACT EXTRACTION      12/4/2009    COLONOSCOPY      with biopsy    IVC FILTER INSERTION      anticoagulation-9/23/2014    KNEE ARTHROCENTESIS       and cortisone injection    KNEE ARTHROSCOPY Left     5/18/2004    LAMINECTOMY      C3-C6 and left sided cervical foraminotomies-10/14/2008    OTHER SURGICAL HISTORY      DONNY x 1    REPLACEMENT TOTAL HIP W/  RESURFACING IMPLANTS      TOTAL HIP ARTHROPLASTY Right     02/07/2017    TOTAL KNEE ARTHROPLASTY Left     3/21/2005     Social History   Social History     Substance and Sexual Activity   Alcohol Use No    Frequency: Never     Social History     Substance and Sexual Activity   Drug Use No     Social History     Tobacco Use   Smoking Status Never Smoker   Smokeless Tobacco Never Used   Tobacco Comment    no passive smoke exposure     Family History   Problem Relation Age of Onset    Coronary artery disease Mother     Diabetes Mother     Depression Mother     Other Mother         tobacco abuse    Other Father         tobacco abuse    Coronary artery disease Father     Other Sister         tobacco abuse    Diabetes Sister     Hypertension Brother     Migraines Other     Breast cancer Other         2009-mastectomy+chemo       Meds/Allergies   current meds:  Current Facility-Administered Medications   Medication Dose Route Frequency    acetaminophen (TYLENOL) tablet 975 mg  975 mg Oral Q8H [address]    amantadine (SYMMETREL) capsule 100 mg  100 mg Oral BID    [START ON 3/6/2019] amLODIPine (NORVASC) tablet 5 mg  5 mg Oral Daily    bisacodyl (DULCOLAX) rectal suppository 10 mg  10 mg Rectal Daily PRN    docusate sodium (COLACE) capsule 100 mg  100 mg Oral BID    fluPHENAZine (PROLIXIN) tablet 5 mg  5 mg Oral Q8H [address]    fluvoxaMINE (LUVOX) tablet 50 mg  50 mg Oral 4x Daily    [START ON 3/6/2019] lidocaine (LIDODERM) 5 % patch 1 patch  1 patch Topical Daily    [START ON 3/6/2019] losartan (COZAAR) tablet 50 mg  50 mg Oral Daily    ondansetron (ZOFRAN-ODT) dispersible tablet 4 mg  4 mg Oral Q8H PRN    polyethylene glycol (MIRALAX) packet 17 g  17 g Oral Daily PRN    risperiDONE (RisperDAL) tablet 0 5 mg  0 5 mg Oral BID PRN    [START ON 3/6/2019] risperiDONE (RisperDAL) tablet 1 mg  1 mg Oral Daily    risperiDONE (RisperDAL) tablet 2 mg  2 mg Oral HS    [START ON 3/6/2019] tamsulosin (FLOMAX) capsule 0 4 mg  0 4 mg Oral Daily With Dinner    [START ON 3/6/2019] warfarin (COUMADIN) tablet 5 mg  5 mg Oral Daily (warfarin)       PTA meds:   Medications Prior to Admission   Medication    acetaminophen (TYLENOL) 325 mg tablet    amantadine (SYMMETREL) 100 mg capsule    amLODIPine (NORVASC) 5 mg tablet    fluPHENAZine (PROLIXIN) 5 mg tablet    fluvoxaMINE (LUVOX) 50 mg tablet    losartan (COZAAR) 50 mg tablet    risperiDONE (RISPERDAL) 3 mg tablet    tamsulosin (FLOMAX) 0 4 mg    warfarin (COUMADIN) 5 mg tablet     Allergies   Allergen Reactions    Lisinopril Cough       Objective   Vitals: Blood pressure 139/70, pulse 96, temperature 98 °F (36 7 °C), temperature source Oral, resp  rate 20, height 5' 4 5" (1 638 m), weight 89 kg (196 lb 3 4 oz), SpO2 92 %  Physical Exam      HENT:  Normocephalic  EOM are normal  PERRLA  Neck in [place] collar  Cardiovascular: Normal rate and regular rhythm  Pulmonary: Breath sounds normal  No respiratory distress  Pt has no wheezes nor rales  Abdominal: Soft  Bowel sounds are normal  Pt exhibits no distension  There is no tenderness  There is no rebound and no guarding  Neurological: Pt is alert and oriented to person, place, and time  Psychiatric: Pt has a normal mood and affect         Lab Results:   Results from last 7 days   Lab Units 03/05/19  0515 03/03/19  7152 03/03/19  2256   WBC Thousand/uL 7 05  --  5 61   HEMOGLOBIN g/dL 13 2  --  15 0   I STAT HEMOGLOBIN g/dl  --  16 3  --    HEMATOCRIT % 41 4  --  46 5   HEMATOCRIT, ISTAT %  --  48  --    PLATELETS Thousands/uL 158  --  205     Results from last 7 days   Lab Units 03/05/19  0515 03/03/19  2258 03/03/19  2256   SODIUM mmol/L 142  --  145   POTASSIUM mmol/L 3 3*  --  3 5   CHLORIDE mmol/L 105  --  103   CO2 mmol/L 34*  --  31   CO2, I-STAT mmol/L  --  32  --    BUN mg/dL 13  --  14   CREATININE mg/dL 0 73  --  0 93   GLUCOSE, ISTAT mg/dl  --  117  --    CALCIUM mg/dL 8 4  --  9 1         Results from last 7 days   Lab Units 03/05/19  0515 03/03/19  2256   INR  2 79* 2 12*           [unfilled]    Labs reviewed    Imaging: reviewed  EKG, Pathology, and Other Studies: I have personally reviewed pertinent reports  VTE Prophylaxis: Warfarin (Coumadin)    Code Status: Level 1 - Full Code   Advance Directive and Living Will:      Power of :    POLST:      Assessment/Plan     C6-7 osteophyte fracture: Pain control and therapy per primary service  Reason for fall unclear  HTN: Continue amlodipine 5mg daily and losartan 50mg daily  Monitor BP every shift  History of DVT and PE: Continue Coumadin  Initial DVT provoked due to knee surgery  PE not provoked  Pt does have an IVC filter  BPH: Continue Flomax  Schizophrenia: Continue psychiatric medications  Prolixin was added 3 weeks ago  monitor for side effects  Counseling / Coordination of Care  Total floor / unit time spent today 60 minutes  Greater than 50% of total time was spent with the patient and / or family counseling and / or coordination of care  ** Please Note: Dragon 360 Dictation voice to text software may have been used in the creation of this document   **

## 2019-03-06 NOTE — H&P
PHYSICAL MEDICINE AND REHABILITATION H&P/ADMISSION NOTE  Jaki Vivar  68 y o  male MRN: [id number]  Unit/Bed#: -01 Encounter: [id number]     Rehab Diagnosis: Brain Dysfunction:  02 22  Traumatic, Closed Injury    Etiologic Diagnosis:  Acute closed head injury with brief loss of consciousness     Overall Assessment & Plan with Risks of Comorbidities:    Jaki Vivar  is a 68 y o  male with a past medical history schizophrenia, anxiety, depression, coronary artery disease, possible stroke, DVT/PE on chronic anticoagulation with Coumadin, obesity class 1, hypertension, urinary frequency on chronic Flomax, lightheadedness, occasional falls, memory impairment, left knee replacement, right total hip replacement, cervical laminectomy and fusion who fell backwards down stairs with brief few minutes loss of consciousness with neck and possible head trauma who was brought to [facility] initially and then transferred to [facility] on 3/3/19  CT head did not show acute findings, CT neck showed C5/C6 osteophyte fracture  Neurosurgery was consulted who recommended non operative management with cervical collar  Patient was complaining of some neck, back, and parasternal pain which were all believed to be related to trauma  Additional imaging of facial bones, thoracic and lumbar spine did not reveal acute fractures but did show chronic L3 compression fracture  Patient noted to have some increased difficulty sleeping and anxiety  Patient was evaluated by skilled therapies and was found to have significant decline in ADLs and ambulation appropriate for admission to [facility]      71-year-old male status post fall with brief loss of consciousness found to have C6-C7 vertebral osteophyte fracture and possible TBI presents with likely multifactorial imbalance possibly related to prior stroke, spinal stenosis, and now head injury, intermittent lightheadedness, acute on chronic cognitive deficits, suboptimal control of anxiety, depression, sleep, and schizophrenia with associated functional decline in ADLs and ambulation  * Head injury, acute  Assessment & Plan  Fall with likely head trauma and brief loss of consciousness and acute osteophyte C6-C7 fracture  Mild increased confusion after fall also with some worsening mood/schizophrenia symptoms  >Recommend acute comprehensive interdisciplinary inpatient rehabilitation to include intensive skilled therapies (PT, OT) as outlined with oversight and management by rehabilitation physician as well as inpatient rehab level nursing, case management and weekly interdisciplinary team meetings     > overstimulation precautions, sleep-wake/agitation restlessness log  > optimize neuroleptic medication  > try to avoid sedative medications  > evaluate and manage fall risks          Cognitive impairment  Assessment & Plan  Likely premorbid; chronic generalized microvascular changes with possible old right lacunar internal capsule infarct and possible right frontal WM   >speech eval in management  >Follow-up with Neurology after discharge  >On chronic home amantadine     Fracture of sixth cervical vertebra (HCC)  Assessment & Plan  C6/C7 osteophyte fracture context of recent fall and history of cervical decompression and fusion  Treatment recommendations per Neurosurgery  Non operative; continue cervical collar per Neurosurgery  Follow up with Neurosurgery after discharge    History of stroke  Assessment & Plan  Patient/family deny although evidence of possible old R lacunar infarct and focus in R frontal WM along with generalized micoangiopathic changes  >Recommend optimal BP mgmt  >Follow-up with neuro after d/c   >On full A/C for hx of VTE    Schizophrenia ([facility] [address] )  Assessment & Plan  Patient currently with fair amount of paranoid delusions, anxiety, difficulty sleeping with occasional panic attacks off home regimen since recent admission prior, with recent fall/hospitalization possible head injury  >Recommend uptitrating medications closer to home regimen and await follow-up with Dr Joanne Gustafson   >Increase/resume fluphenazine back to home 5mg TID   >Increase Risperdone to 1mg qAM and 2mg QHS > provide additional 0 5mg x1 now    >Increase Fluvoxaimine to 4x/day (home 5x  day)   >Increase/resume amantadine back to home 100mg BID  >Monitor mood, delusions, sleep, function  Medications reviewed with his outpt pharmacy with call placed to patient's outpatient psychiatrist Dr Chau Dodge  Patient recently should switched from Trifluoperazine to fluphenazine and Cogentin stopped  Patient/family noted some increased paranoid delusions starting a few days prior to fall  Home regimen per pharmacy:  Fluphenazine 5mg TID, Risperdal 1 5mg qday and 3mg QHS  Amantadine 100 mg b i d , Fluvoxamine 50mg 5x/day  Patient recently on much more modest dosing s/p recent possible BI          Lightheadedness  Assessment & Plan  Chronic intermittent; consider med adjustments  Monitor orthostatics daily for now  PRN abdominal binder and EHSAN hose      History of pulmonary embolism  Assessment & Plan  And DVT on chronic anticoagulation with Coumadin  Medicine consult to manage during acute rehab course  Hold Coumadin dose this evening with fairly significant jump in INR after yesterday's dose of warfarin  Nevertheless INR currently therapeutic    Pain  Assessment & Plan  Currently fairly well controlled  Continue scheduled Tylenol for now  Lidoderm patch  Avoid NSAIDs and avoid opiates     Class 1 obesity due to excess calories without serious comorbidity with body mass index (BMI) of 34 0 to 34 9 in adult  Assessment & Plan   on appropriate nutrition and activity  Adjustments accordingly during skilled therapy and with rehab nursing  Monitor skin closely for breakdown, wounds, rashes; keep clean and dry, turning Q2H   Follow-up with PCP after d/c      Chronic venous stasis dermatitis of both lower extremities  Assessment & Plan  Internal medicine consult to assist with management  Elevate legs when in bed and when in chair for prolonged periods of time  Consider Wilver veronica    Coronary artery disease involving native coronary artery of native heart without angina pectoris  Assessment & Plan  On chronic Coumadin  Optimal blood pressure control  Follow-up with PCP    Benign essential hypertension  Assessment & Plan  Medicine c/s to assist with mgmt  Amlodipine 5 mg daily, losartan 50 mg daily  Monitor blood pressure and orthostatics        # Bowel care:    - monitor for constipation, incontinence, and diarrhea  - goal 1 appropriate BM every 1-2 days  - recommend colace +/- senna and PRN bowel protocol     # Bladder care: On chronic Flomax; monitor orthostatic vital signs in basic vital signs  - monitor for retention, incontinence, signs/symptoms of UTI  - recommend voiding trial; nursing prompt to void followed by bladder scan starting Q4-6H or after each patient initiated void; nursing to record voided output and bladder scan totals; straight cath PRN >350-400 cc; if post void residual bladder scans are <150 cc x3 consecutive voids, can stop scans      # Skin:   - Nursing to turn patient Q2H if not adequately ambulatory, monitor for skin breakdown, rashes, and wounds if applicable    # At risk for venous thromboembolism:  - Recommend SCDs and mobilization  - warfarin     # Diet/Hydration:    Dysphagia 3 thin - recommend speech therapy    Disposition:   Home with estimated length of stay of 10 days from admission    Follow-up:   PCP, Psychiatry, rehab, Neurosurgery, Neurology    CODE: Level 1: Full Code    Restrictions include:   Fall precautions     ---------------------------------------------------------------------------------------------------------------------    Chief Complaint:  Anxiety    History of Present Illness:   Tahira Smith  is a 68 y o  male with a past medical history schizophrenia, anxiety, depression, coronary artery disease, possible stroke, DVT/PE on chronic anticoagulation with Coumadin, obesity class 1, hypertension, urinary frequency on chronic Flomax, lightheadedness, occasional falls, memory impairment, left knee replacement, right total hip replacement, cervical laminectomy and fusion who fell backwards down stairs with brief few minutes loss of consciousness with neck and possible head trauma who was brought to [facility] initially and then transferred to [facility] on 3/3/19  CT head did not show acute findings, CT neck showed C5/C6 osteophyte fracture  Neurosurgery was consulted who recommended non operative management with cervical collar  Patient was complaining of some neck, back, and parasternal pain which were all believed to be related to trauma  Additional imaging of facial bones, thoracic and lumbar spine did not reveal acute fractures but did show chronic L3 compression fracture  Patient noted to have some increased difficulty sleeping and anxiety  Patient was evaluated by skilled therapies and was found to have significant decline in ADLs and ambulation appropriate for admission to [place] [address]  On evaluation patient is oriented to self, time, place, month, and largely to situation  Patient reports mild neck pain but this is improving  He notes some headache since the fall  He denies light sensitivity or sensitivity to sound  Patient has slight difficulty swallowing but this may be due to having the cervical collar on  He notes occasional nonproductive cough without shortness of breath  He denies chest pain  Patient reports last bowel movement was several days ago but still reports having some gas without abdominal pain or nausea  Patient reports chronic intermittent lightheadedness at home with occasional fall  Family is at the bedside reports some chronic memory problems who last few years somewhat worse over the last year    They report he has had several falls over last few years but overall functionally has been doing fairly well  Apparently patient had been on trifuoperazine and Cogentin which was recently switched to fluphenazine alone which they thought was because they had difficulty obtaining older trifuoperazine from   They state his mental health docs in past tried to switch him off of  trifuoperazine in past but they could not because his anxiety and deluisions would worsens on other meds  He also is on multiple other psych meds which have not recently changed  They and patient note change above occurred about 3 weeks ago and he was doing ok until a few days before fall when he started having increased delusions and perseveration that he was going to die and go to hell  He reports still having this thought stuck in his mind which he states has been a chronic theme for him particularly when he is having more symptoms  He also notes some increased anxiety and even occasional panic attack last several days particularly at night making sleeping difficult  He denies SI, hallucinations or other complaints  Review of Systems:     Complete review of systems obtained  Please see HPI for details with other significant symptoms or history listed here: Otherwise, 14 point review of systems completed and was otherwise unremarkable      OBJECTIVE:    Physical Exam:  Temperature 98 8° F, pulse 93, respiratory rate 18, blood pressure 136/66, SpO2 94% on room air  General: Awake, alert in NAD  HENT:  Cervical collar in place, NCAT, MMM  Neck: Supple, no lymphadenopathy  Respiratory: Unlabored breathing, breath sounds equal, Lungs CTA, no wheezes, rales, or rhonchi  Cardiovascular: Regular rate and rhythm, no murmurs, rubs, or gallops  Gastrointestinal: Soft, non-tender, mildly-distended, normoactive bowel sounds  Genitourinary: No sandhu  SkiN/MSK/Extremities:  Distal extremities appropriately warm, normal cap refill distally, no cyanosis or calf edema, no calf tenderness to palpation  Neurologic/Psych:   MENTAL STATUS: awake, oriented to person, place, time, and situation  Affect:  Anxious at times but redirectable  CN II-XII: grossly intact   Strength/MMT:  Near 5/5 throughout  Sensation: intact to light touch  No upper motor neuron signs  Finger to nose testing intact    Laboratory:    Results from last 7 days   Lab Units 03/06/19  0508 03/05/19  0515 03/03/19 2258 03/03/19  2256   HEMOGLOBIN g/dL 13 0 13 2  --  15 0   I STAT HEMOGLOBIN g/dl  --   --  16 3  --    HEMATOCRIT % 40 8 41 4  --  46 5   HEMATOCRIT, ISTAT %  --   --  48  --    WBC Thousand/uL 5 82 7 05  --  5 61     Results from last 7 days   Lab Units 03/06/19  0508 03/05/19  0515 03/03/19  2258 03/03/19  2256   BUN mg/dL 16 13  --  14   POTASSIUM mmol/L 3 8 3 3*  --  3 5   CHLORIDE mmol/L 105 105  --  103   GLUCOSE, ISTAT mg/dl  --   --  117  --    CREATININE mg/dL 0 79 0 73  --  0 93   AST U/L  --   --   --  39   ALT U/L  --   --   --  41     Results from last 7 days   Lab Units 03/06/19  0508 03/05/19  0515 03/03/19  2256   PROTIME seconds 28 3* 29 5* 23 8*   INR  2 65* 2 79* 2 12*        Wt Readings from Last 1 Encounters:   03/06/19 84 8 kg (186 lb 15 2 oz)     Estimated body mass index is 31 59 kg/m² as calculated from the following:    Height as of this encounter: 5' 4 5" (1 638 m)  Weight as of this encounter: 84 8 kg (186 lb 15 2 oz)      Imaging: reviewed    Per EMR:  Past Medical History:   Past Surgical History:   Family History:   Social history:   Past Medical History:   Diagnosis Date    Ankle edema 04/2004    chronic    Arthritis 03/16/2011    right knee    Ataxic gait 11/14/2011    Baker's cyst 04/2005    (djd)-left knee    Cardiac disease     Cataract     Chronic pain     Coronary artery disease     Depression     DVT (deep venous thrombosis) (HCC)     Epistaxis 01/09/2015    Forgetfulness 3/4/2019    Hand tingling 02/05/2010 tingling right forearm and hand-CTS on EMG    Hematuria 11/22/2016    ER-3 way sandhu to irrigate bladder with 3 L NS    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    Hypertension     Lipoma 01/11/2011    in hepatic flexure    Migraine headache 02/2007    optic    Obesity     Pulmonary embolism ([facility] [address] ) 09/18/2014    bilateral PE + acute DVT LLE     Rib fracture 03/2007    left 8th rib 2nd degree fall    Schizophrenia ([facility] [address] ) 04/2009    acute exacerbation    Sciatic leg pain 07/20/2011    left    Stenosis of cervix     with cervical radiculomyelopathy    Suicidal ideation 04/2009    Syncope 01/09/2015    Tardive dyskinesia 02/28/2011    Torn meniscus 04/2004    lateral, knee, left    Varicose vein of leg 04/2004    Venous insufficiency of leg 04/2004    chronic    Vertigo 05/12/2008    transient-questionable BPPV    Past Surgical History:   Procedure Laterality Date    CATARACT EXTRACTION Right     10/2/2009    CATARACT EXTRACTION      12/4/2009    COLONOSCOPY      with biopsy    IVC FILTER INSERTION      anticoagulation-9/23/2014    KNEE ARTHROCENTESIS       and cortisone injection    KNEE ARTHROSCOPY Left     5/18/2004    LAMINECTOMY      C3-C6 and left sided cervical foraminotomies-10/14/2008    OTHER SURGICAL HISTORY      DONNY x 1    REPLACEMENT TOTAL HIP W/  RESURFACING IMPLANTS      TOTAL HIP ARTHROPLASTY Right     02/07/2017    TOTAL KNEE ARTHROPLASTY Left     3/21/2005     Family History   Problem Relation Age of Onset    Coronary artery disease Mother     Diabetes Mother     Depression Mother     Other Mother         tobacco abuse    Other Father         tobacco abuse    Coronary artery disease Father     Other Sister         tobacco abuse    Diabetes Sister     Hypertension Brother     Migraines Other     Breast cancer Other         2009-mastectomy+chemo      Social History     Socioeconomic History    Marital status: /Civil Union     Spouse name: None    Number of children: None    Years of education: None    Highest education level: None   Occupational History    None   Social Needs    Financial resource strain: None    Food insecurity:     Worry: None     Inability: None    Transportation needs:     Medical: None     Non-medical: None   Tobacco Use    Smoking status: Never Smoker    Smokeless tobacco: Never Used    Tobacco comment: no passive smoke exposure   Substance and Sexual Activity    Alcohol use: No     Frequency: Never    Drug use: No    Sexual activity: None   Lifestyle    Physical activity:     Days per week: None     Minutes per session: None    Stress: None   Relationships    Social connections:     Talks on phone: None     Gets together: None     Attends [employer] service: None     Active member of club or organization: None     Attends meetings of clubs or organizations: None     Relationship status: None    Intimate partner violence:     Fear of current or ex partner: None     Emotionally abused: None     Physically abused: None     Forced sexual activity: None   Other Topics Concern    None   Social History Narrative    None          Current Medical Diagnosis Medications Allergies   Patient Active Problem List   Diagnosis    Benign essential hypertension    Coronary artery disease involving native coronary artery of native heart without angina pectoris    Chronic depression    Chronic venous stasis dermatitis of both lower extremities    Deep venous thrombosis of lower extremity (HCC)    Diastolic heart failure ([address] )    Osteoarthritis    Nocturnal enuresis    Idiopathic trigeminal neuralgia    Lacunar infarction    Class 1 obesity due to excess calories without serious comorbidity with body mass index (BMI) of 34 0 to 34 9 in adult    Schizophrenia ([address] )    Disability of walking    Pulmonary embolism ([address] )    Arthropathy    Mixed hyperlipidemia    Neurogenic claudication    Neck pain without injury    Abdominal wall hematoma    Spinal stenosis of lumbar region with neurogenic claudication    Lipoma of colon    Fracture of sixth cervical vertebra (HCC)    Rib pain on left side    Fall    Forgetfulness    Physical deconditioning    Head injury, acute    Lightheadedness    Pain    History of pulmonary embolism    Cognitive impairment    History of stroke      Current Facility-Administered Medications:     acetaminophen (TYLENOL) tablet 975 mg, 975 mg, Oral, Q8H [address], Negin Pickett MD, 975 mg at 03/06/19 0520    amantadine (SYMMETREL) capsule 100 mg, 100 mg, Oral, BID, Negin Pickett MD, 100 mg at 03/06/19 0817    amLODIPine (NORVASC) tablet 5 mg, 5 mg, Oral, Daily, Negin Pickett MD, 5 mg at 03/06/19 0741    bisacodyl (DULCOLAX) rectal suppository 10 mg, 10 mg, Rectal, Daily PRN, Melanie Devlin DO    docusate sodium (COLACE) capsule 100 mg, 100 mg, Oral, BID, Negin Pickett MD, 100 mg at 03/06/19 0817    fluPHENAZine (PROLIXIN) tablet 5 mg, 5 mg, Oral, Q8H [address], Negin Pickett MD, 5 mg at 03/06/19 0520    fluvoxaMINE (LUVOX) tablet 50 mg, 50 mg, Oral, 4x Daily, Negin Pickett MD, 50 mg at 03/06/19 0818    lidocaine (LIDODERM) 5 % patch 1 patch, 1 patch, Topical, Daily, Negin Pickett MD, 1 patch at 03/06/19 0820    losartan (COZAAR) tablet 50 mg, 50 mg, Oral, Daily, Negin Pickett MD, 50 mg at 03/06/19 0817    ondansetron (ZOFRAN-ODT) dispersible tablet 4 mg, 4 mg, Oral, Q8H PRN, Negin Pickett MD    polyethylene glycol [facility]) packet 17 g, 17 g, Oral, Daily PRN, Negin Pickett MD    risperiDONE (RisperDAL) tablet 0 5 mg, 0 5 mg, Oral, BID PRN, Negin Pickett MD    risperiDONE (RisperDAL) tablet 1 mg, 1 mg, Oral, Daily, Negin Pickett MD, 1 mg at 03/06/19 0820    risperiDONE (RisperDAL) tablet 2 mg, 2 mg, Oral, HS, Negin Pickett MD, 2 mg at 03/05/19 2200    tamsulosin (FLOMAX) capsule 0 4 mg, 0 4 mg, Oral, Daily With Ashley Rodas MD    warfarin (COUMADIN) tablet 5 mg, 5 mg, Oral, Daily (warfarin), Flora Herron MD Allergies   Allergen Reactions    Lisinopril Cough           Social History per encounter and chart review:  Michelle Tidwell  lives with family in 2 story home with 5 steps and another 13 stairs to get to the 2nd floor where bathroom and bedroom access are  Patient/family's goals: Return to previous home/apartment  The patient will have 24 hour supervision/physical assistance available upon discharge      Prior Level of Function:  Mod-Independent with ADLs, ambulation with cane  Current Level of Function:  Transfers, ambulation 50 ft, toileting Min assist, dressing moderate assist    Potential Barriers to Discharge:  Co-morbidities (see above), new functional deficits, acute on chronic cognitive deficits, psychiatric stability, pain    Tolerance for three hours of therapy per therapy day: adequate     Rehabilitation Prognosis: good       Rehabilitation Plan/Therapy Interventions: This patient will have close medical and rehabilitation oversight from a physical medicine and rehabilitation physician and if needed an internal medicine physician to manage the complexity of medical issues to optimize health, ability to participate in therapy, quality of life, and functional outcomes  This patient requires 24 hour rehabilitation nursing to address bowel and bladder management, (pain management if listed below), medication administration, positioning/skin monitoring, fall/injury prevention, and VTE prophylaxis  Physical, occupational and speech therapy: Patient requires PT and OT to improve functional mobility, transfers, upper and lower body strengthening, conditioning, balance, and gait training with appropriate assistive device  Patient also requires ST to evaluate and if appropriate treat deficits in speech, swallowing, and cognition  PT, OT, ST will be provided approximately 5 times per week for 60 minutes per day   Skilled therapists and nursing will also provide patient/family safety education and training  / will work to ensure proper communication between patient (+/- family) and staff regarding the overall rehabilitation and medical process while patient is in the acute rehabilitation center  / will work with patient (and if applicable family and community resources) to optimize safe discharge  Discharge Planning:    Estimated length of stay:   12 days  Family/Patient Goals:  Patient/family's goals: Return to previous home/apartment  Mobility Goals:   Modified independent    Activities of Daily Living (ADLs) Goals:  Modified independent    Cognition / Communication:  Return to prior level of function    Rehabilitation and discharge goals discussed with the patient and/or family  Case Managment and Social Work to review patient/family resources and to coordinate Discharge Planning  Patient and Family Education and Training:  Rehabilitation and discharge goals discussed with the patient and/or family  Patient/family education/training needs to be discussed in weekly team meeting  Other equipment: To be determined    Medical Necessity Criteria for ARC Admission:  The preadmission screen, post-admission physical evaluation, overall plan of care and admissions order demonstrate a reasonable expectation that the following criteria were met at the time of admission to the [facility]  (See "Specific areas of management and oversight in ARC setting" for additional details on medical necessity as outlined below)  1  The patient requires active and ongoing therapeutic intervention of multiple therapy disciplines (physical therapy, occupational therapy, speech-language pathology, or prosthetics/orthotics), one of which is physical or occupational therapy      2  Patient requires an intensive rehabilitation therapy program, as defined in Chapter 1, section 110 2 2 of the CMS Medicare Policy Manual  This intensive rehabilitation therapy program will consist of at least 3 hours of therapy per day at least 5 days per week or at least 15 hours of intensive rehabilitation therapy within a 7 consecutive day period, beginning with the date of admission to the [facility]  3  The patient is reasonably expected to actively participate in, and benefit significantly from, the intensive rehabilitation therapy program as defined in Chapter 1, section 110 2 2 of the CMS Medicare Policy Manual at this time of admission to the [facility]  He can reasonably be expected to make measurable improvement (that will be of practical value to improve the patients functional capacity or adaptation to impairments) as a result of the rehabilitation treatment, as defined in section 110 3, and such improvement can be expected to be made within the prescribed period of time  As noted in the CMS Medicare Policy Manual, the patient need not be expected to achieve complete independence in the domain of self-care nor be expected to return to his or her prior level of functioning in order to meet this standard  4  The patient must require physician supervision by a rehabilitation physician  As such, a rehabilitation physician will conduct face-to-face visits with the patient at least 3 days per week throughout the patients stay in the [facility] to assess the patient both medically and functionally, as well as to modify the course of treatment as needed to maximize the patients capacity to benefit from the rehabilitation process    5  The patient requires an intensive and coordinated interdisciplinary approach to providing rehabilitation, as defined in Chapter 1, section 110 2 5 of the CMS Medicare Policy Manual  This will be achieved through periodic team conferences, conducted at least once in a 7-day period, and comprising of an interdisciplinary team of medical professionals consisting of: a rehabilitation physician, registered nurse,  and/or , and a licensed/certified therapist from each therapy discipline involved in treating the patient  Changes Since Pre-admission Assessment: None -This patient's participation in rehab continues to be reasonable, necessary and appropriate  CMS Required Post-Admission Physician Evaluation Elements  History and Physical, including medical history, functional history and active comorbidities as in above text      PostAdmission Physician Evaluation:  The patient has the potential to make improvement and is in need of physical, occupational, and/or therapy services  The patient may also need nutritional services  Given the patient's complex medical condition and risk of further medical complications, rehabilitative services cannot be safely provided at a lower level of care, such as a skilled nursing facility  I have reviewed the patient's functional and medical status at the time of the preadmission screening and they are the same as on the day of this admission  I acknowledge that I have personally performed a full physical examination on this patient within 24 hours of admission  The patient demonstrated understanding the rehabilitation program and the discharge process after we discussed them      Agree in entirety: yes  Minor adaptions: none    Major changes: none    Specific areas of management and oversight in [facility] setting:    Cardiopulmonary function: Ensure cardiopulmonary stability and optimize cardiopulmonary function not only at rest but with activity as patient's activity level significantly increases in acute rehab compared with prior to transfer in preparation for safe discharge from [facility]  Must closely and frequently monitor blood pressure and HR to ensure adequate cardiac output during ADLs and ambulation as patient is at increased risk for orthostatic hypotension/syncope and potential injury if not monitored for and managed adequately      Blood pressure management:    Frequent monitoring of blood pressure with appropriate adjustments in blood pressure medication management to optimize blood pressure control and prevent/limit renal complications  Monitoring impact of blood pressure and side-effects of blood pressure medications at rest and with activity  Hypoxia prevention: Ensure appropriate level of oxygenation at rest and with activity to avoid symptomatic hypoxia, maximize functional performance, and decrease risk of atelectasis/pneumonia through close and frequent monitoring, providing appropriate respiratory treatments (such as incentive spirometry), and when necessary provide/adjust respiratory medications  Pain management:  Pain will improve with frequent evaluation of pain, careful adjustments in medications, frequent re-evaluation of patient's pain and medical/neurologic status to ensure optimal pain control, avoidance of potential serious and even life-threatening side-effects and drug interactions, as well as weaning pain medications as soon as possible to decrease risk of short and long-term use  Brain injury:  Patient's cognitive status is significantly impaired and neurologic status can fluctuate particularly early in rehabilitation process  Patient requires frequent rehab physician and rehab nursing neurologic monitoring for subtle and more significant changes in mentation and neurologic function  Labs must be monitored closely along with hydration and nutritional status  Low threshold to obtain brain imaging  Medications must be carefully monitored and adjusted to optimize functional recovery while limit cognitive impairments  Goal to improve cognitive and neurologic function, provide appropriate patient (and/or family education), and to ensure appropriate optimal discharge plan  Dysphagia: improve swallowing through SLP evaluation with intensive treatments, optimal nutrition, and fluid intake    Adjust diet and swallowing precautions as indicated to decrease risk of aspiration pneumonia and respiratory distress  Close oversight and management by rehab specialized physician  Inpatient rehabilitation education/teaching: To be provided to patient and typically family/caregiver (if able to be identified) by all skilled therapists, rehab nursing, case management, and rehab specialized physician to ensure optimal recovery and decrease risks of complications in both acute rehabilitation setting as well as after discharge  Schizophrenia/Anxiety: Patient's schizophrenia, anxiety, and insomnia and it's impact on therapy participation and functional recovery will improve during course with supportive counseling, relaxation/breathing techniques and if necessary medication management  Requires frequent re-assessment and close management to ensure anxiety/depression management during acute rehab course with planning for appropriate outpatient management to ensure optimal mental health and functional recovery  Obesity:  Close monitoring of nutrition status, nutrition specialist with adjustments in diet   on appropriate short and long term nutrition and activity  Obesity increases complexity of patient's overall condition and causes unique challenges during this part of patient's recovery process  Supervise and if necessary make adjustments in rehab nursing care and skilled therapy care to ensure appropriate toileting, bed mobility, other ADLs, and ambulation to decrease risk of falls/injuries, VTE, skin breakdown/ulceration and optimize functional recovery         Kenzie Enriquez MD, [address] [facility]  Physical Medicine and Rehabilitation  Brain Injury Medicine    ** Please Note: Fluency Direct voice to text software may have been used in the creation of this document   **

## 2019-03-06 NOTE — ASSESSMENT & PLAN NOTE
C6/C7 osteophyte fracture context of recent fall and history of cervical decompression and fusion  Treatment recommendations per Neurosurgery  Non operative;  Repeat C spine X-ray 3/19 > stable  Follow up with Neurosurgery 2 weeks after discharge   > continue cervical collar per Neurosurgery-PA discussed prior to d/c; cervical spine precautions

## 2019-03-06 NOTE — PROGRESS NOTES
03/06/19 1030   Pain Assessment   Pain Assessment No/denies pain   Pain Score No Pain   Restrictions/Precautions   Precautions Bed/chair alarms;Cognitive; Fall Risk;Supervision on toilet/commode;Spinal precautions   Cognitive Linguisitic Assessments   Assessment of Language Related Functional Activities (IGNACIO) Pt engaged in completing portions of the IGNACIO  The following subtests were completed: Telling Time: score of 7/10, which equates to 70% correct, indicating an independent functioning rating of 2, which is indication of needing some level of assistance on this task; Counting Money: score of 10/10, which equates to 100% correct, indicating an independent functioning rating of 1, which means that pt has a high probability of independently functioning on this task; Addressing an Envelope: score of 7/10, which equates to 70% correct, indicating an independent functioning rating of 2, which is indication of needing some level of assistance on this task  Of note, pt correctly placed all information, noting minor spelling errors (both addition and omission of letters); Solving Daily Math Problems: score of 6/10, which equates to 60% correct, indicating an independent functioning rating of 1, which means that pt has a high probability of independently functioning on this task; Of note, pt was timed on first 4 subtests, completing tests in efficient time period, ranging from 2 to 5 minutes  Writing a Check and Balancing a Checkbook: score of 4/10, which equates to 40% correct, indicating an independent functioning rating of 3, which means high probability that the pt is not able to function independently on this task; Of note, pt required moderate repetition of directions throughout task, but it should be noted that gym area was excessively loud during parts of session  At this time, pt is demonstrating deficits in the areas of problem solving and memory   Pt also noted to have word retrieval difficulties in spontaneous conversation, w/ pt expressing difficulty w/ word retrieval as well  Pt will benefit from completing the IGNACIO as demonstrating mild to moderate cognitive linguistic deficits at this time  Pt will benefit from SLP services at this time to maximize overall cognitive linguistic skills  Memory Skills   Memory (FIM) 3 - Recognizes, recalls/performs 50-74%   Social Interaction (FIM) 5 - Interacts appropriately with others 90% of time   Speech/Language/Cognition Assessmetn   Treatment Assessment Pt completing portions of the IGNACIO with pt demonstrating mild to moderate cognitive linguistic deficits at this time  Pt will benefit from skilled SLP services to maximize cognitive linguistic skills at this time  Swallow Information   Current Risks for Dysphagia & Aspiration General debilitation;New Neuro event;Cognitive deficit; Positionong Issues   Current Symptoms/Concerns Difficulty chewing   Current Diet Dysphagia advance; Thin liquid   Baseline Diet Regular; Thin liquids   Consistencies Assessed and Performance   Materials Admnistered Soft/Level 3;Puree/Level 1; Thin liquid   Oral Stage WFL; Mild impaired   Phargngeal Stage WFL   Swallow Mechanics Mild delayed;Swallow initation;Good Larygneal rise   Esophageal Concerns No s/s reported   Recommendations   Risk for Aspiration Mild   Recommendations Dysphagia treatment   Diet Solid Recommendation Level 3 Dysphagia/ advanced/ soft to chew   Diet Liquid Recommendation Thin liquid   Recommended Form of Meds Whole; With thin liquid; As desired; As tolerated   General Precautions Aspiration precautions; Feed only when alert;Minimize distractions;Upright as possible for all oral intake;Remain upright for 45 mins after meals; Supervision with meals  (distant supervision)   Compensatory Swallowing Strategies Alternate solids and liquids; External pacing   Results Reviewed with PT/Family/Caregiver   QI: Eating   Assistance Needed Set-up / clean-up;Supervision   Assistance Provided by [facility] No physical assistance   Eating CARE Score 4   Swallow Assessment   Swallow Treatment Assessment Pt was observed during lunch and was upright and OOB for meal  Pt able to self-feed entire meal  Currently on level 3 diet with thin liquids  Presented with trials of level 3 (tuna) and level 1 consistencies (tomato soup), with thin liquids by cup  Pt consumed 75% of meal and 30cc of liquid  Adequate lip seal w/ no anterior spillage noted during any trials  Minor oral residual noted after trials of level 1 and level 3 consistencies, but noted to clear with subsequent swallows and liquid wash  Pt demonstrated mildly delayed mastication of level 3 consistencies, suspect due to edentulous state  A-P transfer appeared mildly delayed for level 3 consistencies  Swallow initiation appeared grossly functional for all consistencies assessed  Hyolaryngeal elevation appeared adequate for all trials  No overt s/s of aspiration were observed during meal  Recommend continue on level 3 diet with thin liquids at this time  Pt will benefit from skilled SLP services to maximize tolerance of safest least restrictive diet  Swallow Assessment Prognosis   Prognosis Good   Prognosis Considerations Age;Participation level;Previous level of function;Medical diagnosis; Medical prognosis; Ability to carry over   SLP Therapy Minutes   SLP Time In 1030   SLP Time Out 1130   SLP Total Time (minutes) 60   SLP Mode of treatment - Individual (minutes) 60   SLP Mode of treatment - Concurrent (minutes) 0   SLP Mode of treatment - Group (minutes) 0   SLP Mode of treatment - Co-treat (minutes) 0   SLP Mode of Teatment - Total time(minutes) 60 minutes   Therapy Time missed   Time missed?  No   Daily FIM Score   Problem solving (FIM) 3 - Solves basic problmes 50-74% of time   Comprehension (FIM) 4 - Understands basic info/conversation 75-90% of time   Expression (FIM) 5 - Needs help/cues only RARELY (< 10% of the time)   Eating (FIM) 5 - Patient needs help to open contianers or set up tray

## 2019-03-06 NOTE — PROGRESS NOTES
OT EVALUATION       03/06/19 1230   Patient Data   Rehab Impairment impairment of mobility safety and activities of dailiy living  due to orthopedic conditions: other orthopedic   Etiologic Diagnosis osteophyte fracture of C6-C7   Date of Onset 03/03/19   Support System   Able to provide 24 hour supervision Yes  (son and wife)   Home Setup   Type of Home Multi Level   Method of Entry Stairs   Number of Stairs 5  (1 platform step +4 )   Number of Stairs in Home   (FF)   In Home Hand Rail Left   First Floor Bathroom None   Second Floor Bathroom Full;Tub;Combo;Curtain   Second Floor Bathroom Accessibility Commode; Shower stool   First Floor Setup Available No   Home Modifications Necessary? Yes   Home Modification Comment grab bars needed in shower   Available Equipment Roller Walker; Bedside Commode; Shower Stool   Baseline Information   Vocation   (retired )   Transportation Family/friends drive   Prior Device(s) [facility] 126   Prior IADL Participation   Money Management   (had assist at baseline)   Meal Preparation Partial Participation;Microwave  (cold meal prep)   Laundry Wash;Dry;Fold;Partial Participation   Home Cleaning Other  (wife preforms)   Prior Level of Function   Self-Care 3  Independent - Patient completed the activities by him/herself, with or without an assistive device, with no assistance from a helper  Indoor-Mobility (Ambulation) 3  Independent - Patient completed the activities by him/herself, with or without an assistive device, with no assistance from a helper  Stairs 3  Independent - Patient completed the activities by him/herself, with or without an assistive device, with no assistance from a helper  Functional Cognition 3  Independent - Patient completed the activities by him/herself, with or without an assistive device, with no assistance from a helper  Prior Assistance Needed for Household Chores/Cleaning;Money Management; Shopping;Driving   Prior Device Used [address] [employer] [address]  ([facility])   Patient Preference   Nickname (Patient Preference) Willy Zhang   Psychosocial   Patient Behaviors/Mood Guilty;Depressed   Needs Expressed Emotional   Ability to Express Feelings Able to express   Ability to Express Needs Able to express   Ability to Express Thoughts Able to express   Length of Time/Family Visitation 2-4 hrs   Restrictions/Precautions   Precautions Bed/chair alarms;Cognitive; Fall Risk;Impulsive;Supervision on toilet/commode;Spinal precautions   Weight Bearing Restrictions No   Braces or Orthoses C/S Collar   Pain Assessment   Pain Assessment No/denies pain   Pain Score No Pain   QI: Oral Hygiene   Assistance Needed Incidental touching   Assistance Provided by [place] Less than 25%   Comment Pt required guarding assistance while standing at sink to complete oral hygiene  Pt with cueing to maintain spinal precautions   Oral Hygiene CARE Score 3   Grooming   Able To Wash/Dry Face;Brush/Clean Teeth;Wash/Dry Hands;Comb/Brush Hair;Initiate Tasks   Limitation Noted In Problem Solving;Strength   Findings Pt requires assist with shaving due to c/s collar   Grooming (FIM) 4 - Patient completed  4/5 tasks   QI: Shower/Bathe Self   Assistance Needed Physical assistance   Assistance Provided by [place] Less than 25%   Comment Pt required assist to wash buttock thoroughly  Pt able to cross leg over opposing knee to wash feet and LE with verbal cueing needed to maintain cervical spine precautions  Pt stood in shower to wash dave area with grab bars for support  Shower/Bathe Self CARE Score 3   Bathing   Assessed Bath Style Shower   Anticipated D/C Bath Style Tub   Able to Gather/Transport No   Able to [employer] Temperature No   Able to Wash/Rinse/Dry (body part) Left Arm;Right Arm;L Upper Leg;R Upper Leg;L Lower Leg/Foot;R Lower Leg/Foot;Chest;Abdomen;Perineal Area   Limitations Noted in Balance; Endurance; Safety;ROM;Strength   Positioning Seated;Standing   Bathing (FIM) 4 - Patient completes 8/10 or 9/10 parts   QI: Upper Body Dressing   Assistance Needed Physical assistance   Assistance Provided by [place] 50%-74%   Comment Pt required assist to don undershirt over head and to pull shirt down in back  Pt able to thread LUE into shirt but requires assist to pull around back  Pt able to thread RUE into shirt but requires assist to button up shirt   Upper Body Dressing CARE Score 2   QI: Lower Body Dressing   Assistance Needed Incidental touching   Assistance Provided by [place] Less than 25%   Comment Pt able to thread b/l LE's into underwear with multipel attempts and able to thread LE's into pants  Pt requires steadying assist to pull pants up over hips  Pt is limited by dec ROM, dec balance, dec strength and difficulty maintain spinal precautions  Lower Body Dressing CARE Score 3   QI: Putting On/Taking Off Footwear   Assistance Needed Physical assistance   Assistance Provided by [place] 50%-74%   Comment Pt able to doff socks  Pt requires assist to don EHSAN stockings (From home)  pt attempting to cross leg over opposing knee to don shoes but requires assist to don shoes and to tie laces   Putting On/Taking Off Footwear CARE Score 2   QI: Picking Up Object   Reason if not Attempted Safety concerns   Picking Up Object CARE Score 88   Dressing/Undressing Clothing   Remove LB Clothes [address]; [address]; Undergarment;Socks; Shoes   Limitations Noted In Balance; Endurance; Coordination;Problem Solving; Safety;Strength;ROM; Timeliness   Positioning Supported Sit;Standing   UB Dressing (FIM) 3 - Patient completes  50-74% of all tasks   LB Dressing (FIM) 3 - Patient completes  50-74% of all tasks   QI: [address] Needed Physical assistance   Assistance Provided by [place] 25%-49%   Comment Pt requires assist for bowel hygiene care for wiping   Pt atempted but not through enough due to decreased reach beind back   [address] Score 3 Toileting   Able to Pull Clothing down yes, up yes  Able to Manage Clothing Closures Yes   Manage Hygiene Bowel   Limitations Noted In Balance;ROM;UE Strength   Toileting (FIM) 3 - Patient completes  50-74% of all tasks   Bowel/Bladder Management   Bladder Management (FIM) 7 - No helper, safely, timely and completes all tasks independently   QI: Bowel Continence 3  Always incontinent (no episodes of continent bowel movement)   QI: Sit to Stand   Assistance Needed Physical assistance   Assistance Provided by [place] Less than 25%   Comment Pt required CGA for sit to stand transfer wtih cueing for hand placement during descent    Sit to Stand CARE Score 3   QI: Chair/Bed-to-Chair Transfer   Assistance Needed Physical assistance   Assistance Provided by [place] 25%-49%   Comment Pt requires min A for stand pivot transfer with RW   Chair/Bed-to-Chair Transfer CARE Score 3   Transfer Bed/Chair/Wheelchair   Limitations Noted In Balance;Confidence;LE Strength; Sequencing;Problem Solving   Stand Pivot Minimal Assist   Sit to Stand Minimal   Stand to Sit Minimal   Bed, Chair, Wheelchair Transfer (FIM) 3 - [place] needs to lift, boost or assist to stand OR sit   QI: Toilet Transfer   Assistance Needed Physical assistance   Assistance Provided by [place] 25%-49%   Toilet Transfer CARE Score 3   Toilet Transfer   Surface Assessed Raised Toilet   Transfer Technique Standard   Limitations Noted In Balance;Problem Solving; Safety;LE Strength   Adaptive Equipment Grab Bar   Findings Pt requires assist to control descent onto toilet with cueing for hand placement   Toilet Transfer (FIM) 3 - [place] needs to lift, boost or assist to stand OR sit   Tub/Shower Transfer   Limitations Noted In Balance; Endurance; Safety;LE Strength   Assessed Shower   Shower Transfer (FIM) 4 - Patient requires steadying assist or light touching   QI: Walk 10 Feet   Assistance Needed Physical assistance   Assistance Provided by [place] 25%-49%   Walk 10 Feet CARE Score 3   QI: Walk 50 Feet with Two Turns   Assistance Needed Physical assistance   Assistance Provided by [place] 25%-49%   Walk 50 Feet with Two Turns CARE Score 3   Comprehension   Auditory Complex   QI: Comprehension 3  Usually Understands: Understands most conversations, but misses some part/intent of message  Requires cues at times to understand  Comprehension (FIM) 4 - Understands basic info/conversation 75-90% of time   Expression   Intelligibility Sentence   QI: Expression 3  Exhibits some difficulty with expressing needs and ideas (e g , some words or finishing thoughts) or speech is not clear   Expression (FIM) 5 - Needs help/cues only RARELY (< 10% of the time)   Social Interaction   Participation Individual   Medications needed to control mood/behavior? Yes   Behaviors observed Appropriate; Restless   Social Interaction (FIM) 4 - Interacts 75-89% of time   Problem Solving   Routine Manages call bell;[place]anges precautions;Manages ADL   Problem solving (FIM) 3 - Solves basic problmes 50-74% of time   Memory   Remember Routine Yes   Initiates Tasks Yes   Short-Term Impaired   Long Term Impaired   Memory (FIM) 3 - Recognizes, recalls/performs 50-74%   RUE Assessment   RUE Assessment WFL   LUE Assessment   LUE Assessment WFL   Sensation   Light Touch No apparent deficits   Propioception Partial deficits in the RLE;Partial deficits in the LLE   Additional Comments pt reports baseline neuropathy in b/l feet   Cognition   Overall Cognitive Status Impaired   Arousal/Participation Alert; Cooperative   Attention Attends with cues to redirect   Orientation Level Oriented X4   Memory Decreased short term memory;Decreased recall of precautions   Following Commands Follows one step commands with increased time or repetition   Comments Pt overall with history of schizophrenia and is anxious at times during session  Pt with questionable carryover with correct medication management at baseline   Pt with decreased recall and states "I may have screwed up my medication"    Discharge Information   Vocational Plan Retired/not working   Patient's Discharge Plan to return home with supervision from spouse  Patient's Rehab Expectations I want to be ok mentally physically and spiritually    Barriers to Discharge Home Decreased Cognitive Function;Decreased Strength;Decreased Endurance; Safety Considerations; Unsafe Home Setup   Impressions Pt presents s/p fall at home sustaining ostephyte fx of C6-C7 with no surgical intervention  Pt is in c/s collar with spinal precautions  Pt foudn to have pulmonary emobolism during hospital stay and chronic L3 compression fx  As per MD Dr Armen Lyon, pt's previous CT scans show chronic luncar and frontal infarcts  Pt reports that he cannot remember if he had any symptoms at home but does report hitting his head a lot in the past  Pt has significant psych history with schizophrenia and several hopsitalizations but none in the past decade  Pt does demonstrate some anxious thoughts and concern that he is "going to slip up again" MD Dr Armen Lyon aware and is regulating patient's medications  It is questionable if nick was managing his medications properly at baseline  PTA pt was "independent" with medication management and IND with all ADL tasks using SPC for fxnl mobility  Pt currently requires min-mod A for fxnl transfers with RW, min A with bathing and mod A LB dressing  Pt is limited by dec balance, cervical precautions, dec endurance, dec safety awareness, impaired cognition  Pt would beenefit from 2 week LOS with superivsion goals      OT Therapy Minutes   OT Time In 1230   OT Time Out 1400   OT Total Time (minutes) 90   OT Mode of treatment - Individual (minutes) 90   OT Mode of treatment - Concurrent (minutes) 0   OT Mode of treatment - Group (minutes) 0   OT Mode of treatment - Co-treat (minutes) 0   OT Mode of Teatment - Total time(minutes) 90 minutes

## 2019-03-06 NOTE — PCC OCCUPATIONAL THERAPY
Pt made good progress in all ADLs this past week  Pt overall continues to still be limited by c/s collar and spinal precautions and requires reminders 25% of time to comply with precautions during ADL routines  Pt's other barriers include decreased standing balance, decreased new learning, dec memory, dec safety awareness, depression, and decreased endurance  Pt has progressed in functional transfers to CGA/CS with RW  Pt still occasionally needs verbal cueing to complete turns with RW  Spoke ot patient's son and wife and set up family training for MOnday due to pt's son's work schedule  Family training will focus on c/s collar training, and training for bathing/dressing  Pt continues to benefit from inpatient rehab to progress to mod I/supervision level       Jeanine Paulson, OT

## 2019-03-06 NOTE — ASSESSMENT & PLAN NOTE
Patient/family deny although evidence of possible old R lacunar infarct and focus in R frontal WM along with generalized micoangiopathic changes  >Recommend optimal BP mgmt  >On full A/C for hx of VTE  >Follow-up with PCP

## 2019-03-06 NOTE — UTILIZATION REVIEW
Initial Clinical Review  Admission: Date/Time/Statement: 3/4/19 @ 0548        Orders Placed This Encounter   Procedures    Inpatient Admission       Standing Status:   Standing       Number of Occurrences:   1       Order Specific Question:   Admitting Physician       Answer:   Rosa Zuleta       Order Specific Question:   Level of Care       Answer:   Med Surg [16]       Order Specific Question:   Bed Type       Answer:   Trauma [7]       Order Specific Question:   Estimated length of stay       Answer:   More than 2 Midnights       Order Specific Question:   Certification       Answer:   I certify that inpatient services are medically necessary for this patient for a duration of greater than two midnights  See H&P and MD Progress Notes for additional information about the patient's course of treatment  Presented to the ED at [facility], transferred to [facility] ED via EMS, higher level of care  CT demonstrated fracture for an osteophyte at C6-7 and soft tissue emphysema in right face concerning for facial fracture   He was transferred to [facility] the further evaluation and management     ED: Date/Time/Mode of Arrival:             ED Arrival Information      Expected Arrival Acuity Means of Arrival Escorted By Service Admission Type     - 3/4/2019 02:37 Immediate Ambulance DAYDAY Muhammad Trauma Urgent     Arrival Complaint     Fall          Chief Complaint:        Chief Complaint   Patient presents with    Fall       pt fell at home while going up the stairs  +LOC, +thinners, +head and neck trauma with c6 and c7 fx       History of Illness: Lizz Born a 68 y  o  male who presents a trauma transfer from [facility] after sustaining a fall on Coumadin  Patient was at his home where he lives with his wife and son when he fell down approximately 6 steps  [facility] does not remember if he hit his head; his son does state that he lost consciousness for a few seconds  Adam Castelan does have difficulty ambulating at baseline secondary to neuropathy/radiculopathy which is an ongoing issue for him  Laura Sandoval does take Coumadin for DVT/PE  Laura Sandoval currently complains of left parasternal pain, neck pain, and back pain     ED Vital Signs:            ED Triage Vitals   Temperature Pulse Respirations Blood Pressure SpO2   03/04/19 0243 03/04/19 0243 03/04/19 0243 03/04/19 0243 03/04/19 0243   98 7 °F (37 1 °C) (!) 108 18 151/80 95 %       Temp Source Heart Rate Source Patient Position - Orthostatic VS BP Location FiO2 (%)   03/04/19 0243 03/04/19 0243 -- -- --   Oral Monitor             Pain Score           03/04/19 0244           4                Wt Readings from Last 1 Encounters:   02/18/19 90 8 kg (200 lb 3 2 oz)      Pertinent Labs/Diagnostic Test Results:         Color, UA Yellow       Clarity, UA Clear      pH, UA 7 0 4 5 - 8 0       Leukocytes, UA Negative Negative       Nitrite, UA Negative Negative       Protein, UA Negative Negative mg/dl       Glucose, UA Negative Negative mg/dl       Ketones, UA Negative Negative mg/dl       Urobilinogen, UA 0 2 0 2, 1 0 E U /dl E U /dl       Bilirubin, UA Negative Negative       Blood, UA TraceAbnormal  Negative       Specific Gravity, UA 1 015 1 003 - 1 030          RBC, UA 2-4Abnormal  None Seen, 0-5 /hpf       WBC, UA 2-4Abnormal  None Seen, 0-5, 5-55, 5-65 /hpf       Epithelial Cells Occasional None Seen, Occasional /hpf       Bacteria, UA Occasional None Seen, Occasional /hpf        Past Medical/Surgical History:         Active Ambulatory Problems     Diagnosis Date Noted    Benign essential hypertension 12/09/2016    Coronary artery disease involving native coronary artery of native heart without angina pectoris 12/09/2016    Chronic depression 09/16/2015    Chronic venous stasis dermatitis of both lower extremities 04/12/2017    Deep venous thrombosis of lower extremity ([address] ) 01/83/0335    Diastolic heart failure ([address] ) 10/02/2014    Osteoarthritis 06/20/2014    Nocturnal enuresis 07/17/2013    Idiopathic trigeminal neuralgia 09/14/2016    Lacunar infarction 05/05/2014    Class 1 obesity due to excess calories without serious comorbidity with body mass index (BMI) of 34 0 to 34 9 in adult 06/20/2014    Schizophrenia ([facility] ) 12/09/2016    Disability of walking 06/20/2014    Pulmonary embolism ([facility] ) 10/02/2014    Arthropathy 06/20/2014    Mixed hyperlipidemia 07/13/2018    Neurogenic claudication 07/13/2018    Neck pain without injury 10/29/2018    Abdominal wall hematoma 12/19/2018    Spinal stenosis of lumbar region with neurogenic claudication 02/18/2019    Lipoma of colon 01/11/2011           Past Medical History:   Diagnosis Date    Ankle edema 04/2004    Arthritis 03/16/2011    Ataxic gait 11/14/2011    Baker's cyst 04/2005    Cardiac disease      Cataract      Chronic pain      Coronary artery disease      Depression      [facility]) [facility]      Epistaxis 01/09/2015    Hand tingling 02/05/2010    Hematuria 11/22/2016    Horseshoe kidney 2010    Hypertension      Lipoma 01/11/2011    Migraine headache 02/2007    Obesity      Pulmonary embolism ([facility] ) 09/18/2014    Rib fracture 03/2007    Schizophrenia ([place] [address] ) 04/2009    Sciatic leg pain 07/20/2011    Stenosis of cervix      Suicidal ideation 04/2009    Syncope 01/09/2015    Tardive dyskinesia 02/28/2011    Torn meniscus 04/2004    Varicose vein of leg 04/2004    Venous insufficiency of leg 04/2004    Vertigo 05/12/2008      Admitting Diagnosis: Head injury [S09 90XA]  Closed fracture of sixth cervical vertebra without spinal cord injury, initial encounter ([place] [address] ) [[id number]]  Fall, initial encounter [W19  XXXA]  Age/Sex: 68 y o  male  Assessment/Plan:   Trauma Active Problems:   1) L-sided musculoskeletal chest pain  2) fall  3) coumadin coagulopathy  4) concussion  5) cervical spine osteophyte fracture  6) abrasion to L back  7) possible facial fracture     Trauma Plan:   - admit to trauma  - neurosx consult for evaluation of c-spine, psb MRI (does have L knee implant)  - hold coumadin  - geriatrics consult  - cognitive eval  - local wound care to abrasion  - CT face for eval of facial fractures  - PT/OT  Admission Orders:  Scheduled Meds:   Current Facility-Administered Medications:  acetaminophen 650 mg Oral Q6H PRN Angelo Smith MD   amantadine 100 mg Oral Daily Angelo Smith MD   amLODIPine 5 mg Oral Daily Angelo Smith MD   fluPHENAZine 5 mg Oral Daily Angelo Smith MD   fluvoxaMINE 50 mg Oral Daily Angelo Smith MD   losartan 50 mg Oral Daily Angelo Smith MD   risperiDONE 1 5 mg Oral Daily Angelo Smith MD   sodium chloride 75 mL/hr Intravenous Continuous Angelo Smith MD   tamsulosin 0 4 mg Oral Daily With Dinner MATY Lambert      Continuous Infusions:   sodium chloride 75 mL/hr      NPO sips with meds  C Spine precautions / C Collar  Neuro checks q2h  Consu PT/OT  Consult Gerontology  Consult Neurosurgery: fall closed fx 6th C vertebra

## 2019-03-07 ENCOUNTER — TELEPHONE (OUTPATIENT)
Dept: NEUROSURGERY | Facility: CLINIC | Age: 74
End: 2019-03-07

## 2019-03-07 LAB
INR PPP: 2.37 (ref 0.86–1.17)
PROTHROMBIN TIME: 26 SECONDS (ref 11.8–14.2)

## 2019-03-07 PROCEDURE — 85610 PROTHROMBIN TIME: CPT | Performed by: NURSE PRACTITIONER

## 2019-03-07 PROCEDURE — 97530 THERAPEUTIC ACTIVITIES: CPT

## 2019-03-07 PROCEDURE — 92526 ORAL FUNCTION THERAPY: CPT

## 2019-03-07 PROCEDURE — 97116 GAIT TRAINING THERAPY: CPT

## 2019-03-07 PROCEDURE — G0515 COGNITIVE SKILLS DEVELOPMENT: HCPCS

## 2019-03-07 PROCEDURE — 99233 SBSQ HOSP IP/OBS HIGH 50: CPT

## 2019-03-07 RX ORDER — RISPERIDONE 3 MG/1
3 TABLET, FILM COATED ORAL
Status: DISCONTINUED | OUTPATIENT
Start: 2019-03-07 | End: 2019-03-20 | Stop reason: HOSPADM

## 2019-03-07 RX ORDER — FLUVOXAMINE MALEATE 50 MG/1
50 TABLET, COATED ORAL
Status: DISCONTINUED | OUTPATIENT
Start: 2019-03-07 | End: 2019-03-20 | Stop reason: HOSPADM

## 2019-03-07 RX ADMIN — DOCUSATE SODIUM 100 MG: 100 CAPSULE, LIQUID FILLED ORAL at 10:07

## 2019-03-07 RX ADMIN — FLUPHENAZINE HYDROCHLORIDE 5 MG: 2.5 TABLET, FILM COATED ORAL at 14:14

## 2019-03-07 RX ADMIN — ACETAMINOPHEN 975 MG: 325 TABLET, FILM COATED ORAL at 05:09

## 2019-03-07 RX ADMIN — FLUVOXAMINE MALEATE 50 MG: 50 TABLET, FILM COATED ORAL at 14:13

## 2019-03-07 RX ADMIN — LOSARTAN POTASSIUM 50 MG: 50 TABLET, FILM COATED ORAL at 10:07

## 2019-03-07 RX ADMIN — FLUVOXAMINE MALEATE 50 MG: 50 TABLET, FILM COATED ORAL at 21:28

## 2019-03-07 RX ADMIN — AMANTADINE HYDROCHLORIDE 100 MG: 100 CAPSULE, LIQUID FILLED ORAL at 19:17

## 2019-03-07 RX ADMIN — VITAMIN D, TAB 1000IU (100/BT) 1000 UNITS: 25 TAB at 10:06

## 2019-03-07 RX ADMIN — ACETAMINOPHEN 975 MG: 325 TABLET, FILM COATED ORAL at 14:12

## 2019-03-07 RX ADMIN — ACETAMINOPHEN 975 MG: 325 TABLET, FILM COATED ORAL at 21:28

## 2019-03-07 RX ADMIN — FLUPHENAZINE HYDROCHLORIDE 5 MG: 2.5 TABLET, FILM COATED ORAL at 21:28

## 2019-03-07 RX ADMIN — FLUVOXAMINE MALEATE 50 MG: 50 TABLET, FILM COATED ORAL at 19:13

## 2019-03-07 RX ADMIN — TAMSULOSIN HYDROCHLORIDE 0.4 MG: 0.4 CAPSULE ORAL at 16:39

## 2019-03-07 RX ADMIN — DOCUSATE SODIUM 100 MG: 100 CAPSULE, LIQUID FILLED ORAL at 18:25

## 2019-03-07 RX ADMIN — FLUPHENAZINE HYDROCHLORIDE 5 MG: 2.5 TABLET, FILM COATED ORAL at 05:09

## 2019-03-07 RX ADMIN — AMLODIPINE BESYLATE 5 MG: 5 TABLET ORAL at 10:07

## 2019-03-07 RX ADMIN — RISPERIDONE 1.5 MG: 3 TABLET ORAL at 10:15

## 2019-03-07 RX ADMIN — FLUVOXAMINE MALEATE 50 MG: 50 TABLET, FILM COATED ORAL at 10:13

## 2019-03-07 RX ADMIN — AMANTADINE HYDROCHLORIDE 100 MG: 100 CAPSULE, LIQUID FILLED ORAL at 10:14

## 2019-03-07 RX ADMIN — WARFARIN SODIUM 5 MG: 5 TABLET ORAL at 18:25

## 2019-03-07 RX ADMIN — RISPERIDONE 3 MG: 3 TABLET ORAL at 21:28

## 2019-03-07 NOTE — PLAN OF CARE
Problem: Potential for Falls  Goal: Patient will remain free of falls  Description  INTERVENTIONS:  - Assess patient frequently for physical needs  -  Identify cognitive and physical deficits and behaviors that affect risk of falls    -  [place] fall precautions as indicated by assessment   - Educate patient/family on patient safety including physical limitations  - Instruct patient to call for assistance with activity based on assessment  - Modify environment to reduce risk of injury  - Consider OT/PT consult to assist with strengthening/mobility  Outcome: Progressing     Problem: Prexisting or High Potential for Compromised Skin Integrity  Goal: Skin integrity is maintained or improved  Description  INTERVENTIONS:  - Identify patients at risk for skin breakdown  - Assess and monitor skin integrity  - Assess and monitor nutrition and hydration status  - Monitor labs (i e  albumin)  - Assess for incontinence   - Turn and reposition patient  - Assist with mobility/ambulation  - Relieve pressure over bony prominences  - Avoid friction and shearing  - Provide appropriate hygiene as needed including keeping skin clean and dry  - Evaluate need for skin moisturizer/barrier cream  - Collaborate with interdisciplinary team (i e  Nutrition, Rehabilitation, etc )   - Patient/family teaching  Outcome: Progressing     Problem: PAIN - ADULT  Goal: Verbalizes/displays adequate comfort level or baseline comfort level  Description  Interventions:  - Encourage patient to monitor pain and request assistance  - Assess pain using appropriate pain scale  - Administer analgesics based on type and severity of pain and evaluate response  - Implement non-pharmacological measures as appropriate and evaluate response  - Consider cultural and social influences on pain and pain management  - Notify physician/advanced practitioner if interventions unsuccessful or patient reports new pain  Outcome: Progressing     Problem: SAFETY ADULT  Goal: Maintain or return to baseline ADL function  Description  INTERVENTIONS:  -  Assess patient's ability to carry out ADLs; assess patient's baseline for ADL function and identify physical deficits which impact ability to perform ADLs (bathing, care of mouth/teeth, toileting, grooming, dressing, etc )  - Assess/evaluate cause of self-care deficits   - Assess range of motion  - Assess patient's mobility; develop plan if impaired  - Assess patient's need for assistive devices and provide as appropriate  - Encourage maximum independence but intervene and supervise when necessary  ¯ Involve family in performance of ADLs  ¯ Assess for home care needs following discharge   ¯ Request OT consult to assist with ADL evaluation and planning for discharge  ¯ Provide patient education as appropriate  Outcome: Progressing  Goal: Maintain or return mobility status to optimal level  Description  INTERVENTIONS:  - Assess patient's baseline mobility status (ambulation, transfers, stairs, etc )    - Identify cognitive and physical deficits and behaviors that affect mobility  - Identify mobility aids required to assist with transfers and/or ambulation (gait belt, sit-to-stand, lift, walker, cane, etc )  - [place] fall precautions as indicated by assessment  - Record patient progress and toleration of activity level on Mobility SBAR; progress patient to next Phase/Stage  - Instruct patient to call for assistance with activity based on assessment  - Request Rehabilitation consult to assist with strengthening/weightbearing, etc   Outcome: Progressing     Problem: DISCHARGE PLANNING  Goal: Discharge to home or other facility with appropriate resources  Description  INTERVENTIONS:  - Identify barriers to discharge w/patient and caregiver  - Arrange for needed discharge resources and transportation as appropriate  - Identify discharge learning needs (meds, wound care, etc )  - Arrange for interpretive services to assist at discharge as needed  - Refer to Case Management Department for coordinating discharge planning if the patient needs post-hospital services based on physician/advanced practitioner order or complex needs related to functional status, cognitive ability, or social support system  Outcome: Progressing     Problem: GASTROINTESTINAL - ADULT  Goal: Maintains or returns to baseline bowel function  Description  INTERVENTIONS:  - Assess bowel function  - Encourage oral fluids to ensure adequate hydration  - Administer IV fluids as ordered to ensure adequate hydration  - Administer ordered medications as needed  - Encourage mobilization and activity  - Nutrition services referral to assist patient with appropriate food choices  Outcome: Progressing     Problem: SKIN/TISSUE INTEGRITY - ADULT  Goal: Skin integrity remains intact  Description  INTERVENTIONS  - Identify patients at risk for skin breakdown  - Assess and monitor skin integrity  - Assess and monitor nutrition and hydration status  - Monitor labs (i e  albumin)  - Assess for incontinence   - Turn and reposition patient  - Assist with mobility/ambulation  - Relieve pressure over bony prominences  - Avoid friction and shearing  - Provide appropriate hygiene as needed including keeping skin clean and dry  - Evaluate need for skin moisturizer/barrier cream  - Collaborate with interdisciplinary team (i e  Nutrition, Rehabilitation, etc )   - Patient/family teaching  Outcome: Progressing     Problem: MUSCULOSKELETAL - ADULT  Goal: Maintain or return mobility to safest level of function  Description  INTERVENTIONS:  - Assess patient's ability to carry out ADLs; assess patient's baseline for ADL function and identify physical deficits which impact ability to perform ADLs (bathing, care of mouth/teeth, toileting, grooming, dressing, etc )  - Assess/evaluate cause of self-care deficits   - Assess range of motion  - Assess patient's mobility; develop plan if impaired  - Assess patient's need for assistive devices and provide as appropriate  - Encourage maximum independence but intervene and supervise when necessary  - Involve family in performance of ADLs  - Assess for home care needs following discharge   - Request OT consult to assist with ADL evaluation and planning for discharge  - Provide patient education as appropriate  Outcome: Progressing  Goal: Maintain proper alignment of affected body part  Description  INTERVENTIONS:  - Support, maintain and protect limb and body alignment  - Provide pt/fam with appropriate education  Outcome: Progressing

## 2019-03-07 NOTE — PCC CARE MANAGEMENT
Pt is participating well with therapy, and is expected to return home  Pt has been educated on potential for continued care, ie therapy and services  Pt has supports in the community  Following to assist with d/c planning needs

## 2019-03-07 NOTE — TEAM CONFERENCE
Acute RehabilitationTeam Conference Note  Date: 3/7/2019   Time: 10:38 AM       Patient Name:  Romayne Casco  Medical Record Number: [id number]   YOB: 1945  Sex: Male          [facility]  Payor Info:  Payor: [employer]  REP / Plan: [employer]ond [address]  REP / Product Type: Medicare HMO /      Admitting Diagnosis: Fracture Elease Emily  [id number]]   Admit Date/Time:  3/5/2019  5:07 PM  Admission Comments: No comment available     Primary Diagnosis:  Head injury, acute  Principal Problem: Head injury, acute    Patient Active Problem List    Diagnosis Date Noted    Lightheadedness 03/06/2019    Pain 03/06/2019    History of pulmonary embolism 03/06/2019    Cognitive impairment 03/06/2019    History of stroke 03/06/2019    [facility] 03/04/2019    Rib pain on left side 03/04/2019    Fall 03/04/2019    Forgetfulness 03/04/2019    Physical deconditioning 03/04/2019    Head injury, acute 03/04/2019    Spinal stenosis of lumbar region with neurogenic claudication 02/18/2019    Abdominal wall hematoma 12/19/2018    Neck pain without injury 10/29/2018    Mixed hyperlipidemia 07/13/2018    Neurogenic claudication 07/13/2018    Chronic venous stasis dermatitis of both lower extremities 04/12/2017    Benign essential hypertension 12/09/2016    Coronary artery disease involving native coronary artery of native heart without angina pectoris 12/09/2016    Schizophrenia ([facility] 12/09/2016    Idiopathic trigeminal neuralgia 09/14/2016    Chronic depression 09/16/2015    Deep venous thrombosis of lower extremity ([facility] ) 10/02/2014    Chronic diastolic heart failure ([facility] ) 10/02/2014    Pulmonary embolism ([facility] ) 10/02/2014    Osteoarthritis 06/20/2014    Class 1 obesity due to excess calories without serious comorbidity with body mass index (BMI) of 34 0 to 34 9 in adult 06/20/2014    Disability of walking 06/20/2014    Arthropathy 06/20/2014    Lacunar infarction 05/05/2014    Nocturnal enuresis 07/17/2013    Lipoma of colon 01/11/2011       Physical Therapy:    Weight Bearing Status: Full Weight Bearing  Transfers: Moderate Assistance, Minimal Assistance(OVERALL MIN)  Bed Mobility: Moderate Assistance  Amulation Distance (ft): 150 feet  Ambulation: Moderate Assistance, Minimal Assistance  Assistive Device for Ambulation: Roller Walker  Number of Stairs: 6  Assistive Device for Stairs: Bilateral Hand Rails  Stair Assistance: Moderate Assistance    3/6/19  On evaluation, patient presenting with the following deficits: impaired strength and ROM, impaired static and dynamic sitting and standing balance, poor standing tolerance, and decreased overall functional mobility  Patient wearing CS collar during session in which he required education on spinal precautions  Patient is overall flat and tangential but is able to be redirected onto task  He perseverates on his mental health throughout session and would benefit from neuropsych consult (previously placed)  During functional assessment, patient able to perform bed mobility, transfers, gait and stairs with overall Stephy, mod A at times  He is a good rehab candidate with goals of Supervision; expected LOS 2 weeks  He requires skilled PT intervention at this time to maximize function and reduce caregiver burden  Occupational Therapy:  Eating: Supervision  Grooming: Minimal Assistance  Bathing: Minimal Assistance  Bathing: Minimal Assistance  Upper Body Dressing: Moderate Assistance  Lower Body Dressing: Moderate Assistance  Toileting: Moderate Assistance  Tub/Shower Transfer: Minimal Assistance  Toilet Transfer:  Moderate Assistance  Cognition: Exceptions to WNL  Cognition: Decreased Executive Functions, Decreased Attention, Decreased Safety, Impulsive  Orientation: Person, Place, Time  Discharge Recommendations: Home with:  [address] with[de-identified] Family Support, 24 Hour Supervision       Pt presents s/p fall at home sustaining ostephyte fx of C6-C7 with no surgical intervention  Pt is in c/s collar with spinal precautions  Pt foudn to have pulmonary emobolism during hospital stay and chronic L3 compression fx  As per MD Dr Osvaldo Hickey, pt's previous CT scans show chronic luncar and frontal infarcts  Pt reports that he cannot remember if he had any symptoms at home but does report hitting his head a lot in the past  Pt has significant psych history with schizophrenia and several hopsitalizations but none in the past decade  Pt does demonstrate some anxious thoughts and concern that he is "going to slip up again" MD Dr Osvaldo Hickey aware and is regulating patient's medications  It is questionable if nick was managing his medications properly at baseline  PTA pt was "independent" with medication management and IND with all ADL tasks using SPC for fxnl mobility  Pt currently requires min-mod A for fxnl transfers with RW, min A with bathing and mod A LB dressing  Pt is limited by dec balance, cervical precautions, dec endurance, dec safety awareness, impaired cognition  Pt would beenefit from 2 week LOS with superivsion goals  Speech Therapy:  Mode of Communication: Verbal  Cognition: Exceptions to WNL  Cognition: Decreased Memory, Decreased Executive Functions, Decreased Attention  Orientation: Person, Place, Time, Situation  Swallowing: Exceptions to WNL  Swallowing: Oral Dysphagia  Diet Recommendations: Level 3/Denture Soft, Thin  Discharge Recommendations: Home with:  [address] with[de-identified] 24 Hour Supervision, 24 Hour Assisteance, Family Support, Home Speech Therapy, Outpatient Speech Therapy  Pt able to complete portions of the IGNACIO (Assessment of Language Related Functional Activities), where at this time, pt is demonstrating mild-moderate cognitive linguistic deficits, but will continue to benefit from completing assessment to further determine needs to be addressed by SLP services while on the ARC   Of note, attention was poor at times, where pt was unable "retrace" thoughts and elaborate further  Additionally noted was tangentile speech patterns and flight of thoughts during assessments, but was able to be re-directed back to activities appropriately  Pt also assessed for dysphagia, in which pt is edentulous, but pt denies food "avoidances" of certain regular food items  Current diet is level 3 w/ thin liquids at this time, in which pt does demonstrate mildly slowed mastication of consistencies, but does not pose to be an increased risk for aspiration at this time  At this time, pt will continue to benefit from SLP services to finish cognitive linguistic assessment to further determine focus of cognitive tx sessions and will f/u to establish safest least restrictive diet at this time  Nursing Notes:  Appetite: Fair  Diet Type: Dysphagia III, Thin Liquids                      Diet Patient/Family Education Complete: No                            Bladder: 7 - Complete [place]     Bladder Patient/Family Education: Yes  Bowel: 5 - Supervision     Bowel Patient/Family Education: Yes  Pain Location: Neck  Pain Orientation: Bilateral, Distal  Pain Score: 8                       Hospital Pain Intervention(s): Medication (See MAR)  Pain Patient/Family Education: Yes  Medication Management/Safety  Safe Administration: Yes  Medication Patient/Family Education Complete: Yes    Pt admitted to [facility] s/p fall with  C6-C7 osteophyte fx- non operable  Pt wears Collar at all times  Scratch to rt side of neck under collar  Pt also has Large bruise with redness to left shoulder & And to buttocks  Pt has a Hx schizophrenia  &  OCD, currently on prolixin, Luvox, Risperdal  Pt states that he can be anxious and have panic attacks  Pain is managed with lidocaine patch, tylenol  Pt is on coumadin for anti-coag  Bp managed with Cozaar, Norvasc  Pt has Parkinsons and is on Symmetrel  He is continent of bladder and bowel   Constipation was relieved with suppository  Pt requires alarms for safety  This week we will monitor lab values, vital signs and work on pain management  We will monitor moods, restlessness and sleep patterns  We will prevent skin breakdown and teach pt to turn and reposition and weight shift, and perform routine skin checks  We will teach patient to check collar for any breakdown and keep clean  We will monitor for constipation and medicate per bowel regimen  Pt will practice safety awareness and remain free from falls  Case Management:     Discharge Planning  Living Arrangements: Spouse/significant other, Children  Support Systems: Spouse/significant other  Assistance Needed: yes  Type of Current Residence: Private residence  Current [facility] [address] : No  Pt is participating well with therapy, maintains a positive outlook, and is expected to return home  Pt has been educated on potential for continued care, ie therapy and services  Pt has supports in the community  Following to assist with d/c planning needs  Is the patient actively participating in therapies? yes  List any modifications to the treatment plan:     Barriers Interventions   Cog deficits Speech therapy    dysphagia Speech therapy modified diet   Balance, dynamic reach Therapy exercises, use AD   Collar mgmt Therapy and nursing training/education   stairs Therapy stair training     Is the patient making expected progress toward goals?  yes  List any update or changes to goals:     Medical Goals: Patient will be medically stable for discharge to [facility] [place] upon completion of rehab program and Patient will be able to manage medical conditions and comorbid conditions with medications and follow up upon completion of rehab program    Weekly Team Goals:   Rehab Team Goals  ADL Team Goal: Patient will require supervision with ADLs with least restrictive device upon completion of rehab program  Bowel/Bladder Team Goal: Patient will be independent with bladder/bowel management with least restrictive device upon completion of rehab program  Transfer Team Goal: Patient will require supervision with transfers with least restrictive device upon completion of rehab program  Locomotion Team Goal: Patient will require supervision with locomotion with least restrictive device upon completion of rehab program  Cognitive Team Goal: Patient will require supervision for basic and complex tasks upon completion of rehab program    Discussion: pt presents with above barriers and is expressing thoughts of suicidal ideation  Dr Daria Renner aware of pts hx and medication is being adjusted  Pt has large supportive family and will be able to assist on dc  Pt is currently functioning overall mod a with adls, mod a with transfers and ambulation  Anticipated los 2 weeks with supervision goals and return home  Anticipated Discharge Date:  reteam [facility] Team Members Present: The following team members are supervising care for this patient and were present during this Weekly Team Conference      Physician: Dr Margi Manzano MD  : Marquise Wilcox MSW and Justin Campo YONI/ LCSW  Registered Nurse: Dang Tilley RN  Physical Therapist: Goldy Chacon DPT  Occupational Therapist: Nery Hines MS, OTR/L  Speech Therapist: Vivek Wilder MS, CCC-SLP  Other:

## 2019-03-07 NOTE — PROGRESS NOTES
Internal Medicine Progress Note  Patient: Edilma Dubois  Age/sex: 68 y o  male  Medical Record #: [id number]      ASSESSMENT/PLAN:  Edilma Dubois  is seen and examined and management for following issues:      C6-7 osteophyte fracture: Pain control per primary service  Reason for fall unclear      HTN: stable; continue Amlodipine 5mg daily and Losartan 50mg daily  No changes today     History of DVT/PE/IVC filter: on life long AC; Continue Coumadin = dose was skipped 3/5 since big jump in INR  Initial DVT provoked due to knee surgery but PE not provoked  Family Medicine manages [facility] [place] as OP and was on 5mg qd  No change Coumadin today     BPH: Continue Flomax      Paranoid Schizophrenia: sees Dr Jd Farley as OP  Continue Risperdal, Luvox, Prolixin  Prolixin was added 3 weeks ago      Possible HS hypoxia:  Not sure if yesterday AMs sat was accurate but no low recorded since then; will watch    Hx Diastolic CHF:  Controlled w/o diuretics; per PCPs note, he has chronic edema as OP but today not much by exam      Subjective: offers no complaints    ROS:   GI: denies abdominal pain, change bowel habits or reflux symptoms  Neuro: No new neurologic changes  Respiratory: No Cough, SOB  Cardiovascular: No CP, palpitations     Scheduled Meds:    Current Facility-Administered Medications:  acetaminophen 975 mg Oral [facility] Karon Richey MD   amantadine 100 mg Oral BID Karon Richey MD   amLODIPine 5 mg Oral Daily Karon Richey MD   bisacodyl 10 mg Rectal Daily PRN Hansel Correa DO   cholecalciferol 1,000 Units Oral Daily Karon Richey MD   docusate sodium 100 mg Oral BID Karon Richey MD   fluPHENAZine 5 mg Oral [facility] Karon Richey MD   fluvoxaMINE 50 mg Oral 4x Daily Karon Richey MD   lidocaine 1 patch Topical Daily Karon Richey MD   losartan 50 mg Oral Daily Karon Richey MD   ondansetron 4 mg Oral Q8H PRN Karon Richey MD   polyethylene glycol 17 g Oral Daily PRN Karon Richey MD   risperiDONE 0 5 mg Oral BID PRN Peter Cotto MD   risperiDONE 1 5 mg Oral Daily Peter Cotto MD   risperiDONE 3 mg Oral HS Peter Cotto MD   tamsulosin 0 4 mg Oral Daily With Edil Luke MD   warfarin 5 mg Oral Daily (warfarin) Peter Cotto MD       Labs:     Results from last 7 days   Lab Units 03/06/19  0508 03/05/19  0515   WBC Thousand/uL 5 82 7 05   HEMOGLOBIN g/dL 13 0 13 2   HEMATOCRIT % 40 8 41 4   PLATELETS Thousands/uL 149 158     Results from last 7 days   Lab Units 03/06/19  0508 03/05/19  0515 03/03/19  2258   SODIUM mmol/L 142 142  --    POTASSIUM mmol/L 3 8 3 3*  --    CHLORIDE mmol/L 105 105  --    CO2 mmol/L 32 34*  --    CO2, I-STAT mmol/L  --   --  32   BUN mg/dL 16 13  --    CREATININE mg/dL 0 79 0 73  --    GLUCOSE, ISTAT mg/dl  --   --  117   CALCIUM mg/dL 8 4 8 4  --          Results from last 7 days   Lab Units 03/07/19  0642 03/06/19  0508   INR  2 37* 2 65*            [unfilled]    Labs reviewed    Physical Examination:  Vitals:   Vitals:    03/05/19 2010 03/06/19 0501 03/06/19 2038 03/07/19 0640   BP: 139/70 156/80 138/74 115/69   BP Location: Left arm Right arm Left arm Right arm   Pulse: 96 89 88 92   Resp: 20 16 18 16   Temp: 98 °F (36 7 °C) 98 9 °F (37 2 °C) 97 8 °F (36 6 °C) 97 5 °F (36 4 °C)   TempSrc: Oral Oral Oral Oral   SpO2: 92% (!) 88% 90% 92%   Weight:  84 8 kg (186 lb 15 2 oz)     Height:         Constitutional:  NAD; pleasant; nontoxic  HEENT:  AT/NC; oropharynx negative for thrush on tongue   Neck: collar on  CV:  +S1, S2;  RRR; no rub/murmur  Pulmonary:  BBS without crackles/wheeze/rhonci; resp are unlabored  Abdominal:  soft, +BS, ND/NT  Skin:  no rashes  Musculoskeletal:  no LE edema  :  no sandhu  Neurological:  AAO;  BURGOS 5/5        [x ] Total time spent: 30 Mins and greater than 50% of this time was spent counseling/coordinating care  ** Please Note: Dragon 360 Dictation voice to text software may have been used in the creation of this document   **

## 2019-03-07 NOTE — CONSULTS
Consultation - Neuro/Rehab Psychology   [facility]  68 y o  male MRN: [id number]  Unit/Bed#: -01 Encounter: [id number]    Assessment/Plan     Assessment:  Pt reported history of schizophrenia and described current delusions related to other people's thoughts of pt  He is prescribed medication to help alleviate symptoms  Psychosocial stressors include: fall that lead to TBI; cognitive impairment; management of schizophrenia  Pt reported having difficulty coping with mood disturbance and symptoms of TBI  Family and social support includes: pt wife and children; [place] community  Pt reported motivation to participate in rehab program and work on rehab goals despite mood disturbance  Dx: F20 9 Schizophrenia (by history)  Code: 28777    Plan:   Pt will be afforded rehab psychology services while at [facility] to help pt cope with illness  History of Present Illness   Physician Requesting Consult: Isabel Adkins MD  Reason for Consult / Principal Problem: coping, adjustment  Patient is a 68 y o  male   Primary complaints include: agitation, anxiety, anxiety attacks, concern about health problems, fear of impending doom, fearfulness, feeling depressed, increased irritability, poor concentration and relationship difficulties  Psychosocial Stressors: health  Khalif Kelly MD     Psychiatric Review Of Systems:  sleep: no  appetite changes: no  weight changes: no  energy/anergy: yes  interest/pleasure/anhedonia: no  somatic symptoms: yes  anxiety/panic: yes  aristides: no  guilty/hopeless: no  self injurious behavior/risky behavior: no    Historical Information   Past Psychiatric History: pt has history of inpt behavioral health admissions related to schizophrenia symptoms and suicidal ideation; he participates in medication management with Dr Lucio Phipps  GDS= 5/15      Substance Abuse History:  Use of Alcohol: denied and 0 out of 4 on CAGE    Smoking history: none noted       Family Psychiatric History: none noted  Social History  Education: high school diploma/GED  Marital history:   Living arrangement, social support: The patient lives in home with wife and son  Occupational History: retired  Functioning Relationships: good support system  Other Pertinent History: None    Traumatic History:   Abuse: none noted  Other Traumatic Events: none noted      Past Medical History:   Diagnosis Date    Ankle edema 04/2004    chronic    Arthritis 03/16/2011    right knee    Ataxic gait 11/14/2011    Baker's cyst 04/2005    (djd)-left knee    Cardiac disease     Cataract     Chronic pain     Coronary artery disease     Depression     DVT (deep venous thrombosis) (HCC)     Epistaxis 01/09/2015    Forgetfulness 3/4/2019    Hand tingling 02/05/2010    tingling right forearm and hand-CTS on EMG    Hematuria 11/22/2016    ER-3 way sandhu to irrigate bladder with 3 L NS    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 [address] ) 09/18/2014    bilateral PE + acute DVT LLE     Rib fracture 03/2007    left 8th rib 2nd degree fall    Schizophrenia ([facility] [address] ) 04/2009    acute exacerbation    Sciatic leg pain 07/20/2011    left    Stenosis of cervix     with cervical radiculomyelopathy    Suicidal ideation 04/2009    Syncope 01/09/2015    Tardive dyskinesia 02/28/2011    Torn meniscus 04/2004    lateral, knee, left    Varicose vein of leg 04/2004    Venous insufficiency of leg 04/2004    chronic    Vertigo 05/12/2008    transient-questionable BPPV       Medical Review Of Systems:  Review of Systems    Meds/Allergies   current meds:   Current Facility-Administered Medications   Medication Dose Route Frequency    acetaminophen (TYLENOL) tablet 975 mg  975 mg Oral Q8H [facility]    amantadine (SYMMETREL) capsule 100 mg  100 mg Oral BID    amLODIPine (NORVASC) tablet 5 mg  5 mg Oral Daily    bisacodyl (DULCOLAX) rectal suppository 10 mg  10 mg Rectal Daily PRN    cholecalciferol (VITAMIN D3) tablet 1,000 Units  1,000 Units Oral Daily    docusate sodium (COLACE) capsule 100 mg  100 mg Oral BID    fluPHENAZine (PROLIXIN) tablet 5 mg  5 mg Oral Q8H [facility]    fluvoxaMINE (LUVOX) tablet 50 mg  50 mg Oral 4x Daily    lidocaine (LIDODERM) 5 % patch 1 patch  1 patch Topical Daily    losartan (COZAAR) tablet 50 mg  50 mg Oral Daily    ondansetron (ZOFRAN-ODT) dispersible tablet 4 mg  4 mg Oral Q8H PRN    polyethylene glycol (MIRALAX) packet 17 g  17 g Oral Daily PRN    risperiDONE (RisperDAL) tablet 0 5 mg  0 5 mg Oral BID PRN    risperiDONE (RisperDAL) tablet 1 5 mg  1 5 mg Oral Daily    risperiDONE (RisperDAL) tablet 3 mg  3 mg Oral HS    tamsulosin (FLOMAX) capsule 0 4 mg  0 4 mg Oral Daily With Dinner    warfarin (COUMADIN) tablet 5 mg  5 mg Oral Daily (warfarin)     Allergies   Allergen Reactions    Lisinopril Cough       Objective   Vital signs in last 24 hours:  Temp:  [97 5 °F (36 4 °C)-98 2 °F (36 8 °C)] 98 2 °F (36 8 °C)  HR:  [87-95] 94  Resp:  [16-18] 18  BP: (111-138)/(64-74) 111/64      Intake/Output Summary (Last 24 hours) at 3/7/2019 1419  Last data filed at 3/7/2019 1350  Gross per 24 hour   Intake 540 ml   Output    Net 540 ml       Mental Status Evaluation:  Appearance:  age appropriate   Behavior:  normal   Speech:  normal pitch and normal volume   Mood:  constricted   Affect:  mood-congruent   Language:  WNL   Thought Process:  goal directed   Thought Content:  delusions  persecutory   Perceptual Disturbances: None   Risk Potential: none   Sensorium:  person, place and situation   Cognition:  impaired   Consciousness:  alert and awake    Attention: attention span and concentration were age appropriate   Intellect: within normal limits   Fund of Knowledge: vocabulary: WNL   Insight:  fair   Judgment: age appropriate   Muscle Strength and Tone: see PT eval   Gait/Station: see PT eval   Motor Activity: no abnormal movements

## 2019-03-07 NOTE — TELEPHONE ENCOUNTER
03/20/2019-DISCUSSED Geo Castro, PT NOT SEEN IN HOSPITAL AND IS BEING DISCHARGED TODAY TO HOME  PT HAD [id number] [facility], JUST NEEDS F/U APT THIS WEEK WITH PA     03/19/2019-APT SCHEDULED FOR TODAY CANCELLED  PT Cheryl Ruiz [address]     03/18/2019-PT [address]    03/15/2019-PT [address]    03/14/2019-PT [address]    03/13/2019-PT [address]    03/12/2019-PT [address]    03/11/2019-PT [address]    03/08/2019-PT STILL IN HOSPITAL    03/06/2019-AYALA (03/05/2019)SIGN-OFF, FOLLOW-UP 2 WK WITH PA WITH CERVICAL SPINE UPRIGHT XR      [address] [facility]  03/19/2019 APT [place]

## 2019-03-07 NOTE — PROGRESS NOTES
03/07/19 1030   Pain Assessment   Pain Assessment No/denies pain   Restrictions/Precautions   Precautions Bed/chair alarms;Cognitive; Fall Risk;Spinal precautions;Supervision on toilet/commode   Braces or Orthoses C/S Collar   Cognition   Overall Cognitive Status Impaired   Subjective   Subjective pt emotionally disconnected today, but agreeable to therapy  no reports of pain at this time   QI: Sit to Stand   Assistance Needed Physical assistance   Assistance Provided by [place] Less than 25%   Sit to Stand CARE Score 3   QI: Chair/Bed-to-Chair Transfer   Assistance Needed Physical assistance; Adaptive equipment   Assistance Provided by [place] Less than 25%   Chair/Bed-to-Chair Transfer CARE Score 3   Transfer Bed/Chair/Wheelchair   Limitations Noted In Balance; Endurance;LE Strength   Adaptive Equipment Roller Walker   Stand Pivot Contact Guard;Minimal Assist   Sit to [employer] Guard;Minimal Assist   Stand to [facility]   Findings practiced STS, no UE support for functional strengthening   Bed, Chair, Wheelchair Transfer (FIM) 3 - [place] needs to lift, boost or assist to stand OR sit   QI: Walk 10 Feet   Assistance Needed Physical assistance; Adaptive equipment   Assistance Provided by [place] Less than 25%   Comment with RW   Walk 10 Feet CARE Score 3   QI: Walk 50 Feet with Two Turns   Assistance Needed Physical assistance; Adaptive equipment   Assistance Provided by [place] Less than 25%   Walk 50 Feet with Two Turns CARE Score 3   QI: Walk 150 Feet   Assistance Needed Physical assistance; Adaptive equipment   Assistance Provided by [place] Less than 25%   Walk 150 Feet CARE Score 3   QI: Walking 10 Feet on Uneven Surfaces   Reason if not Attempted Safety concerns   Walking 10 Feet on Uneven Surfaces CARE Score 88   Ambulation   Does the patient walk? 2   Yes   Primary Discharge Mode of Locomotion Walk   Walk Assist Level Contact Guard;Minimum Assist   Gait Pattern Inconsistant Shilpa;Decreased foot clearance; Forward Flexion; Improper weight shift   Assist Device Roller Erin [place] Walked (feet) 150 ft  (x2)   Limitations Noted In Balance; Coordination;Posture;Speed;Strength   Findings pt practiced 175ft x3 with HHA on the L to work on balance  attempted [facility], however pt had difficulty with sequencing  Walking (FIM) 4 - Patient requires steadying assist or light touching AND distance 150 feet or more, no rest   QI: Wheel 50 Feet with Two Turns   Reason if not Attempted Activity not applicable   Wheel 50 Feet with Two Turns CARE Score 9   QI: Wheel 150 Feet   Reason if not Attempted Activity not applicable   Wheel 558 Feet CARE Score 9   Wheelchair mobility   QI: Does the patient use a wheelchair? 0  No   QI: 4 Steps   Assistance Needed Physical assistance; Adaptive equipment   Assistance Provided by [place] Less than 25%   Comment B HRs   4 Steps CARE Score 3   QI: 12 Steps   Reason if not Attempted Safety concerns   12 Steps CARE Score 88   Stairs   Type Stairs   # of Steps 6   Weight Bearing Precautions Fall Risk   Assist Devices Bilateral Rail   Findings CG/Stephy with B HRs, pt able to turn around and come down fwd   reciprocal pattern ascending    Stairs (FIM) 2 - Patient goes up and down 4 - 11 stairs regardless of assist/device/setup   Assessment   Treatment Assessment pt tolerated tx well with cont practice of gait training wtih and without AD   pt stated he used a hurricane cane at home; however pt had difficulty with scquencing at this time  pt limited at this time due to decrease in emotional well being  will cont to work on strengthening, balance and safety to decrease fall risk and progress with functional mobility and Ind  Problem List Decreased strength;Decreased endurance;Decreased range of motion; Impaired balance;Decreased mobility; Decreased coordination; Impaired judgement;Decreased safety awareness;Decreased skin integrity;Orthopedic restrictions;Pain   Barriers to Discharge Inaccessible home environment;Decreased caregiver support   PT Barriers   Physical Impairment Decreased strength;Decreased endurance; Impaired balance;Decreased mobility; Decreased safety awareness;Orthopedic restrictions   Functional Limitation Walking;Transfers;Standing;Stair negotiation;Car transfers   Plan   Treatment/Interventions Functional transfer training;LE strengthening/ROM; Endurance training;Patient/family training;Bed mobility;Gait training   Progress Progressing toward goals   Recommendation   Recommendation Home PT;Outpatient PT; Home with family support   Equipment Recommended Walker   PT Therapy Minutes   PT Time In 1030   PT Time Out 1130   PT Total Time (minutes) 60   PT Mode of treatment - Individual (minutes) 60   PT Mode of treatment - Concurrent (minutes) 0   PT Mode of treatment - Group (minutes) 0   PT Mode of treatment - Co-treat (minutes) 0   PT Mode of Teatment - Total time(minutes) 60 minutes   Therapy Time missed   Time missed?  No

## 2019-03-07 NOTE — PROGRESS NOTES
03/07/19 1230   Pain Assessment   Pain Assessment 0-10   Pain Score 3   Pain Type Acute pain   Pain Location Neck   Pain Orientation Posterior   Pain Radiating Towards shoulders   Pain Descriptors Discomfort;Nagging   Pain Frequency Constant/continuous   Pain Onset Ongoing   Clinical Progression Not changed   Effect of Pain on Daily Activities limits act tolerance   Hospital Pain Intervention(s) Repositioned; Rest   Restrictions/Precautions   Precautions Bed/chair alarms;Cognitive; Fall Risk;Impulsive;Spinal precautions;Supervision on toilet/commode   Weight Bearing Restrictions No   ROM Restrictions Yes  (cervical spinal precuations)   Braces or Orthoses C/S Collar   QI: Putting On/Taking Off Footwear   Assistance Needed Physical assistance   Assistance Provided by [place] 25%-49%   Comment Pt able to doff socks and sneakers with leg crossover  pt then donned sock and sneaker onto RLE with leg crossover  Pt unable to don sock and sneaker on LLE due to limited dynamic reach and dec ability to maintain leg crossover  Pt will benefit from repetitive practice with ADL tasks to inc carryover with tech and to ensure spinal precuation maintenance  Putting On/Taking Off Footwear CARE Score 3   QI: Sit to Stand   Assistance Needed Incidental touching   Assistance Provided by [place] Less than 25%   Sit to Stand CARE Score 3   QI: Chair/Bed-to-Chair Transfer   Assistance Needed Physical assistance   Assistance Provided by [place] 25%-49%   Comment impulsive limiting safety durign functional transfers with RW   Chair/Bed-to-Chair Transfer CARE Score 3   Transfer Bed/Chair/Wheelchair   Limitations Noted In Balance;Confidence; Coordination; Sequencing;LE Strength   Adaptive Equipment Roller [employer], Chair, Wheelchair Transfer (FIM) 3 - [place] needs to lift, boost or assist to stand OR sit   Health Management   Health Management Level of Assistance Moderate verbal cues   Health Management Pt engaged in simulated med management using pt's medications that he recalls utilizing at home  Pt reports that PTA he forgot to take medications even though his son set alarms  Pt able to locate directions on pill bottles  On medication with directions stating every 8 hours, pt unable to determine appropriate spacing of medications stating "7am, 5pm, 8pm "  When asked questions such as "what do you do if you forgot to take medications today?", "what do you do if you take too much medication?", and "what is the impiortance of adhering to medcs?" pt unable to recall answer and states "I really hope my son will help me when I go home " Pt had overall difficulty due to dec safety, problem solving, memory  Recommend that pt's family (son) provides assistance to ensure adherence of medications at home  Cognition   Overall Cognitive Status Impaired   Arousal/Participation Alert; Cooperative   Attention Attends with cues to redirect   Orientation Level Oriented to person;Oriented to place;Oriented to time   Memory Decreased short term memory;Decreased recall of precautions   Following Commands Follows one step commands with increased time or repetition   Activity Tolerance   Activity Tolerance Patient tolerated treatment well   Assessment   Treatment Assessment Pt participated in skilled OT session focusing on functional transfers, [facility] education, and medication management  Pt cont to be limited by self-reported anxious feelings  Pt required emotional support to inc motivation and participate in OT session  OT spoke with neuropsych regarding cuing to support pt including "stay in this moment" and "your thoughts aren't facts" to attempt to orient pt to current situation and allow for focus on current task  Pt would benefit from cont therapy focusing on transfers, standing tolerance, standing balance  Pt's wife can provide S but can not provide phsyical assistance at this time  Cont with POC     Prognosis Good   Problem List Decreased strength;Decreased endurance;Decreased range of motion; Impaired balance;Decreased mobility; Decreased coordination; Impaired judgement;Decreased safety awareness;Decreased skin integrity;Orthopedic restrictions;Pain   Barriers to Discharge Inaccessible home environment;Decreased caregiver support   Plan   Treatment/Interventions ADL retraining;Functional transfer training; Therapeutic exercise; Endurance training;Patient/family training;Equipment eval/education   Progress Progressing toward goals   OT Therapy Minutes   OT Time In 1230   OT Time Out 1400   OT Total Time (minutes) 90   OT Mode of treatment - Individual (minutes) 90   OT Mode of treatment - Concurrent (minutes) 0   OT Mode of treatment - Group (minutes) 0   OT Mode of treatment - Co-treat (minutes) 0   OT Mode of Teatment - Total time(minutes) 90 minutes   Therapy Time missed   Time missed?  No

## 2019-03-07 NOTE — PROGRESS NOTES
Physical Medicine and Rehabilitation Progress Note  Gerber Chapa 68 y o  male MRN: [id number]  Unit/Bed#: -01 Encounter: [id number]    Chief Complaints:  Anxiety    Subjective/Interval Events:   Reviewed case with patient's outpt psychiatrist   See A&P below  Patient reports he slept better overall last night  He still reports some fear that he is going to die that is more uncontrolled than he would like at times but is redirectable  Patient reports mild headache and neck pain but not too bad  He denies current lightheadedness, SOB, abdominal pain, constipation, calf pain, or other new complaints  ROS: A 10-point ROS was performed  Negative except as listed above  Overall Assessment/relevant history:  Gerber Chapa  is a 68 y o  male with a past medical history schizophrenia, anxiety, depression, coronary artery disease, possible stroke, DVT/PE on chronic anticoagulation with Coumadin, obesity class 1, hypertension, urinary frequency on chronic Flomax, lightheadedness, occasional falls, memory impairment, left knee replacement, right total hip replacement, cervical laminectomy and fusion who fell backwards down stairs with brief few minutes loss of consciousness with neck and possible head trauma who was brought to [facility] initially and then transferred to [facility] on 3/3/19  CT head did not show acute findings, CT neck showed C5/C6 osteophyte fracture  Neurosurgery was consulted who recommended non operative management with cervical collar  Patient was complaining of some neck, back, and parasternal pain which were all believed to be related to trauma  Additional imaging of facial bones, thoracic and lumbar spine did not reveal acute fractures but did show chronic L3 compression fracture  Patient noted to have some increased difficulty sleeping and anxiety    Patient was evaluated by skilled therapies and was found to have significant decline in ADLs and ambulation appropriate for admission to [facility]      18-year-old male status post fall with brief loss of consciousness found to have C6-C7 vertebral osteophyte fracture and possible TBI presents with likely multifactorial imbalance possibly related to prior stroke, spinal stenosis, and now head injury, intermittent lightheadedness, acute on chronic cognitive deficits, suboptimal control of anxiety, depression, sleep, and schizophrenia with associated functional decline in ADLs and ambulation      Functional status (recent):    See below     Functional status on admission to [facility]:  PT: Transfers mod assist  OT: Dressing mod assist, Bathing, grooming min assist   ST: Problem solving and memory mod assist; comprehension min assist; expression supervision, social interaction supervision    * Head injury, acute  Assessment & Plan  Fall with likely head trauma and brief loss of consciousness and acute osteophyte C6-C7 fracture  Mild increased confusion after fall also with some worsening mood/schizophrenia symptoms  >Recommend acute comprehensive interdisciplinary inpatient rehabilitation to include intensive skilled therapies (PT, OT) as outlined with oversight and management by rehabilitation physician as well as inpatient rehab level nursing, case management and weekly interdisciplinary team meetings     > overstimulation precautions, sleep-wake/agitation restlessness log  > optimize neuroleptic medication  > try to avoid sedative medications  > evaluate and manage fall risks          Cognitive impairment  Assessment & Plan  Likely premorbid; chronic generalized microvascular changes with possible old right lacunar internal capsule infarct and possible right frontal WM   >speech eval in management  >Follow-up with Neurology after discharge  >On chronic home amantadine     Fracture of sixth cervical vertebra (HCC)  Assessment & Plan  C6/C7 osteophyte fracture context of recent fall and history of cervical decompression and fusion  Treatment recommendations per Neurosurgery  Non operative; continue cervical collar per Neurosurgery  Follow up with Neurosurgery after discharge    History of stroke  Assessment & Plan  Patient/family deny although evidence of possible old R lacunar infarct and focus in R frontal WM along with generalized micoangiopathic changes  >Recommend optimal BP mgmt  >Follow-up with neuro after d/c   >On full A/C for hx of VTE    Schizophrenia (HCC)  Assessment & Plan  >Some improvements in sleep; still with some anxiety and paranoia   >Concern patient may have not been providing himself medications appropriately > recommend family training and assist c/ med mgmt  >Recommend uptitrating medications closer to home regimen again > discussed with patient's outpt psych Dr Jeffrey Khalil 3/6   >Continue fluphenazine at home 5mg TID   >Increase Risperdone back to home 1 5mg qAM and 3mg QHS tomorrow > provide additional 0 5mg x1 now    >Increase Fluvoxamine back to home 5x/day   >Continue amantadine at home 100mg BID  >Monitor mood, delusions, sleep, function    Patient presented c/ fair amount of paranoid delusions, anxiety, difficulty sleeping with occasional panic attacks off home regimen since recent admission prior, with recent fall/hospitalization possible head injury  Medications reviewed with his outpt pharmacy and patient's outpatient psychiatrist Dr Jeffrey Khalil  Patient recently should switched from Trifluoperazine to fluphenazine and Cogentin stopped  Patient/family noted some increased paranoid delusions starting a few days prior to fall  Home regimen per pharmacy:  Fluphenazine 5mg TID, Risperdal 1 5mg qday and 3mg QHS  Amantadine 100 mg b i d , Fluvoxamine 50mg 5x/day  Patient recently on much more modest dosing s/p recent possible BI          Lightheadedness  Assessment & Plan  Chronic intermittent; consider med adjustments  Monitor orthostatics > only lying and sitting obtained so far   PRN abdominal binder and WILVER hose      History of pulmonary embolism  Assessment & Plan  And DVT on chronic anticoagulation with Coumadin  Medicine consult to manage during acute rehab course  INR tx 3/6    Pain  Assessment & Plan  Currently fairly well controlled  Continue scheduled Tylenol for now  Lidoderm patch  Avoid NSAIDs and avoid opiates     Class 1 obesity due to excess calories without serious comorbidity with body mass index (BMI) of 34 0 to 34 9 in adult  Assessment & Plan   on appropriate nutrition and activity  Adjustments accordingly during skilled therapy and with rehab nursing  Monitor skin closely for breakdown, wounds, rashes; keep clean and dry, turning Q2H   Follow-up with PCP after d/c      Chronic diastolic heart failure ([address] )  Assessment & Plan  Mgmt per IM     Chronic venous stasis dermatitis of both lower extremities  Assessment & Plan  Internal medicine consult to assist with management  Elevate legs when in bed and when in chair for prolonged periods of time  Consider Wilver veronica    Coronary artery disease involving native coronary artery of native heart without angina pectoris  Assessment & Plan  On chronic Coumadin  Optimal blood pressure control  Follow-up with PCP    Benign essential hypertension  Assessment & Plan  Medicine c/s to assist with mgmt  Amlodipine 5 mg daily, losartan 50 mg daily  Monitor blood pressure and orthostatics      # Bowel care:    - monitor for constipation, incontinence, and diarrhea  - goal 1 appropriate BM every 1-2 days  - recommend colace +/- senna and PRN bowel protocol     # Bladder care:   On chronic Flomax; monitor orthostatic vital signs in basic vital signs  - monitor for retention, incontinence, signs/symptoms of UTI  - recommend voiding trial; nursing prompt to void followed by bladder scan starting Q4-6H or after each patient initiated void; nursing to record voided output and bladder scan totals; straight cath PRN >350-400 cc; if post void residual bladder scans are <150 cc x3 consecutive voids, can stop scans      # Skin:   - Nursing to turn patient Q2H if not adequately ambulatory, monitor for skin breakdown, rashes, and wounds if applicable     # At risk for venous thromboembolism:  - Recommend SCDs and mobilization  - warfarin     # Diet/Hydration:    Dysphagia 3 thin - recommend speech therapy     Disposition:   Home with estimated length of stay of 10 days from admission     Follow-up:   PCP, Psychiatry, rehab, Neurosurgery, Neurology     CODE: Level 1: Full Code     Restrictions include: Fall precautions         ---------------------------------------------------------------------------------------------------------------------    Objective: Allergies and Medications per EMR    Physical Exam:  T=98 8, P 89, RR 16, /80, SpO2 88% on RA  General: Awake, alert in NAD  HENT:  MMM, cervical collar in place   Respiratory: Unlabored breathing, breath sounds equal, Lungs CTA, no wheezes, rales, or rhonchi  Cardiovascular: Regular rate and rhythm, no murmurs, rubs, or gallops  Gastrointestinal: Soft, non-tender, non-distended, normoactive bowel sounds  Genitourinary: No sandhu  SkiN/MSK/Extremities:  No calf edema or calf tenderness to palpation  Neurologic/Psych:   MENTAL STATUS: at recent baseline, orientation intact  Affect: Euthymic   Strength/MMT:  Unchanged       Diagnostic Studies: reviewed, no new imaging  No results found      Laboratory:    Results from last 7 days   Lab Units 03/06/19  0508 03/05/19 0515 03/03/19 2258 03/03/19  2256   HEMOGLOBIN g/dL 13 0 13 2  --  15 0   I STAT HEMOGLOBIN g/dl  --   --  16 3  --    HEMATOCRIT % 40 8 41 4  --  46 5   HEMATOCRIT, ISTAT %  --   --  48  --    WBC Thousand/uL 5 82 7 05  --  5 61     Results from last 7 days   Lab Units 03/06/19  0508 03/05/19  0515 03/03/19  2258 03/03/19  2256   BUN mg/dL 16 13  --  14   POTASSIUM mmol/L 3 8 3 3*  --  3 5   CHLORIDE mmol/L 105 105  --  103   GLUCOSE, ISTAT mg/dl  --   --  117  --    CREATININE mg/dL 0 79 0 73  --  0 93   AST U/L  --   --   --  39   ALT U/L  --   --   --  41     Results from last 7 days   Lab Units 03/06/19  0508 03/05/19  0515 03/03/19  2256   PROTIME seconds 28 3* 29 5* 23 8*   INR  2 65* 2 79* 2 12*        Patient Active Problem List   Diagnosis    Benign essential hypertension    Coronary artery disease involving native coronary artery of native heart without angina pectoris    Chronic depression    Chronic venous stasis dermatitis of both lower extremities    Deep venous thrombosis of lower extremity (HCC)    Chronic diastolic heart failure (HCC)    Osteoarthritis    Nocturnal enuresis    Idiopathic trigeminal neuralgia    Lacunar infarction    Class 1 obesity due to excess calories without serious comorbidity with body mass index (BMI) of 34 0 to 34 9 in adult    Schizophrenia (HCC)    Disability of walking    Pulmonary embolism (HCC)    Arthropathy    Mixed hyperlipidemia    Neurogenic claudication    Neck pain without injury    Abdominal wall hematoma    Spinal stenosis of lumbar region with neurogenic claudication    Lipoma of colon    Fracture of sixth cervical vertebra (HCC)    Rib pain on left side    Fall    Forgetfulness    Physical deconditioning    Head injury, acute    Lightheadedness    Pain    History of pulmonary embolism    Cognitive impairment    History of stroke       ** Please Note: Fluency Direct voice to text software may have been used in the creation of this document  **    Total visit time:  At least 25 minutes, with more than 50% spent counseling/coordinating care    Satya Granger MD, [facility]  Physical Medicine and Rehabilitation  Brain Injury Medicine

## 2019-03-07 NOTE — PROGRESS NOTES
03/07/19 0800   Pain Assessment   Pain Assessment No/denies pain   Pain Score No Pain   Restrictions/Precautions   Precautions Bed/chair alarms;Cognitive; Fall Risk;Spinal precautions;Supervision on toilet/commode   Braces or Orthoses C/S Collar   Memory Skills   Memory (FIM) 3 - Recognizes, recalls/performs 50-74%   Social Interaction (FIM) 4 - Interacts 75-89% of time   Speech/Language/Cognition Assessmetn   Treatment Assessment Pt engaged in completing the rest of the IGNACIO  The following subtests were completed: Understanding Medicine Label: score of 6/10, which equates to 60% correct, indicating an independent functioning rating of 2, which indication of needing some level of assistance on this task; Using a Calendar: score of 5/10, which equates to 50% correct, indicating an independent functioning rating of 3, which means high probability that the pt is not able to function independently on this task); Reading Instructions: score of 8/10, which equates to 80% correct, indicating an independent functioning rating of 1, which means that pt has a high probability of independently functioning on this task; Using the Telephone: Pt asked to provide pt's phone number which pt correctly elicited  Pt asked what number pt would use in an emergency, w/ pt eliciting pt's son's phone number  Pt given verbal cues to recall which number to call when needing medical assistance/ambulance, to which pt responded "I don't remember " Provided pt with explanation of "9-1-1" to which pt said "I forgot about that one " Pt was then given a visual of a list of phone numbers and was asked to identify which number to call for "information," which pt correctly elicited; Writing a Phone Message: score of 13/20, which equates to 65% correct, indicating an independent functioning rating of 2, which means indication of needing some level of assistance on this task  Of note, pt requiring multiple repetitions of directions to complete tasks  Pt verbalized recent difficulty with memory  Pt noted to have flight of thought throughout task, often becoming tangential and eliciting irrelevant information to the current conversation topic  At this time, pt is demonstrating deficits in the following areas memory, problem solving, and word recall  Pt then participated in written category exclusion task, in which pt was given 4 words and asked to determine which word doesn't belong  Pt correctly elicited excluded word 20/20  Pt then asked to provide rationale for choosing excluded word, w/ pt correctly eliciting rationale 43/33  When given verbal cues, accuracy increased to 19/20  Of note, pt at times demonstrated word retrieval difficulty, but provided some descriptions of words to compensate, which improved communicative intent  Pt continues to benefit from SLP services at this time to maximize overall cognitive linguistic skills  Swallow Information   Current Risks for Dysphagia & Aspiration General debilitation;New Neuro event;Cognitive deficit; Positionong Issues   Current Symptoms/Concerns Difficulty chewing   Current Diet Dysphagia advance; Thin liquid   Baseline Diet Regular; Thin liquids   Consistencies Assessed and Performance   Materials Admnistered Soft/Level 3;Mechanical Soft/Level 2; Thin liquid   Oral Stage WFL; Mild impaired   Phargngeal Stage WFL   Swallow Mechanics Mild delayed;Swallow initation;Good Larygneal rise   Esophageal Concerns No s/s reported   Recommendations   Risk for Aspiration Mild   Recommendations Dysphagia treatment   Diet Solid Recommendation Level 3 Dysphagia/ advanced/ soft to chew   Diet Liquid Recommendation Thin liquid   Recommended Form of Meds Whole; With thin liquid; As desired; As tolerated   General Precautions Aspiration precautions; Feed only when alert;Minimize distractions;Upright as possible for all oral intake;Remain upright for 45 mins after meals; Supervision with meals  (distant supervision)   Compensatory Swallowing Strategies Alternate solids and liquids; External pacing   Results Reviewed with PT/Family/Caregiver   QI: Eating   Assistance Needed Set-up / [place] Seek; Verbal cues   Assistance Provided by [place] No physical assistance   Eating CARE Score 4   Swallow Assessment   Swallow Treatment Assessment Pt observed during breakfast and was upright and OOB for meal  Pt able to self-feed entire meal  Currently on level 3 diet with thin liquids  Pt presented w/ level 2/3 and regular textures (cereal w/ milk & fresh fruit/banana) and thin liquids by cup  Pt consumed 50% of meal and 90cc of thin liquids  Minor anterior spillage was observed w/ cereal, but adequate lip seal noted noted on trials of thin liquids by cup  Mildly prolonged mastication was observed on trials of level 2/3 and regular consistencies, w/ pt demonstrating mild oral residual on trials of level 2/3 consistencies, which cleared over time  A-P transfer appeared mildly delayed on level 2/3 consistencies, but was prompter with thin liquids  Swallow initiation appeared grossly functional for all consistencies assessed  Hyolaryngeal elevation appeared adequate for all consistencies observed  No overt s/s of aspiration were noted throughout meal  Due to continued s/s of mild oral dysphagia, recommend continue on level 3 diet with thin liquids at this time  Pt continues to benefit from skilled SLP services to maximize tolerance of safest least restrictive diet at this time     Swallow Assessment Prognosis   Prognosis Good   Prognosis Considerations Age;Participation level;Previous level of function;Ability to carry over   SLP Therapy Minutes   SLP Time In 0800   SLP Time Out 0930   SLP Total Time (minutes) 90   SLP Mode of treatment - Individual (minutes) 45   SLP Mode of treatment - Concurrent (minutes) 45   SLP Mode of treatment - Group (minutes) 0   SLP Mode of treatment - Co-treat (minutes) 0   SLP Mode of Teatment - Total time(minutes) 90 minutes   Therapy Time missed   Time missed?  No   Daily FIM Score   Problem solving (FIM) 3 - Solves basic problmes 50-74% of time   Comprehension (FIM) 4 - Understands basic info/conversation 75-90% of time   Expression (FIM) 5 - Needs help/cues only RARELY (< 10% of the time)   Eating (FIM) 5 - Patient needs help to open contianers or set up tray

## 2019-03-08 PROBLEM — E55.9 VITAMIN D INSUFFICIENCY: Status: ACTIVE | Noted: 2019-03-08

## 2019-03-08 LAB
25(OH)D3 SERPL-MCNC: 29.8 NG/ML (ref 30–100)
ANION GAP SERPL CALCULATED.3IONS-SCNC: 3 MMOL/L (ref 4–13)
BUN SERPL-MCNC: 17 MG/DL (ref 5–25)
CALCIUM SERPL-MCNC: 9 MG/DL (ref 8.3–10.1)
CHLORIDE SERPL-SCNC: 104 MMOL/L (ref 100–108)
CO2 SERPL-SCNC: 32 MMOL/L (ref 21–32)
CREAT SERPL-MCNC: 0.8 MG/DL (ref 0.6–1.3)
GFR SERPL CREATININE-BSD FRML MDRD: 89 ML/MIN/1.73SQ M
GLUCOSE SERPL-MCNC: 96 MG/DL (ref 65–140)
INR PPP: 2.72 (ref 0.86–1.17)
POTASSIUM SERPL-SCNC: 3.6 MMOL/L (ref 3.5–5.3)
PROTHROMBIN TIME: 28.9 SECONDS (ref 11.8–14.2)
SODIUM SERPL-SCNC: 139 MMOL/L (ref 136–145)

## 2019-03-08 PROCEDURE — 97110 THERAPEUTIC EXERCISES: CPT

## 2019-03-08 PROCEDURE — 80048 BASIC METABOLIC PNL TOTAL CA: CPT

## 2019-03-08 PROCEDURE — 99232 SBSQ HOSP IP/OBS MODERATE 35: CPT

## 2019-03-08 PROCEDURE — G0515 COGNITIVE SKILLS DEVELOPMENT: HCPCS

## 2019-03-08 PROCEDURE — 92526 ORAL FUNCTION THERAPY: CPT

## 2019-03-08 PROCEDURE — 82306 VITAMIN D 25 HYDROXY: CPT

## 2019-03-08 PROCEDURE — 97530 THERAPEUTIC ACTIVITIES: CPT

## 2019-03-08 PROCEDURE — 85610 PROTHROMBIN TIME: CPT | Performed by: NURSE PRACTITIONER

## 2019-03-08 RX ORDER — MELATONIN
2000 DAILY
Status: DISCONTINUED | OUTPATIENT
Start: 2019-03-09 | End: 2019-03-20 | Stop reason: HOSPADM

## 2019-03-08 RX ORDER — CHOLECALCIFEROL (VITAMIN D3) 10 MCG
1 TABLET ORAL
Status: DISCONTINUED | OUTPATIENT
Start: 2019-03-08 | End: 2019-03-20 | Stop reason: HOSPADM

## 2019-03-08 RX ORDER — WARFARIN SODIUM 5 MG/1
5 TABLET ORAL
Status: DISCONTINUED | OUTPATIENT
Start: 2019-03-09 | End: 2019-03-09

## 2019-03-08 RX ORDER — WARFARIN SODIUM 2.5 MG/1
2.5 TABLET ORAL
Status: DISCONTINUED | OUTPATIENT
Start: 2019-03-08 | End: 2019-03-12

## 2019-03-08 RX ADMIN — AMANTADINE HYDROCHLORIDE 100 MG: 100 CAPSULE, LIQUID FILLED ORAL at 17:54

## 2019-03-08 RX ADMIN — WARFARIN SODIUM 2.5 MG: 2.5 TABLET ORAL at 17:53

## 2019-03-08 RX ADMIN — DOCUSATE SODIUM 100 MG: 100 CAPSULE, LIQUID FILLED ORAL at 17:53

## 2019-03-08 RX ADMIN — AMANTADINE HYDROCHLORIDE 100 MG: 100 CAPSULE, LIQUID FILLED ORAL at 10:17

## 2019-03-08 RX ADMIN — FLUVOXAMINE MALEATE 50 MG: 50 TABLET, FILM COATED ORAL at 07:51

## 2019-03-08 RX ADMIN — RISPERIDONE 1.5 MG: 3 TABLET ORAL at 10:14

## 2019-03-08 RX ADMIN — FLUPHENAZINE HYDROCHLORIDE 5 MG: 2.5 TABLET, FILM COATED ORAL at 21:22

## 2019-03-08 RX ADMIN — FLUVOXAMINE MALEATE 50 MG: 50 TABLET, FILM COATED ORAL at 15:03

## 2019-03-08 RX ADMIN — ACETAMINOPHEN 975 MG: 325 TABLET, FILM COATED ORAL at 07:49

## 2019-03-08 RX ADMIN — TAMSULOSIN HYDROCHLORIDE 0.4 MG: 0.4 CAPSULE ORAL at 17:53

## 2019-03-08 RX ADMIN — ACETAMINOPHEN 975 MG: 325 TABLET, FILM COATED ORAL at 15:00

## 2019-03-08 RX ADMIN — FLUVOXAMINE MALEATE 50 MG: 50 TABLET, FILM COATED ORAL at 17:54

## 2019-03-08 RX ADMIN — FLUVOXAMINE MALEATE 50 MG: 50 TABLET, FILM COATED ORAL at 21:23

## 2019-03-08 RX ADMIN — FLUVOXAMINE MALEATE 50 MG: 50 TABLET, FILM COATED ORAL at 12:07

## 2019-03-08 RX ADMIN — FLUPHENAZINE HYDROCHLORIDE 5 MG: 2.5 TABLET, FILM COATED ORAL at 07:50

## 2019-03-08 RX ADMIN — ACETAMINOPHEN 975 MG: 325 TABLET, FILM COATED ORAL at 21:22

## 2019-03-08 RX ADMIN — DOCUSATE SODIUM 100 MG: 100 CAPSULE, LIQUID FILLED ORAL at 10:11

## 2019-03-08 RX ADMIN — LIDOCAINE 1 PATCH: 50 PATCH CUTANEOUS at 10:11

## 2019-03-08 RX ADMIN — FLUPHENAZINE HYDROCHLORIDE 5 MG: 2.5 TABLET, FILM COATED ORAL at 15:02

## 2019-03-08 RX ADMIN — AMLODIPINE BESYLATE 5 MG: 5 TABLET ORAL at 10:09

## 2019-03-08 RX ADMIN — RISPERIDONE 3 MG: 3 TABLET ORAL at 21:22

## 2019-03-08 RX ADMIN — VITAMIN D, TAB 1000IU (100/BT) 1000 UNITS: 25 TAB at 10:07

## 2019-03-08 RX ADMIN — Medication 1 CAPSULE: at 17:53

## 2019-03-08 RX ADMIN — LOSARTAN POTASSIUM 50 MG: 50 TABLET, FILM COATED ORAL at 10:08

## 2019-03-08 NOTE — PROGRESS NOTES
Per patients daughter the  patient did not speak of SI prior even when depressed   She asked not to give newspaper and I am going to dc pastoral care bc it can be a trigger

## 2019-03-08 NOTE — PROGRESS NOTES
Physical Medicine and Rehabilitation Progress Note  Tahira Smith 68 y o  male MRN: [id number]  Unit/Bed#: -01 Encounter: [id number]    Chief Complaints:  Anxiety    Subjective/Interval Events:   Patient reported to staff that he wanted to die earlier in day which improved later in day  I spoke to patient for extended period this evening  He reports feeling like he might die and is concerned he could end up in hell as he is having racist thoughts  He states he can feel like he might die or end up in hell when his schizophrenia is not well controlled in past   He denies current SI, violent ideation, hallucinations, uncontrolled depression, anxiety  He states his anxiety has been higher than usual recently  Patient feels staff has been very friendly and helpful  He states sometimes he feels like he is doing something wrong when the bell goes off  He was explained that was just for his safety and is temporary  Patient reports that speaking to a  may be beneficial for him  He reports talking to psychology was helpful and is hoping he can do that after he leaves and that he thinks his psychiatrist works in same place with psychologist   He was reviewed plan to increase his meds to prior dosing and that could be in part he is feeling and having these thoughts  He was reviewed CBT techniques for anxiety and unwanted thoughts, followed or in conjunction with notifying nursing if they still felt significant and if necessary we could give PRN medication  Patient was in agreement with plan and could reiterate plan back  He reports not significant headache, neck pain, or other pain  Patient denies lightheadedness, calf pain, SOB, cough, or other complaints  Extended discussion today held with patient regarding current condition, patient's concern, reviewing CBT techniques, supportive counseling, psych/medical/rehabilitation management, and disposition      ROS: A 10-point ROS was performed  Negative except as listed above  Overall Assessment/relevant history:  Jose Duarte  is a 68 y o  male with a past medical history schizophrenia, anxiety, depression, coronary artery disease, possible stroke, DVT/PE on chronic anticoagulation with Coumadin, obesity class 1, hypertension, urinary frequency on chronic Flomax, lightheadedness, occasional falls, memory impairment, left knee replacement, right total hip replacement, cervical laminectomy and fusion who fell backwards down stairs with brief few minutes loss of consciousness with neck and possible head trauma who was brought to [facility] initially and then transferred to [facility] on 3/3/19  CT head did not show acute findings, CT neck showed C5/C6 osteophyte fracture  Neurosurgery was consulted who recommended non operative management with cervical collar  Patient was complaining of some neck, back, and parasternal pain which were all believed to be related to trauma  Additional imaging of facial bones, thoracic and lumbar spine did not reveal acute fractures but did show chronic L3 compression fracture  Patient noted to have some increased difficulty sleeping and anxiety    Patient was evaluated by skilled therapies and was found to have significant decline in ADLs and ambulation appropriate for admission to [facility]      67-year-old male status post fall with brief loss of consciousness found to have C6-C7 vertebral osteophyte fracture and possible TBI presents with likely multifactorial imbalance possibly related to prior stroke, spinal stenosis, and now head injury, intermittent lightheadedness, acute on chronic cognitive deficits, suboptimal control of anxiety, depression, sleep, and schizophrenia with associated functional decline in ADLs and ambulation      Functional status (recent):    Transfers min-mod assist     Functional status on admission to [facility]:  PT: Transfers mod assist  OT: Dressing mod assist, Bathing, grooming min assist   ST: Problem solving and memory mod assist; comprehension min assist; expression supervision, social interaction supervision    Schizophrenia ([facility] [address] )  Assessment & Plan  >Still with some anxiety, paranoid ideations, with transient S/I without plan/redirectable > improved now   >Again suspect acute worsening related to be off home regimen which suspect will improve with reintroduction of medications at appropriate dosing  >Monitor closely with nursing and staff; Neuropsych c/s and following; if necessary obtain inpt psych c/s  >Increase meds back to home regimen; if necessary can increase fluphenazine to 5mg QID per outpt psych   >Continue fluphenazine at home 5mg TID   >Increase Risperdone back to home 1 5mg qAM and 3mg QHS    >Increase Fluvoxamine 50mg 5x/day    >Continue amantadine at home 100mg BID   >Concern patient may have not been providing himself medications appropriately > recommend family training and assist c/ med mgmt  >Recommend uptitrating medications closer to home regimen again > discussed with patient's outpt psych Dr Morteza Schultz 3/6   >Monitor mood, delusions, sleep, function    Patient presented c/ fair amount of paranoid delusions, anxiety, difficulty sleeping with occasional panic attacks off home regimen since recent admission prior, with recent fall/hospitalization possible head injury  Medications reviewed with his outpt pharmacy and patient's outpatient psychiatrist Dr Morteza Schultz  Patient recently should switched from Trifluoperazine to fluphenazine and Cogentin stopped  Patient/family noted some increased paranoid delusions starting a few days prior to fall  Home regimen per pharmacy:  Fluphenazine 5mg TID, Risperdal 1 5mg qday and 3mg QHS  Amantadine 100 mg b i d , Fluvoxamine 50mg 5x/day  Patient recently on much more modest dosing s/p recent possible BI         * Head injury, acute  Assessment & Plan  Fall with likely head trauma and brief loss of consciousness and acute osteophyte C6-C7 fracture  Mild increased confusion after fall also with some worsening mood/schizophrenia symptoms  >Recommend acute comprehensive interdisciplinary inpatient rehabilitation to include intensive skilled therapies (PT, OT) as outlined with oversight and management by rehabilitation physician as well as inpatient rehab level nursing, case management and weekly interdisciplinary team meetings     > overstimulation precautions, sleep-wake/agitation restlessness log  > optimize neuroleptic medication  > try to avoid sedative medications  > evaluate and manage fall risks          Cognitive impairment  Assessment & Plan  Likely premorbid; chronic generalized microvascular changes with possible old right lacunar internal capsule infarct and possible right frontal WM   >speech eval in management  >Follow-up with Neurology after discharge  >On chronic home amantadine     Fracture of sixth cervical vertebra (HCC)  Assessment & Plan  C6/C7 osteophyte fracture context of recent fall and history of cervical decompression and fusion  Treatment recommendations per Neurosurgery  Non operative; continue cervical collar per Neurosurgery  Follow up with Neurosurgery after discharge    History of stroke  Assessment & Plan  Patient/family deny although evidence of possible old R lacunar infarct and focus in R frontal WM along with generalized micoangiopathic changes  >Recommend optimal BP mgmt  >Follow-up with neuro after d/c   >On full A/C for hx of VTE    Lightheadedness  Assessment & Plan  Chronic intermittent; consider med adjustments  Orthostatics negative recently; if necessary repeat particularly with adjustments in meds   PRN abdominal binder and EHSAN hose      History of pulmonary embolism  Assessment & Plan  And DVT on chronic anticoagulation with Coumadin  Medicine consult to manage during acute rehab course      Pain  Assessment & Plan  Currently fairly well controlled  Continue scheduled Tylenol for now  Lidoderm patch  Avoid NSAIDs and avoid opiates     Class 1 obesity due to excess calories without serious comorbidity with body mass index (BMI) of 34 0 to 34 9 in adult  Assessment & Plan   on appropriate nutrition and activity  Adjustments accordingly during skilled therapy and with rehab nursing  Monitor skin closely for breakdown, wounds, rashes; keep clean and dry, turning Q2H   Follow-up with PCP after d/c      Chronic diastolic heart failure ([facility] [address] )  Assessment & Plan  Mgmt per IM     Chronic venous stasis dermatitis of both lower extremities  Assessment & Plan  Internal medicine consult to assist with management  Elevate legs when in bed and when in chair for prolonged periods of time  Consider Wilver veronica    Coronary artery disease involving native coronary artery of native heart without angina pectoris  Assessment & Plan  On chronic Coumadin  Optimal blood pressure control  Follow-up with PCP    Benign essential hypertension  Assessment & Plan  Medicine c/s to assist with mgmt  Amlodipine 5 mg daily, losartan 50 mg daily  Monitor blood pressure and orthostatics    # Bowel care:    - monitor for constipation, incontinence, and diarrhea  - goal 1 appropriate BM every 1-2 days  - recommend colace +/- senna and PRN bowel protocol     # Bladder care:   On chronic Flomax;  - monitor for retention, incontinence, signs/symptoms of UTI  - recommend voiding trial; nursing prompt to void followed by bladder scan starting Q4-6H or after each patient initiated void; nursing to record voided output and bladder scan totals; straight cath PRN >350-400 cc; if post void residual bladder scans are <150 cc x3 consecutive voids, can stop scans      # Skin:   - Nursing to turn patient Q2H if not adequately ambulatory, monitor for skin breakdown, rashes, and wounds if applicable     # At risk for venous thromboembolism:  - Recommend SCDs and mobilization  - warfarin     # Diet/Hydration:    Dysphagia 3 thin - recommend speech therapy     Disposition:   Home with estimated length of stay of 10-14 days from admission     Follow-up:   PCP, Psychiatry, rehab, Neurosurgery, Neurology     CODE: Level 1: Full Code     Restrictions include: Fall precautions         ---------------------------------------------------------------------------------------------------------------------    Objective: Allergies and Medications per EMR    Physical Exam:  Vitals:    03/07/19 1350   BP: 111/64   Pulse: 94   Resp: 18   Temp: 98 2 °F (36 8 °C)   SpO2: 95%     General: Awake, alert in NAD  HENT:  MMM, cervical collar in place   Respiratory: Unlabored breathing, breath sounds equal, Lungs CTA, no wheezes, rales, or rhonchi  Cardiovascular: Regular rate and rhythm, no murmurs, rubs, or gallops  Gastrointestinal: Soft, non-tender, non-distended, normoactive bowel sounds  Genitourinary: No sandhu  SkiN/MSK/Extremities:  No calf edema or calf tenderness to palpation  Neurologic/Psych:   MENTAL STATUS: at recent baseline, orientation intact  Affect: Euthymic to somewhat flattened   Strength/MMT:  Unchanged       Diagnostic Studies: reviewed, no new imaging  No results found      Laboratory:    Results from last 7 days   Lab Units 03/06/19  0508 03/05/19 0515 03/03/19 2258 03/03/19  2256   HEMOGLOBIN g/dL 13 0 13 2  --  15 0   I STAT HEMOGLOBIN g/dl  --   --  16 3  --    HEMATOCRIT % 40 8 41 4  --  46 5   HEMATOCRIT, ISTAT %  --   --  48  --    WBC Thousand/uL 5 82 7 05  --  5 61     Results from last 7 days   Lab Units 03/06/19  0508 03/05/19  0515 03/03/19  2258 03/03/19  2256   BUN mg/dL 16 13  --  14   POTASSIUM mmol/L 3 8 3 3*  --  3 5   CHLORIDE mmol/L 105 105  --  103   GLUCOSE, ISTAT mg/dl  --   --  117  --    CREATININE mg/dL 0 79 0 73  --  0 93   AST U/L  --   --   --  39   ALT U/L  --   --   --  41     Results from last 7 days   Lab Units 03/07/19  0642 03/06/19  0508 03/05/19  0515   PROTIME seconds 26 0* 28 3* 29 5*   INR 2 37* 2 65* 2 79*        Patient Active Problem List   Diagnosis    Benign essential hypertension    Coronary artery disease involving native coronary artery of native heart without angina pectoris    Chronic depression    Chronic venous stasis dermatitis of both lower extremities    Deep venous thrombosis of lower extremity (HCC)    Chronic diastolic heart failure (HCC)    Osteoarthritis    Nocturnal enuresis    Idiopathic trigeminal neuralgia    Lacunar infarction    Class 1 obesity due to excess calories without serious comorbidity with body mass index (BMI) of 34 0 to 34 9 in adult    Schizophrenia (HCC)    Disability of walking    Pulmonary embolism (HCC)    Arthropathy    Mixed hyperlipidemia    Neurogenic claudication    Neck pain without injury    Abdominal wall hematoma    Spinal stenosis of lumbar region with neurogenic claudication    Lipoma of colon    Fracture of sixth cervical vertebra (HCC)    Rib pain on left side    Fall    Forgetfulness    Physical deconditioning    Head injury, acute    Lightheadedness    Pain    History of pulmonary embolism    Cognitive impairment    History of stroke       ** Please Note: Fluency Direct voice to text software may have been used in the creation of this document  **    Total time spent: At least 35 minutes, with more than 50% spent counseling/coordinating care  In addition, this patient was discussed by the interdisciplinary team in weekly case conference today  The care of the patient was extensively discussed with all care providers and an appropriate rehabilitation plan was formulated unique for this patient  Barriers were identified preventing progression of therapy and appropriate interventions were discussed with each discipline  Please see the team note for input from all disciplines regarding barriers, intervention, and discharge planning      Franky Brown MD, [address] [facility] Medicine and Rehabilitation  Brain Injury Medicine

## 2019-03-08 NOTE — PROGRESS NOTES
03/08/19 1130   Pain Assessment   Pain Assessment No/denies pain   Pain Score No Pain   Restrictions/Precautions   Precautions Aspiration;Bed/chair alarms;Cognitive; Fall Risk;Pain;Spinal precautions;Supervision on toilet/commode   Memory Skills   Memory (FIM) 3 - Recognizes, recalls/performs 50-74%   Social Interaction (FIM) 4 - Interacts 75-89% of time   Speech/Language/Cognition Assessmetn   Treatment Assessment Pt's wife and 2 daughters present for session  Pt participated in skilled ST session targeting STM  Pt and pt's family provided w/ education regarding types of memory and strategies pt can use to assist w/ recall  Pt's daughter noted pt's recent difficulty w/ maintaining attention and potential impact on memory  Pt agreed w/ daughter's observation, expressing difficulty concentrating on tasks w/ increased background noise/distractions  Emphasized to pt use of memory strategies to assist w/ recall when distractions/noise are present  Pt participated in auditory memory retention task, in which pt was provided w/ 4 words and was asked a word placement question about words  Pt correctly elicited word 2/67 independently  Pt was encouraged to use verbal rehearsal strategy multiple times before answering question  Pt noted to use verbal rehearsal for most trials, assisting w/ accuracy of task  Pt also encouraged to use associations, as word lists involved words in the same category  When asked to determine category words belong to, pt increased accuracy of recall to 7/10 words  When given repetition of word lists, accuracy increased to 9/10  Pt at times required 2-3 repetitions of word lists prior to eliciting correct answer  Of note, pt often recalled 3-4 words in word list, but out of order impacting accuracy of task  Also of note, increased distraction and noise was present in gym area, w/ pt frequently expressing distraction w/ surroundings   Pt then participated in visual memory task, in which pt was given 4 pictures on one page and asked to recall items in pictures when picture stimuli removed  Pt encouraged to use verbal rehearsal and association during task  Pt correctly elicited 8/8 pictures independently  Pt continues to benefit from skilled ST services to maximize cognitive linguistic skills at this time  Swallow Information   Current Risks for Dysphagia & Aspiration General debilitation;New Neuro event;Cognitive deficit; Positionong Issues   Current Symptoms/Concerns Difficulty chewing;Decreased oral intake   Current Diet Dysphagia advance; Thin liquid   Baseline Diet Regular; Thin liquids   Consistencies Assessed and Performance   Materials Admnistered Soft/Level 3;Mechanical Soft/Level 2; Thin liquid   Oral Stage WFL; Mild impaired   Phargngeal Stage WFL   Swallow Mechanics Mild delayed;Swallow initation;Good Larygneal rise   Esophageal Concerns No s/s reported   Recommendations   Risk for Aspiration Mild   Recommendations Dysphagia treatment   Diet Solid Recommendation Level 3 Dysphagia/ advanced/ soft to chew   Diet Liquid Recommendation Thin liquid   Recommended Form of Meds Whole; With thin liquid; As desired; As tolerated   General Precautions Aspiration precautions; Feed only when alert;Minimize distractions;Upright as possible for all oral intake;Remain upright for 45 mins after meals; Supervision with meals  (distant supervision)   Compensatory Swallowing Strategies Alternate solids and liquids; External pacing   Results Reviewed with PT/Family/Caregiver   QI: Eating   Assistance Needed Verbal cues   Assistance Provided by [place] No physical assistance   Eating CARE Score 4   Swallow Assessment   Swallow Treatment Assessment Pt's wife and 2 daughters present for session  Pt was observed during lunch and was upright and OOB for meal  Pt able to self-feed entire meal  Currently on level 3 diet with thin liquids   Pt presented w/ trials of level 3 consistency (mac n cheese & chopped carrots) and thin liquids by cup  Pt consumed 25% of meal and 60cc of thin liquids  Noted slow intake of meal  Pt required coaxing and encouragement to continue eating throughout session  Pt presents w/ decreased appetite, w/ pt's wife noting common presence of decrease appetite when pt is having "episodes " Adequate lip seal w/ no anterior spillage was observed on all trials  Mild oral residual noted after trials of level 3 consistencies, w/ pt noted to independently use liquid wash which cleared residual  Pt w/ mildly prolonged mastication of level 3 consistencies  A-P transfer appeared mildly delayed on trials of level 3 consistency  Swallow initiation appeared grossly functional for all consistencies assessed  Hyolaryngeal elevation was [facility] for all trials observed  No overt s/s of aspiration were noted during meal  Due to continued s/s of oral dysphagia, recommend continue on level 3 diet w/ thin liquids at this time  Pt continues to benefit from skilled ST services to maximize tolerance of safest least restrictive diet at this time  Swallow Assessment Prognosis   Prognosis Good   Prognosis Considerations Age;Participation level;Previous level of function;Ability to carry over   SLP Therapy Minutes   SLP Time In 1130   SLP Time Out 1230   SLP Total Time (minutes) 60   SLP Mode of treatment - Individual (minutes) 0   SLP Mode of treatment - Concurrent (minutes) 60   SLP Mode of treatment - Group (minutes) 0   SLP Mode of treatment - Co-treat (minutes) 0   SLP Mode of Teatment - Total time(minutes) 60 minutes   Therapy Time missed   Time missed?  No   Daily FIM Score   Problem solving (FIM) 3 - Solves basic problmes 50-74% of time   Comprehension (FIM) 4 - Understands basic info/conversation 75-90% of time   Expression (FIM) 5 - Needs help/cues only RARELY (< 10% of the time)   Eating (FIM) 5 - Patient requires supervision, cueing or coaxing

## 2019-03-08 NOTE — PROGRESS NOTES
03/08/19 1230   Pain Assessment   Pain Assessment 0-10   Pain Score No Pain   Restrictions/Precautions   Precautions Aspiration;Bed/chair alarms; Fall Risk;Cognitive;Spinal precautions;Supervision on toilet/commode   Braces or Orthoses C/S Collar   Cognition   Overall Cognitive Status Impaired   Arousal/Participation Alert; Cooperative   Attention Attends with cues to redirect   Orientation Level Oriented to person;Oriented to place;Oriented to time   Memory Decreased short term memory;Decreased recall of precautions   Following Commands Follows one step commands with increased time or repetition   Subjective   Subjective pt reports that 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 a 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 growing up and feels bad he did not become one; reports no pain   QI: Sit to Stand   Assistance Needed Supervision   Sit to Stand CARE Score 4   QI: Chair/Bed-to-Chair Transfer   Assistance Needed Supervision   Comment CS   Chair/Bed-to-Chair Transfer CARE Score 4   Transfer Bed/Chair/Wheelchair   Limitations Noted In Balance; Endurance;UE Strength;LE Strength   Adaptive Equipment Roller Walker   Stand Pivot Supervision   Sit to Stand Supervision   Stand to W  JOSE Dayey, Chair, Wheelchair Transfer (FIM) 5 - Patient requires supervision/monitoring   QI: Walk 10 Feet   Assistance Needed Supervision   Comment CS   Walk 10 Feet CARE Score 4   QI: Walk 50 Feet with Two Turns   Assistance Needed Supervision   Comment CS   Walk 50 Feet with Two Turns CARE Score 4   QI: Walk 150 Feet   Assistance Needed Supervision   Comment CS   Walk 150 Feet CARE Score 4   Ambulation   Does the patient walk? 2  Yes   Primary Discharge Mode of Locomotion Walk   Walk Assist Level Supervision   Gait Pattern Inconsistant Shilpa; Forward Flexion;Narrow KERVIN; Wide KERVIN;Step through; Improper weight shift   Assist Device Roller Walker   Distance Walked (feet) 150 ft  (x4, 350x1)   Limitations Noted In Balance; Endurance; Heel Strike;Posture;Speed;Strength;Swing   Walking (FIM) 5 - Patient requires supervision/monitoring AND distance 150 feet or more, no rest   Wheelchair mobility   QI: Does the patient use a wheelchair? 0  No   QI: 4 Steps   Assistance Needed Incidental touching   4 Steps CARE Score 4   QI: 12 Steps   Assistance Needed Incidental touching   12 Steps CARE Score 4   Stairs   Type Stairs   # of Steps 12   Weight Bearing Precautions Fall Risk   Assist Devices Bilateral Rail   Findings reciprocal up and non-reciprocal down   Stairs (FIM) 4 - Patient requires steadying assist or light touching AND patient goes up and down full flight (12- 14 stairs)   QI: Toilet Transfer   Assistance Needed Supervision   Comment stood   Toilet Transfer CARE Score 4   Toilet Transfer   Findings stood   Toilet Transfer (FIM) 5 - Patient requires supervision/monitoring   Therapeutic Interventions   Strengthening LAQ, standing marches 2 5# CW 15x2; adduction yellow ball 20x2; abduction blue TB 5x2   Flexibility hamstring and gastroc 60"x2   Other Comments   Comments redness on underside of chin on L and R from ill-fitting collar, adjusted and nwo it fits properly to off-load pressure areas, NSG notified   Assessment   Treatment Assessment pt has good endurance and is able to increase amb distance with rests in between; pt is self-limiting at times but is able to improve with redirection; pt has flexion of the neck that limits his ability to look forward and looks down at the ground consistently; xfers are good but he needs 50% VC for hands on the chair before sitting to avoid a jarring motion when sitting without lowering himself, able to improve toward end of session; pt would benefit from increasing strength in BLE, RLE is weaker than LLE at this time; continue to increase endurance, strength, and balance for safe and functional mobility and activity; cotninue POC as per PT   Problem List Decreased strength;Decreased endurance;Decreased range of motion; Impaired balance;Decreased mobility; Decreased coordination; Impaired judgement;Decreased safety awareness;Decreased skin integrity;Orthopedic restrictions   Barriers to Discharge Inaccessible home environment;Decreased caregiver support   PT Barriers   Functional Limitation Walking;Transfers;Standing;Stair negotiation;Car transfers   Plan   Treatment/Interventions ADL retraining;Functional transfer training;LE strengthening/ROM; Elevations; Therapeutic exercise; Endurance training;Cognitive reorientation;Patient/family training;Equipment eval/education; Bed mobility;Gait training   Progress Progressing toward goals   Recommendation   Recommendation Home PT;Outpatient PT; Home with family support   Equipment Recommended Walker   PT Therapy Minutes   PT Time In 1230   PT Time Out 1400   PT Total Time (minutes) 90   PT Mode of treatment - Individual (minutes) 90   PT Mode of treatment - Concurrent (minutes) 0   PT Mode of treatment - Group (minutes) 0   PT Mode of treatment - Co-treat (minutes) 0   PT Mode of Teatment - Total time(minutes) 90 minutes   Therapy Time missed   Time missed?  No

## 2019-03-08 NOTE — PLAN OF CARE
Problem: SAFETY ADULT  Goal: Maintain or return mobility status to optimal level  Description  INTERVENTIONS:  - Assess patient's baseline mobility status (ambulation, transfers, stairs, etc )    - Identify cognitive and physical deficits and behaviors that affect mobility  - Identify mobility aids required to assist with transfers and/or ambulation (gait belt, sit-to-stand, lift, walker, cane, etc )  - [place] fall precautions as indicated by assessment  - Record patient progress and toleration of activity level on Mobility SBAR; progress patient to next Phase/Stage  - Instruct patient to call for assistance with activity based on assessment  - Request Rehabilitation consult to assist with strengthening/weightbearing, etc   Outcome: Progressing

## 2019-03-08 NOTE — PROGRESS NOTES
03/08/19 0800   Pain Assessment   Pain Assessment 0-10   Pain Score 4   Pain Type Acute pain   Pain Location Neck   Pain Orientation Posterior   Pain Radiating Towards shoulders   Pain Descriptors Discomfort;Aching; Sore   Pain Frequency Constant/continuous   Pain Onset Ongoing   Clinical Progression Not changed   Effect of Pain on Daily Activities limits dynamic reach   Hospital Pain Intervention(s) Repositioned;Rest;Distraction   Diversional Activities   (conversation and OT activity)   Response to Interventions tolerated session   Restrictions/Precautions   Precautions Bed/chair alarms;Cognitive; Fall Risk;Impulsive;Pain;Spinal precautions   Weight Bearing Restrictions No   ROM Restrictions Yes  (cerv spinal precautions)   Braces or Orthoses C/S Collar  (will req A from family for management at home)   Lifestyle   Autonomy "I deserve the pain because of the mistakes i've made" req MAX encouragement and emotional support   QI: Sit to Stand   Assistance Needed Incidental touching   Assistance Provided by [place] Less than 25%   Comment CGA and cuing for hand placement    Sit to Stand CARE Score 3   QI: Chair/Bed-to-Chair Transfer   Assistance Needed Physical assistance   Assistance Provided by [place] 25%-49%   Comment req cuing to keep RW close to body, turns quickly tripping over RW, and req cuing to complete turn to surface prior to sitting   Chair/Bed-to-Chair Transfer CARE Score 3   Transfer Bed/Chair/Wheelchair   Limitations Noted In Balance; Coordination;Confidence; Endurance;Problem Solving; Sequencing;LE Strength   Adaptive Equipment Roller [employer], Chair, Wheelchair Transfer (FIM) 3 - Patient completes 50 - 74% of all tasks   QI: [address] Needed Incidental touching   Assistance Provided by [place] Less than 25%   Comment CGA during urination in stance at toilet; pt able to perform CM over hips   [address] Score 3   Toileting   Able to [address] down yes, up yes  Able to Manage Clothing Closures Yes   Manage Hygiene Bladder   Limitations Noted In Balance;Problem Solving;ROM;Safety   Findings Pt edu on safety concerns with urinating in stance due to   Pt willing to attempt to urinate seated  Toileting (FIM) 4 - Patient requires steadying assist or light touching   QI: Toilet Transfer   Comment PT URINATED IN STANCE  Exercise Tools   Exercise Tools Yes   Other Exercise Tool 1 towel glides on table top to promote UE ROM and relieve discomfort and soreness in shoulders  Pt tolerated forward reach, Vs, R/L lateral slides and circles with no reported pain for 3x10  Cognition   Overall Cognitive Status Impaired   Arousal/Participation Alert; Cooperative   Attention Difficulty attending to directions  (Pt req constant redirection to limit intrusive thoughts)   Orientation Level Oriented X4   Memory Decreased short term memory;Decreased recall of precautions   Following Commands Follows one step commands with increased time or repetition   Comments SAFETY CUE CARDS: Pt engaged in task to determine level of awareness regarding safety and ability to problem solve  Pt required to sort safe v  unsafe and able to complete with 100% accuracy  Pt then engaged in conversation about risks of unsafe situation and how to fix unsafe situation  Pt overall able to report appropriate fixes  Pt would benefit from practice with safety awareness during functional ADL and IADL tasks to determine carryover and divided attention  Activity Tolerance   Activity Tolerance Patient tolerated treatment well   Assessment   Treatment Assessment Pt participated in skilled OT session focusing on functional transfers, functional cog with safety awareness, UE ROM/endurance, and toileting  Pt cont to req emotional support during tasks and responds well to positive reinforcement when he correctly answers a question or completes a task  Pt reports fear of trying new things or adapting activities   Pt would benefit from cont therapy focusing on leisure exploration, standing tolerance, dynamic balance with RW, and carryover of safety awareness during functional tasks  Cont with POC  Prognosis Good   Problem List Decreased strength;Decreased endurance;Decreased range of motion; Impaired balance;Decreased mobility; Decreased coordination; Impaired judgement;Decreased safety awareness;Decreased skin integrity;Orthopedic restrictions;Pain   Barriers to Discharge Inaccessible home environment;Decreased caregiver support   Plan   Treatment/Interventions ADL retraining;Functional transfer training; Therapeutic exercise; Endurance training;Cognitive reorientation;Patient/family training;Bed mobility   Progress Slow progress, cognitive deficits  (pt is self-limiting)   Recommendation   OT Discharge Recommendation 24 hour supervision/assist   OT Therapy Minutes   OT Time In 0800   OT Time Out 0900   OT Total Time (minutes) 60   OT Mode of treatment - Individual (minutes) 60   OT Mode of treatment - Concurrent (minutes) 0   OT Mode of treatment - Group (minutes) 0   OT Mode of treatment - Co-treat (minutes) 0   OT Mode of Teatment - Total time(minutes) 60 minutes   Therapy Time missed   Time missed?  No

## 2019-03-08 NOTE — PROGRESS NOTES
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  Initial DVT provoked due to knee surgery but PE not provoked  Family Medicine manages [facility] as OP and was on 5mg qd  Continue Coumadin = dose was skipped 3/5 since big jump in INR  Will change dosing to 2 5 mg MWF and 5mg T/TH/S/S since 5mg qd too much      BPH: Continue Flomax      Paranoid Schizophrenia: sees Dr Jd Farley as OP  Continue Risperdal, Luvox, Prolixin  Prolixin was added 3 weeks ago      Hx Diastolic CHF:  Controlled w/o diuretics; per PCPs note, he has chronic edema as OP but today not much by exam      Subjective: offers no complaints    ROS:   GI: denies abdominal pain, change bowel habits or reflux symptoms  Neuro: No new neurologic changes  Respiratory: No Cough, SOB  Cardiovascular: No CP, palpitations     Scheduled Meds:    Current Facility-Administered Medications:  acetaminophen 975 mg Oral [facility] Karon Richey MD   amantadine 100 mg Oral BID Karon Richey MD   amLODIPine 5 mg Oral Daily Karon Richey MD   bisacodyl 10 mg Rectal Daily PRN Hansel Correa DO   cholecalciferol 1,000 Units Oral Daily Karon Richey MD   docusate sodium 100 mg Oral BID Karon Richey MD   fluPHENAZine 5 mg Oral [facility] Karon Richey MD   fluvoxaMINE 50 mg Oral 5x Daily Kraon Richey MD   lidocaine 1 patch Topical Daily Karon Richey MD   losartan 50 mg Oral Daily Karon Richey MD   ondansetron 4 mg Oral Q8H PRN Karon Richey MD   polyethylene glycol 17 g Oral Daily PRN Karon Richey MD   risperiDONE 0 5 mg Oral BID PRN Karon Richey MD   risperiDONE 1 5 mg Oral Daily Jolynn Formosa Rabia Stover MD   risperiDONE 3 mg Oral HS Mickey Alonso MD   tamsulosin 0 4 mg Oral Daily With Chen Edwards MD   warfarin 5 mg Oral Daily (warfarin) Mickey Alonso MD       Labs:     Results from last 7 days   Lab Units 03/06/19  0508 03/05/19  0515   WBC Thousand/uL 5 82 7 05   HEMOGLOBIN g/dL 13 0 13 2   HEMATOCRIT % 40 8 41 4   PLATELETS Thousands/uL 149 158     Results from last 7 days   Lab Units 03/08/19  0732 03/06/19  0508  03/03/19  2258   SODIUM mmol/L 139 142   < >  --    POTASSIUM mmol/L 3 6 3 8   < >  --    CHLORIDE mmol/L 104 105   < >  --    CO2 mmol/L 32 32   < >  --    CO2, I-STAT mmol/L  --   --   --  32   BUN mg/dL 17 16   < >  --    CREATININE mg/dL 0 80 0 79   < >  --    GLUCOSE, ISTAT mg/dl  --   --   --  117   CALCIUM mg/dL 9 0 8 4   < >  --     < > = values in this interval not displayed  Results from last 7 days   Lab Units 03/08/19  0732 03/07/19  0642   INR  2 72* 2 37*            [unfilled]    Labs reviewed    Physical Examination:  Vitals:   Vitals:    03/07/19 1011 03/07/19 1350 03/07/19 2100 03/08/19 0726   BP: 122/71 111/64 140/73 123/67   BP Location: Right arm Right arm Right arm Right arm   Pulse: 95 94 92 94   Resp:  18 18 16   Temp:  98 2 °F (36 8 °C) (!) 97 4 °F (36 3 °C) 98 2 °F (36 8 °C)   TempSrc:  Oral Oral Oral   SpO2:  95% 95% 94%   Weight:       Height:         Constitutional:  NAD; pleasant; nontoxic  HEENT:  AT/NC; oropharynx negative for thrush on tongue   Neck: collar on  CV:  +S1, S2;  RRR; no rub/murmur  Pulmonary:  BBS without crackles/wheeze/rhonci; resp are unlabored  Abdominal:  soft, +BS, ND/NT  Skin:  no rashes  Musculoskeletal:  no LE edema  :  no sandhu  Neurological:  AAO;  BURGOS 5/5        [x ] Total time spent: 30 Mins and greater than 50% of this time was spent counseling/coordinating care  ** Please Note: Dragon 360 Dictation voice to text software may have been used in the creation of this document   **

## 2019-03-08 NOTE — PROGRESS NOTES
Physical Medicine and Rehabilitation Progress Note  Tahira Smith 68 y o  male MRN: [id number]  Unit/Bed#: -01 Encounter: [id number]    Chief Complaints:  Anxiety    Subjective/Interval Events:   Patient now back on full home dosing regimen  Patient reports still having significant anxiety and depression rated at 10/10 not consistent with flattened affect  He still perseverates on thinking he is going to die and going to hell but redirectable but needs frequent redirection  He denies actual S/I, plan, and hallucinations  He reports sleeping somewhat better last night  He denies headache, neck pain, nausea, lightheadedness, calf pain, SOB, weakness, or other complaints  ROS: A 10-point ROS was performed  Negative except as listed above  Overall Assessment/relevant history:  Tahira Smith  is a 68 y o  male with a past medical history schizophrenia, anxiety, depression, coronary artery disease, possible stroke, DVT/PE on chronic anticoagulation with Coumadin, obesity class 1, hypertension, urinary frequency on chronic Flomax, lightheadedness, occasional falls, memory impairment, left knee replacement, right total hip replacement, cervical laminectomy and fusion who fell backwards down stairs with brief few minutes loss of consciousness with neck and possible head trauma who was [facility] and then transferred to \[facility]"" on 3/3/19  CT head did not show acute findings, CT neck showed C5/C6 osteophyte fracture  Neurosurgery was consulted who recommended non operative management with cervical collar  Patient was complaining of some neck, back, and parasternal pain which were all believed to be related to trauma  Additional imaging of facial bones, thoracic and lumbar spine did not reveal acute fractures but did show chronic L3 compression fracture  Patient noted to have some increased difficulty sleeping and anxiety    Patient was evaluated by skilled therapies and was found to have significant decline in ADLs and ambulation appropriate for admission to [facility]      43-year-old male status post fall with brief loss of consciousness found to have C6-C7 vertebral osteophyte fracture and possible TBI presents with likely multifactorial imbalance possibly related to prior stroke, spinal stenosis, and now head injury, intermittent lightheadedness, acute on chronic cognitive deficits, suboptimal control of anxiety, depression, sleep, and schizophrenia with associated functional decline in ADLs and ambulation      Functional status (recent):    Transfers min-mod assist     Functional status on admission to [facility]:  PT: Transfers mod assist  OT: Dressing mod assist, Bathing, grooming min assist   ST: Problem solving and memory mod assist; comprehension min assist; expression supervision, social interaction supervision    Schizophrenia ([facility] [address] )  Assessment & Plan  >Stable but ongoing self report of significant anxiety and depression rated at 10/10 not consistent with flattened affect likely related schizophrenia; still perseverates on thinking he is going to die and going to hell; thinks his family would be better off with out him at times but redirectable; denies actual S/I and plan    Agrees with plan  >Sleeping somewhat better; Back on full home regimen now after being off of it for a few days  >Suspect acute worsening related to be off home regimen which suspect will improve with reintroduction of medications at appropriate dosing; however patient was having some increased paranoia and perseveration which started a few days prior to fall (3 weeks after switching from from Trifluoperazine to fluphenazine and Cogentin stopped; family states he has been trialed in past off Trifluoperazine and could not tolerate switch to other meds  >Monitor closely with nursing and staff; Neuropsych c/s and following;   >Recommend inpt psych c/s given rating/type of symptoms to assist with eval and optimal mgmt recs  >For now continue recently full resumed home regimen:   >Fluphenazine at home 5mg TID   >Risperdone back at home 1 5mg qAM and 3mg QHS    >Fluvoxamine 50mg 5x/day    >Amantadine at home 100mg BID   >Per outpt psych could increase fluphenazine to 5mg QID if needed > which I think would be reasonable next if needed  >If still fails consider obtaining and going back on Trifluoperazine but this was difficult due to decreased manufacturing apparently   >Some concern patient may have not been providing himself medications appropriately > recommend family training and assist c/ med mgmt    >Discussed with patient's outpt psych Dr Dolly Coley 3/6  Patient presented c/ fair amount of paranoid delusions, anxiety, difficulty sleeping with occasional panic attacks off home regimen since recent admission prior, with recent fall/hospitalization possible head injury  Medications reviewed with his outpt pharmacy and patient's outpatient psychiatrist Dr Dolly Coley  Patient recently should switched from Trifluoperazine to fluphenazine and Cogentin stopped  Patient/family noted some increased paranoid delusions starting a few days prior to fall  Home regimen per pharmacy:  Fluphenazine 5mg TID, Risperdal 1 5mg qday and 3mg QHS  Amantadine 100 mg b i d , Fluvoxamine 50mg 5x/day  Patient recently on much more modest dosing s/p recent possible BI         * Head injury, acute  Assessment & Plan  Fall with likely head trauma and brief loss of consciousness and acute osteophyte C6-C7 fracture  Mild increased confusion after fall also with some worsening mood/schizophrenia symptoms  >Recommend acute comprehensive interdisciplinary inpatient rehabilitation to include intensive skilled therapies (PT, OT) as outlined with oversight and management by rehabilitation physician as well as inpatient rehab level nursing, case management and weekly interdisciplinary team meetings     > overstimulation precautions, sleep-wake/agitation restlessness log  > optimize neuroleptic medication  > try to avoid sedative medications  > evaluate and manage fall risks          Cognitive impairment  Assessment & Plan  Likely premorbid; chronic generalized microvascular changes with possible old right lacunar internal capsule infarct and possible right frontal WM   >speech eval in management  >Follow-up with Neurology after discharge  >On chronic home amantadine     Fracture of sixth cervical vertebra (HCC)  Assessment & Plan  C6/C7 osteophyte fracture context of recent fall and history of cervical decompression and fusion  Treatment recommendations per Neurosurgery  Non operative; continue cervical collar per Neurosurgery  Follow up with Neurosurgery after discharge    History of stroke  Assessment & Plan  Patient/family deny although evidence of possible old R lacunar infarct and focus in R frontal WM along with generalized micoangiopathic changes  >Recommend optimal BP mgmt  >Follow-up with neuro after d/c   >On full A/C for hx of VTE    Lightheadedness  Assessment & Plan  Chronic intermittent; consider med adjustments  Orthostatics negative recently; if necessary repeat particularly with adjustments in meds   PRN abdominal binder and EHSAN hose      History of pulmonary embolism  Assessment & Plan  And DVT on chronic anticoagulation with Coumadin  Medicine consult to manage during acute rehab course      Pain  Assessment & Plan  Currently fairly well controlled  Continue scheduled Tylenol for now  Lidoderm patch  Avoid NSAIDs and avoid opiates     Class 1 obesity due to excess calories without serious comorbidity with body mass index (BMI) of 34 0 to 34 9 in adult  Assessment & Plan   on appropriate nutrition and activity  Adjustments accordingly during skilled therapy and with rehab nursing  Monitor skin closely for breakdown, wounds, rashes; keep clean and dry, turning Q2H   Follow-up with PCP after d/c      Chronic diastolic heart failure (HCC)  Assessment & Plan  Mgmt per IM     Chronic venous stasis dermatitis of both lower extremities  Assessment & Plan  Internal medicine consult to assist with management  Elevate legs when in bed and when in chair for prolonged periods of time  Consider Wilver veronica    Coronary artery disease involving native coronary artery of native heart without angina pectoris  Assessment & Plan  On chronic Coumadin  Optimal blood pressure control  Follow-up with PCP    Benign essential hypertension  Assessment & Plan  Medicine c/s to assist with mgmt  Amlodipine 5 mg daily, losartan 50 mg daily  Monitor blood pressure and orthostatics    # Bowel care:    - monitor for constipation, incontinence, and diarrhea  - goal 1 appropriate BM every 1-2 days  - recommend colace +/- senna and PRN bowel protocol     # Bladder care: On chronic Flomax;  - monitor for retention, incontinence, signs/symptoms of UTI  - recommend voiding trial; nursing prompt to void followed by bladder scan starting Q4-6H or after each patient initiated void; nursing to record voided output and bladder scan totals; straight cath PRN >350-400 cc; if post void residual bladder scans are <150 cc x3 consecutive voids, can stop scans      # Skin:   - Nursing to turn patient Q2H if not adequately ambulatory, monitor for skin breakdown, rashes, and wounds if applicable     # At risk for venous thromboembolism:  - Recommend SCDs and mobilization  - warfarin     # Diet/Hydration:    Dysphagia 3 thin - recommend speech therapy     Disposition:   Home with estimated length of stay of 10-14 days from admission     Follow-up:   PCP, Psychiatry, rehab, Neurosurgery, Neurology     CODE: Level 1: Full Code     Restrictions include: Fall precautions         ---------------------------------------------------------------------------------------------------------------------    Objective:     Allergies and Medications per EMR    Physical Exam:  Vitals:    03/08/19 1009   BP: 148/78   Pulse:    Resp:    Temp:    SpO2:      General: Awake, alert in NAD  HENT:  MMM, cervical collar in place   Respiratory: Unlabored breathing, breath sounds equal, Lungs CTA, no wheezes, rales, or rhonchi  Cardiovascular: Regular rate and rhythm, no murmurs, rubs, or gallops  Gastrointestinal: Soft, non-tender, non-distended, normoactive bowel sounds  Genitourinary: No sandhu  SkiN/MSK/Extremities:  No calf edema or calf tenderness to palpation  Neurologic/Psych:   MENTAL STATUS: at recent baseline, orientation intact  Affect: Flattened    Diagnostic Studies: reviewed, no new imaging  No results found      Laboratory:    Results from last 7 days   Lab Units 03/06/19  0508 03/05/19  0515 03/03/19  2258 03/03/19  2256   HEMOGLOBIN g/dL 13 0 13 2  --  15 0   I STAT HEMOGLOBIN g/dl  --   --  16 3  --    HEMATOCRIT % 40 8 41 4  --  46 5   HEMATOCRIT, ISTAT %  --   --  48  --    WBC Thousand/uL 5 82 7 05  --  5 61     Results from last 7 days   Lab Units 03/08/19  0732 03/06/19  0508 03/05/19  0515 03/03/19  2258 03/03/19  2256   BUN mg/dL 17 16 13  --  14   POTASSIUM mmol/L 3 6 3 8 3 3*  --  3 5   CHLORIDE mmol/L 104 105 105  --  103   GLUCOSE, ISTAT mg/dl  --   --   --  117  --    CREATININE mg/dL 0 80 0 79 0 73  --  0 93   AST U/L  --   --   --   --  39   ALT U/L  --   --   --   --  41     Results from last 7 days   Lab Units 03/08/19  0732 03/07/19  0642 03/06/19  0508   PROTIME seconds 28 9* 26 0* 28 3*   INR  2 72* 2 37* 2 65*        Patient Active Problem List   Diagnosis    Benign essential hypertension    Coronary artery disease involving native coronary artery of native heart without angina pectoris    Chronic depression    Chronic venous stasis dermatitis of both lower extremities    Deep venous thrombosis of lower extremity (HCC)    Chronic diastolic heart failure (HCC)    Osteoarthritis    Nocturnal enuresis    Idiopathic trigeminal neuralgia    Lacunar infarction    Class 1 obesity due to excess calories without serious comorbidity with body mass index (BMI) of 34 0 to 34 9 in adult    Schizophrenia (HCC)    Disability of walking    Pulmonary embolism (HCC)    Arthropathy    Mixed hyperlipidemia    Neurogenic claudication    Neck pain without injury    Abdominal wall hematoma    Spinal stenosis of lumbar region with neurogenic claudication    Lipoma of colon    Fracture of sixth cervical vertebra (HCC)    Rib pain on left side    Fall    Forgetfulness    Physical deconditioning    Head injury, acute    Lightheadedness    Pain    History of pulmonary embolism    Cognitive impairment    History of stroke       ** Please Note: Fluency Direct voice to text software may have been used in the creation of this document  **    Total visit time:  At least 25 minutes, with more than 50% spent counseling/coordinating care    Bita Adler MD, [facility]  Physical Medicine and Rehabilitation  Brain Injury Medicine

## 2019-03-09 LAB
INR PPP: 3.33 (ref 0.86–1.17)
PROTHROMBIN TIME: 33.8 SECONDS (ref 11.8–14.2)

## 2019-03-09 PROCEDURE — G0515 COGNITIVE SKILLS DEVELOPMENT: HCPCS

## 2019-03-09 PROCEDURE — 97110 THERAPEUTIC EXERCISES: CPT

## 2019-03-09 PROCEDURE — 97530 THERAPEUTIC ACTIVITIES: CPT

## 2019-03-09 PROCEDURE — 97535 SELF CARE MNGMENT TRAINING: CPT

## 2019-03-09 PROCEDURE — 92526 ORAL FUNCTION THERAPY: CPT

## 2019-03-09 PROCEDURE — 99221 1ST HOSP IP/OBS SF/LOW 40: CPT | Performed by: PSYCHIATRY & NEUROLOGY

## 2019-03-09 PROCEDURE — 85610 PROTHROMBIN TIME: CPT | Performed by: NURSE PRACTITIONER

## 2019-03-09 RX ADMIN — AMANTADINE HYDROCHLORIDE 100 MG: 100 CAPSULE, LIQUID FILLED ORAL at 09:39

## 2019-03-09 RX ADMIN — FLUVOXAMINE MALEATE 50 MG: 50 TABLET, FILM COATED ORAL at 06:05

## 2019-03-09 RX ADMIN — ACETAMINOPHEN 975 MG: 325 TABLET, FILM COATED ORAL at 13:58

## 2019-03-09 RX ADMIN — RISPERIDONE 3 MG: 3 TABLET ORAL at 21:10

## 2019-03-09 RX ADMIN — FLUVOXAMINE MALEATE 50 MG: 50 TABLET, FILM COATED ORAL at 11:41

## 2019-03-09 RX ADMIN — FLUPHENAZINE HYDROCHLORIDE 5 MG: 2.5 TABLET, FILM COATED ORAL at 21:10

## 2019-03-09 RX ADMIN — AMLODIPINE BESYLATE 5 MG: 5 TABLET ORAL at 09:36

## 2019-03-09 RX ADMIN — DOCUSATE SODIUM 100 MG: 100 CAPSULE, LIQUID FILLED ORAL at 09:36

## 2019-03-09 RX ADMIN — RISPERIDONE 1.5 MG: 3 TABLET ORAL at 09:38

## 2019-03-09 RX ADMIN — Medication 1 CAPSULE: at 17:03

## 2019-03-09 RX ADMIN — ACETAMINOPHEN 975 MG: 325 TABLET, FILM COATED ORAL at 06:04

## 2019-03-09 RX ADMIN — VITAMIN D, TAB 1000IU (100/BT) 2000 UNITS: 25 TAB at 09:35

## 2019-03-09 RX ADMIN — FLUPHENAZINE HYDROCHLORIDE 5 MG: 2.5 TABLET, FILM COATED ORAL at 06:04

## 2019-03-09 RX ADMIN — FLUPHENAZINE HYDROCHLORIDE 5 MG: 2.5 TABLET, FILM COATED ORAL at 15:33

## 2019-03-09 RX ADMIN — DOCUSATE SODIUM 100 MG: 100 CAPSULE, LIQUID FILLED ORAL at 17:03

## 2019-03-09 RX ADMIN — FLUVOXAMINE MALEATE 50 MG: 50 TABLET, FILM COATED ORAL at 17:04

## 2019-03-09 RX ADMIN — FLUVOXAMINE MALEATE 50 MG: 50 TABLET, FILM COATED ORAL at 21:11

## 2019-03-09 RX ADMIN — LIDOCAINE 1 PATCH: 50 PATCH CUTANEOUS at 09:37

## 2019-03-09 RX ADMIN — FLUVOXAMINE MALEATE 50 MG: 50 TABLET, FILM COATED ORAL at 15:32

## 2019-03-09 RX ADMIN — ACETAMINOPHEN 975 MG: 325 TABLET, FILM COATED ORAL at 21:11

## 2019-03-09 RX ADMIN — LOSARTAN POTASSIUM 50 MG: 50 TABLET, FILM COATED ORAL at 09:36

## 2019-03-09 RX ADMIN — TAMSULOSIN HYDROCHLORIDE 0.4 MG: 0.4 CAPSULE ORAL at 17:03

## 2019-03-09 RX ADMIN — AMANTADINE HYDROCHLORIDE 100 MG: 100 CAPSULE, LIQUID FILLED ORAL at 17:04

## 2019-03-09 NOTE — PROGRESS NOTES
Pt awake, cooperative  Slight jumbled thoughts  States,"Aminah not changed in 73 years"; and "Aminah lost everything"  Reminded pt of family visit /celebration yesterday  Pt needs much reinforcement

## 2019-03-09 NOTE — PROGRESS NOTES
03/09/19 0830   Pain Assessment   Pain Assessment No/denies pain   Restrictions/Precautions   Precautions Aspiration;Bed/chair alarms;Cognitive; Fall Risk;Impulsive;Spinal precautions;Supervision on toilet/commode   ROM Restrictions Yes   Braces or Orthoses C/S Collar   Executive Function Skills   Problem Solving X   Simple Functional Tasks Moderate   Money Management Minimal   Insight Mild insight   Impulsive Mildly impulsive   Organization Moderately disorganized   Memory Skills   Orientation Level Oriented to person;Oriented to place;Oriented to situation   Short Term Recent Recall Moderate Impairment   Memory (FIM) 3 - Recognizes, recalls/performs 50-74%   Social Interaction (FIM) 4 - Interacts 75-89% of time   Speech/Language/Cognition Assessmetn   Treatment Assessment Pt seen for f/u ST w/initial presentation of 4-word sets targeting auditory STM and categorization skills w/accurate recall of 14/20 words across 5 sets w/improvement to 18/20 given verbal cues  He also ID'd the correct word given word order f/u ?' for 2/5 sets w/2nd try for 1 set and improvement to 3/5 given repetition of words  He then engaged in [employer] task of indicating # of denominations of each dollar or coin for 5 set amounts w/4/5 sets completed accurately and improvement to 5/5 given verbal prompt  It should be noted that self corrections noted for 3/5 sets w/some mild impulsvity w/responses  Session concluded w/verbal problem solving task during which pt provided appropriate causes to problematic situations for 3/5 sets and solutions for 4 5/5 sets  Pt tangential throughout session referencing G*d, shame, guilt and even the weather w/frequent prompts to remain on task  This was also noted during breakfast w/increased encouragement/prompoting needed for pt to eat/drink  Further skilled ST intervention warranted at this time     Swallow Information   Current Risks for Dysphagia & Aspiration General debilitation;New Neuro event;Cognitive deficit;Cervical spine injury/surgery; Positionong Issues   Current Symptoms/Concerns Difficulty chewing;Decreased oral intake   Current Diet Dysphagia advance; Thin liquid   Baseline Diet Regular; Thin liquids   Consistencies Assessed and Performance   Materials Admnistered Regular/Solid; Thin liquid   Oral Stage WFL; Mild impaired; With limited amount   Phargngeal Stage WFL   Swallow Mechanics Mild delayed;Swallow initation;Good Larygneal rise   Esophageal Concerns No s/s reported   Recommendations   Risk for Aspiration None   Recommendations Consider oral diet; Dysphagia treatment   Diet Solid Recommendation Level 3 Dysphagia/ advanced/ soft to chew   Diet Liquid Recommendation Thin liquid   Recommended Form of Meds Whole; As desired; As tolerated   General Precautions Upright as possible for all oral intake;Remain upright for 45 mins after meals   Compensatory Swallowing Strategies Alternate solids and liquids   Results Reviewed with RN;PT/Family/Caregiver   QI: [address] Provided by [place] No physical assistance   Eating CARE Score 6   Swallow Assessment   Swallow Treatment Assessment Pt assessed at breakfast w/regular/thin liquid tray of bagel (3bites), fresh fruit cup (2 pieces melon), and thin hot tea (90cc)  Pt needed max encouragement just to eat small amount of food as he perseverated on his Buddhism/beliefs and the world (most of the statements were non-sensical)  Pt was independent w/self feeding w/ability to alyson pices of fruit into smaller, bite size pieces  He did take larger bites of bagel 2* endentulism, but despite prolonged mastication, it was fully functional for bolus formation  AP transfer appeared mildly delayed w/bagel and fruit and swallow initiation appeared mildly delayed-prompt  Swallow of thins was also prompt  No overt s/sx penetration/aspiration noted w/any consistencies   Suspect pt would do well w/a regular/thin liquid diet, but would like to f/u w/additional trial 2* limited PO intake this session before any recommendations made  Pt therefore to remain on a level 3/thin liquid diet w/ f/u by SLP  Swallow Assessment Prognosis   Prognosis Good   Prognosis Considerations Age; Medical diagnosis; Medical prognosis; Potential;Severity of impairments;New learning ability;Ability to carry over   SLP Therapy Minutes   SLP Time In 0830   SLP Time Out 0930   SLP Total Time (minutes) 60   SLP Mode of treatment - Individual (minutes) 60   SLP Mode of treatment - Concurrent (minutes) 0   SLP Mode of treatment - Group (minutes) 0   SLP Mode of treatment - Co-treat (minutes) 0   SLP Mode of Teatment - Total time(minutes) 60 minutes   Therapy Time missed   Time missed?  No   Daily FIM Score   Problem solving (FIM) 3 - Solves basic problmes 50-74% of time   Comprehension (FIM) 4 - Understands basic info/conversation 75-90% of time   Expression (FIM) 5 - Needs help/cues only RARELY (< 10% of the time)   Eating (FIM) 5 - Patient requires supervision, cueing or coaxing

## 2019-03-09 NOTE — PLAN OF CARE
Problem: Potential for Falls  Goal: Patient will remain free of falls  Description  INTERVENTIONS:  - Assess patient frequently for physical needs  -  Identify cognitive and physical deficits and behaviors that affect risk of falls    -  [place] fall precautions as indicated by assessment   - Educate patient/family on patient safety including physical limitations  - Instruct patient to call for assistance with activity based on assessment  - Modify environment to reduce risk of injury  - Consider OT/PT consult to assist with strengthening/mobility  Outcome: Progressing

## 2019-03-09 NOTE — PROGRESS NOTES
03/09/19 1400   Pain Assessment   Pain Assessment 0-10   Pain Score 2   Pain Type Acute pain   Pain Location Neck   Pain Orientation Posterior   Pain Descriptors Sore   Pain Frequency Intermittent   Clinical Progression Gradually improving   Hospital Pain Intervention(s) Repositioned   Response to Interventions decreased   Restrictions/Precautions   Precautions Aspiration; Fall Risk;Bed/chair alarms;Cognitive;Supervision on toilet/commode;Spinal precautions   Braces or Orthoses C/S Collar   Cognition   Overall Cognitive Status Impaired   Arousal/Participation Alert; Cooperative   Attention Attends with cues to redirect   Orientation Level Oriented to person;Oriented to place;Oriented to time   Memory Decreased short term memory;Decreased recall of precautions   Following Commands Follows one step commands with increased time or repetition   Subjective   Subjective reports he is feeling better each day   QI: Roll Left and Right   Assistance Needed Supervision   Roll Left and Right CARE Score 4   QI: Sit to Lying   Assistance Needed Supervision   Sit to Lying CARE Score 4   QI: Lying to Sitting on Side of Bed   Assistance Needed Supervision   Lying to Sitting on Side of Bed CARE Score 4   QI: Sit to Stand   Assistance Needed Supervision   Sit to Stand CARE Score 4   QI: Chair/Bed-to-Chair Transfer   Assistance Needed Supervision   Chair/Bed-to-Chair Transfer CARE Score 4   Transfer Bed/Chair/Wheelchair   Limitations Noted In Balance;UE Strength;LE Strength   Stand Pivot Supervision   Sit to [address] to Sit Supervision   Supine to Sit Supervision   Sit to Supine Supervision   Bed, Chair, Wheelchair Transfer (FIM) 5 - Patient requires supervision/monitoring   QI: [address] 10 Feet CARE Score 4   QI: Walk 50 Feet with Two [address] 50 Feet with Two Turns CARE Score 4   QI: Walk [address] [facility] 150 Feet CARE Score 4   Ambulation   Does the patient walk? 2  Yes   Primary Discharge Mode of Locomotion Walk   Walk Assist Level Supervision   Gait Pattern Inconsistant Shilpa; Slow Shilpa; Forward Flexion;Narrow KERVIN;Step through   Assist Device Roller Walker   Distance Walked (feet) 350 ft  (x4)   Limitations Noted In Balance; Endurance;Posture;Speed;Strength;Swing   Walking (FIM) 5 - Patient requires supervision/monitoring AND distance 150 feet or more, no rest   Wheelchair mobility   QI: Does the patient use a wheelchair? 0  No   Therapeutic Interventions   Strengthening LAQ, standing marches 3# CW 15x2; adduction yellow ball 20x2; abduction blue TB 15x2; STS 10x1   Flexibility hamstring and gastroc 60"x2   Balance short distance amb with NBQC 10'x1   Equipment Use   NuStep 10 mins BLE only level 2   Assessment   Treatment Assessment pt continues to do well with endurance and strength in BLE; balance is slightly impaired witho no UE support; RW is good for stability at this time; pt does not always reach for chair before sitting but he does not come down fast; bed mobility is good with mat table raised up and uses the log roll technique; pt would benefit from increasing balance with no UE support, strength, and endurance for safe and functional mobility and to reduce fall risk; continue POC as per PT   Problem List Decreased strength;Decreased range of motion;Decreased endurance; Impaired balance;Decreased coordination;Decreased cognition; Impaired judgement;Decreased safety awareness;Decreased skin integrity;Orthopedic restrictions   Barriers to Discharge Inaccessible home environment;Decreased caregiver support   PT Barriers   Functional Limitation Walking;Transfers;Standing;Stair negotiation;Car transfers   Plan   Treatment/Interventions ADL retraining;Functional transfer training;LE strengthening/ROM; Elevations; Therapeutic exercise; Endurance training;Cognitive reorientation;Patient/family training;Equipment eval/education; Bed mobility;Gait training   Progress Progressing toward goals   Recommendation   Recommendation Home PT;Outpatient PT; Home with family support   Equipment Recommended Walker   PT Therapy Minutes   PT Time In 1400   PT Time Out 1530   PT Total Time (minutes) 90   PT Mode of treatment - Individual (minutes) 90   PT Mode of treatment - Concurrent (minutes) 0   PT Mode of treatment - Group (minutes) 0   PT Mode of treatment - Co-treat (minutes) 0   PT Mode of Teatment - Total time(minutes) 90 minutes   Therapy Time missed   Time missed?  No

## 2019-03-09 NOTE — PROGRESS NOTES
03/09/19 1000   Pain Assessment   Pain Assessment 0-10   Pain Score 5   Pain Type Acute pain   Pain Location Neck   Pain Orientation Bilateral;Left   Pain Radiating Towards shoulders/ears at times   Pain Descriptors Aching; Other (Comment)  (sharp at times)   Pain Frequency Intermittent   Pain Onset Gradual   Clinical Progression Not changed   Effect of Pain on Daily Activities limited AROM in UE   Patient's Stated Pain Goal No pain   Hospital Pain Intervention(s) Repositioned; Ambulation/increased activity; Rest   Response to Interventions tolerated Tx   Restrictions/Precautions   Precautions Aspiration;Bed/chair alarms; Fall Risk;Spinal precautions;Cognitive;Supervision on toilet/commode   ROM Restrictions Yes  (spinal precautions)   Braces or Orthoses C/S Collar   Lifestyle   Autonomy "Is it bad to want to be alone, away from people, but also to want to be with people at the same time"  "Sometimes I think my family would be better off without me"  Reciprocal Relationships supportive DTR present at end of session   QI: Sit to [address] Provided by [place] No physical assistance   Sit to Stand CARE Score 4   QI: Chair/Bed-to-Chair Transfer   Assistance Needed Incidental touching   Assistance Provided by [place] Less than 25%   Comment CG   Chair/Bed-to-Chair Transfer CARE Score 3   Transfer Bed/Chair/Wheelchair   Limitations Noted In Balance; Coordination; Endurance;LE Strength   Adaptive Equipment Roller Walker   Stand Pivot Contact Guard   Sit to Stand Supervision   Stand to Sit Supervision   Findings Pt requires VC to look up and for proper hand placement during fxnl transfers  Pt requires VC to control lower stand>sit  Pt has constant flexion in neck and benefits from VC to look up/straight ahead as much as pt tolerates  Pt also benefits from VC to  RW to improve overall posture and pt safety      Bed, Chair, Wheelchair Transfer (FIM) 4 - Patient requires steadying assist or light touching   QI: [address] Provided by [place] No physical assistance   Comment CS   Toileting Hygiene CARE Score 4   Toileting   Able to [address] down yes, up yes  Able to Manage Clothing Closures Yes   Manage Hygiene Bladder   Limitations Noted In Balance;LE Strength;Problem Solving   Findings CS in stance for CM over hips, pt seated to urinate  Toileting (FIM) 5 - Patient requires supervision/monitoring   QI: Toilet Transfer   Assistance Needed Incidental touching   Assistance Provided by [place] Less than 25%   Comment CG    Toilet Transfer CARE Score 3   Toilet Transfer   Surface Assessed Raised Toilet   Transfer Technique Standard   Limitations Noted In Balance;Problem Solving; Safety;LE Strength   Adaptive Equipment Grab Bar   Findings Pt CG w/ RW and VC for safe hand placement on GB and body positioning for safe transfer  Toilet Transfer (FIM) 4 - Patient requires steadying assist or light touching   Therapeutic Exercise - ROM   UE-ROM Yes   ROM- Right Upper Extremities   R Shoulder AROM; Prolonged stretch; Flexion;ABduction   R Position Standing   R Weight/Reps/Sets 3x10 shoulder rolls, scapular retraction, and fwd chest press w/ no resistance   RUE ROM Comment Pt CG/CS in stance to complete non-resistant b/l UE AROM within spinal precautions to promote improved posture, dec pain, and gentle strengthening to b/l UE  Pt requires visual demo and frequent VC to follow exercise instruction  Pt able to tolerate standing for all exercises and required only one seated break to manage fatigue  OT provided edu on why posture and shoulder relaxation is essential for dec pain and overall improved fxn  Pt has no c/o inc pain during exercises  ROM - Left Upper Extremities    LUE ROM Comment See RUE for detail  Cognition   Overall Cognitive Status Impaired   Arousal/Participation Alert; Cooperative   Attention Attends with cues to redirect Orientation Level Oriented to person;Oriented to place;Oriented to time   Memory Decreased short term memory;Decreased recall of precautions   Following Commands Follows one step commands with increased time or repetition   Comments Pt tangential and easily distractible, requires redirection to focus on therapy tasks and max encouragment/emotional support  Pt states "I've made a lot of mistakes, I'm learning a lot about myself here, there's a lot of things I wish I didn't do or I could change", however pt could not be more specific when prompted  Additional Activities   Additional Activities Comments Pt engaged in retrieving 8 cones from overhead cabinets w/ focus on fxnl reach, body mechanics, and endurance  Pt able to retrieve all 8 cones without a rest break  Pt demo fair carryover for OT instruction on body mechanics and would cont to benefit from further edu to inc pt indep w/ safety, fxnl reach, and kitchen mobility at  level  Activity Tolerance   Activity Tolerance Patient tolerated treatment well   Assessment   Treatment Assessment Pt participated in skilled OT Tx session w/ focus on stand tolerance, UE AROM/stretching/light strengthening, fxnl reach, endurance, and body mechanics  See above note for activity detail  Pt reports wanting to read paper or books with inc ease and less strain on b/l UE, OT attempted to have pt place elbows on table top while reading in effort to reduce strain on neck and not have pt holding arms up, however pt demo difficulty with this task  Pt would benefit from OT f/u for additional recommendations for reading newspaper/books w/ less pain to promote leisure participation  Cont OT POC w/ focus on fxnl cognition, kitchen mobility, stand tolerance, safety awareness, endurance, and dynamic standing balance to maximize pt independence w/ ADLs/IADLs and all fxnl transfers      Prognosis Good   Problem List Decreased strength;Decreased range of motion;Decreased endurance; Impaired balance;Decreased coordination;Decreased cognition; Impaired judgement;Decreased safety awareness;Decreased skin integrity;Orthopedic restrictions   Barriers to Discharge Inaccessible home environment;Decreased caregiver support   Plan   Treatment/Interventions ADL retraining;Functional transfer training;LE strengthening/ROM; Therapeutic exercise; Endurance training;Cognitive reorientation; Compensatory technique education   Progress Progressing toward goals   Recommendation   OT Discharge Recommendation 24 hour supervision/assist   OT Therapy Minutes   OT Time In 1000   OT Time Out 1130   OT Total Time (minutes) 90   OT Mode of treatment - Individual (minutes) 90   OT Mode of treatment - Concurrent (minutes) 0   OT Mode of treatment - Group (minutes) 0   OT Mode of treatment - Co-treat (minutes) 0   OT Mode of Teatment - Total time(minutes) 90 minutes   Therapy Time missed   Time missed?  No

## 2019-03-09 NOTE — CONSULTS
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  F29 psychotic disorder not otherwise specified  PLAN:   1) psych consult was requested as patient is reporting to be very depressed with this writer bed talked to patient patient talked at length and said that he feels very negative and he needs to talk to a counselor  2) please help patient obtain a counseling appointment once he is ready to be discharged  3) patient is also requesting talk to a    4) continue patient on his current psychotropic medication  Chief Complaint: " I feel very negative and depressed   "    History of Present Illness: This is a 78-year-old  male who was admitted with a history of subsequent falls and now he is in a rehab center during his admission patient reported feeling very sad and thoughts of not  He is not exactly suicidal but has expressed that he has no will to live  He expressed that he is very negative he has done a lot of strong and he knows that lot would not forgive him  He was seen by a psychologist Xanax but the similar concerns  When this writer talked to him with talked at length that how he feels negative and he wants to continue seeing a counselor  He is currently seeing Dr Mack Hendrickson in a month for medication  He denies any hallucinations delusions denied at least and any active suicidal ideas but says that he does not care if he lives or die  He is sleeping okay        PAST PSYCH HISTORY:     Denied any previous suicide attempts or inpatient psychiatric hospitalization sees Dr Mack Hendrickson an on medications that include Risperdal, Luvox, Prolixin    Substance Abuse History:    Denied    Family Psychiatric History:     Not significant  Social History:  Social History     Socioeconomic History    Marital status: /Civil Union     Spouse name: Not on file    Number of children: 3    Years of education: Not on file    Highest education level: Not on file   Occupational History    Not on file   Social Needs    Financial resource strain: Not on file    Food insecurity:     Worry: Not on file     Inability: Not on file    Transportation needs:     Medical: Not on file     Non-medical: Not on file   Tobacco Use    Smoking status: Never Smoker    Smokeless tobacco: Never Used    Tobacco comment: no passive smoke exposure   Substance and Sexual Activity    Alcohol use: No     Frequency: Never     Binge frequency: Never    Drug use: No    Sexual activity: Not on file   Lifestyle    Physical activity:     Days per week: Not on file     Minutes per session: Not on file    Stress: Not on file   Relationships    Social connections:     Talks on phone: Not on file     Gets together: Not on file     Attends Buddhism service: Not on file     Active member of club or organization: Not on file     Attends meetings of clubs or organizations: Not on file     Relationship status: Not on file    Intimate partner violence:     Fear of current or ex partner: Not on file     Emotionally abused: Not on file     Physically abused: Not on file     Forced sexual activity: Not on file   Other Topics Concern    Not on file   Social History Narrative    Not on file         Traumatic History:   Abuse: None  Other Traumatic Events: None      Past Medical History:   Diagnosis Date    Ankle edema 04/2004    chronic    Arthritis 03/16/2011    right knee    Ataxic gait 11/14/2011    Baker's cyst 04/2005    (djd)-left knee    Cardiac disease     Cataract     Chronic pain     Coronary artery disease     Depression     DVT (deep venous thrombosis) (HCC)     Epistaxis 01/09/2015    Forgetfulness 3/4/2019    Hand tingling 02/05/2010    tingling right forearm and hand-CTS on EMG    Hematuria 11/22/2016    ER-3 way sandhu to irrigate bladder with 3 L NS    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    Hypertension     Lipoma 01/11/2011    in hepatic flexure    Migraine headache 02/2007    optic    Obesity     Pulmonary embolism ([facility] [address] ) 09/18/2014    bilateral PE + acute DVT LLE     Rib fracture 03/2007    left 8th rib 2nd degree fall    Schizophrenia ([facility] [address] ) 04/2009    acute exacerbation    Sciatic leg pain 07/20/2011    left    Stenosis of cervix     with cervical radiculomyelopathy    Suicidal ideation 04/2009    Syncope 01/09/2015    Tardive dyskinesia 02/28/2011    Torn meniscus 04/2004    lateral, knee, left    Varicose vein of leg 04/2004    Venous insufficiency of leg 04/2004    chronic    Vertigo 05/12/2008    transient-questionable BPPV       Scheduled Meds:  Current Facility-Administered Medications:  acetaminophen 975 mg Oral [facility] Herve Maloney MD   amantadine 100 mg Oral BID Herve Maloney MD   amLODIPine 5 mg Oral Daily Herve Maloney MD   b complex-vitamin C-folic acid 1 capsule Oral Daily With Tammi Mendoza MD   bisacodyl 10 mg Rectal Daily PRN Melani Cherry DO   cholecalciferol 2,000 Units Oral Daily Herve Maloney MD   docusate sodium 100 mg Oral BID Herve Maloney MD   fluPHENAZine 5 mg Oral [facility] Herve Maloney MD   fluvoxaMINE 50 mg Oral 5x Daily Herve Maloney MD   lidocaine 1 patch Topical Daily Herve Maloney MD   losartan 50 mg Oral Daily Herve Maloney MD   ondansetron 4 mg Oral Q8H PRN Herve Maloney MD   polyethylene glycol 17 g Oral Daily PRN Herve Maloney MD   risperiDONE 0 5 mg Oral BID PRN Herve Maloney MD   risperiDONE 1 5 mg Oral Daily Herve Maloney MD   risperiDONE 3 mg Oral HS Herve Maloney MD   tamsulosin 0 4 mg Oral Daily With Tammi Mendoza MD   warfarin 2 5 mg Oral Once per day on Mon Wed Fri MATY De     Continuous Infusions:   PRN Meds: bisacodyl    ondansetron    polyethylene glycol    risperiDONE     Allergies   Allergen Reactions    Lisinopril Cough       Vitals:    03/09/19 0810 03/09/19 0815 03/09/19 0935 03/09/19 1415   BP: 131/66 134/66 123/63 128/65   BP Location: Left arm Left arm  Left arm   Pulse:    86   Resp:    20   Temp:    98 1 °F (36 7 °C)   TempSrc:    Oral   SpO2: 95% 95%  94%   Weight:       Height:           Medical Review Of Systems:  All 12 point review of system is normal except for what is mention in medical hisotry  Mental Status Evaluation:  Appearance:  Patient appeared older than his age was sitting in the chair wearing a neck collar  Behavior:   behavior was cooperative    Speech:  Spontaneous normal over inclusive and increased in amount  Mood:  Depressed   Affect Sad   Language: naming objects and Goal directed  Thought Process:   logical and linear    Thought Content:  Depressed and hopeless  Perceptual Disturbances:  denying any auditory and visual hallucinations  Risk Potential: Denying active suicidal ideas but says it does not care if he lives or die  Sensorium:  person, place, time/date, situation, day of week, month of year and year   Cognition:  grossly intact   Consciousness:   alert, awake, and oriented ×3    Attention: Good attention span   Intellect: Normal   Fund of Knowledge: awareness of current events: normal    Insight:  Good   Judgment: Good   Muscle Strength and Tone: Normal    Gait/Station:  gait was not assessed    Motor Activity: no abnormal movements     Lab Results: I have personally reviewed pertinent lab results  NOTE:  Total of 40 minutes were spent in talking to patient completing this medical record reviewing medical chart medical decision making    Obdulia Clements MD

## 2019-03-09 NOTE — PROGRESS NOTES
Internal Medicine Progress Note  Patient: Radha Painter  Age/sex: 68 y o  male  Medical Record #: [id number]      ASSESSMENT/PLAN:  Radha Painter  is seen and examined and management for following issues:      C6-7 osteophyte fracture: Pain control per primary service  Reason for fall unclear      HTN: stable; continue Amlodipine 5mg daily and Losartan 50mg daily       History of DVT/PE/IVC filter: on life long AC  Initial DVT provoked due to knee surgery but PE not provoked  Family Medicine manages [facility] as OP and was on 5mg qd  Continue Coumadin = dose was skipped 3/5 since big jump in INR  Will change dosing to 2 5 mg MWF and 5mg T/TH/S/S since 5mg qd too much; hold coumadin today, INR in a m, then will likely keep same dosing as mentioned previously      BPH: Continue Flomax      Paranoid Schizophrenia: sees Dr Joanne Gustafson as OP  Continue Risperdal, Luvox, Prolixin  Prolixin was added recently      Hx Diastolic CHF:  Controlled w/o diuretics; per PCPs note, he has chronic edema       Subjective: offers no complaints    ROS:   GI: denies abdominal pain, change bowel habits or reflux symptoms  Neuro: No new neurologic changes  Respiratory: No Cough, SOB  Cardiovascular: No CP, palpitations   Psych:  Feels "cold" emotionally    Scheduled Meds:    Current Facility-Administered Medications:  acetaminophen 975 mg Oral [facility] Cheryl Bowser MD   amantadine 100 mg Oral BID Cheryl Bowser MD   amLODIPine 5 mg Oral Daily Cheryl Bowser MD   b complex-vitamin C-folic acid 1 capsule Oral Daily With Cheri Darden MD   bisacodyl 10 mg Rectal Daily PRN Ana Cristina Cruz DO   cholecalciferol 2,000 Units Oral Daily Cheryl Bowser MD   docusate sodium 100 mg Oral BID Cheryl Bowser MD   fluPHENAZine 5 mg Oral [facility] Cheryl Bowser MD   fluvoxaMINE 50 mg Oral 5x Daily Cheryl Bwoser MD   lidocaine 1 patch Topical Daily Cheryl Bowser MD   losartan 50 mg Oral Daily Cheryl Bowser MD   ondansetron 4 mg Oral Q8H PRN Esthela Vasquez Micki Hubbard MD   polyethylene glycol 17 g Oral Daily PRN Mandy Juarez MD   risperiDONE 0 5 mg Oral BID PRN Mandy Juarez MD   risperiDONE 1 5 mg Oral Daily Mandy Juarez MD   risperiDONE 3 mg Oral HS Mandy Juarez MD   tamsulosin 0 4 mg Oral Daily With Radha Sherman MD   warfarin 2 5 mg Oral Once per day on Mon Wed Fri MATY Burgos   warfarin 5 mg Oral Once per day on Sun Tue Thu Sat MATY Burgos       Labs:     Results from last 7 days   Lab Units 03/06/19  0508 03/05/19  0515   WBC Thousand/uL 5 82 7 05   HEMOGLOBIN g/dL 13 0 13 2   HEMATOCRIT % 40 8 41 4   PLATELETS Thousands/uL 149 158     Results from last 7 days   Lab Units 03/08/19  0732 03/06/19  0508  03/03/19  2258   SODIUM mmol/L 139 142   < >  --    POTASSIUM mmol/L 3 6 3 8   < >  --    CHLORIDE mmol/L 104 105   < >  --    CO2 mmol/L 32 32   < >  --    CO2, I-STAT mmol/L  --   --   --  32   BUN mg/dL 17 16   < >  --    CREATININE mg/dL 0 80 0 79   < >  --    GLUCOSE, ISTAT mg/dl  --   --   --  117   CALCIUM mg/dL 9 0 8 4   < >  --     < > = values in this interval not displayed           Results from last 7 days   Lab Units 03/09/19  0547 03/08/19  0732   INR  3 33* 2 72*            [unfilled]    Labs reviewed    Physical Examination:  Vitals:   Vitals:    03/08/19 1009 03/08/19 1359 03/08/19 2030 03/09/19 0548   BP: 148/78 145/70 151/76 134/69   BP Location:  Right arm Right arm Left arm   Pulse:  90 94 87   Resp:  18 18 17   Temp:  98 2 °F (36 8 °C) 98 3 °F (36 8 °C) 98 2 °F (36 8 °C)   TempSrc:  Oral Oral Oral   SpO2:  99% 95% 94%   Weight:       Height:         Constitutional:  NAD; pleasant; nontoxic  HEENT:  AT/NC; oropharynx negative for thrush on tongue   Neck: collar on  CV:  +S1, S2;  RRR; no rub/murmur  Pulmonary:  BBS without crackles/wheeze/rhonci; resp are unlabored  Abdominal:  soft, +BS, ND/NT  Skin:  no rashes  Musculoskeletal:  no LE edema  :  no sandhu  Neurological:  AAO;  BURGOS 5/5        [x ] Total time spent: 30 Mins and greater than 50% of this time was spent counseling/coordinating care  ** Please Note: Dragon 360 Dictation voice to text software may have been used in the creation of this document   **

## 2019-03-10 LAB
INR PPP: 3.02 (ref 0.86–1.17)
PROTHROMBIN TIME: 31.3 SECONDS (ref 11.8–14.2)

## 2019-03-10 PROCEDURE — 97530 THERAPEUTIC ACTIVITIES: CPT

## 2019-03-10 PROCEDURE — 97110 THERAPEUTIC EXERCISES: CPT

## 2019-03-10 PROCEDURE — 85610 PROTHROMBIN TIME: CPT | Performed by: NURSE PRACTITIONER

## 2019-03-10 PROCEDURE — 92526 ORAL FUNCTION THERAPY: CPT

## 2019-03-10 PROCEDURE — 97116 GAIT TRAINING THERAPY: CPT

## 2019-03-10 PROCEDURE — G0515 COGNITIVE SKILLS DEVELOPMENT: HCPCS

## 2019-03-10 RX ORDER — WARFARIN SODIUM 5 MG/1
5 TABLET ORAL
Status: DISCONTINUED | OUTPATIENT
Start: 2019-03-10 | End: 2019-03-12

## 2019-03-10 RX ADMIN — TAMSULOSIN HYDROCHLORIDE 0.4 MG: 0.4 CAPSULE ORAL at 17:14

## 2019-03-10 RX ADMIN — FLUVOXAMINE MALEATE 50 MG: 50 TABLET, FILM COATED ORAL at 17:14

## 2019-03-10 RX ADMIN — AMANTADINE HYDROCHLORIDE 100 MG: 100 CAPSULE, LIQUID FILLED ORAL at 08:25

## 2019-03-10 RX ADMIN — VITAMIN D, TAB 1000IU (100/BT) 2000 UNITS: 25 TAB at 08:24

## 2019-03-10 RX ADMIN — DOCUSATE SODIUM 100 MG: 100 CAPSULE, LIQUID FILLED ORAL at 17:14

## 2019-03-10 RX ADMIN — DOCUSATE SODIUM 100 MG: 100 CAPSULE, LIQUID FILLED ORAL at 08:23

## 2019-03-10 RX ADMIN — ACETAMINOPHEN 975 MG: 325 TABLET, FILM COATED ORAL at 21:17

## 2019-03-10 RX ADMIN — Medication 1 CAPSULE: at 17:14

## 2019-03-10 RX ADMIN — FLUPHENAZINE HYDROCHLORIDE 5 MG: 2.5 TABLET, FILM COATED ORAL at 06:02

## 2019-03-10 RX ADMIN — FLUVOXAMINE MALEATE 50 MG: 50 TABLET, FILM COATED ORAL at 14:54

## 2019-03-10 RX ADMIN — RISPERIDONE 3 MG: 3 TABLET ORAL at 21:17

## 2019-03-10 RX ADMIN — FLUVOXAMINE MALEATE 50 MG: 50 TABLET, FILM COATED ORAL at 21:17

## 2019-03-10 RX ADMIN — LOSARTAN POTASSIUM 50 MG: 50 TABLET, FILM COATED ORAL at 08:23

## 2019-03-10 RX ADMIN — FLUPHENAZINE HYDROCHLORIDE 5 MG: 2.5 TABLET, FILM COATED ORAL at 21:15

## 2019-03-10 RX ADMIN — RISPERIDONE 1.5 MG: 3 TABLET ORAL at 08:27

## 2019-03-10 RX ADMIN — FLUPHENAZINE HYDROCHLORIDE 5 MG: 2.5 TABLET, FILM COATED ORAL at 14:54

## 2019-03-10 RX ADMIN — WARFARIN SODIUM 5 MG: 5 TABLET ORAL at 17:14

## 2019-03-10 RX ADMIN — AMLODIPINE BESYLATE 5 MG: 5 TABLET ORAL at 08:25

## 2019-03-10 RX ADMIN — ACETAMINOPHEN 975 MG: 325 TABLET, FILM COATED ORAL at 14:54

## 2019-03-10 RX ADMIN — FLUVOXAMINE MALEATE 50 MG: 50 TABLET, FILM COATED ORAL at 11:21

## 2019-03-10 RX ADMIN — FLUVOXAMINE MALEATE 50 MG: 50 TABLET, FILM COATED ORAL at 06:02

## 2019-03-10 RX ADMIN — ACETAMINOPHEN 975 MG: 325 TABLET, FILM COATED ORAL at 06:02

## 2019-03-10 RX ADMIN — LIDOCAINE 1 PATCH: 50 PATCH CUTANEOUS at 08:28

## 2019-03-10 RX ADMIN — AMANTADINE HYDROCHLORIDE 100 MG: 100 CAPSULE, LIQUID FILLED ORAL at 17:14

## 2019-03-10 NOTE — PROGRESS NOTES
03/10/19 1230   Pain Assessment   Pain Assessment 0-10   Pain Score 5   Pain Type Acute pain   Pain Location Head   Pain Orientation Posterior   Pain Descriptors Headache   Pain Frequency Intermittent   Pain Onset Gradual   Clinical Progression Resolved   Effect of Pain on Daily Activities no limitations at this time   Hospital Pain Intervention(s) Other (Comment); Rest;Ambulation/increased activity; Emotional support  (RN notified and admin pain meds)   Response to Interventions Pt tolerated Tx and was inconsistent w/ report for pain levels  Pt c/o only slight ache in neck/shoulders and no c/o headache at end of session  Restrictions/Precautions   Precautions Aspiration;Bed/chair alarms;Cognitive; Fall Risk;Supervision on toilet/commode   ROM Restrictions Yes  (spinal precautions)   Braces or Orthoses C/S Collar   QI: Sit to [address] Provided by [place] No physical assistance   Comment Rw   Sit to Stand CARE Score 4   QI: Chair/Bed-to-Chair Transfer   Assistance Needed Supervision   Assistance Provided by [place] No physical assistance   Comment RW   Chair/Bed-to-Chair Transfer CARE Score 4   Transfer Bed/Chair/Wheelchair   Limitations Noted In Balance; Coordination;LE Strength;UE Strength   Adaptive Equipment Roller Walker   Stand Pivot Supervision   Sit to Stand Supervision   Stand to Sit Supervision   Findings Pt S w/ RW and cont to require VC for safe hand placement/body positioning and to keep head up to maintain proper posture  Pt cont to present w/ cervical flexion during fxnl ambulation and sit<>stand  Bed, Chair, Wheelchair Transfer (FIM) 5 - Patient requires supervision/monitoring   Kitchen Mobility   Kitchen-Mobility Level Walker   Kitchen Activity Retrieve items;Transport items   Kitchen Mobility Comments Pt demo G carryover from previous OT session for body mechanics and positioning of RW to retrieve items from overhead cabinets   OT provided initial demo on counter slide method for item transport and how to safely retrieve items from refrigerator  OT provided edu that pt 's frequently used items should be placed on upper shelf of fridge or in fridge door for inc ease of access and to allow pt to maintain spinal precautions  Pt receptive to OT feedback and demo fair carryover for safe retrieval of orange juice cup from top shelf  Pt able to complete slight squat in stance w/ use of counter to stablize at Stephy level  Pt demo G carryover for counter slide method  Therapeutic Excerise-Strength   UE Strength Yes   Right Upper Extremity- Strength   R Position Standing   Equipment Theraband   RUE Strength Comment Pt engaged in alteranting b/l UE tricep strengthening w/ posterior tricep press utilizing light resistance TB and OT provided VC for proper posture/positioning for pt to maintain spinal precautions  Pt w/ no c/o inc pain, however required seated rest breaks to manage fatigue  Pt reports "oh this exercise feels good"  Left Upper Extremity-Strength   LUE Strength Comment See RUE for detail  Cognition   Overall Cognitive Status Impaired   Arousal/Participation Alert; Cooperative   Attention Attends with cues to redirect   Orientation Level Oriented to person;Oriented to place;Oriented to time   Memory Decreased short term memory;Decreased recall of precautions   Following Commands Follows one step commands with increased time or repetition   Comments OT had pt in stance at full mirror for visual feedback of posture, pt noted w/ improved upright posture in stance, however pt became tearful and states "I'm so ugly, I don't know why my wife  me"  Pt w/ poor self image and requires max emotional support  Additional Activities   Additional Activities Comments Pt noted to  RW during turns and making quick turns during Tx session resulting in L side LOB and Stephy from OT to correct   OT provided edu for safe turning in RW, pt completed turns w/ RW 3x w/ fair carryover noted  Activity Tolerance   Activity Tolerance Patient tolerated treatment well   Assessment   Treatment Assessment Pt participated in skilled OT Tx session w/ focus on ktichen mobility, maintaining spinal precautions, light UE strengthening, practice for safe turning, and cont edu for postural alignment  See above note for activity detail  Pt transfers progressed to S w/ RW and pt noted w/ improved stand tolerance overall  However pt cont to be self-limiting at times and requires max encouragement  Cont OT POC w/ focus on endurance, dynamic standing balance, and safety awareness to maximize pt independence w/ ADLs/IADLs and all fxnl transfers  Prognosis Good   Problem List Decreased strength;Decreased range of motion;Decreased endurance; Impaired balance;Decreased mobility; Decreased coordination;Decreased cognition; Impaired judgement;Decreased safety awareness;Orthopedic restrictions;Pain   Barriers to Discharge Inaccessible home environment;Decreased caregiver support   Plan   Treatment/Interventions ADL retraining;Functional transfer training;LE strengthening/ROM; Therapeutic exercise; Endurance training;Cognitive reorientation; Compensatory technique education   Progress Progressing toward goals   Recommendation   OT Discharge Recommendation 24 hour supervision/assist   OT Therapy Minutes   OT Time In 1230   OT Time Out 1330   OT Total Time (minutes) 60   OT Mode of treatment - Individual (minutes) 60   OT Mode of treatment - Concurrent (minutes) 0   OT Mode of treatment - Group (minutes) 0   OT Mode of treatment - Co-treat (minutes) 0   OT Mode of Teatment - Total time(minutes) 60 minutes   Therapy Time missed   Time missed?  No

## 2019-03-10 NOTE — PROGRESS NOTES
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  Initial DVT provoked due to knee surgery but PE not provoked  Family Medicine manages [facility] as OP and was on 5mg qd  Continue Coumadin = dose was skipped 3/5 since big jump in INR  Will change dosing to 2 5 mg MWF and 5mg T/TH/S/S since 5mg qd too much; coumadin held yesterday, INR 3 0 will resume previous dosing      BPH: Continue Flomax      Paranoid Schizophrenia: sees Dr León Cook as OP  Continue Risperdal, Luvox, Prolixin  Prolixin was added recently      Hx Diastolic CHF:  Controlled w/o diuretics;  he has chronic edema       Subjective: offers no complaints; slept well overnight    ROS:   GI: denies abdominal pain, change bowel habits or reflux symptoms  Neuro: No new neurologic changes  Respiratory: No Cough, SOB  Cardiovascular: No CP, palpitations   Psych:  Feels "cold" emotionally    Scheduled Meds:    Current Facility-Administered Medications:  acetaminophen 975 mg Oral [facility] Madny Juarez MD   amantadine 100 mg Oral BID Mandy Juarez MD   amLODIPine 5 mg Oral Daily Mandy Juarez MD   b complex-vitamin C-folic acid 1 capsule Oral Daily With Radha Sherman MD   bisacodyl 10 mg Rectal Daily PRN Letha Duverney, DO   cholecalciferol 2,000 Units Oral Daily Mandy Juarez MD   docusate sodium 100 mg Oral BID Mandy Juarez MD   fluPHENAZine 5 mg Oral [facility] Mandy Juarez MD   fluvoxaMINE 50 mg Oral 5x Daily Mandy Juarez MD   lidocaine 1 patch Topical Daily Mandy Juarez MD   losartan 50 mg Oral Daily Mandy Juarez MD   ondansetron 4 mg Oral Q8H PRN Mandy Juarez MD   polyethylene glycol 17 g Oral Daily PRN Katerine Lyons MD   risperiDONE 0 5 mg Oral BID PRN Katerine Lyons MD   risperiDONE 1 5 mg Oral Daily Katerine Lyons MD   risperiDONE 3 mg Oral HS Katerine Lyons MD   tamsulosin 0 4 mg Oral Daily With Adri Omer MD   warfarin 2 5 mg Oral Once per day on Mon Wed Fri MATY Siddiqui   warfarin 5 mg Oral Once per day on Sun Tue Thu Sat MATY Bro       Labs:     Results from last 7 days   Lab Units 03/06/19  0508 03/05/19  0515   WBC Thousand/uL 5 82 7 05   HEMOGLOBIN g/dL 13 0 13 2   HEMATOCRIT % 40 8 41 4   PLATELETS Thousands/uL 149 158     Results from last 7 days   Lab Units 03/08/19  0732 03/06/19  0508  03/03/19  2258   SODIUM mmol/L 139 142   < >  --    POTASSIUM mmol/L 3 6 3 8   < >  --    CHLORIDE mmol/L 104 105   < >  --    CO2 mmol/L 32 32   < >  --    CO2, I-STAT mmol/L  --   --   --  32   BUN mg/dL 17 16   < >  --    CREATININE mg/dL 0 80 0 79   < >  --    GLUCOSE, ISTAT mg/dl  --   --   --  117   CALCIUM mg/dL 9 0 8 4   < >  --     < > = values in this interval not displayed           Results from last 7 days   Lab Units 03/10/19  0517 03/09/19  0547   INR  3 02* 3 33*            [unfilled]    Labs reviewed    Physical Examination:  Vitals:   Vitals:    03/09/19 0935 03/09/19 1415 03/09/19 2036 03/10/19 0516   BP: 123/63 128/65 115/64 153/71   BP Location:  Left arm Left arm Right arm   Pulse:  86 84 102   Resp:  20 19 19   Temp:  98 1 °F (36 7 °C) 97 6 °F (36 4 °C) 97 8 °F (36 6 °C)   TempSrc:  Oral Oral Oral   SpO2:  94% 95% 94%   Weight:       Height:         Constitutional:  NAD; pleasant; nontoxic  HEENT:  AT/NC; oropharynx negative for thrush on tongue   Neck: collar on  CV:  +S1, S2;  RRR; no rub/murmur  Pulmonary:  BBS without crackles/wheeze/rhonci; resp are unlabored  Abdominal:  soft, +BS, ND/NT  Skin:  no rashes  Musculoskeletal:  no LE edema  :  no sandhu  Neurological:  AAO;  BURGOS 5/5        [x ] Total time spent: 30 Mins and greater than 50% of this time was spent counseling/coordinating care  ** Please Note: Dragon 360 Dictation voice to text software may have been used in the creation of this document   **

## 2019-03-10 NOTE — PROGRESS NOTES
03/10/19 0821   Pain Assessment   Pain Assessment 0-10   Pain Score No Pain   Restrictions/Precautions   Precautions Aspiration; Fall Risk;Bed/chair alarms;Cognitive;Supervision on toilet/commode;Spinal precautions   Braces or Orthoses C/S Collar   Cognition   Overall Cognitive Status Impaired   Arousal/Participation Alert; Cooperative   Attention Attends with cues to redirect   Orientation Level Oriented to person;Oriented to place;Oriented to time   Memory Decreased short term memory;Decreased recall of precautions   Following Commands Follows one step commands with increased time or repetition   Subjective   Subjective reports he is feeling good   QI: Sit to Stand   Assistance Needed Supervision   Sit to Stand CARE Score 4   QI: Chair/Bed-to-Chair Transfer   Assistance Needed Supervision   Chair/Bed-to-Chair Transfer CARE Score 4   Transfer Bed/Chair/Wheelchair   Limitations Noted In Balance;UE Strength;LE Strength   Adaptive Equipment Roller [address] [place] [address] Road to Stand Supervision   Stand to Sit Supervision   Findings poor carryover to use hands to sit   Bed, Chair, Wheelchair Transfer (FIM) 5 - Patient requires verbal cues   QI: Walk 10 Feet   Assistance Needed Supervision   Comment CS   Walk 10 Feet CARE Score 4   QI: Walk 50 Feet with Two Turns   Assistance Needed Supervision   Comment CS   Walk 50 Feet with Two Turns CARE Score 4   QI: Walk 150 Feet   Assistance Needed Supervision   Comment CS   Walk 150 Feet CARE Score 4   Ambulation   Does the patient walk? 2  Yes   Primary Discharge Mode of Locomotion Walk   Walk Assist Level Close Supervision   Gait Pattern Inconsistant Shilpa; Slow Shilpa;Narrow KERVIN;Step through; Improper weight shift   Assist Device Cane   Limitations Noted In Balance; Endurance; Heel Strike;Speed;Strength;Swing   Walking (FIM) 5 - Patient requires supervision/monitoring AND distance 150 feet or more, no rest   QI: 1 Step (Curb)   Assistance Needed Supervision Comment RW   1 Step (Curb) CARE Score 4   Stairs   Type Curb   # of Steps 1   Weight Bearing Precautions Fall Risk   Assist Devices Roller Walker   Therapeutic Interventions   Flexibility hamstring and gastroc 60"x2   Balance FA/FT EO/EC floor 60"x2   Neuromuscular Re-Education amb with NBQC 150'x1, 'x1 and 350'x1   Assessment   Treatment Assessment pt continues to amb well with fair balance when using cane; decreased carryover for hand placement to sit or stand; endurance continues to improve with few reports of fatigue, only after long distance walks with the cane; static balance with EC is impaired with FT and FA; continue to increase balance in this way for safe and functional mobility and activity; continue POC as per PT   Problem List Decreased strength;Decreased range of motion;Decreased endurance; Impaired balance;Decreased cognition; Impaired judgement;Decreased safety awareness;Orthopedic restrictions   Barriers to Discharge Inaccessible home environment;Decreased caregiver support   PT Barriers   Functional Limitation Walking;Transfers;Standing;Stair negotiation;Car transfers   Plan   Treatment/Interventions ADL retraining;Functional transfer training;LE strengthening/ROM; Elevations; Therapeutic exercise; Endurance training;Cognitive reorientation;Patient/family training;Equipment eval/education; Bed mobility;Gait training   Progress Progressing toward goals   Recommendation   Recommendation Home PT;Outpatient PT; Home with family support   Equipment Recommended Walker   PT Therapy Minutes   PT Time In 0830   PT Time Out 0930   PT Total Time (minutes) 60   PT Mode of treatment - Individual (minutes) 60   PT Mode of treatment - Concurrent (minutes) 0   PT Mode of treatment - Group (minutes) 0   PT Mode of treatment - Co-treat (minutes) 0   PT Mode of Teatment - Total time(minutes) 60 minutes   Therapy Time missed   Time missed?  No

## 2019-03-10 NOTE — PLAN OF CARE
Problem: Potential for Falls  Goal: Patient will remain free of falls  Description  INTERVENTIONS:  - Assess patient frequently for physical needs  -  Identify cognitive and physical deficits and behaviors that affect risk of falls    -  [place] fall precautions as indicated by assessment   - Educate patient/family on patient safety including physical limitations  - Instruct patient to call for assistance with activity based on assessment  - Modify environment to reduce risk of injury  - Consider OT/PT consult to assist with strengthening/mobility  Outcome: Progressing

## 2019-03-10 NOTE — PROGRESS NOTES
Pt c/o l hand numbness for a few minutes today after holding phone  Then complaints to OT of sudden headache across top of head  That lasted few minutes  vss pt has no complaints now

## 2019-03-10 NOTE — PROGRESS NOTES
03/10/19 1100   Pain Assessment   Pain Assessment No/denies pain   Restrictions/Precautions   Precautions Aspiration;Bed/chair alarms;Cognitive; Fall Risk;Supervision on toilet/commode   Braces or Orthoses C/S Collar   Memory Skills   Memory (FIM) 4 - Recalls 2 of 3 steps   Social Interaction (FIM) 5 - Interacts appropriately with others 90% of time   Speech/Language/Cognition Assessmetn   Treatment Assessment Pt able to recall daily events such as prior meal (breakfast), prior therapies w/o increased cues  Pt mildly tangentile today w/ "other thoughts" that pt has, but able to be re-directed back to tasks  Complete functional math task, in which pt was to add up 3 coin amounts  Pt was 14/15 accurate in completing, noting ability to correct error given cue  Additionally given functional math task given dollar and change amounts, in which pt was to determine the dollar and then change amount into quarters, dimes, nickels and pennies  Pt was 12/15 accurate w/ task, noting ability to correct all errors given visual cue  Will continue to benefit from SLP services at this time to maximize cognitive skills at this time  Swallow Information   Current Risks for Dysphagia & Aspiration General debilitation;Brain injury;Mental status change;Cognitive deficit   Current Symptoms/Concerns Difficulty chewing;Decreased oral intake   Current Diet Dysphagia advance; Thin liquid   Baseline Diet Dyphagia advanced;Regular; Thin liquids   Consistencies Assessed and Performance   Materials Admnistered Soft/Level 3;Regular/Solid; Thin liquid   Oral Stage Mild impaired   Phargngeal Stage WFL   Swallow Mechanics Mild delayed;Swallow initation;Good Larygneal rise   Esophageal Concerns No s/s reported   Recommendations   Diet Solid Recommendation Level 3 Dysphagia/ advanced/ soft to chew   Diet Liquid Recommendation Thin liquid   Recommended Form of Meds As tolerated   General Precautions Aspiration precautions; Feed only when alert;Minimize distractions;Upright as possible for all oral intake;Remain upright for 45 mins after meals   Compensatory Swallowing Strategies Alternate solids and liquids;Voluntary throat clear/cough to clear penetration   Results Reviewed with PT/Family/Caregiver   QI: [address] Provided by [place] No physical assistance   Eating CARE Score 6   Swallow Assessment   Swallow Treatment Assessment Pt observed w/ lunch  Trialed w/ regular diet w/ thin lquids, where pt consumed 10% of meal and 60cc of thin liquids by cup  Pt able to setup tray and feed self  Mastication of solids (meatballs and pasta and broocoli) was mildly prolonged w/o oral residuall or pocketing  No anterior spillage noted w/ consistencies  A-p transfer of thins was [facility]  Swallow initiation was grossly prompt and hyolaryngeal elevation is WFL  No overt signs/sxs of aspiration noted w/ meal  Will continue to trial regular diet items w/ SLP supervision at this time  Swallow Assessment Prognosis   Prognosis Good   Prognosis Considerations Co-morbidities; Medical diagnosis; Medical prognosis;Previous level of function;Severity of impairments;New learning ability;Ability to carry over; Cooperation   SLP Therapy Minutes   SLP Time In 1100   SLP Time Out 1140   SLP Total Time (minutes) 40   SLP Mode of treatment - Individual (minutes) 40   SLP Mode of treatment - Concurrent (minutes) 0   SLP Mode of treatment - Group (minutes) 0   SLP Mode of treatment - Co-treat (minutes) 0   SLP Mode of Teatment - Total time(minutes) 40 minutes   Therapy Time missed   Time missed?  No   Daily FIM Score   Problem solving (FIM) 4 - Solves basic problems 75-89% of time   Comprehension (FIM) 4 - Understands basic info/conversation 75-90% of time   Expression (FIM) 5 - Needs help/cues only RARELY (< 10% of the time)   Eating (FIM) 6 - Patient requires modified diet

## 2019-03-11 LAB
ANION GAP SERPL CALCULATED.3IONS-SCNC: 5 MMOL/L (ref 4–13)
BASOPHILS # BLD AUTO: 0.05 THOUSANDS/ΜL (ref 0–0.1)
BASOPHILS NFR BLD AUTO: 1 % (ref 0–1)
BUN SERPL-MCNC: 15 MG/DL (ref 5–25)
CALCIUM SERPL-MCNC: 8.8 MG/DL (ref 8.3–10.1)
CHLORIDE SERPL-SCNC: 101 MMOL/L (ref 100–108)
CO2 SERPL-SCNC: 33 MMOL/L (ref 21–32)
CREAT SERPL-MCNC: 0.77 MG/DL (ref 0.6–1.3)
EOSINOPHIL # BLD AUTO: 0.3 THOUSAND/ΜL (ref 0–0.61)
EOSINOPHIL NFR BLD AUTO: 7 % (ref 0–6)
ERYTHROCYTE [DISTWIDTH] IN BLOOD BY AUTOMATED COUNT: 14.2 % (ref 11.6–15.1)
GFR SERPL CREATININE-BSD FRML MDRD: 91 ML/MIN/1.73SQ M
GLUCOSE SERPL-MCNC: 86 MG/DL (ref 65–140)
HCT VFR BLD AUTO: 43 % (ref 36.5–49.3)
HGB BLD-MCNC: 13.6 G/DL (ref 12–17)
IMM GRANULOCYTES # BLD AUTO: 0.02 THOUSAND/UL (ref 0–0.2)
IMM GRANULOCYTES NFR BLD AUTO: 0 % (ref 0–2)
INR PPP: 2.76 (ref 0.86–1.17)
LYMPHOCYTES # BLD AUTO: 0.91 THOUSANDS/ΜL (ref 0.6–4.47)
LYMPHOCYTES NFR BLD AUTO: 20 % (ref 14–44)
MCH RBC QN AUTO: 29.2 PG (ref 26.8–34.3)
MCHC RBC AUTO-ENTMCNC: 31.6 G/DL (ref 31.4–37.4)
MCV RBC AUTO: 93 FL (ref 82–98)
MONOCYTES # BLD AUTO: 0.5 THOUSAND/ΜL (ref 0.17–1.22)
MONOCYTES NFR BLD AUTO: 11 % (ref 4–12)
NEUTROPHILS # BLD AUTO: 2.72 THOUSANDS/ΜL (ref 1.85–7.62)
NEUTS SEG NFR BLD AUTO: 61 % (ref 43–75)
NRBC BLD AUTO-RTO: 0 /100 WBCS
PLATELET # BLD AUTO: 229 THOUSANDS/UL (ref 149–390)
PMV BLD AUTO: 9.1 FL (ref 8.9–12.7)
POTASSIUM SERPL-SCNC: 3.7 MMOL/L (ref 3.5–5.3)
PROTHROMBIN TIME: 29.2 SECONDS (ref 11.8–14.2)
RBC # BLD AUTO: 4.65 MILLION/UL (ref 3.88–5.62)
SODIUM SERPL-SCNC: 139 MMOL/L (ref 136–145)
WBC # BLD AUTO: 4.5 THOUSAND/UL (ref 4.31–10.16)

## 2019-03-11 PROCEDURE — 97530 THERAPEUTIC ACTIVITIES: CPT

## 2019-03-11 PROCEDURE — 97535 SELF CARE MNGMENT TRAINING: CPT

## 2019-03-11 PROCEDURE — 80048 BASIC METABOLIC PNL TOTAL CA: CPT | Performed by: NURSE PRACTITIONER

## 2019-03-11 PROCEDURE — 99232 SBSQ HOSP IP/OBS MODERATE 35: CPT

## 2019-03-11 PROCEDURE — 85025 COMPLETE CBC W/AUTO DIFF WBC: CPT | Performed by: NURSE PRACTITIONER

## 2019-03-11 PROCEDURE — 97116 GAIT TRAINING THERAPY: CPT

## 2019-03-11 PROCEDURE — 85610 PROTHROMBIN TIME: CPT | Performed by: NURSE PRACTITIONER

## 2019-03-11 RX ADMIN — FLUPHENAZINE HYDROCHLORIDE 5 MG: 2.5 TABLET, FILM COATED ORAL at 06:23

## 2019-03-11 RX ADMIN — AMANTADINE HYDROCHLORIDE 100 MG: 100 CAPSULE, LIQUID FILLED ORAL at 17:12

## 2019-03-11 RX ADMIN — FLUVOXAMINE MALEATE 50 MG: 50 TABLET, FILM COATED ORAL at 21:17

## 2019-03-11 RX ADMIN — ACETAMINOPHEN 975 MG: 325 TABLET, FILM COATED ORAL at 21:17

## 2019-03-11 RX ADMIN — Medication 1 CAPSULE: at 17:12

## 2019-03-11 RX ADMIN — WARFARIN SODIUM 2.5 MG: 2.5 TABLET ORAL at 17:12

## 2019-03-11 RX ADMIN — RISPERIDONE 1.5 MG: 3 TABLET ORAL at 09:05

## 2019-03-11 RX ADMIN — AMLODIPINE BESYLATE 5 MG: 5 TABLET ORAL at 09:02

## 2019-03-11 RX ADMIN — LOSARTAN POTASSIUM 50 MG: 50 TABLET, FILM COATED ORAL at 09:02

## 2019-03-11 RX ADMIN — FLUVOXAMINE MALEATE 50 MG: 50 TABLET, FILM COATED ORAL at 17:12

## 2019-03-11 RX ADMIN — RISPERIDONE 3 MG: 3 TABLET ORAL at 21:16

## 2019-03-11 RX ADMIN — TAMSULOSIN HYDROCHLORIDE 0.4 MG: 0.4 CAPSULE ORAL at 17:12

## 2019-03-11 RX ADMIN — FLUVOXAMINE MALEATE 50 MG: 50 TABLET, FILM COATED ORAL at 13:44

## 2019-03-11 RX ADMIN — FLUPHENAZINE HYDROCHLORIDE 5 MG: 2.5 TABLET, FILM COATED ORAL at 21:16

## 2019-03-11 RX ADMIN — DOCUSATE SODIUM 100 MG: 100 CAPSULE, LIQUID FILLED ORAL at 09:03

## 2019-03-11 RX ADMIN — FLUPHENAZINE HYDROCHLORIDE 5 MG: 2.5 TABLET, FILM COATED ORAL at 13:44

## 2019-03-11 RX ADMIN — LIDOCAINE 1 PATCH: 50 PATCH CUTANEOUS at 08:59

## 2019-03-11 RX ADMIN — ACETAMINOPHEN 975 MG: 325 TABLET, FILM COATED ORAL at 13:45

## 2019-03-11 RX ADMIN — FLUVOXAMINE MALEATE 50 MG: 50 TABLET, FILM COATED ORAL at 06:23

## 2019-03-11 RX ADMIN — VITAMIN D, TAB 1000IU (100/BT) 2000 UNITS: 25 TAB at 09:08

## 2019-03-11 RX ADMIN — FLUVOXAMINE MALEATE 50 MG: 50 TABLET, FILM COATED ORAL at 11:38

## 2019-03-11 RX ADMIN — DOCUSATE SODIUM 100 MG: 100 CAPSULE, LIQUID FILLED ORAL at 17:12

## 2019-03-11 RX ADMIN — ACETAMINOPHEN 975 MG: 325 TABLET, FILM COATED ORAL at 06:23

## 2019-03-11 RX ADMIN — AMANTADINE HYDROCHLORIDE 100 MG: 100 CAPSULE, LIQUID FILLED ORAL at 09:04

## 2019-03-11 NOTE — PROGRESS NOTES
03/11/19 0700   Pain Assessment   Pain Assessment No/denies pain   Pain Score No Pain   QI: Oral Hygiene   Assistance Needed Incidental touching   Assistance Provided by [place] No physical assistance   Oral Hygiene CARE Score 4   Grooming   Able To Initiate Tasks; Shave;Wash/Dry Hands;Brush/Clean Teeth;Wash/Dry Face;Comb/Brush Hair   Limitation Noted In Problem Solving   Findings Pt requires steadying A while in stance to ensure balance and safety  Grooming (FIM) 4 - Patient requires steadying assist or light touching   QI: Shower/Bathe Self   Assistance Needed Incidental touching   Assistance Provided by [place] No physical assistance   Comment Pt continues to require steadying A while in stance for rear hygiener  Shower/Bathe Self CARE Score 4   Bathing   Assessed Bath Style Tub   Anticipated D/C Bath Style Tub   Able to Gather/Transport No   Able to [employer] Temperature No   Able to Wash/Rinse/Dry (body part) Left Arm;Right Arm;L Upper Leg;R Upper Leg;L Lower Leg/Foot;R Lower Leg/Foot;Chest;Abdomen;Perineal Area; Buttocks   Limitations Noted in Balance; Endurance; Safety;Strength   Positioning Seated;Standing   Adaptive Equipment Hand Held Shower; Tub Bench   Bathing (FIM) 4 - Patient requires steadying assist or light touching   Tub/Shower Transfer   Limitations Noted In Balance; Endurance; Safety   Adaptive Equipment Transfer Bench   Tub Transfer (FIM) 4 - Patient requires steadying assist or light touching   QI: Upper Body Dressing   Assistance Needed Incidental touching   Assistance Provided by [place] Less than 25%   Comment Pt demos ability to don/doff pullover shirt with Stephy warranted to adjust shirt around collar  Upper Body Dressing CARE Score 3   QI: Lower Body Dressing   Assistance Needed Incidental touching   Assistance Provided by [place] No physical assistance   Comment Pt demos ability to thread BLEs into pants and undergarment with increased time via cross leg technique   Pt compliant with spinal precautions this session  Lower Body Dressing CARE Score 4   QI: Putting On/Taking Off Footwear   Assistance Needed Supervision   Assistance Provided by [place] No physical assistance   Comment Pt demos ability to don/doff socks and shoes via cross leg method  Putting On/Taking Off Footwear CARE Score 4   Dressing/Undressing Clothing   Remove UB Clothes Pullover Shirt   Remove LB Clothes Socks; Undergarment;Pants   Don UB [address] [place] [address]; Undergarment;Socks; Shoes   Limitations Noted In Balance; Endurance; Safety;Strength;Timeliness   Positioning Supported Sit;Standing   UB Dressing (FIM) 4 - Patient completes 75% of all tasks   LB Dressing (FIM) 4 - Patient requires steadying assist or light touching   QI: Lying to Sitting on Side of Bed   Assistance Needed Supervision   Assistance Provided by [place] No physical assistance   Lying to Sitting on Side of Bed CARE Score 4   QI: Sit to [address] Provided by [place] No physical assistance   Sit to Stand CARE Score 4   QI: Chair/Bed-to-Chair Transfer   Assistance Needed Supervision;Verbal cues   Assistance Provided by [place] No physical assistance   Chair/Bed-to-Chair Transfer CARE Score 4   Transfer Bed/Chair/Wheelchair   Positioning Concerns Cognition   Limitations Noted In Balance; Coordination; Endurance;LE Strength;UE Strength;Problem Solving   Adaptive Equipment Roller Walker   Bed, Chair, Wheelchair Transfer (FIM) 5 - Patient requires supervision/monitoring   Cognition   Overall Cognitive Status Impaired   Arousal/Participation Alert; Cooperative   Attention Attends with cues to redirect   Orientation Level Oriented to person;Oriented to place;Oriented to time   Memory Decreased short term memory;Decreased recall of precautions   Following Commands Follows one step commands with increased time or repetition   Activity Tolerance   Activity Tolerance Patient tolerated treatment well Assessment   Treatment Assessment Pt participated in skilled OT services with focus on ADL retraining and functional mobility  Pt continues to be limited by decreased functional cognition, decreased safety awareness, and decreased strength/endurance  Pt requires increased time for all task completion  Pt continues to require Stephy/CGA with all ADL tasks to ensure balance and safety  Pt does demo improved carryover of spinal precautions this session and is compliant throughout  Pt will continue to benefit from skilled OT services with focus on functional mobility/transfers, standing tolerance, standing balance, and endurance  Prognosis Good   Problem List Decreased strength;Decreased range of motion;Decreased endurance; Impaired balance;Decreased mobility; Decreased coordination;Decreased cognition; Impaired judgement;Decreased safety awareness;Orthopedic restrictions;Pain   Plan   Treatment/Interventions ADL retraining;Functional transfer training; Therapeutic exercise; Endurance training;Patient/family training;Equipment eval/education; Compensatory technique education   Progress Progressing toward goals   Recommendation   OT Discharge Recommendation 24 hour supervision/assist   OT Therapy Minutes   OT Time In 0700   OT Time Out 0830   OT Total Time (minutes) 90   OT Mode of treatment - Individual (minutes) 90   OT Mode of treatment - Concurrent (minutes) 0   OT Mode of treatment - Group (minutes) 0   OT Mode of treatment - Co-treat (minutes) 0   OT Mode of Teatment - Total time(minutes) 90 minutes   Therapy Time missed   Time missed?  No

## 2019-03-11 NOTE — PLAN OF CARE
Problem: Potential for Falls  Goal: Patient will remain free of falls  Description  INTERVENTIONS:  - Assess patient frequently for physical needs  -  Identify cognitive and physical deficits and behaviors that affect risk of falls    -  [place] fall precautions as indicated by assessment   - Educate patient/family on patient safety including physical limitations  - Instruct patient to call for assistance with activity based on assessment  - Modify environment to reduce risk of injury  - Consider OT/PT consult to assist with strengthening/mobility  Outcome: Progressing     Problem: Prexisting or High Potential for Compromised Skin Integrity  Goal: Skin integrity is maintained or improved  Description  INTERVENTIONS:  - Identify patients at risk for skin breakdown  - Assess and monitor skin integrity  - Assess and monitor nutrition and hydration status  - Monitor labs (i e  albumin)  - Assess for incontinence   - Turn and reposition patient  - Assist with mobility/ambulation  - Relieve pressure over bony prominences  - Avoid friction and shearing  - Provide appropriate hygiene as needed including keeping skin clean and dry  - Evaluate need for skin moisturizer/barrier cream  - Collaborate with interdisciplinary team (i e  Nutrition, Rehabilitation, etc )   - Patient/family teaching  Outcome: Progressing     Problem: PAIN - ADULT  Goal: Verbalizes/displays adequate comfort level or baseline comfort level  Description  Interventions:  - Encourage patient to monitor pain and request assistance  - Assess pain using appropriate pain scale  - Administer analgesics based on type and severity of pain and evaluate response  - Implement non-pharmacological measures as appropriate and evaluate response  - Consider cultural and social influences on pain and pain management  - Notify physician/advanced practitioner if interventions unsuccessful or patient reports new pain  Outcome: Progressing     Problem: SAFETY ADULT  Goal: Maintain or return to baseline ADL function  Description  INTERVENTIONS:  -  Assess patient's ability to carry out ADLs; assess patient's baseline for ADL function and identify physical deficits which impact ability to perform ADLs (bathing, care of mouth/teeth, toileting, grooming, dressing, etc )  - Assess/evaluate cause of self-care deficits   - Assess range of motion  - Assess patient's mobility; develop plan if impaired  - Assess patient's need for assistive devices and provide as appropriate  - Encourage maximum independence but intervene and supervise when necessary  ¯ Involve family in performance of ADLs  ¯ Assess for home care needs following discharge   ¯ Request OT consult to assist with ADL evaluation and planning for discharge  ¯ Provide patient education as appropriate  Outcome: Progressing  Goal: Maintain or return mobility status to optimal level  Description  INTERVENTIONS:  - Assess patient's baseline mobility status (ambulation, transfers, stairs, etc )    - Identify cognitive and physical deficits and behaviors that affect mobility  - Identify mobility aids required to assist with transfers and/or ambulation (gait belt, sit-to-stand, lift, walker, cane, etc )  - [place] fall precautions as indicated by assessment  - Record patient progress and toleration of activity level on Mobility SBAR; progress patient to next Phase/Stage  - Instruct patient to call for assistance with activity based on assessment  - Request Rehabilitation consult to assist with strengthening/weightbearing, etc   Outcome: Progressing     Problem: DISCHARGE PLANNING  Goal: Discharge to home or other facility with appropriate resources  Description  INTERVENTIONS:  - Identify barriers to discharge w/patient and caregiver  - Arrange for needed discharge resources and transportation as appropriate  - Identify discharge learning needs (meds, wound care, etc )  - Arrange for interpretive services to assist at discharge as needed  - Refer to Case Management Department for coordinating discharge planning if the patient needs post-hospital services based on physician/advanced practitioner order or complex needs related to functional status, cognitive ability, or social support system  Outcome: Progressing     Problem: GASTROINTESTINAL - ADULT  Goal: Maintains or returns to baseline bowel function  Description  INTERVENTIONS:  - Assess bowel function  - Encourage oral fluids to ensure adequate hydration  - Administer IV fluids as ordered to ensure adequate hydration  - Administer ordered medications as needed  - Encourage mobilization and activity  - Nutrition services referral to assist patient with appropriate food choices  Outcome: Progressing     Problem: SKIN/TISSUE INTEGRITY - ADULT  Goal: Skin integrity remains intact  Description  INTERVENTIONS  - Identify patients at risk for skin breakdown  - Assess and monitor skin integrity  - Assess and monitor nutrition and hydration status  - Monitor labs (i e  albumin)  - Assess for incontinence   - Turn and reposition patient  - Assist with mobility/ambulation  - Relieve pressure over bony prominences  - Avoid friction and shearing  - Provide appropriate hygiene as needed including keeping skin clean and dry  - Evaluate need for skin moisturizer/barrier cream  - Collaborate with interdisciplinary team (i e  Nutrition, Rehabilitation, etc )   - Patient/family teaching  Outcome: Progressing     Problem: MUSCULOSKELETAL - ADULT  Goal: Maintain or return mobility to safest level of function  Description  INTERVENTIONS:  - Assess patient's ability to carry out ADLs; assess patient's baseline for ADL function and identify physical deficits which impact ability to perform ADLs (bathing, care of mouth/teeth, toileting, grooming, dressing, etc )  - Assess/evaluate cause of self-care deficits   - Assess range of motion  - Assess patient's mobility; develop plan if impaired  - Assess patient's need for assistive devices and provide as appropriate  - Encourage maximum independence but intervene and supervise when necessary  - Involve family in performance of ADLs  - Assess for home care needs following discharge   - Request OT consult to assist with ADL evaluation and planning for discharge  - Provide patient education as appropriate  Outcome: Progressing  Goal: Maintain proper alignment of affected body part  Description  INTERVENTIONS:  - Support, maintain and protect limb and body alignment  - Provide pt/fam with appropriate education  Outcome: Progressing

## 2019-03-11 NOTE — PROGRESS NOTES
03/11/19 1000   Pain Assessment   Pain Assessment No/denies pain   Restrictions/Precautions   Precautions Aspiration;Bed/chair alarms;Cognitive; Fall Risk;Spinal precautions;Supervision on toilet/commode   Braces or Orthoses C/S Collar   Cognition   Overall Cognitive Status Impaired   Arousal/Participation Cooperative; Alert   Attention Attends with cues to redirect   Memory Decreased short term memory;Decreased recall of precautions;Decreased recall of recent events   Following Commands Follows multistep commands with increased time or repetition   Subjective   Subjective no c/o pain at this time; cont to feel like he has difficulty trusting people but agrees he is making progress   QI: Sit to [address] Provided by [place] No physical assistance   Sit to Stand CARE Score 4   QI: Chair/Bed-to-Chair Transfer   Assistance Needed Supervision; Adaptive equipment   Assistance Provided by [place] No physical assistance   Comment with rW   Chair/Bed-to-Chair Transfer CARE Score 4   Transfer Bed/Chair/Wheelchair   Limitations Noted In Balance; Coordination;UE Strength;LE Strength   Adaptive Equipment Roller Walker   Stand Pivot Supervision   Sit to Stand Supervision   Stand to Sit Supervision   Findings CGA with no AD; needs occasional cueing to keep RW close with SPT   Bed, Chair, Wheelchair Transfer (FIM) 5 - Patient requires supervision/monitoring   QI: Walk 10 Feet   Assistance Needed Supervision; Adaptive equipment   Assistance Provided by [place] No physical assistance   Walk 10 Feet CARE Score 4   QI: Walk 50 Feet with Two Turns   Assistance Needed Supervision; Adaptive equipment   Assistance Provided by [place] No physical assistance   Walk 50 Feet with Two Turns CARE Score 4   QI: Walk 150 Feet   Assistance Needed Supervision; Adaptive equipment   Assistance Provided by [place] No physical assistance   Walk 150 Feet CARE Score 4   QI: Walking 10 Feet on Uneven Surfaces   Reason if not Attempted Safety concerns   Walking 10 Feet on Uneven Surfaces CARE Score 88   Ambulation   Does the patient walk? 2  Yes   Primary Discharge Mode of Locomotion Walk   Walk Assist Level Close Supervision   Gait Pattern Inconsistant Shilpa;Decreased foot clearance; Forward Flexion;Trendelenburg; Improper weight shift;Decreased L stance;Decreased R stance   Assist Device Favianer Erin Hatfield Walked (feet) 350 ft   Limitations Noted In Balance; Endurance;Posture;Speed;Strength   Findings pt practiced amb with SPC, CGA 350x2, 150x2 no AD   occasional unsteady gait LOB with turns   Walking (FIM) 5 - Patient requires supervision/monitoring AND distance 150 feet or more, no rest   QI: Wheel 50 Feet with Two Turns   Reason if not Attempted Activity not applicable   Wheel 50 Feet with Two Turns CARE Score 9   QI: Wheel 150 Feet   Reason if not Attempted Activity not applicable   Wheel 512 Feet CARE Score 9   Wheelchair mobility   QI: Does the patient use a wheelchair? 0  No   QI: 4 Steps   Assistance Needed Incidental touching; Adaptive equipment   Assistance Provided by [place] Less than 25%   4 Steps CARE Score 3   QI: 12 Steps   Assistance Needed Incidental touching; Adaptive equipment   Assistance Provided by [place] Less than 25%   12 Steps CARE Score 3   Stairs   Type Stairs   # of Steps 12   Weight Bearing Precautions Fall Risk   Assist Devices Cane;Single Rail   Findings nonreciprocal ascending and descending    cues for SPC sequencing   Stairs (FIM) 4 - Patient requires steadying assist or light touching AND patient goes up and down full flight (12- 14 stairs)   QI: Toilet Transfer   Assistance Needed Supervision   Assistance Provided by [place] No physical assistance   Comment pt stand to urinate   Toilet Transfer CARE Score 4   Therapeutic Interventions   Balance standing UE D2 flex with yellow tband to improve trunk rotation   Equipment Use   NuStep L2 10mins LEs only   Assessment   Treatment Assessment pt cont to work on gait training without an AD to improve balance and righting reactions  without AD pt cont to have LOB needing CG to correct  pt presents with trendelenburg to the L causing pt to lose balance to that side  pt with decrease righting reactions at this time and will benefit from cont training without AD to improve balance and rigthing reactions for safe d/c home  pt may require AD for home use to decrease fall risk and to improve funcitonal ind at d/c  Problem List Decreased strength;Decreased range of motion;Decreased endurance; Impaired balance;Decreased mobility; Decreased coordination;Decreased cognition; Impaired judgement;Decreased safety awareness;Orthopedic restrictions;Pain   Barriers to Discharge Inaccessible home environment;Decreased caregiver support   PT Barriers   Physical Impairment Decreased strength;Decreased endurance; Impaired balance;Decreased mobility; Decreased safety awareness;Orthopedic restrictions   Functional Limitation Walking;Transfers;Standing;Stair negotiation;Car transfers   Plan   Treatment/Interventions Functional transfer training;LE strengthening/ROM; Endurance training;Patient/family training;Bed mobility;Gait training   Progress Progressing toward goals   Recommendation   Recommendation Home PT;24 hour supervision/assist;Home with family support   Equipment Recommended Walker   PT Therapy Minutes   PT Time In 1000   PT Time Out 1130   PT Total Time (minutes) 90   PT Mode of treatment - Individual (minutes) 90   PT Mode of treatment - Concurrent (minutes) 0   PT Mode of treatment - Group (minutes) 0   PT Mode of treatment - Co-treat (minutes) 0   PT Mode of Teatment - Total time(minutes) 90 minutes   Therapy Time missed   Time missed?  No

## 2019-03-11 NOTE — PROGRESS NOTES
Physical Medicine and Rehabilitation Progress Note  Guero Rodriguez 68 y o  male MRN: [id number]  Unit/Bed#: -01 Encounter: [id number]    Chief Complaints:  Decline in function     Subjective/Interval Events:   Patient reports depression and frequency of negative thoughts decreased last day or so  He reports still with some anxiety at times but not worse recently  Patient denies S/I  He reports sleeping enough and denies new weakness, headache, neck pain, nausea, lightheadedness, calf pain, SOB, or other complaints  ROS: A 10-point ROS was performed  Negative except as listed above  Overall Assessment/relevant history:  Guero Rodriguez  is a 68 y o  male with a past medical history schizophrenia, anxiety, depression, coronary artery disease, possible stroke, DVT/PE on chronic anticoagulation with Coumadin, obesity class 1, hypertension, urinary frequency on chronic Flomax, lightheadedness, occasional falls, memory impairment, left knee replacement, right total hip replacement, cervical laminectomy and fusion who fell backwards down stairs with brief few minutes loss of consciousness with neck and possible head trauma who was brought to [facility] initially and then transferred to [facility] on 3/3/19  CT head did not show acute findings, CT neck showed C5/C6 osteophyte fracture  Neurosurgery was consulted who recommended non operative management with cervical collar  Patient was complaining of some neck, back, and parasternal pain which were all believed to be related to trauma  Additional imaging of facial bones, thoracic and lumbar spine did not reveal acute fractures but did show chronic L3 compression fracture  Patient noted to have some increased difficulty sleeping and anxiety    Patient was evaluated by skilled therapies and was found to have significant decline in ADLs and ambulation appropriate for admission to [facility] [place] [facility] [place]      70-year-old male status post fall with brief loss of consciousness found to have C6-C7 vertebral osteophyte fracture and possible TBI presents with likely multifactorial imbalance possibly related to prior stroke, spinal stenosis, and now head injury, intermittent lightheadedness, acute on chronic cognitive deficits, suboptimal control of anxiety, depression, sleep, and schizophrenia with associated functional decline in ADLs and ambulation      Functional status (recent):    Memory, problem solving, comprehension min assist; transfers and ambulation supervision     Functional status on admission to [facility]:  PT: Transfers mod assist  OT: Dressing mod assist, Bathing, grooming min assist   ST: Problem solving and memory mod assist; comprehension min assist; expression supervision, social interaction supervision    Schizophrenia ([facility] [address] )  Assessment & Plan  Some improvements in degree of depression and anxiety; sleep stable   >Appreciate recent psych c/s 2/8 > continue current mgmt plan   >Fluphenazine at home 5mg TID   >Risperdone back at home 1 5mg qAM and 3mg QHS    >Fluvoxamine 50mg 5x/day    >Amantadine at home 100mg BID     >Suspect recent acute worsening related to be off home regimen which suspect will improve with reintroduction of medications at appropriate dosing; however patient was having some increased paranoia and perseveration which started a few days prior to fall (3 weeks after switching from from Trifluoperazine to fluphenazine and Cogentin stopped; family states he has been trialed in past off Trifluoperazine and could not tolerate switch to other meds  >Monitor closely with nursing and staff; Neuropsych c/s and following;   >Per outpt psych could increase fluphenazine to 5mg QID if needed > which I think would be reasonable next if needed  >If still fails consider obtaining and going back on Trifluoperazine but this was difficult due to decreased manufacturing apparently   >Some concern patient may have not been providing himself medications appropriately > recommend family training and assist c/ med mgmt    >Discussed with patient's outpt psych Dr Manas Champagne 3/6  Patient presented c/ fair amount of paranoid delusions, anxiety, difficulty sleeping with occasional panic attacks off home regimen since recent admission prior, with recent fall/hospitalization possible head injury  Medications reviewed with his outpt pharmacy and patient's outpatient psychiatrist Dr Manas Champagne  Patient recently should switched from Trifluoperazine to fluphenazine and Cogentin stopped  Patient/family noted some increased paranoid delusions starting a few days prior to fall  Home regimen per pharmacy:  Fluphenazine 5mg TID, Risperdal 1 5mg qday and 3mg QHS  Amantadine 100 mg b i d , Fluvoxamine 50mg 5x/day  Patient recently on much more modest dosing s/p recent possible BI         * Head injury, acute  Assessment & Plan  Fall with likely head trauma and brief loss of consciousness and acute osteophyte C6-C7 fracture  Mild increased confusion after fall also with some worsening mood/schizophrenia symptoms  >Recommend acute comprehensive interdisciplinary inpatient rehabilitation to include intensive skilled therapies (PT, OT) as outlined with oversight and management by rehabilitation physician as well as inpatient rehab level nursing, case management and weekly interdisciplinary team meetings     > overstimulation precautions, sleep-wake/agitation restlessness log  > optimize neuroleptic medication  > try to avoid sedative medications  > evaluate and manage fall risks          Cognitive impairment  Assessment & Plan  Likely premorbid; chronic generalized microvascular changes with possible old right lacunar internal capsule infarct and possible right frontal WM   >speech eval in management  >Follow-up with Neurology after discharge  >On chronic home amantadine     Fracture of sixth cervical vertebra [facility])  Assessment & Plan  C6/C7 osteophyte fracture context of recent fall and history of cervical decompression and fusion  Treatment recommendations per Neurosurgery  Non operative; continue cervical collar per Neurosurgery  Follow up with Neurosurgery after discharge    History of stroke  Assessment & Plan  Patient/family deny although evidence of possible old R lacunar infarct and focus in R frontal WM along with generalized micoangiopathic changes  >Recommend optimal BP mgmt  >Follow-up with neuro after d/c   >On full A/C for hx of VTE    Lightheadedness  Assessment & Plan  No recent reports  Chronic intermittent; consider med adjustments  Orthostatics negative recently; if necessary repeat particularly with adjustments in meds   PRN abdominal binder and WILVER hose      Vitamin D insufficiency  Assessment & Plan  D3 2000 units qday     History of pulmonary embolism  Assessment & Plan  And DVT on chronic anticoagulation with Coumadin  Medicine consult to manage during acute rehab course  Regimen adjusted       Pain  Assessment & Plan  Remains well controlled  Continue scheduled Tylenol for now  Lidoderm patch  Avoid NSAIDs and avoid opiates     Class 1 obesity due to excess calories without serious comorbidity with body mass index (BMI) of 34 0 to 34 9 in adult  Assessment & Plan   on appropriate nutrition and activity  Adjustments accordingly during skilled therapy and with rehab nursing  Monitor skin closely for breakdown, wounds, rashes; keep clean and dry, turning Q2H   Follow-up with PCP after d/c      Chronic diastolic heart failure ([address] )  Assessment & Plan  Mgmt per IM     Chronic venous stasis dermatitis of both lower extremities  Assessment & Plan  Internal medicine consult to assist with management  Elevate legs when in bed and when in chair for prolonged periods of time  Consider Wilver veronica    Coronary artery disease involving native coronary artery of native heart without angina pectoris  Assessment & Plan  On chronic Coumadin  Optimal blood pressure control  Follow-up with PCP    Benign essential hypertension  Assessment & Plan  Medicine c/s to assist with mgmt  Amlodipine 5 mg daily, losartan 50 mg daily  Monitor blood pressure and orthostatics    # Bowel care:    - monitor for constipation, incontinence, and diarrhea  - goal 1 appropriate BM every 1-2 days  - recommend colace +/- senna and PRN bowel protocol     # Bladder care: On chronic Flomax;  - monitor for retention, incontinence, signs/symptoms of UTI  - recommend voiding trial; nursing prompt to void followed by bladder scan starting Q4-6H or after each patient initiated void; nursing to record voided output and bladder scan totals; straight cath PRN >350-400 cc; if post void residual bladder scans are <150 cc x3 consecutive voids, can stop scans      # Skin:   - Nursing to turn patient Q2H if not adequately ambulatory, monitor for skin breakdown, rashes, and wounds if applicable     # At risk for venous thromboembolism:  - Recommend SCDs and mobilization  - warfarin     # Diet/Hydration:    Dysphagia 3 thin - recommend speech therapy     Disposition:   Home with estimated length of stay of 10-14 days from admission     Follow-up:   PCP, Psychiatry, rehab, Neurosurgery, Neurology     CODE: Level 1: Full Code     Restrictions include: Fall precautions         ---------------------------------------------------------------------------------------------------------------------    Objective:     Allergies and Medications per EMR    Physical Exam:  Vitals:    03/11/19 1314   BP: 107/57   Pulse: 77   Resp: 18   Temp: 97 5 °F (36 4 °C)   SpO2: 95%     General: Awake, alert in NAD  HENT:  MMM, cervical collar in place   Respiratory: Unlabored breathing, breath sounds equal, Lungs CTA, no wheezes, rales, or rhonchi  Cardiovascular: Regular rate and rhythm, no murmurs, rubs, or gallops  Gastrointestinal: Soft, non-tender, non-distended, normoactive bowel sounds  Genitourinary: No sadnhu  SkiN/MSK/Extremities:  No calf edema or calf tenderness to palpation  Neurologic/Psych:   MENTAL STATUS: at recent baseline, orientation intact  Affect: Flattened  Strength near full throughout     Diagnostic Studies: reviewed, no new imaging  No results found      Laboratory:    Results from last 7 days   Lab Units 03/11/19  0555 03/06/19  0508 03/05/19  0515   HEMOGLOBIN g/dL 13 6 13 0 13 2   HEMATOCRIT % 43 0 40 8 41 4   WBC Thousand/uL 4 50 5 82 7 05     Results from last 7 days   Lab Units 03/11/19  0555 03/08/19  0732 03/06/19  0508   BUN mg/dL 15 17 16   POTASSIUM mmol/L 3 7 3 6 3 8   CHLORIDE mmol/L 101 104 105   CREATININE mg/dL 0 77 0 80 0 79     Results from last 7 days   Lab Units 03/11/19  0555 03/10/19  0517 03/09/19  0547   PROTIME seconds 29 2* 31 3* 33 8*   INR  2 76* 3 02* 3 33*        Patient Active Problem List   Diagnosis    Benign essential hypertension    Coronary artery disease involving native coronary artery of native heart without angina pectoris    Chronic depression    Chronic venous stasis dermatitis of both lower extremities    Deep venous thrombosis of lower extremity (HCC)    Chronic diastolic heart failure (HCC)    Osteoarthritis    Nocturnal enuresis    Idiopathic trigeminal neuralgia    Lacunar infarction    Class 1 obesity due to excess calories without serious comorbidity with body mass index (BMI) of 34 0 to 34 9 in adult    Schizophrenia (HCC)    Disability of walking    Pulmonary embolism (HCC)    Arthropathy    Mixed hyperlipidemia    Neurogenic claudication    Neck pain without injury    Abdominal wall hematoma    Spinal stenosis of lumbar region with neurogenic claudication    Lipoma of colon    Fracture of sixth cervical vertebra (HCC)    Rib pain on left side    Fall    Forgetfulness    Physical deconditioning    Head injury, acute    Lightheadedness    Pain    History of pulmonary embolism    Cognitive impairment    History of stroke    Vitamin D insufficiency       ** Please Note: Fluency Direct voice to text software may have been used in the creation of this document  **    Total visit time:  At least 25 minutes, with more than 50% spent counseling/coordinating care    Chinyere Brody MD, [address] [place] [address]  Physical Medicine and Rehabilitation  Brain Injury Medicine

## 2019-03-11 NOTE — PROGRESS NOTES
Internal Medicine Progress Note  Patient: Nano Winn  Age/sex: 68 y o  male  Medical Record #: [id number]      ASSESSMENT/PLAN:  Nano Winn  is seen and examined and management for following issues:      C6-7 osteophyte fracture: Pain control per primary service  F/u NS on d/c; Reason for fall unclear      HTN: stable; continue Amlodipine 5mg daily and Losartan 50mg daily       History of DVT/PE/IVC filter: on life long AC  Initial DVT provoked due to knee surgery but PE not provoked  Family Medicine manages [facility] as OP and was on 5mg qd  Continue Coumadin = dose was skipped 3/5 since big jump in INR  Will change dosing to 2 5 mg MWF and 5mg T/TH/S/S since 5mg qd too much; coumadin held 3/9/19, will cont current dosing since decreased on 3/8/19      BPH: Continue Flomax      Paranoid Schizophrenia: sees Dr Bettina De La O as OP  Continue Risperdal, Luvox, Prolixin  Prolixin was added recently      Hx Diastolic CHF:  Controlled w/o diuretics;  he has chronic edema       Subjective: offers no complaints    ROS:   GI: denies abdominal pain, change bowel habits or reflux symptoms  Neuro: No new neurologic changes  Respiratory: No Cough, SOB  Cardiovascular: No CP, palpitations   Psych:  Feels "cold" emotionally    Scheduled Meds:    Current Facility-Administered Medications:  acetaminophen 975 mg Oral [facility] Wayne Granger MD   amantadine 100 mg Oral BID Wayne Granger MD   amLODIPine 5 mg Oral Daily Wayne Granger MD   b complex-vitamin C-folic acid 1 capsule Oral Daily With Judy Parker MD   bisacodyl 10 mg Rectal Daily PRN Natalia Cuellar DO   cholecalciferol 2,000 Units Oral Daily Wayne Granger MD   docusate sodium 100 mg Oral BID Wayne Granger MD   fluPHENAZine 5 mg Oral [facility] Wayne Granger MD   fluvoxaMINE 50 mg Oral 5x Daily Wayne Granger MD   lidocaine 1 patch Topical Daily Wayne Granger MD   losartan 50 mg Oral Daily Wayne Granger MD   ondansetron 4 mg Oral Q8H PRN Wayne Granger MD polyethylene glycol 17 g Oral Daily PRN Karon Richey MD   risperiDONE 0 5 mg Oral BID PRN Karon Richey MD   risperiDONE 1 5 mg Oral Daily Karon Richey MD   risperiDONE 3 mg Oral HS Karon Richey MD   tamsulosin 0 4 mg Oral Daily With Wilver Oakley MD   warfarin 2 5 mg Oral Once per day on Mon Wed Fri MATY Lloyd   warfarin 5 mg Oral Once per day on Sun Tue Thu Sat MATY Santana       Labs:     Results from last 7 days   Lab Units 03/11/19  0555 03/06/19  0508   WBC Thousand/uL 4 50 5 82   HEMOGLOBIN g/dL 13 6 13 0   HEMATOCRIT % 43 0 40 8   PLATELETS Thousands/uL 229 149     Results from last 7 days   Lab Units 03/11/19  0555 03/08/19  0732   SODIUM mmol/L 139 139   POTASSIUM mmol/L 3 7 3 6   CHLORIDE mmol/L 101 104   CO2 mmol/L 33* 32   BUN mg/dL 15 17   CREATININE mg/dL 0 77 0 80   CALCIUM mg/dL 8 8 9 0         Results from last 7 days   Lab Units 03/11/19  0555 03/10/19  0517   INR  2 76* 3 02*            [unfilled]    Labs reviewed    Physical Examination:  Vitals:   Vitals:    03/10/19 1307 03/10/19 1311 03/10/19 1958 03/11/19 0554   BP: 125/61 117/64 134/72 137/70   BP Location: Right arm Right arm Left arm Right arm   Pulse: 86 90 77 82   Resp: 18  18 20   Temp: 97 9 °F (36 6 °C)  97 6 °F (36 4 °C) 97 8 °F (36 6 °C)   TempSrc: Oral  Oral Oral   SpO2: 93%  96% 93%   Weight:       Height:         Constitutional:  NAD; pleasant; nontoxic   Neck: collar on  CV:  +S1, S2;  RRR; no rub/murmur  Pulmonary:  BBS without crackles/wheeze/rhonci; resp are unlabored  Abdominal:  soft, +BS, ND/NT  Skin:  no rashes  Musculoskeletal:  no LE edema  :  no sandhu  Neurological:  AAO;  BURGOS 5/5        [x ] Total time spent: 30 Mins and greater than 50% of this time was spent counseling/coordinating care  ** Please Note: Dragon 360 Dictation voice to text software may have been used in the creation of this document   **

## 2019-03-12 LAB
INR PPP: 3.03 (ref 0.86–1.17)
PROTHROMBIN TIME: 31.4 SECONDS (ref 11.8–14.2)

## 2019-03-12 PROCEDURE — 92508 TX SP LANG VOICE COMM GROUP: CPT

## 2019-03-12 PROCEDURE — 97110 THERAPEUTIC EXERCISES: CPT

## 2019-03-12 PROCEDURE — 99231 SBSQ HOSP IP/OBS SF/LOW 25: CPT

## 2019-03-12 PROCEDURE — 97530 THERAPEUTIC ACTIVITIES: CPT

## 2019-03-12 PROCEDURE — 97116 GAIT TRAINING THERAPY: CPT

## 2019-03-12 PROCEDURE — 85610 PROTHROMBIN TIME: CPT | Performed by: NURSE PRACTITIONER

## 2019-03-12 PROCEDURE — 97535 SELF CARE MNGMENT TRAINING: CPT

## 2019-03-12 PROCEDURE — 0HBRXZZ EXCISION OF TOE NAIL, EXTERNAL APPROACH: ICD-10-PCS | Performed by: PODIATRIST

## 2019-03-12 RX ORDER — AMMONIUM LACTATE 12 G/100G
CREAM TOPICAL 2 TIMES DAILY
Status: DISCONTINUED | OUTPATIENT
Start: 2019-03-12 | End: 2019-03-20 | Stop reason: HOSPADM

## 2019-03-12 RX ORDER — WARFARIN SODIUM 2.5 MG/1
2.5 TABLET ORAL
Status: DISCONTINUED | OUTPATIENT
Start: 2019-03-12 | End: 2019-03-18

## 2019-03-12 RX ORDER — WARFARIN SODIUM 5 MG/1
5 TABLET ORAL
Status: DISCONTINUED | OUTPATIENT
Start: 2019-03-14 | End: 2019-03-18

## 2019-03-12 RX ADMIN — RISPERIDONE 1.5 MG: 3 TABLET ORAL at 09:08

## 2019-03-12 RX ADMIN — LOSARTAN POTASSIUM 50 MG: 50 TABLET, FILM COATED ORAL at 09:06

## 2019-03-12 RX ADMIN — AMLODIPINE BESYLATE 5 MG: 5 TABLET ORAL at 09:06

## 2019-03-12 RX ADMIN — Medication 1 CAPSULE: at 16:38

## 2019-03-12 RX ADMIN — FLUVOXAMINE MALEATE 50 MG: 50 TABLET, FILM COATED ORAL at 21:57

## 2019-03-12 RX ADMIN — FLUVOXAMINE MALEATE 50 MG: 50 TABLET, FILM COATED ORAL at 06:12

## 2019-03-12 RX ADMIN — WARFARIN SODIUM 2.5 MG: 2.5 TABLET ORAL at 18:50

## 2019-03-12 RX ADMIN — Medication: at 22:00

## 2019-03-12 RX ADMIN — ACETAMINOPHEN 975 MG: 325 TABLET, FILM COATED ORAL at 21:55

## 2019-03-12 RX ADMIN — RISPERIDONE 3 MG: 3 TABLET ORAL at 22:00

## 2019-03-12 RX ADMIN — FLUPHENAZINE HYDROCHLORIDE 5 MG: 2.5 TABLET, FILM COATED ORAL at 14:32

## 2019-03-12 RX ADMIN — FLUPHENAZINE HYDROCHLORIDE 5 MG: 2.5 TABLET, FILM COATED ORAL at 21:56

## 2019-03-12 RX ADMIN — FLUVOXAMINE MALEATE 50 MG: 50 TABLET, FILM COATED ORAL at 18:17

## 2019-03-12 RX ADMIN — FLUVOXAMINE MALEATE 50 MG: 50 TABLET, FILM COATED ORAL at 12:01

## 2019-03-12 RX ADMIN — ACETAMINOPHEN 975 MG: 325 TABLET, FILM COATED ORAL at 06:12

## 2019-03-12 RX ADMIN — TAMSULOSIN HYDROCHLORIDE 0.4 MG: 0.4 CAPSULE ORAL at 16:38

## 2019-03-12 RX ADMIN — AMANTADINE HYDROCHLORIDE 100 MG: 100 CAPSULE, LIQUID FILLED ORAL at 09:08

## 2019-03-12 RX ADMIN — AMANTADINE HYDROCHLORIDE 100 MG: 100 CAPSULE, LIQUID FILLED ORAL at 18:17

## 2019-03-12 RX ADMIN — FLUPHENAZINE HYDROCHLORIDE 5 MG: 2.5 TABLET, FILM COATED ORAL at 06:11

## 2019-03-12 RX ADMIN — VITAMIN D, TAB 1000IU (100/BT) 2000 UNITS: 25 TAB at 09:06

## 2019-03-12 RX ADMIN — FLUVOXAMINE MALEATE 50 MG: 50 TABLET, FILM COATED ORAL at 14:32

## 2019-03-12 RX ADMIN — DOCUSATE SODIUM 100 MG: 100 CAPSULE, LIQUID FILLED ORAL at 09:06

## 2019-03-12 RX ADMIN — DOCUSATE SODIUM 100 MG: 100 CAPSULE, LIQUID FILLED ORAL at 18:16

## 2019-03-12 RX ADMIN — ACETAMINOPHEN 975 MG: 325 TABLET, FILM COATED ORAL at 14:31

## 2019-03-12 RX ADMIN — LIDOCAINE 1 PATCH: 50 PATCH CUTANEOUS at 09:09

## 2019-03-12 NOTE — PROGRESS NOTES
03/12/19 0845   Pain Assessment   Pain Assessment No/denies pain   Restrictions/Precautions   Precautions Aspiration;Bed/chair alarms;Cognitive; Fall Risk;Impulsive;Supervision on toilet/commode   Swallow Information   Current Risks for Dysphagia & Aspiration General debilitation;New Neuro event;Brain injury;Cognitive deficit   Current Symptoms/Concerns Difficulty chewing;Decreased oral intake   Current Diet Dysphagia advance; Thin liquid   Consistencies Assessed and Performance   Materials Admnistered Regular/Solid; Thin liquid   Oral Stage Mild impaired   Phargngeal Stage WFL   Swallow Mechanics WFL;Swallow initation; Appears prompt;Good Larygneal rise   Esophageal Concerns No s/s reported   Recommendations   Diet Solid Recommendation Regular consistency   Diet Liquid Recommendation Thin liquid   Recommended Form of Meds As tolerated   General Precautions Aspiration precautions; Feed only when alert;Minimize distractions;Upright as possible for all oral intake;Remain upright for 45 mins after meals  (OOB for meals)   Compensatory Swallowing Strategies Alternate solids and liquids;Effortful swallow;Voluntary throat clear/cough to clear penetration   Results Reviewed with RN;PT/Family/Caregiver   QI: [address] Provided by [place] No physical assistance   Eating CARE Score 6   Swallow Assessment   Swallow Treatment Assessment Pt observed w/ breakfast, where pt was trialed w/ regular w/ thin liquids for meal  Pt was able to setup tray and able to feed self w/o difficulty  Pt consumed 25% of meal and 120cc of thin liquids by cup  Pt demonstrating slower but full/functional mastication givne regular textures (bagel, fresh fruit cut up into smaller pieces) as well as w/ softer solids (scrambled eggs) w/o oral residual/pocketing  Lip seal was WFL around cup, tsp  A-p transfer of thins by cup was [facility]  No anterior spillage noted w/ consistencies   Swallow initiation prompter today w/ all consistencies and hyolaryngeal elevation WFL  No overt signs/sxs of aspiration noted w/ meal  Will recommend to upgrade diet to regular w/ thin liquids at this time and will f/u briefly to monitor tolerance of diet upgrade w/o increased oropharyngeal or aspiration sxs  Swallow Assessment Prognosis   Prognosis Good   Prognosis Considerations Co-morbidities; Medical diagnosis; Medical prognosis;Participation level; Potential;Previous level of function; Cooperation   SLP Therapy Minutes   SLP Time In 0845   SLP Time Out 0900   SLP Total Time (minutes) 15   SLP Mode of treatment - Individual (minutes) 0   SLP Mode of treatment - Concurrent (minutes) 0   SLP Mode of treatment - Group (minutes) 15   SLP Mode of treatment - Co-treat (minutes) 0   SLP Mode of Teatment - Total time(minutes) 15 minutes   Therapy Time missed   Time missed?  No   Daily FIM Score   Eating (FIM) 6 - Patient requires modified diet

## 2019-03-12 NOTE — PROGRESS NOTES
Internal Medicine Progress Note  Patient: Heather Lennon  Age/sex: 68 y o  male  Medical Record #: [id number]      ASSESSMENT/PLAN:  Heather Lennon  is seen and examined and management for following issues:      C6-7 osteophyte fracture: Pain control per primary service  F/u NS on d/c; Reason for fall unclear      HTN: stable; continue Amlodipine 5mg daily and Losartan 50mg daily       History of DVT/PE/IVC filter: on life long AC  Initial DVT provoked due to knee surgery but PE not provoked  Family Medicine manages [facility] as OP and was on 5mg qd  Continue Coumadin = but adjust dosing  INR 3 03  Repeat INR Thursday      BPH: Continue Flomax      Paranoid Schizophrenia: sees Dr Odessa Britton as OP  Continue Risperdal, Luvox, Prolixin  Prolixin was added recently  Hx Diastolic CHF:  Controlled w/o diuretics;  he has chronic edema       Subjective:  Patient reports that he has chronic aches and pains  He denies any other complaints      ROS:   GI: denies abdominal pain, change bowel habits or reflux symptoms  Neuro: No new neurologic changes  Respiratory: No Cough, SOB  Cardiovascular: No CP, palpitations   Psych:  No depression or anxiety    Scheduled Meds:    Current Facility-Administered Medications:  acetaminophen 975 mg Oral [facility] Carole Galan MD   amantadine 100 mg Oral BID Carole Galan MD   amLODIPine 5 mg Oral Daily Carole Galan MD   b complex-vitamin C-folic acid 1 capsule Oral Daily With Farhat Borja MD   bisacodyl 10 mg Rectal Daily PRN Gabriel Cortez DO   cholecalciferol 2,000 Units Oral Daily Carole Galan MD   docusate sodium 100 mg Oral BID Carole Galan MD   fluPHENAZine 5 mg Oral [facility] Carole Galan MD   fluvoxaMINE 50 mg Oral 5x Daily Carole Galan MD   lidocaine 1 patch Topical Daily Carole Galan MD   losartan 50 mg Oral Daily Carole Galan MD   ondansetron 4 mg Oral Q8H PRN Carole Galan MD   polyethylene glycol 17 g Oral Daily PRN Carole Galan MD   risperiDONE 0 5 mg Oral BID PRN Carole Galan MD   risperiDONE 1 5 mg Oral Daily Carole Galan MD   risperiDONE 3 mg Oral HS Carole Galan MD   tamsulosin 0 4 mg Oral Daily With Farhat Borja MD   warfarin 2 5 mg Oral Once per day on Mon Wed Fri MATY Martinez   warfarin 5 mg Oral Once per day on Sun Tue Thu Sat Tarik MATY Leonard       Labs:     Results from last 7 days   Lab Units 03/11/19  0555 03/06/19  0508   WBC Thousand/uL 4 50 5 82   HEMOGLOBIN g/dL 13 6 13 0   HEMATOCRIT % 43 0 40 8   PLATELETS Thousands/uL 229 149     Results from last 7 days   Lab Units 03/11/19  0555 03/08/19  0732   SODIUM mmol/L 139 139   POTASSIUM mmol/L 3 7 3 6   CHLORIDE mmol/L 101 104   CO2 mmol/L 33* 32   BUN mg/dL 15 17   CREATININE mg/dL 0 77 0 80   CALCIUM mg/dL 8 8 9 0         Results from last 7 days   Lab Units 03/12/19  0505 03/11/19  0555   INR  3 03* 2 76*            [unfilled]    Labs reviewed    Physical Examination:  Vitals:   Vitals:    03/11/19 1314 03/11/19 2024 03/12/19 0505 03/12/19 0906   BP: 107/57 137/70 153/75 115/98   BP Location: Right arm Right arm Left arm    Pulse: 77 78 83    Resp: 18 18 18    Temp: 97 5 °F (36 4 °C) 97 8 °F (36 6 °C) 98 °F (36 7 °C)    TempSrc: Oral Oral Oral    SpO2: 95% 93% 91%    Weight:       Height:         Constitutional:  NAD; pleasant; nontoxic   Neck: collar on  CV:  +S1, S2;  RRR; no rub/murmur  Pulmonary:  BBS without crackles/wheeze/rhonci; resp are unlabored  Abdominal:  soft, +BS, ND/NT  Skin:  no rashes  Musculoskeletal:  no LE edema  :  no sandhu  Neurological:  AAO;  BURGOS 5/5        [ X ] Total time spent: 30 Mins and greater than 50% of this time was spent counseling/coordinating care  ** Please Note: Dragon 360 Dictation voice to text software may have been used in the creation of this document   **

## 2019-03-12 NOTE — PROGRESS NOTES
03/12/19 1230   Pain Assessment   Pain Assessment No/denies pain   Restrictions/Precautions   Precautions Bed/chair alarms;Cognitive; Fall Risk;Spinal precautions;Supervision on toilet/commode   Braces or Orthoses C/S Collar   Cognition   Overall Cognitive Status Impaired   Subjective   Subjective no changes from previous session; podiatry assessing pt at start of session   QI: Roll Left and Right   Assistance Needed Supervision   Assistance Provided by [place] No physical assistance   Roll Left and Right CARE Score 4   QI: Sit to [address] Provided by [place] No physical assistance   Sit to Lying CARE Score 4   QI: Lying to Sitting on Side of Bed   Assistance Needed Supervision   Assistance Provided by [place] No physical assistance   Lying to Sitting on Side of Bed CARE Score 4   QI: Sit to [address] Provided by [place] No physical assistance   Sit to Stand CARE Score 4   QI: Chair/Bed-to-Chair Transfer   Assistance Needed Supervision; Adaptive equipment   Assistance Provided by [place] No physical assistance   Chair/Bed-to-Chair Transfer CARE Score 4   Transfer Bed/Chair/Wheelchair   Limitations Noted In Balance; Coordination;UE Strength;LE Strength   Adaptive Equipment Roller Walker   Stand Pivot Supervision   Sit to Stand Supervision   Stand to Sit Supervision   Supine to Sit Supervision   Sit to Supine Supervision   Findings log rolling technique during bed mobility   Bed, Chair, Wheelchair Transfer (FIM) 5 - Patient requires supervision/monitoring   QI: Walk 10 Feet   Assistance Needed Incidental touching;Supervision; Adaptive equipment   Assistance Provided by [place] No physical assistance   Comment with RW and SPC   Walk 10 Feet CARE Score 4   QI: Walk 50 Feet with Two Turns   Assistance Needed Supervision; Adaptive equipment; Incidental touching   Assistance Provided by [place] No physical assistance   Comment with SPC and RW   Walk 48 Feet with Two Turns CARE Score 4   QI: Walk 150 Feet   Assistance Needed Incidental touching;Supervision; Adaptive equipment   Assistance Provided by [place] No physical assistance   Comment SPC and RW   Walk 150 Feet CARE Score 4   QI: Walking 10 Feet on Uneven Surfaces   Reason if not Attempted Safety concerns   Walking 10 Feet on Uneven Surfaces CARE Score 88   Ambulation   Does the patient walk? 2  Yes   Primary Discharge Mode of Locomotion Walk   Walk Assist Level Close Supervision;Contact Guard   Gait Pattern Inconsistant Shilpa;Decreased foot clearance; Forward Flexion; Improper weight shift;Trendelenburg   Assist Device Roller [employer] Walked (feet) 350 ft   Limitations Noted In Balance; Coordination; Endurance;Posture;Speed;Strength   Findings practiced amb with SPC and 150' no AD x2 for balance  cont to have difficulty with turns    Walking (FIM) 4 - Patient requires steadying assist or light touching AND distance 150 feet or more, no rest   QI: Wheel 50 Feet with Two Turns   Reason if not Attempted Activity not applicable   Wheel 50 Feet with Two Turns CARE Score 9   QI: Wheel 150 Feet   Reason if not Attempted Activity not applicable   Wheel 115 Feet CARE Score 9   Wheelchair mobility   QI: Does the patient use a wheelchair? 0  No   QI: 4 Steps   Assistance Needed Incidental touching; Adaptive equipment   Assistance Provided by [place] No physical assistance   Comment B HRs on 6"    4 Steps CARE Score 4   QI: 12 Steps   Assistance Needed Incidental touching; Adaptive equipment   Assistance Provided by [place] No physical assistance   Comment 6"  steps B Hrs   12 Steps CARE Score 4   Stairs   Type Stairs   # of Steps 12   Weight Bearing Precautions Fall Risk   Assist Devices Bilateral Rail   Findings CGA for safety; may want to trial FF next session   Stairs (FIM) 4 - Patient requires steadying assist or light touching AND patient goes up and down full flight (12- 14 stairs) Therapeutic Interventions   Strengthening unsupported STS x10 for functional strengthening and initial standing balance   Equipment Use   NuStep L2 10mins LEs only   Assessment   Treatment Assessment pt cont to focus on gait training with SPC and no AD for balance and righting reactions,  due to trendelenburg pt with poor wt shift to the R and occasional unsteady gait to the L   pt difficulty with turns and improve righting reactions  recommendation for RW upon d/c to improve functional ind   will benefit from cont practice on FF of steps and FT for safe d/c home  Problem List Decreased range of motion;Decreased strength;Decreased endurance; Impaired balance;Decreased mobility; Decreased coordination; Impaired judgement;Decreased safety awareness;Decreased cognition;Orthopedic restrictions   Barriers to Discharge Inaccessible home environment;Decreased caregiver support   PT Barriers   Physical Impairment Decreased strength;Decreased endurance; Impaired balance;Decreased mobility; Decreased safety awareness;Orthopedic restrictions   Functional Limitation Walking;Transfers;Standing;Stair negotiation;Car transfers   Plan   Treatment/Interventions Functional transfer training;LE strengthening/ROM; Endurance training;Patient/family training;Bed mobility;Gait training   Progress Progressing toward goals   Recommendation   Recommendation 24 hour supervision/assist;Home PT; Home with family support   PT Therapy Minutes   PT Time In 1230   PT Time Out 1330   PT Total Time (minutes) 60   PT Mode of treatment - Individual (minutes) 60   PT Mode of treatment - Concurrent (minutes) 0   PT Mode of treatment - Group (minutes) 0   PT Mode of treatment - Co-treat (minutes) 0   PT Mode of Teatment - Total time(minutes) 60 minutes

## 2019-03-12 NOTE — PROGRESS NOTES
03/12/19 0900   Pain Assessment   Pain Assessment 0-10   Pain Score No Pain   Restrictions/Precautions   Precautions Aspiration;Bed/chair alarms; Fall Risk;Cognitive;Supervision on toilet/commode;Spinal precautions   Braces or Orthoses C/S Collar   Eating Assessment   Eating (FIM) 6 - Patient requires increased time or safety concern   Grooming   Able To Initiate Tasks; Wash/Dry Face;Wash/Dry Hands   Findings Pt standing at sink to wash hands at supervision level  Pt with intermittent b/l hand release and able to reach outside KERVIN to R side to retrieve paper towels   Grooming (FIM) 5 - Patient requires supervision/monitoring   Tub/Shower Transfer   Findings pt completed dry tub transfer attempting to step over edge of tub  Pt required min A to step over tub  WIth tub bench pt is supervision with verbal cues for positioning of hands for stand <---> sit  At this time recommend pt have tub bench for d/c     QI: Upper Body Dressing   Assistance Needed Supervision;Verbal cues   Assistance Provided by [place] No physical assistance   Comment Pt utlized mirror for visual feedback to don/doff pull over shirt and button up shirt  Pt instructed to remove vneck shirt from under the c/s collar prior to doffing shirt  Pt able to set up vneck shirt correctly and then instructed to reach behind head to doff shirt over head first  Pt able to complete with verbal cueing alone  Pt able to doff button up shirt while in stance  Pt educated on doffing pull over shirt once seated due to safety and balance deficits while pulling shirt up over head   Upper Body Dressing CARE Score 4   Dressing/Undressing Clothing   Remove UB Clothes Pullover Shirt; [address]; Button Shirt   UB Dressing (FIM) 5 - Patient requires verbal cues   QI: Sit to Stand   Assistance Needed Supervision   Assistance Provided by [place] No physical assistance   Sit to Stand CARE Score 4   QI: Chair/Bed-to-Chair Transfer   Assistance Needed Supervision   Assistance Provided by [place] No physical assistance   Comment Pt is supervision with stand pivot transfers with RW   Chair/Bed-to-Chair Transfer CARE Score 4   Transfer Bed/Chair/Wheelchair   Limitations Noted In Balance;LE Strength;UE Strength   Bed, Chair, Wheelchair Transfer (FIM) 5 - Patient requires supervision/monitoring   QI: [address] Provided by [place] No physical assistance   Comment Pt is CS for pants up/down over hips  Pt able to complete bladder hygiene adn check for bowel hygiene while seated ont oilet  Pt wiht verbal cueing to not twist when attempting to flush toilet while in stance  Post education pt able to turnw ith whole body to flush toilet behind him at supervision level   [address] Score 4   Toileting   Able to [address] down yes, up yes  Able to Manage Clothing Closures Yes   Manage Hygiene Bladder   Limitations Noted In Balance;Problem Solving; Safety;LE Strength   Toileting (FIM) 5 - Patient requires supervision/monitoring   QI: Toilet Transfer   Assistance Needed Supervision   Assistance Provided by [place] No physical assistance   Toilet Transfer CARE Score 4   Toilet Transfer   Surface Assessed Raised Toilet   Transfer Technique Stand Pivot   Limitations Noted In Balance;Problem Solving; Safety;LE Strength; Sequencing   Toilet Transfer (FIM) 5 - Patient requires supervision/monitoring   ROM- Right Upper Extremities   RUE ROM Comment Pt completed forward rows and backward rows with 1# dowel along with internal and external reaching to simulate donning shirt and tucking shirt into pants  Pt compelted 2 sets 15 reps in order to improve UE ROM and ADL independence   ROM - Left Upper Extremities    LUE ROM Comment Pt completed forward rows and backward rows with 1# dowel along with internal and external reaching to simulate donning shirt and tucking shirt into pants   Pt compelted 2 sets 15 reps in order to improve UE ROM and ADL participation   Cognition   Overall Cognitive Status Impaired   Arousal/Participation Alert; Cooperative   Attention Attends with cues to redirect   Orientation Level Oriented X4   Memory Decreased long term memory;Decreased short term memory;Decreased recall of precautions   Following Commands Follows multistep commands with increased time or repetition   Assessment   Treatment Assessment Pt engaged in OT treatment session with focus on fxnl transfers, standing balance, toileting and UE ROM  Pt toielted twice during OT session with noed decreased ROM with internal rotation to wipe buttock and tuck shirt into pants  Pt then eganged in AROM for external internal shoudler rotation to improve ADL fxn  Pt tolerated session well  Pt educated on preforming AROM in room 2 sets 15 reps  Spoke to patient regarding recommendations for tub/shower with instalilng of grab bars  Thens poke to son over phone  Son will take pictures of current shower seat  ALso discussed with son setting up family trainign for c/s collar management and observing tub/shower ADL  Son will be in later today (aroudn 14:30) to set up further training  Pt overall tolerated session fair  Pt still requires verbal cueing for safety with completing stand pivot transfers with RW  Continue with POC with focus on UE ROM, dyanmic standing balance, and fxnl mobility   Prognosis Good   Problem List Decreased range of motion;Decreased strength;Decreased endurance; Impaired balance;Decreased mobility; Decreased coordination; Impaired judgement;Decreased safety awareness;Decreased cognition;Orthopedic restrictions   Plan   Treatment/Interventions ADL retraining;Functional transfer training;LE strengthening/ROM; Therapeutic exercise; Endurance training;Cognitive reorientation;Patient/family training;Gait training; Compensatory technique education; Bed mobility   Progress Progressing toward goals   Recommendation   OT Discharge Recommendation 24 hour supervision/assist   OT Therapy Minutes   OT Time In 0900   OT Time Out 1030   OT Total Time (minutes) 90   OT Mode of treatment - Individual (minutes) 90   OT Mode of treatment - Concurrent (minutes) 0   OT Mode of treatment - Group (minutes) 0   OT Mode of treatment - Co-treat (minutes) 0   OT Mode of Teatment - Total time(minutes) 90 minutes   Therapy Time missed   Time missed?  No

## 2019-03-12 NOTE — PROGRESS NOTES
03/12/19 1100   Pain Assessment   Pain Assessment No/denies pain   Restrictions/Precautions   Precautions Bed/chair alarms;Cognitive; Fall Risk;Spinal precautions;Supervision on toilet/commode   Braces or Orthoses C/S Collar   Cognition   Overall Cognitive Status Impaired   Arousal/Participation Cooperative; Alert   Attention Attends with cues to redirect   Memory Decreased recall of precautions;Decreased recall of recent events   Following Commands Follows multistep commands with increased time or repetition   Subjective   Subjective pt reports he has trouble trusting people and cogntively "hurting" no reports of physical pain   QI: Sit to [address] Provided by [place] No physical assistance   Sit to Stand CARE Score 4   QI: Chair/Bed-to-Chair Transfer   Assistance Needed Supervision; Adaptive equipment;Verbal cues   Assistance Provided by [place] No physical assistance   Comment occasional cueing to complete SPT before sitting    Chair/Bed-to-Chair Transfer CARE Score 4   Transfer Bed/Chair/Wheelchair   Limitations Noted In Balance;Problem Solving;LE Strength   Adaptive Equipment Roller Walker   Stand Pivot Supervision   Sit to Stand Supervision   Stand to Sit Supervision   Findings needs VCs to complete tx before sitting to soon and to keep RW in front until seated   Bed, Chair, Wheelchair Transfer (FIM) 5 - Patient requires verbal cues   QI: Walk 10 Feet   Assistance Needed Supervision; Adaptive equipment   Assistance Provided by [place] No physical assistance   Walk 10 Feet CARE Score 4   QI: Walk 50 Feet with Two Turns   Assistance Needed Supervision; Adaptive equipment   Assistance Provided by [place] No physical assistance   Walk 50 Feet with Two Turns CARE Score 4   QI: Walk 150 Feet   Assistance Needed Supervision; Adaptive equipment   Assistance Provided by [place] No physical assistance   Walk 150 Feet CARE Score 4   QI: Walking 10 Feet on Uneven Surfaces   Reason if not Attempted Safety concerns   Walking 10 Feet on Uneven Surfaces CARE Score 88   Ambulation   Does the patient walk? 2  Yes   Primary Discharge Mode of Locomotion Walk   Walk Assist Level Close Supervision   Gait Pattern Inconsistant Shilpa; Slow Shilpa;Decreased foot clearance; Improper weight shift   Assist Device Roller [place] Walked (feet) 350 ft   Limitations Noted In Balance; Endurance;Speed;Strength   Findings cont to practice with RW and without AD   Walking (FIM) 5 - Patient requires supervision/monitoring AND distance 150 feet or more, no rest   QI: Wheel 50 Feet with Two Turns   Reason if not Attempted Activity not applicable   Wheel 50 Feet with Two Turns CARE Score 9   QI: Wheel 150 Feet   Reason if not Attempted Activity not applicable   Wheel 039 Feet CARE Score 9   Wheelchair mobility   QI: Does the patient use a wheelchair? 0  No   QI: 1 Step (Curb)   Assistance Needed Supervision; Adaptive equipment;Verbal cues   Assistance Provided by [place] No physical assistance   Comment cues for sequencing to get closer before bringing advancing RW   1 Step (Curb) CARE Score 4   Stairs   Type Curb   # of Steps 2   Weight Bearing Precautions Fall Risk   Assist Devices Roller Walker   Findings cues for sequencing on curb step and safety to get closer to edge of step before advancing RW up/down   Therapeutic Interventions   Strengthening sidestepping 20' x2   standing hip abd x30, standing at counter top in gym  Assessment   Treatment Assessment session focused on improving activity tolerance through gait training  pt safely amb with RW at Sup, but without an AD or with [address] but with decrease balance and rigthing reactions  pt has unsteady gait with turns and presents with a trendelenburg gait pattern  pt focused on hip strengthening to improve overall activity tolerance with mobility   Problem List Decreased range of motion;Decreased strength;Decreased endurance; Impaired balance;Decreased mobility; Decreased coordination; Impaired judgement;Decreased safety awareness;Decreased cognition;Orthopedic restrictions   Barriers to Discharge Inaccessible home environment;Decreased caregiver support   PT Barriers   Physical Impairment Decreased strength;Decreased endurance; Impaired balance;Decreased mobility; Decreased safety awareness;Orthopedic restrictions   Functional Limitation Walking;Transfers;Standing;Stair negotiation;Car transfers   Plan   Treatment/Interventions Functional transfer training;LE strengthening/ROM; Endurance training;Patient/family training;Bed mobility;Gait training   Progress Progressing toward goals   Recommendation   Recommendation Home PT; Home with family support   Equipment Recommended Walker   PT Therapy Minutes   PT Time In 1100   PT Time Out 1130   PT Total Time (minutes) 30   PT Mode of treatment - Individual (minutes) 30   PT Mode of treatment - Concurrent (minutes) 0   PT Mode of treatment - Group (minutes) 0   PT Mode of treatment - Co-treat (minutes) 0   PT Mode of Teatment - Total time(minutes) 30 minutes   Therapy Time missed   Time missed?  No

## 2019-03-12 NOTE — PLAN OF CARE
Problem: Potential for Falls  Goal: Patient will remain free of falls  Description  INTERVENTIONS:  - Assess patient frequently for physical needs  -  Identify cognitive and physical deficits and behaviors that affect risk of falls    -  [place] fall precautions as indicated by assessment   - Educate patient/family on patient safety including physical limitations  - Instruct patient to call for assistance with activity based on assessment  - Modify environment to reduce risk of injury  - Consider OT/PT consult to assist with strengthening/mobility  Outcome: Progressing     Problem: Prexisting or High Potential for Compromised Skin Integrity  Goal: Skin integrity is maintained or improved  Description  INTERVENTIONS:  - Identify patients at risk for skin breakdown  - Assess and monitor skin integrity  - Assess and monitor nutrition and hydration status  - Monitor labs (i e  albumin)  - Assess for incontinence   - Turn and reposition patient  - Assist with mobility/ambulation  - Relieve pressure over bony prominences  - Avoid friction and shearing  - Provide appropriate hygiene as needed including keeping skin clean and dry  - Evaluate need for skin moisturizer/barrier cream  - Collaborate with interdisciplinary team (i e  Nutrition, Rehabilitation, etc )   - Patient/family teaching  Outcome: Progressing     Problem: PAIN - ADULT  Goal: Verbalizes/displays adequate comfort level or baseline comfort level  Description  Interventions:  - Encourage patient to monitor pain and request assistance  - Assess pain using appropriate pain scale  - Administer analgesics based on type and severity of pain and evaluate response  - Implement non-pharmacological measures as appropriate and evaluate response  - Consider cultural and social influences on pain and pain management  - Notify physician/advanced practitioner if interventions unsuccessful or patient reports new pain  Outcome: Progressing     Problem: SAFETY ADULT  Goal: Maintain or return to baseline ADL function  Description  INTERVENTIONS:  -  Assess patient's ability to carry out ADLs; assess patient's baseline for ADL function and identify physical deficits which impact ability to perform ADLs (bathing, care of mouth/teeth, toileting, grooming, dressing, etc )  - Assess/evaluate cause of self-care deficits   - Assess range of motion  - Assess patient's mobility; develop plan if impaired  - Assess patient's need for assistive devices and provide as appropriate  - Encourage maximum independence but intervene and supervise when necessary  ¯ Involve family in performance of ADLs  ¯ Assess for home care needs following discharge   ¯ Request OT consult to assist with ADL evaluation and planning for discharge  ¯ Provide patient education as appropriate  Outcome: Progressing  Goal: Maintain or return mobility status to optimal level  Description  INTERVENTIONS:  - Assess patient's baseline mobility status (ambulation, transfers, stairs, etc )    - Identify cognitive and physical deficits and behaviors that affect mobility  - Identify mobility aids required to assist with transfers and/or ambulation (gait belt, sit-to-stand, lift, walker, cane, etc )  - [place] fall precautions as indicated by assessment  - Record patient progress and toleration of activity level on Mobility SBAR; progress patient to next Phase/Stage  - Instruct patient to call for assistance with activity based on assessment  - Request Rehabilitation consult to assist with strengthening/weightbearing, etc   Outcome: Progressing     Problem: DISCHARGE PLANNING  Goal: Discharge to home or other facility with appropriate resources  Description  INTERVENTIONS:  - Identify barriers to discharge w/patient and caregiver  - Arrange for needed discharge resources and transportation as appropriate  - Identify discharge learning needs (meds, wound care, etc )  - Arrange for interpretive services to assist at discharge as needed  - Refer to Case Management Department for coordinating discharge planning if the patient needs post-hospital services based on physician/advanced practitioner order or complex needs related to functional status, cognitive ability, or social support system  Outcome: Progressing     Problem: GASTROINTESTINAL - ADULT  Goal: Maintains or returns to baseline bowel function  Description  INTERVENTIONS:  - Assess bowel function  - Encourage oral fluids to ensure adequate hydration  - Administer IV fluids as ordered to ensure adequate hydration  - Administer ordered medications as needed  - Encourage mobilization and activity  - Nutrition services referral to assist patient with appropriate food choices  Outcome: Progressing     Problem: SKIN/TISSUE INTEGRITY - ADULT  Goal: Skin integrity remains intact  Description  INTERVENTIONS  - Identify patients at risk for skin breakdown  - Assess and monitor skin integrity  - Assess and monitor nutrition and hydration status  - Monitor labs (i e  albumin)  - Assess for incontinence   - Turn and reposition patient  - Assist with mobility/ambulation  - Relieve pressure over bony prominences  - Avoid friction and shearing  - Provide appropriate hygiene as needed including keeping skin clean and dry  - Evaluate need for skin moisturizer/barrier cream  - Collaborate with interdisciplinary team (i e  Nutrition, Rehabilitation, etc )   - Patient/family teaching  Outcome: Progressing     Problem: MUSCULOSKELETAL - ADULT  Goal: Maintain or return mobility to safest level of function  Description  INTERVENTIONS:  - Assess patient's ability to carry out ADLs; assess patient's baseline for ADL function and identify physical deficits which impact ability to perform ADLs (bathing, care of mouth/teeth, toileting, grooming, dressing, etc )  - Assess/evaluate cause of self-care deficits   - Assess range of motion  - Assess patient's mobility; develop plan if impaired  - Assess patient's need for assistive devices and provide as appropriate  - Encourage maximum independence but intervene and supervise when necessary  - Involve family in performance of ADLs  - Assess for home care needs following discharge   - Request OT consult to assist with ADL evaluation and planning for discharge  - Provide patient education as appropriate  Outcome: Progressing  Goal: Maintain proper alignment of affected body part  Description  INTERVENTIONS:  - Support, maintain and protect limb and body alignment  - Provide pt/fam with appropriate education  Outcome: Progressing

## 2019-03-12 NOTE — CONSULTS
Consult - Podiatry   Cheryl Patel  68 y o  male MRN: [id number]  Unit/Bed#: -01 Encounter: [id number]    Assessment/Plan     Assessment:  1  Onychodystrophy  2  Xerosis  3  Venous insuffiencey b/l     Plan:  - Nails x10 were sharply trimmed to normal length and thickness with a large nail nipper without incident   - BID Amlactin application for xerosis  - Recommended to apply compression stockings and elevate legs as much as possible   - thank you for consultation, will sign off at this time  Call for questions or concerns  History of Present Illness     HPI:  Cheryl Patel  is a 68 y o  male who presents with elongated toenails  States that their nails are painful, elongated  They have difficulty applying their socks and shoes  The pressure within their shoe gear is painful and they have been unable to cut their nails adequately  Patient states he follows up with Dr Lena Davis every 9 weeks for foot  States its hard for him to apply the compressions stockings by himself  Denies nausea vomiting fever chills shortness of breath today  Inpatient consult to Podiatry     Performed by  Magalys Briggs DPM     Authorized by Gretta Simon PA-C       Consent: Written consent obtained  Review of Systems   Constitutional: Negative  HENT: Negative  Eyes: Negative  Respiratory: Negative  Cardiovascular: Negative  Gastrointestinal: Negative  Musculoskeletal:  Painful toenails times 10  Skin:  Dystrophic toenails times 10  Neurological: Negative  Psych: negative         Historical Information   Past Medical History:   Diagnosis Date    Ankle edema 04/2004    chronic    Arthritis 03/16/2011    right knee    Ataxic gait 11/14/2011    Baker's cyst 04/2005    (djd)-left knee    Cardiac disease     Cataract     Chronic pain     Coronary artery disease     Depression     DVT [facility] venous thrombosis) ([facility] )     Epistaxis 01/09/2015    Forgetfulness 3/4/2019    Hand tingling 02/05/2010    tingling right forearm and hand-CTS on EMG    Hematuria 11/22/2016    ER-3 way sandhu to irrigate bladder with 3 L NS    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    Hypertension     Lipoma 01/11/2011    in hepatic flexure    Migraine headache 02/2007    optic    Obesity     Pulmonary embolism ([facility] ) 09/18/2014    bilateral PE + acute DVT LLE     Rib fracture 03/2007    left 8th rib 2nd degree fall    Schizophrenia ([facility] ) 04/2009    acute exacerbation    Sciatic leg pain 07/20/2011    left    Stenosis of cervix     with cervical radiculomyelopathy    Suicidal ideation 04/2009    Syncope 01/09/2015    Tardive dyskinesia 02/28/2011    Torn meniscus 04/2004    lateral, knee, left    Varicose vein of leg 04/2004    Venous insufficiency of leg 04/2004    chronic    Vertigo 05/12/2008    transient-questionable BPPV     Past Surgical History:   Procedure Laterality Date    CATARACT EXTRACTION Right     10/2/2009    CATARACT EXTRACTION      12/4/2009    COLONOSCOPY      with biopsy    IVC FILTER INSERTION      anticoagulation-9/23/2014    KNEE ARTHROCENTESIS       and cortisone injection    KNEE ARTHROSCOPY Left     5/18/2004    LAMINECTOMY      C3-C6 and left sided cervical foraminotomies-10/14/2008    OTHER SURGICAL HISTORY      DONNY x 1    REPLACEMENT TOTAL HIP W/  RESURFACING IMPLANTS      TOTAL HIP ARTHROPLASTY Right     02/07/2017    TOTAL KNEE ARTHROPLASTY Left     3/21/2005     Social History   Social History     Substance and Sexual Activity   Alcohol Use Yes    Alcohol/week: 0 0 oz    Frequency: Never    Binge frequency: Never    Comment: no drinking     Social History     Substance and Sexual Activity   Drug Use No     Social History     Tobacco Use   Smoking Status Never Smoker   Smokeless Tobacco Never Used   Tobacco Comment    no passive smoke exposure     Family History:   Family History   Problem Relation Age of Onset    Coronary artery disease Mother    Tony Duncan Diabetes Mother     Depression Mother     Other Mother         tobacco abuse    Other Father         tobacco abuse    Coronary artery disease Father     Other Sister         tobacco abuse    Diabetes Sister     Hypertension Brother     Migraines Other     Breast cancer Other         2009-mastectomy+chemo       Meds/Allergies   Medications Prior to Admission   Medication    acetaminophen (TYLENOL) 325 mg tablet    amantadine (SYMMETREL) 100 mg capsule    amLODIPine (NORVASC) 5 mg tablet    fluPHENAZine (PROLIXIN) 5 mg tablet    fluvoxaMINE (LUVOX) 50 mg tablet    losartan (COZAAR) 50 mg tablet    risperiDONE (RISPERDAL) 3 mg tablet    tamsulosin (FLOMAX) 0 4 mg    warfarin (COUMADIN) 5 mg tablet     Allergies   Allergen Reactions    Lisinopril Cough       Objective   First Vitals:   Blood Pressure: 136/66 (03/05/19 1640)  Pulse: 93 (03/05/19 1640)  Temperature: 98 8 °F (37 1 °C) (03/05/19 1640)  Temp Source: Oral (03/05/19 1640)  Respirations: 18 (03/05/19 1640)  Height: 5' 4 5" (163 8 cm) (03/05/19 1640)  Weight - Scale: 89 kg (196 lb 3 4 oz) (03/05/19 1640)  SpO2: 94 % (03/05/19 1640)    Current Vitals:   Blood Pressure: 135/67 (03/12/19 1301)  Pulse: 86 (03/12/19 1301)  Temperature: 98 3 °F (36 8 °C) (03/12/19 1301)  Temp Source: Oral (03/12/19 1301)  Respirations: 18 (03/12/19 1301)  Height: 5' 4 5" (163 8 cm) (03/05/19 1640)  Weight - Scale: 84 8 kg (186 lb 15 2 oz) (03/06/19 0501)  SpO2: 94 % (03/12/19 1301)        /67 (BP Location: Left arm)   Pulse 86   Temp 98 3 °F (36 8 °C) (Oral)   Resp 18   Ht 5' 4 5" (1 638 m)   Wt 84 8 kg (186 lb 15 2 oz)   SpO2 94%   BMI 31 59 kg/m²      General Appearance:    Alert, cooperative, no distress   Head:    Normocephalic, without obvious abnormality, atraumatic   Eyes:    PERRL, conjunctiva/corneas clear, EOM's intact        Nose:   Moist mucous membranes   Neck:   Supple, symmetrical, trachea midline   Back:     Symmetric   Lungs: Respirations unlabored   Heart:    Regular rate and rhythm, S1 and S2 normal, no murmur, rub   or gallop   Abdomen:     Soft, non-tender   Extremities:   Bilateral lower extremity with edema  Bilateral lower extremity range of motion within normal limits  MMT 5/5   Pulses:   Right DP 2/4 and PT 1/4, Left DP 1/4 and PT 1/4  Cap refill less than 3 seconds  Skin:   Elongated, dystrophic, thickened toenails times 10 with notable subungual debris and tender to palpation  Moderate is xerotic feet  Neurologic:   Gross sensation is intact  Protective sensation is intact             Lab Results:   Admission on 03/05/2019   Component Date Value    Sodium 03/06/2019 142     Potassium 03/06/2019 3 8     Chloride 03/06/2019 105     CO2 03/06/2019 32     ANION GAP 03/06/2019 5     BUN 03/06/2019 16     Creatinine 03/06/2019 0 79     Glucose 03/06/2019 86     Calcium 03/06/2019 8 4     eGFR 03/06/2019 90     WBC 03/06/2019 5 82     RBC 03/06/2019 4 41     Hemoglobin 03/06/2019 13 0     Hematocrit 03/06/2019 40 8     MCV 03/06/2019 93     MCH 03/06/2019 29 5     MCHC 03/06/2019 31 9     RDW 03/06/2019 14 1     Platelets 84/19/2228 149     MPV 03/06/2019 10 0     Protime 03/06/2019 28 3*    INR 03/06/2019 2 65*    Protime 03/07/2019 26 0*    INR 03/07/2019 2 37*    Protime 03/08/2019 28 9*    INR 03/08/2019 2 72*    Sodium 03/08/2019 139     Potassium 03/08/2019 3 6     Chloride 03/08/2019 104     CO2 03/08/2019 32     ANION GAP 03/08/2019 3*    BUN 03/08/2019 17     Creatinine 03/08/2019 0 80     Glucose 03/08/2019 96     Calcium 03/08/2019 9 0     eGFR 03/08/2019 89     Vit D, 25-Hydroxy 03/08/2019 29 8*    Protime 03/09/2019 33 8*    INR 03/09/2019 3 33*    Protime 03/10/2019 31 3*    INR 03/10/2019 3 02*    Protime 03/11/2019 29 2*    INR 03/11/2019 2 76*    WBC 03/11/2019 4 50     RBC 03/11/2019 4 65     Hemoglobin 03/11/2019 13 6     Hematocrit 03/11/2019 43 0     MCV 03/11/2019 93     MCH 03/11/2019 29 2     MCHC 03/11/2019 31 6     RDW 03/11/2019 14 2     MPV 03/11/2019 9 1     Platelets 36/36/9979 229     nRBC 03/11/2019 0     Neutrophils Relative 03/11/2019 61     Immat GRANS % 03/11/2019 0     Lymphocytes Relative 03/11/2019 20     Monocytes Relative 03/11/2019 11     Eosinophils Relative 03/11/2019 7*    Basophils Relative 03/11/2019 1     Neutrophils Absolute 03/11/2019 2 72     Immature Grans Absolute 03/11/2019 0 02     Lymphocytes Absolute 03/11/2019 0 91     Monocytes Absolute 03/11/2019 0 50     Eosinophils Absolute 03/11/2019 0 30     Basophils Absolute 03/11/2019 0 05     Sodium 03/11/2019 139     Potassium 03/11/2019 3 7     Chloride 03/11/2019 101     CO2 03/11/2019 33*    ANION GAP 03/11/2019 5     BUN 03/11/2019 15     Creatinine 03/11/2019 0 77     Glucose 03/11/2019 86     Calcium 03/11/2019 8 8     eGFR 03/11/2019 91     Protime 03/12/2019 31 4*    INR 03/12/2019 3 03*                   Invalid input(s): LABAEARO            Imaging: I have personally reviewed pertinent films in PACS  EKG, Pathology, and Other Studies: I have personally reviewed pertinent reports        Code Status: Level 1 - Full Code  Advance Directive and Living Will:      Power of :    POLST:

## 2019-03-13 PROBLEM — L98.8: Status: ACTIVE | Noted: 2019-03-13

## 2019-03-13 PROBLEM — R42 LIGHTHEADEDNESS: Status: RESOLVED | Noted: 2019-03-06 | Resolved: 2019-03-13

## 2019-03-13 PROCEDURE — 92508 TX SP LANG VOICE COMM GROUP: CPT

## 2019-03-13 PROCEDURE — 97530 THERAPEUTIC ACTIVITIES: CPT

## 2019-03-13 PROCEDURE — 97110 THERAPEUTIC EXERCISES: CPT

## 2019-03-13 PROCEDURE — 97112 NEUROMUSCULAR REEDUCATION: CPT

## 2019-03-13 PROCEDURE — 99233 SBSQ HOSP IP/OBS HIGH 50: CPT

## 2019-03-13 PROCEDURE — 97535 SELF CARE MNGMENT TRAINING: CPT

## 2019-03-13 RX ORDER — FLUPHENAZINE HYDROCHLORIDE 2.5 MG/1
5 TABLET ORAL 4 TIMES DAILY
Status: DISCONTINUED | OUTPATIENT
Start: 2019-03-13 | End: 2019-03-20 | Stop reason: HOSPADM

## 2019-03-13 RX ORDER — ACETAMINOPHEN 325 MG/1
975 TABLET ORAL
Status: DISCONTINUED | OUTPATIENT
Start: 2019-03-14 | End: 2019-03-20 | Stop reason: HOSPADM

## 2019-03-13 RX ORDER — ACETAMINOPHEN 325 MG/1
650 TABLET ORAL 2 TIMES DAILY PRN
Status: DISCONTINUED | OUTPATIENT
Start: 2019-03-13 | End: 2019-03-20 | Stop reason: HOSPADM

## 2019-03-13 RX ADMIN — FLUPHENAZINE HYDROCHLORIDE 5 MG: 2.5 TABLET, FILM COATED ORAL at 13:13

## 2019-03-13 RX ADMIN — FLUVOXAMINE MALEATE 50 MG: 50 TABLET, FILM COATED ORAL at 05:41

## 2019-03-13 RX ADMIN — RISPERIDONE 1.5 MG: 3 TABLET ORAL at 08:28

## 2019-03-13 RX ADMIN — FLUPHENAZINE HYDROCHLORIDE 5 MG: 2.5 TABLET, FILM COATED ORAL at 05:37

## 2019-03-13 RX ADMIN — AMANTADINE HYDROCHLORIDE 100 MG: 100 CAPSULE, LIQUID FILLED ORAL at 08:28

## 2019-03-13 RX ADMIN — ACETAMINOPHEN 975 MG: 325 TABLET, FILM COATED ORAL at 13:12

## 2019-03-13 RX ADMIN — DOCUSATE SODIUM 100 MG: 100 CAPSULE, LIQUID FILLED ORAL at 08:28

## 2019-03-13 RX ADMIN — FLUPHENAZINE HYDROCHLORIDE 5 MG: 2.5 TABLET, FILM COATED ORAL at 21:13

## 2019-03-13 RX ADMIN — WARFARIN SODIUM 2.5 MG: 2.5 TABLET ORAL at 17:31

## 2019-03-13 RX ADMIN — AMANTADINE HYDROCHLORIDE 100 MG: 100 CAPSULE, LIQUID FILLED ORAL at 17:31

## 2019-03-13 RX ADMIN — LIDOCAINE 1 PATCH: 50 PATCH CUTANEOUS at 08:28

## 2019-03-13 RX ADMIN — Medication: at 17:31

## 2019-03-13 RX ADMIN — TAMSULOSIN HYDROCHLORIDE 0.4 MG: 0.4 CAPSULE ORAL at 16:14

## 2019-03-13 RX ADMIN — Medication: at 08:27

## 2019-03-13 RX ADMIN — FLUVOXAMINE MALEATE 50 MG: 50 TABLET, FILM COATED ORAL at 13:12

## 2019-03-13 RX ADMIN — ACETAMINOPHEN 975 MG: 325 TABLET, FILM COATED ORAL at 05:38

## 2019-03-13 RX ADMIN — DOCUSATE SODIUM 100 MG: 100 CAPSULE, LIQUID FILLED ORAL at 17:31

## 2019-03-13 RX ADMIN — AMLODIPINE BESYLATE 5 MG: 5 TABLET ORAL at 08:28

## 2019-03-13 RX ADMIN — LOSARTAN POTASSIUM 50 MG: 50 TABLET, FILM COATED ORAL at 08:28

## 2019-03-13 RX ADMIN — RISPERIDONE 3 MG: 3 TABLET ORAL at 21:14

## 2019-03-13 RX ADMIN — FLUVOXAMINE MALEATE 50 MG: 50 TABLET, FILM COATED ORAL at 17:31

## 2019-03-13 RX ADMIN — FLUVOXAMINE MALEATE 50 MG: 50 TABLET, FILM COATED ORAL at 21:14

## 2019-03-13 RX ADMIN — VITAMIN D, TAB 1000IU (100/BT) 2000 UNITS: 25 TAB at 08:27

## 2019-03-13 RX ADMIN — FLUVOXAMINE MALEATE 50 MG: 50 TABLET, FILM COATED ORAL at 11:48

## 2019-03-13 RX ADMIN — Medication 1 CAPSULE: at 16:14

## 2019-03-13 NOTE — PROGRESS NOTES
03/13/19 1130   Pain Assessment   Pain Assessment No/denies pain   Restrictions/Precautions   Precautions Aspiration;Bed/chair alarms;Cognitive; Fall Risk;Supervision on toilet/commode   Braces or Orthoses C/S Collar   Swallow Information   Current Risks for Dysphagia & Aspiration General debilitation;New Neuro event;Brain injury;Cognitive deficit;Cervical spine injury/surgery   Current Symptoms/Concerns Difficulty chewing;Recent Pneumonia   Current Diet Regular; Thin liquid   Baseline Diet Regular; Thin liquids   Consistencies Assessed and Performance   Materials Admnistered Soft/Level 3;Regular/Solid; Thin liquid   Oral Stage Mild impaired   Phargngeal Stage WFL   Swallow Mechanics Mild delayed;Swallow initation;Good Larygneal rise   Recommendations   Diet Solid Recommendation Regular consistency   Diet Liquid Recommendation Thin liquid   Recommended Form of Meds As tolerated   General Precautions Aspiration precautions; Feed only when alert;Minimize distractions; Remain upright for 45 mins after meals;Upright as possible for all oral intake  (OOB for meals)   Compensatory Swallowing Strategies Alternate solids and liquids;Voluntary throat clear/cough to clear penetration   Results Reviewed with RN;PT/Family/Caregiver;PAC/CRNP   QI: [address] Provided by [place] No physical assistance   Eating CARE Score 6   Swallow Assessment   Swallow Treatment Assessment Pt observed w/ lunch in which pt was recommended for diet upgrade but not completed in system  Pt trialed w/ regular diet w/ thin liquids for lunch  Pt was able to setup tray and feed self  Consumed 50% of meal and 120cc of thins by cup  Pt demonstrating slower but full and function mastication given solids (roasted potatoes, turkey slices w/ gravy, steamed vegetables) w/o increased oral residual/pocketing  A-p transfer given rice pudding was [facility]  Additionally A-p transfer of tea by cup was [facility]   Swallow initiation prompt w/ consistencies and hyolaryngeal elevation is WFL  No overt signs/sxs of aspiration noted w/ meal  Will continue to recommend to upgrade diet to regular w/ thin liquids at this time and brief f/u to monitor tolerance of diet upgrade w/o increased dysphagia or aspiration sxs  Swallow Assessment Prognosis   Prognosis Good   Prognosis Considerations Co-morbidities; Medical prognosis   SLP Therapy Minutes   SLP Time In 1130   SLP Time Out 1200   SLP Total Time (minutes) 30   SLP Mode of treatment - Individual (minutes) 0   SLP Mode of treatment - Concurrent (minutes) 0   SLP Mode of treatment - Group (minutes) 30   SLP Mode of treatment - Co-treat (minutes) 0   SLP Mode of Teatment - Total time(minutes) 30 minutes   Therapy Time missed   Time missed?  No   Daily FIM Score   Eating (FIM) 6 - Patient requires increased time or safety concern

## 2019-03-13 NOTE — PROGRESS NOTES
Physical Medicine and Rehabilitation Progress Note  María Samuels  68 y o  male MRN: [id number]  Unit/Bed#: -01 Encounter: [id number]    Chief Complaints:  Decline in function     Subjective/Interval Events:   Patient reports sleeping adequately overnight  He reports that he still feels guilty at times for having negative thoughts in spite of what other people tell him  He reports depression is somewhat better for sure that it was last week although still not back to his prior baseline month or 2 ago  He denies significant anxiety or recent panic  He denies hallucinations  Patient reports some mild neck pain at times without worsening strength or sensation or radiating arm pain  He denies lightheadedness, fever, cough, abdominal pain, calf pain, shortness of breath, or other complaints  ROS: A 10-point ROS was performed  Negative except as listed above  Overall Assessment/relevant history:  María Samuels  is a 68 y o  male with a past medical history schizophrenia, anxiety, depression, coronary artery disease, possible stroke, DVT/PE on chronic anticoagulation with Coumadin, obesity class 1, hypertension, urinary frequency on chronic Flomax, lightheadedness, occasional falls, memory impairment, left knee replacement, right total hip replacement, cervical laminectomy and fusion who fell backwards down stairs with brief few minutes loss of consciousness with neck and possible head trauma who was brought to [facility] initially and then transferred to [facility] on 3/3/19  CT head did not show acute findings, CT neck showed C5/C6 osteophyte fracture  Neurosurgery was consulted who recommended non operative management with cervical collar  Patient was complaining of some neck, back, and parasternal pain which were all believed to be related to trauma  Additional imaging of facial bones, thoracic and lumbar spine did not reveal acute fractures but did show chronic L3 compression fracture  Patient noted to have some increased difficulty sleeping and anxiety    Patient was evaluated by skilled therapies and was found to have significant decline in ADLs and ambulation appropriate for admission to [facility]      52-year-old male status post fall with brief loss of consciousness found to have C6-C7 vertebral osteophyte fracture and possible TBI presents with likely multifactorial imbalance possibly related to prior stroke, spinal stenosis, and now head injury, intermittent lightheadedness, acute on chronic cognitive deficits, suboptimal control of anxiety, depression, sleep, and schizophrenia with associated functional decline in ADLs and ambulation      Functional status (recent):    Memory, problem solving, comprehension min assist; transfers and ambulation supervision     Functional status on admission to [facility]:  PT: Transfers mod assist  OT: Dressing mod assist, Bathing, grooming min assist   ST: Problem solving and memory mod assist; comprehension min assist; expression supervision, social interaction supervision    Schizophrenia [facility])  Assessment & Plan  Emotional/thought profile stable today   > still consider increasing Fluphenazine to QID if needed  Recent improvements in degree of depression and anxiety; sleep stable   >Appreciate recent psych c/s 2/8 > continue current mgmt plan   >Fluphenazine at home 5mg TID   >Risperdone back at home 1 5mg qAM and 3mg QHS    >Fluvoxamine 50mg 5x/day    >Amantadine at home 100mg BID      >Suspect recent acute worsening related to be off home regimen which suspect will improve with reintroduction of medications at appropriate dosing; however patient was having some increased paranoia and perseveration which started a few days prior to fall (3 weeks after switching from from Trifluoperazine to fluphenazine and Cogentin stopped; family states he has been trialed in past off Trifluoperazine and could not tolerate switch to other meds  >Monitor closely with nursing and staff; Neuropsych c/s and following;   >Per outpt psych could increase fluphenazine to 5mg QID if needed > which I think would be reasonable next if needed  >If still fails consider obtaining and going back on Trifluoperazine but this was difficult due to decreased manufacturing apparently   >Some concern patient may have not been providing himself medications appropriately > recommend family training and assist c/ med mgmt    >Discussed with patient's outpt psych Dr Heriberto Putnam 3/6  Patient presented c/ fair amount of paranoid delusions, anxiety, difficulty sleeping with occasional panic attacks off home regimen since recent admission prior, with recent fall/hospitalization possible head injury  Medications reviewed with his outpt pharmacy and patient's outpatient psychiatrist Dr Heriberto Putnam  Patient recently should switched from Trifluoperazine to fluphenazine and Cogentin stopped  Patient/family noted some increased paranoid delusions starting a few days prior to fall  Home regimen per pharmacy:  Fluphenazine 5mg TID, Risperdal 1 5mg qday and 3mg QHS  Amantadine 100 mg b i d , Fluvoxamine 50mg 5x/day  Patient recently on much more modest dosing s/p recent possible BI         * Head injury, acute  Assessment & Plan  Fall with likely head trauma and brief loss of consciousness and acute osteophyte C6-C7 fracture  Mild increased confusion after fall also with some worsening mood/schizophrenia symptoms  >Recommend acute comprehensive interdisciplinary inpatient rehabilitation to include intensive skilled therapies (PT, OT) as outlined with oversight and management by rehabilitation physician as well as inpatient rehab level nursing, case management and weekly interdisciplinary team meetings     > overstimulation precautions, sleep-wake/agitation restlessness log  > optimize neuroleptic medication  > try to avoid sedative medications  > evaluate and manage fall risks          Cognitive impairment  Assessment & Plan  Likely premorbid; chronic generalized microvascular changes with possible old right lacunar internal capsule infarct and possible right frontal WM   >speech management  >Follow-up with Neurology after discharge  >On chronic home amantadine   >Recommend family assist with med mgmt after d/c    Fracture of sixth cervical vertebra [facility])  Assessment & Plan  C6/C7 osteophyte fracture context of recent fall and history of cervical decompression and fusion  Treatment recommendations per Neurosurgery  Non operative; continue cervical collar per Neurosurgery  Repeat C spine X-ray 3/19  Follow up with Neurosurgery after discharge    History of stroke  Assessment & Plan  Patient/family deny although evidence of possible old R lacunar infarct and focus in R frontal WM along with generalized micoangiopathic changes  >Recommend optimal BP mgmt  >Follow-up with neuro after d/c   >On full A/C for hx of VTE    Lightheadednessresolved as of 3/13/2019  Assessment & Plan  No recent reports  Chronic intermittent; consider med adjustments  Orthostatics negative recently; if necessary repeat particularly with adjustments in meds   PRN abdominal binder and EHSAN hose      Vitamin D insufficiency  Assessment & Plan  D3 2000 units qday     History of pulmonary embolism  Assessment & Plan  And DVT on chronic anticoagulation with Coumadin  Medicine consult to manage during acute rehab course  Regimen adjusted       Pain  Assessment & Plan  Remains well controlled  Continue scheduled Tylenol for now  Lidoderm patch  Avoid NSAIDs and avoid opiates     Class 1 obesity due to excess calories without serious comorbidity with body mass index (BMI) of 34 0 to 34 9 in adult  Assessment & Plan   on appropriate nutrition and activity  Adjustments accordingly during skilled therapy and with rehab nursing  Monitor skin closely for breakdown, wounds, rashes; keep clean and dry, turning Q2H Follow-up with PCP after d/c      Chronic diastolic heart failure ([address] )  Assessment & Plan  Mgmt per IM     Chronic venous stasis dermatitis of both lower extremities  Assessment & Plan  Internal medicine consult to assist with management  Elevate legs when in bed and when in chair for prolonged periods of time  Consider Wilver veronica    Coronary artery disease involving native coronary artery of native heart without angina pectoris  Assessment & Plan  On chronic Coumadin  Optimal blood pressure control  Follow-up with PCP    Benign essential hypertension  Assessment & Plan  Medicine c/s to assist with mgmt  Amlodipine 5 mg daily, losartan 50 mg daily  Monitor blood pressure and orthostatics    # Bowel care:    - monitor for constipation, incontinence, and diarrhea  - goal 1 appropriate BM every 1-2 days  - recommend colace +/- senna and PRN bowel protocol     # Bladder care: On chronic Flomax;  - monitor for retention, incontinence, signs/symptoms of UTI  - recommend voiding trial; nursing prompt to void followed by bladder scan starting Q4-6H or after each patient initiated void; nursing to record voided output and bladder scan totals; straight cath PRN >350-400 cc; if post void residual bladder scans are <150 cc x3 consecutive voids, can stop scans      # Skin:   - Nursing to turn patient Q2H if not adequately ambulatory, monitor for skin breakdown, rashes, and wounds if applicable     # At risk for venous thromboembolism:  - Recommend SCDs and mobilization  - warfarin     # Diet/Hydration:    Dysphagia 3 thin - recommend speech therapy     Disposition:   Home with estimated length of stay of 10-14 days from admission     Follow-up:   PCP, Psychiatry, rehab, Neurosurgery, Neurology     CODE: Level 1: Full Code     Restrictions include: Fall precautions         ---------------------------------------------------------------------------------------------------------------------    Objective:     Allergies and Medications per EMR    Physical Exam:  Temperature 98 3° F, pulse 86, respiratory rate 18, blood pressure 135/67, SpO2 94% on room air  General: Awake, alert in NAD  HENT:  MMM, cervical collar in place   Respiratory: Unlabored breathing, breath sounds equal, Lungs CTA, no wheezes, rales, or rhonchi  Cardiovascular: Regular rate and rhythm, no murmurs, rubs, or gallops  Gastrointestinal: Soft, non-tender, non-distended, normoactive bowel sounds  Genitourinary: No sandhu  SkiN/MSK/Extremities:  No calf edema or calf tenderness to palpation  Neurologic/Psych:   MENTAL STATUS: at recent baseline, orientation intact  Affect: Flattened    Diagnostic Studies: reviewed, no new imaging  No results found      Laboratory:    Results from last 7 days   Lab Units 03/11/19  0555   HEMOGLOBIN g/dL 13 6   HEMATOCRIT % 43 0   WBC Thousand/uL 4 50     Results from last 7 days   Lab Units 03/11/19  0555 03/08/19  0732   BUN mg/dL 15 17   POTASSIUM mmol/L 3 7 3 6   CHLORIDE mmol/L 101 104   CREATININE mg/dL 0 77 0 80     Results from last 7 days   Lab Units 03/12/19  0505 03/11/19  0555 03/10/19  0517   PROTIME seconds 31 4* 29 2* 31 3*   INR  3 03* 2 76* 3 02*        Patient Active Problem List   Diagnosis    Benign essential hypertension    Coronary artery disease involving native coronary artery of native heart without angina pectoris    Chronic depression    Chronic venous stasis dermatitis of both lower extremities    Deep venous thrombosis of lower extremity (HCC)    Chronic diastolic heart failure (HCC)    Osteoarthritis    Nocturnal enuresis    Idiopathic trigeminal neuralgia    Lacunar infarction    Class 1 obesity due to excess calories without serious comorbidity with body mass index (BMI) of 34 0 to 34 9 in adult    Schizophrenia (HCC)    Disability of walking    Pulmonary embolism (HCC)    Arthropathy    Mixed hyperlipidemia    Neurogenic claudication    Neck pain without injury    Abdominal wall hematoma  Spinal stenosis of lumbar region with neurogenic claudication    Lipoma of colon    Fracture of sixth cervical vertebra (HCC)    Rib pain on left side    Fall    Forgetfulness    Physical deconditioning    Head injury, acute    Pain    History of pulmonary embolism    Cognitive impairment    History of stroke    Vitamin D insufficiency       ** Please Note: Fluency Direct voice to text software may have been used in the creation of this document  **    Total visit time:  At least 25 minutes, with more than 50% spent counseling/coordinating care    Angela Beasley MD, [facility]  Physical Medicine and Rehabilitation  Brain Injury Medicine

## 2019-03-13 NOTE — PROGRESS NOTES
03/13/19 1000   Pain Assessment   Pain Assessment No/denies pain   Pain Score No Pain   Restrictions/Precautions   Precautions Aspiration;Bed/chair alarms;Cognitive; Fall Risk;Supervision on toilet/commode   Braces or Orthoses C/S Collar   QI: Sit to [address] Provided by [employer] No physical assistance   Sit to Stand CARE Score 4   QI: Chair/Bed-to-Chair Transfer   Assistance Needed Supervision   Assistance Provided by [employer] No physical assistance   Comment w/ RW   Chair/Bed-to-Chair Transfer CARE Score 4   Transfer Bed/Chair/Wheelchair   Limitations Noted In Balance;UE Strength;LE Strength   Adaptive Equipment Roller Walker   Findings CS w/ RW, pt demo G RW management and overall safety awareness during session w/ inc time provided  Bed, Chair, Wheelchair Transfer (FIM) 5 - Patient requires supervision/monitoring   QI: [address] Provided by [employer] No physical assistance   [address] Score 4   Toileting   Able to [address] down yes, up yes  Able to Manage Clothing Closures Yes   Manage Hygiene Bladder   Limitations Noted In Balance;Problem Solving   Findings Pt initially w/ difficulty pull underwear over (R) hip, however w/ inc time to complete pt performed at   Toileting (FIM) 5 - Patient requires supervision/monitoring   QI: Toilet Transfer   Assistance Needed Incidental touching   Assistance Provided by [employer] No physical assistance   Comment w/ use of grab bar   Toilet Transfer CARE Score 4   Toilet Transfer   Surface Assessed Raised Toilet   Transfer Technique Stand Pivot   Limitations Noted In Balance; Safety   Adaptive Equipment Grab Bar   Findings CGA w/ use of grab bar   Toilet Transfer (FIM) 4 - Patient requires steadying assist or light touching   Functional Standing Tolerance   Time 6 minutes x2 trials   Activity Pt engaged in vianey ther act w/ focus on standing tolerance, leisure act to boost moral, fxnl reach, and crossing midline  Pt tolerated well w/ CS and occ unilateral support of table  Pt req 1 seated rest break following act to manage fatigue w/ pt stating, "I'm a little more tired than normal, I tried deep breathing last night but I still didn't get a good night sleep "   Right Upper Extremity- Strength   R Position Seated   Equipment Dowel   R Weight/Reps/Sets 1 5#/10/3   RUE Strength Comment Pt completed bicep curls, chest press, and prograde rows while seated  Pt tolerated well w/ rest breaks given prn to manage fatigue  Left Upper Extremity-Strength   L Position Seated   Equipment Dowel   L Weights/Reps/Sets 1 5#/10/3   LUE Strength Comment BUE ther ex completed  See RUE strength note for inc detail  Exercise Tools   Other Exercise Tool 1 10# flex bar 3x10 overhand and underhand to inc BUE strength  Additional Activities   Additional Activities Comments Pt completed beanbag ther act x2 trials w/ focus on dynamic standing balance w/ occ L unilateral support  Pt tolerated well w/ CGA and 1 rest break initiated between trials  Add'lly, pt weaved between 8 cones placed on floor x2 trials w/ focus on RW management, safety awareness, and navigating environmental barriers  Pt completed at [facility] and req 1 standing rest break between trials to manage fatigue  Assessment   Treatment Assessment Pt seen for OT w/ focus on fxnl standing tolerance, dynamic standing balance work, toileting, and BUE ther ex  Pt cont to req encouragement to inc confidence and maximize act performance  Improvements noted w/ carryover of safety strategies and RW management t/o session  Pt will cont to benefit from skilled services for endurance/balance work and carryover of ADL retraining and self monitoring strategies  Pt was left resting in therapy gym for ST to follow  Prognosis Good   Problem List Decreased strength;Decreased endurance; Impaired balance;Decreased mobility; Impaired judgement;Orthopedic restrictions;Pain;Decreased skin integrity   Plan   Treatment/Interventions ADL retraining;Functional transfer training; Endurance training; Therapeutic exercise;Patient/family training;Equipment eval/education   Progress Progressing toward goals   Recommendation   OT Discharge Recommendation 24 hour supervision/assist   OT Therapy Minutes   OT Time In 1000   OT Time Out 1130   OT Total Time (minutes) 90   OT Mode of treatment - Individual (minutes) 90   OT Mode of treatment - Concurrent (minutes) 0   OT Mode of treatment - Group (minutes) 0   OT Mode of treatment - Co-treat (minutes) 0   OT Mode of Teatment - Total time(minutes) 90 minutes   Therapy Time missed   Time missed?  No

## 2019-03-13 NOTE — PLAN OF CARE
Problem: Potential for Falls  Goal: Patient will remain free of falls  Description  INTERVENTIONS:  - Assess patient frequently for physical needs  -  Identify cognitive and physical deficits and behaviors that affect risk of falls    -  [place] fall precautions as indicated by assessment   - Educate patient/family on patient safety including physical limitations  - Instruct patient to call for assistance with activity based on assessment  - Modify environment to reduce risk of injury  - Consider OT/PT consult to assist with strengthening/mobility  Outcome: Progressing     Problem: Prexisting or High Potential for Compromised Skin Integrity  Goal: Skin integrity is maintained or improved  Description  INTERVENTIONS:  - Identify patients at risk for skin breakdown  - Assess and monitor skin integrity  - Assess and monitor nutrition and hydration status  - Monitor labs (i e  albumin)  - Assess for incontinence   - Turn and reposition patient  - Assist with mobility/ambulation  - Relieve pressure over bony prominences  - Avoid friction and shearing  - Provide appropriate hygiene as needed including keeping skin clean and dry  - Evaluate need for skin moisturizer/barrier cream  - Collaborate with interdisciplinary team (i e  Nutrition, Rehabilitation, etc )   - Patient/family teaching  Outcome: Progressing     Problem: PAIN - ADULT  Goal: Verbalizes/displays adequate comfort level or baseline comfort level  Description  Interventions:  - Encourage patient to monitor pain and request assistance  - Assess pain using appropriate pain scale  - Administer analgesics based on type and severity of pain and evaluate response  - Implement non-pharmacological measures as appropriate and evaluate response  - Consider cultural and social influences on pain and pain management  - Notify physician/advanced practitioner if interventions unsuccessful or patient reports new pain  Outcome: Progressing     Problem: SAFETY ADULT  Goal: Maintain or return to baseline ADL function  Description  INTERVENTIONS:  -  Assess patient's ability to carry out ADLs; assess patient's baseline for ADL function and identify physical deficits which impact ability to perform ADLs (bathing, care of mouth/teeth, toileting, grooming, dressing, etc )  - Assess/evaluate cause of self-care deficits   - Assess range of motion  - Assess patient's mobility; develop plan if impaired  - Assess patient's need for assistive devices and provide as appropriate  - Encourage maximum independence but intervene and supervise when necessary  ¯ Involve family in performance of ADLs  ¯ Assess for home care needs following discharge   ¯ Request OT consult to assist with ADL evaluation and planning for discharge  ¯ Provide patient education as appropriate  Outcome: Progressing  Goal: Maintain or return mobility status to optimal level  Description  INTERVENTIONS:  - Assess patient's baseline mobility status (ambulation, transfers, stairs, etc )    - Identify cognitive and physical deficits and behaviors that affect mobility  - Identify mobility aids required to assist with transfers and/or ambulation (gait belt, sit-to-stand, lift, walker, cane, etc )  - [place] fall precautions as indicated by assessment  - Record patient progress and toleration of activity level on Mobility SBAR; progress patient to next Phase/Stage  - Instruct patient to call for assistance with activity based on assessment  - Request Rehabilitation consult to assist with strengthening/weightbearing, etc   Outcome: Progressing     Problem: DISCHARGE PLANNING  Goal: Discharge to home or other facility with appropriate resources  Description  INTERVENTIONS:  - Identify barriers to discharge w/patient and caregiver  - Arrange for needed discharge resources and transportation as appropriate  - Identify discharge learning needs (meds, wound care, etc )  - Arrange for interpretive services to assist at discharge as needed  - Refer to Case Management Department for coordinating discharge planning if the patient needs post-hospital services based on physician/advanced practitioner order or complex needs related to functional status, cognitive ability, or social support system  Outcome: Progressing     Problem: GASTROINTESTINAL - ADULT  Goal: Maintains or returns to baseline bowel function  Description  INTERVENTIONS:  - Assess bowel function  - Encourage oral fluids to ensure adequate hydration  - Administer IV fluids as ordered to ensure adequate hydration  - Administer ordered medications as needed  - Encourage mobilization and activity  - Nutrition services referral to assist patient with appropriate food choices  Outcome: Progressing     Problem: SKIN/TISSUE INTEGRITY - ADULT  Goal: Skin integrity remains intact  Description  INTERVENTIONS  - Identify patients at risk for skin breakdown  - Assess and monitor skin integrity  - Assess and monitor nutrition and hydration status  - Monitor labs (i e  albumin)  - Assess for incontinence   - Turn and reposition patient  - Assist with mobility/ambulation  - Relieve pressure over bony prominences  - Avoid friction and shearing  - Provide appropriate hygiene as needed including keeping skin clean and dry  - Evaluate need for skin moisturizer/barrier cream  - Collaborate with interdisciplinary team (i e  Nutrition, Rehabilitation, etc )   - Patient/family teaching  Outcome: Progressing     Problem: MUSCULOSKELETAL - ADULT  Goal: Maintain or return mobility to safest level of function  Description  INTERVENTIONS:  - Assess patient's ability to carry out ADLs; assess patient's baseline for ADL function and identify physical deficits which impact ability to perform ADLs (bathing, care of mouth/teeth, toileting, grooming, dressing, etc )  - Assess/evaluate cause of self-care deficits   - Assess range of motion  - Assess patient's mobility; develop plan if impaired  - Assess patient's need for assistive devices and provide as appropriate  - Encourage maximum independence but intervene and supervise when necessary  - Involve family in performance of ADLs  - Assess for home care needs following discharge   - Request OT consult to assist with ADL evaluation and planning for discharge  - Provide patient education as appropriate  Outcome: Progressing  Goal: Maintain proper alignment of affected body part  Description  INTERVENTIONS:  - Support, maintain and protect limb and body alignment  - Provide pt/fam with appropriate education  Outcome: Progressing

## 2019-03-13 NOTE — PROGRESS NOTES
03/13/19 0830   Pain Assessment   Pain Assessment No/denies pain   Pain Score No Pain   Restrictions/Precautions   Precautions Bed/chair alarms;Cognitive; Fall Risk;Supervision on toilet/commode   Weight Bearing Restrictions No   ROM Restrictions Yes  (cervical spine precautions )   Braces or Orthoses C/S Collar   Cognition   Overall Cognitive Status Impaired   Arousal/Participation Cooperative; Alert   Attention Attends with cues to redirect   Orientation Level Oriented X4   Memory Decreased short term memory;Decreased recall of recent events   Following Commands Follows multistep commands with increased time or repetition   Subjective   Subjective Patient agreeable to particiapte in PT session    QI: Sit to [address] Provided by [place] No physical assistance   Comment RW   Sit to Stand CARE Score 4   QI: Chair/Bed-to-Chair Transfer   Assistance Needed Supervision; Adaptive equipment   Assistance Provided by [place] No physical assistance   Comment RW   Chair/Bed-to-Chair Transfer CARE Score 4   Transfer Bed/Chair/Wheelchair   Limitations Noted In Balance; Coordination;UE Strength;LE Strength   Adaptive Equipment Roller Walker   Stand Pivot Supervision   Sit to Stand Supervision   Stand to Sit Supervision   Supine to Sit Supervision   Sit to Supine Supervision   Bed, Chair, Wheelchair Transfer (FIM) 5 - Patient requires supervision/monitoring   QI: Walk 10 Feet   Assistance Needed Supervision; Adaptive equipment   Assistance Provided by [place] No physical assistance   Comment RW   Walk 10 Feet CARE Score 4   QI: Walk 50 Feet with Two Turns   Assistance Needed Supervision; Adaptive equipment   Assistance Provided by [place] No physical assistance   Comment RW   Walk 50 Feet with Two Turns CARE Score 4   QI: Walk 150 Feet   Assistance Needed Supervision; Adaptive equipment   Assistance Provided by [place] No physical assistance   Comment RW   Walk 150 Feet CARE Score 4   Ambulation Does the patient walk? 2  Yes   Primary Discharge Mode of Locomotion Walk   Walk Assist Level Supervision   Gait Pattern Inconsistant Shilpa;Trendelenburg   Assist Device Roller Walker   Distance Walked (feet) 350 ft  (x2)   Limitations Noted In Balance; Coordination;Posture;Speed;Strength;Swing   Findings Discussed current recommendation of using RW at time of discharge and using SPC on stairs  Would recommend RW for both levels of home to increase safety with mobility on both levels    Walking (FIM) 5 - Patient requires supervision/monitoring AND distance 150 feet or more, no rest   QI: Wheel 50 Feet with Two Turns   Reason if not Attempted Activity not applicable   Wheel 50 Feet with Two Turns CARE Score 9   QI: Wheel 150 Feet   Reason if not Attempted Activity not applicable   Wheel 877 Feet CARE Score 9   Wheelchair mobility   QI: Does the patient use a wheelchair? 0  No   QI: 1 Step (Curb)   Assistance Needed Supervision; Adaptive equipment   Assistance Provided by [place] No physical assistance   Comment S with SPC    1 Step (Curb) CARE Score 4   QI: 4 Steps   Assistance Needed Supervision   Assistance Provided by [place] No physical assistance   Comment B railing on 6"  steps, 6 total to simulate JOSE JUAN home    4 Steps CARE Score 4   QI: 12 Steps   Assistance Needed Incidental touching; Adaptive equipment   Assistance Provided by [place] Less than 25%   Comment CGA using SPC and R rail down and L rail up; verbal instruction for proper hand placement    12 Steps CARE Score 3   Stairs   Type Stairs;Curb   # of Steps 21   Weight Bearing Precautions Fall Risk   Assist Devices Single Rail;Cane   Stairs (FIM) 4 - Patient requires steadying assist or light touching AND patient goes up and down full flight (12- 14 stairs)   QI: Picking Up Object   Reason if not Attempted Safety concerns   Picking Up Object CARE Score 88   QI: Toilet Transfer   Assistance Needed Supervision   Assistance Provided by [place] No physical assistance   Toilet Transfer CARE Score 4   Toilet Transfer   Surface Assessed Standard Toilet   Limitations Noted In Balance;Confidence; Sequencing;LE Strength   Toilet Transfer (FIM) 5 - Patient requires supervision/monitoring   Therapeutic Interventions   Balance 600' with SPC, 200' x2 with no AD,   Assessment   Treatment Assessment Patient making good progress thus far with skilled PT intervention  Patient overall S with bed mobility, transfers and gait using RW  Patient's goal at discharge is to utilize [facility] in which he is able to demonstrate ability to perform safely at CS/CGA however, CGA requires at times 2* recent LOB with poor righting reactions noted  At this time, RW provides patient with the most (I) and safety  Discussed safety precautions this session especially with pacing during activity in event of urgency  Patient is intrusted on stay present and concerntrated during times for potential fall risk as he was observed trailing off during  step negotiation  Patient also able to initiate FF stairs this session but required verbal instruction for proper LE and SPC sequencing to increase safety  Spoke with OT who set up family training 3/18/19  Anticipate pending d/c home next week, home with family support and current recommendation for using RW  Problem List Decreased strength;Decreased endurance; Impaired balance;Decreased mobility; Impaired judgement;Orthopedic restrictions;Pain;Decreased skin integrity   Barriers to Discharge Inaccessible home environment;Decreased caregiver support   PT Barriers   Functional Limitation Standing;Stair negotiation   Plan   Treatment/Interventions Functional transfer training;LE strengthening/ROM; Elevations; Therapeutic exercise; Endurance training;Patient/family training;Cognitive reorientation;Equipment eval/education;Gait training;Bed mobility; Compensatory technique education   Progress Progressing toward goals   Recommendation   Recommendation 24 hour supervision/assist;Home PT   Equipment Recommended Walker   PT Therapy Minutes   PT Time In 0830   PT Time Out 0930   PT Total Time (minutes) 60   PT Mode of treatment - Individual (minutes) 60   PT Mode of treatment - Concurrent (minutes) 0   PT Mode of treatment - Group (minutes) 0   PT Mode of treatment - Co-treat (minutes) 0   PT Mode of Teatment - Total time(minutes) 60 minutes   Therapy Time missed   Time missed?  No

## 2019-03-13 NOTE — PROGRESS NOTES
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  Initial DVT provoked due to knee surgery but PE not provoked  Family Medicine manages [facility] as OP and was on 5mg qd  Continue Coumadin = but yesterday adjusted dosing from 2 5 x 3/5 x 4 to 2 5 x4/5 x 3  INR 3 03  Repeat INR tomorrow      BPH: Continue Flomax      Paranoid Schizophrenia: sees Dr Maye Fox as OP  Continue Risperdal, Luvox, Prolixin  Prolixin was added recently  Hx Diastolic CHF:  Controlled w/o diuretics;  he has chronic LE edema       Subjective:  denies any complaints      ROS:   GI: denies abdominal pain, change bowel habits or reflux symptoms  Neuro: No new neurologic changes  Respiratory: No Cough, SOB  Cardiovascular: No CP, palpitations   Psych:  No depression or anxiety    Scheduled Meds:    Current Facility-Administered Medications:  acetaminophen 975 mg Oral [facility] Bonifacio Stern MD   amantadine 100 mg Oral BID Bonifacio Stern MD   amLODIPine 5 mg Oral Daily Bonifacio Stern MD   ammonium lactate  Topical BID [place] [id number]   b complex-vitamin C-folic acid 1 capsule Oral Daily With Terry Cisneros MD   bisacodyl 10 mg Rectal Daily PRN Sabrina Morel DO   cholecalciferol 2,000 Units Oral Daily Bonifacio Stern MD   docusate sodium 100 mg Oral BID Bonifacio Stern MD   fluPHENAZine 5 mg Oral [facility] Bonifacio Stern MD   fluvoxaMINE 50 mg Oral 5x Daily Bonifacio Stern MD   lidocaine 1 patch Topical Daily Bonifacio Stern MD   losartan 50 mg Oral Daily Bonifacio Stern MD   ondansetron 4 mg Oral Q8H PRN Bonifacio Stern MD   polyethylene glycol 17 g Oral Daily PRN Qiana [place] Etelvina Saucedo MD   risperiDONE 0 5 mg Oral BID PRN Karon Richey MD   risperiDONE 1 5 mg Oral Daily Karon Richey MD   risperiDONE 3 mg Oral HS Karon Richey MD   tamsulosin 0 4 mg Oral Daily With Wilver Oakley MD   warfarin 2 5 mg Oral Once per day on Mon Tue Wed Fri Dominique Sanchez PA-C   [START ON 3/14/2019] warfarin 5 mg Oral Once per day on Sun Thu Sat Dominique Sanchez PA-C       Labs:     Results from last 7 days   Lab Units 03/11/19  0555   WBC Thousand/uL 4 50   HEMOGLOBIN g/dL 13 6   HEMATOCRIT % 43 0   PLATELETS Thousands/uL 229     Results from last 7 days   Lab Units 03/11/19  0555 03/08/19  0732   SODIUM mmol/L 139 139   POTASSIUM mmol/L 3 7 3 6   CHLORIDE mmol/L 101 104   CO2 mmol/L 33* 32   BUN mg/dL 15 17   CREATININE mg/dL 0 77 0 80   CALCIUM mg/dL 8 8 9 0         Results from last 7 days   Lab Units 03/12/19  0505 03/11/19  0555   INR  3 03* 2 76*            [unfilled]    Labs reviewed    Physical Examination:  Vitals:   Vitals:    03/12/19 1301 03/12/19 1940 03/13/19 0514 03/13/19 0827   BP: 135/67 145/70 168/80 137/89   BP Location: Left arm Left arm Right arm    Pulse: 86 80 82    Resp: 18 18 16    Temp: 98 3 °F (36 8 °C) 98 1 °F (36 7 °C) 97 6 °F (36 4 °C)    TempSrc: Oral Oral Oral    SpO2: 94% 93% 90%    Weight:   82 2 kg (181 lb 3 5 oz)    Height:         Constitutional:  NAD; pleasant; nontoxic   Neck: collar on  CV:  +S1, S2;  RRR; no rub/murmur  Pulmonary:  BBS without crackles/wheeze/rhonci; resp are unlabored  Abdominal:  soft, +BS, ND/NT  Skin:  no rashes  Musculoskeletal:  Very mild LE edema  :  no sandhu  Neurological:  AAO;  BURGOS 5/5        [ X ] Total time spent: 30 Mins and greater than 50% of this time was spent counseling/coordinating care  ** Please Note: Dragon 360 Dictation voice to text software may have been used in the creation of this document   **

## 2019-03-14 LAB
ANION GAP SERPL CALCULATED.3IONS-SCNC: 7 MMOL/L (ref 4–13)
BASOPHILS # BLD AUTO: 0.03 THOUSANDS/ΜL (ref 0–0.1)
BASOPHILS NFR BLD AUTO: 1 % (ref 0–1)
BUN SERPL-MCNC: 12 MG/DL (ref 5–25)
CALCIUM SERPL-MCNC: 8.5 MG/DL (ref 8.3–10.1)
CHLORIDE SERPL-SCNC: 99 MMOL/L (ref 100–108)
CO2 SERPL-SCNC: 32 MMOL/L (ref 21–32)
CREAT SERPL-MCNC: 0.71 MG/DL (ref 0.6–1.3)
EOSINOPHIL # BLD AUTO: 0.11 THOUSAND/ΜL (ref 0–0.61)
EOSINOPHIL NFR BLD AUTO: 2 % (ref 0–6)
ERYTHROCYTE [DISTWIDTH] IN BLOOD BY AUTOMATED COUNT: 14.3 % (ref 11.6–15.1)
GFR SERPL CREATININE-BSD FRML MDRD: 94 ML/MIN/1.73SQ M
GLUCOSE SERPL-MCNC: 84 MG/DL (ref 65–140)
HCT VFR BLD AUTO: 40.9 % (ref 36.5–49.3)
HGB BLD-MCNC: 13.3 G/DL (ref 12–17)
IMM GRANULOCYTES # BLD AUTO: 0.03 THOUSAND/UL (ref 0–0.2)
IMM GRANULOCYTES NFR BLD AUTO: 1 % (ref 0–2)
INR PPP: 2.86 (ref 0.86–1.17)
LYMPHOCYTES # BLD AUTO: 0.78 THOUSANDS/ΜL (ref 0.6–4.47)
LYMPHOCYTES NFR BLD AUTO: 15 % (ref 14–44)
MCH RBC QN AUTO: 29.7 PG (ref 26.8–34.3)
MCHC RBC AUTO-ENTMCNC: 32.5 G/DL (ref 31.4–37.4)
MCV RBC AUTO: 91 FL (ref 82–98)
MONOCYTES # BLD AUTO: 0.52 THOUSAND/ΜL (ref 0.17–1.22)
MONOCYTES NFR BLD AUTO: 10 % (ref 4–12)
NEUTROPHILS # BLD AUTO: 3.73 THOUSANDS/ΜL (ref 1.85–7.62)
NEUTS SEG NFR BLD AUTO: 71 % (ref 43–75)
NRBC BLD AUTO-RTO: 0 /100 WBCS
PLATELET # BLD AUTO: 241 THOUSANDS/UL (ref 149–390)
PMV BLD AUTO: 9.4 FL (ref 8.9–12.7)
POTASSIUM SERPL-SCNC: 3.4 MMOL/L (ref 3.5–5.3)
PROTHROMBIN TIME: 30 SECONDS (ref 11.8–14.2)
RBC # BLD AUTO: 4.48 MILLION/UL (ref 3.88–5.62)
SODIUM SERPL-SCNC: 138 MMOL/L (ref 136–145)
WBC # BLD AUTO: 5.2 THOUSAND/UL (ref 4.31–10.16)

## 2019-03-14 PROCEDURE — 92508 TX SP LANG VOICE COMM GROUP: CPT

## 2019-03-14 PROCEDURE — 99233 SBSQ HOSP IP/OBS HIGH 50: CPT

## 2019-03-14 PROCEDURE — 97530 THERAPEUTIC ACTIVITIES: CPT

## 2019-03-14 PROCEDURE — 97535 SELF CARE MNGMENT TRAINING: CPT

## 2019-03-14 PROCEDURE — G0515 COGNITIVE SKILLS DEVELOPMENT: HCPCS

## 2019-03-14 PROCEDURE — 97116 GAIT TRAINING THERAPY: CPT

## 2019-03-14 PROCEDURE — 85025 COMPLETE CBC W/AUTO DIFF WBC: CPT | Performed by: NURSE PRACTITIONER

## 2019-03-14 PROCEDURE — 80048 BASIC METABOLIC PNL TOTAL CA: CPT | Performed by: NURSE PRACTITIONER

## 2019-03-14 PROCEDURE — 97110 THERAPEUTIC EXERCISES: CPT

## 2019-03-14 PROCEDURE — 85610 PROTHROMBIN TIME: CPT | Performed by: NURSE PRACTITIONER

## 2019-03-14 RX ORDER — POTASSIUM CHLORIDE 20 MEQ/1
40 TABLET, EXTENDED RELEASE ORAL ONCE
Status: COMPLETED | OUTPATIENT
Start: 2019-03-14 | End: 2019-03-14

## 2019-03-14 RX ADMIN — AMLODIPINE BESYLATE 5 MG: 5 TABLET ORAL at 07:51

## 2019-03-14 RX ADMIN — VITAMIN D, TAB 1000IU (100/BT) 2000 UNITS: 25 TAB at 07:50

## 2019-03-14 RX ADMIN — AMANTADINE HYDROCHLORIDE 100 MG: 100 CAPSULE, LIQUID FILLED ORAL at 18:42

## 2019-03-14 RX ADMIN — ACETAMINOPHEN 650 MG: 325 TABLET ORAL at 13:39

## 2019-03-14 RX ADMIN — Medication 1 CAPSULE: at 16:25

## 2019-03-14 RX ADMIN — RISPERIDONE 1.5 MG: 3 TABLET ORAL at 07:54

## 2019-03-14 RX ADMIN — WARFARIN SODIUM 5 MG: 5 TABLET ORAL at 18:41

## 2019-03-14 RX ADMIN — FLUVOXAMINE MALEATE 50 MG: 50 TABLET, FILM COATED ORAL at 11:41

## 2019-03-14 RX ADMIN — Medication 1 APPLICATION: at 07:56

## 2019-03-14 RX ADMIN — ACETAMINOPHEN 975 MG: 325 TABLET ORAL at 13:42

## 2019-03-14 RX ADMIN — FLUVOXAMINE MALEATE 50 MG: 50 TABLET, FILM COATED ORAL at 14:39

## 2019-03-14 RX ADMIN — FLUPHENAZINE HYDROCHLORIDE 5 MG: 2.5 TABLET, FILM COATED ORAL at 11:41

## 2019-03-14 RX ADMIN — Medication 1 APPLICATION: at 18:42

## 2019-03-14 RX ADMIN — FLUPHENAZINE HYDROCHLORIDE 5 MG: 2.5 TABLET, FILM COATED ORAL at 16:26

## 2019-03-14 RX ADMIN — DOCUSATE SODIUM 100 MG: 100 CAPSULE, LIQUID FILLED ORAL at 07:50

## 2019-03-14 RX ADMIN — FLUPHENAZINE HYDROCHLORIDE 5 MG: 2.5 TABLET, FILM COATED ORAL at 21:24

## 2019-03-14 RX ADMIN — LOSARTAN POTASSIUM 50 MG: 50 TABLET, FILM COATED ORAL at 07:51

## 2019-03-14 RX ADMIN — DOCUSATE SODIUM 100 MG: 100 CAPSULE, LIQUID FILLED ORAL at 18:42

## 2019-03-14 RX ADMIN — TAMSULOSIN HYDROCHLORIDE 0.4 MG: 0.4 CAPSULE ORAL at 16:25

## 2019-03-14 RX ADMIN — RISPERIDONE 3 MG: 3 TABLET ORAL at 21:26

## 2019-03-14 RX ADMIN — POTASSIUM CHLORIDE 40 MEQ: 1500 TABLET, EXTENDED RELEASE ORAL at 11:40

## 2019-03-14 RX ADMIN — ACETAMINOPHEN 975 MG: 325 TABLET ORAL at 06:08

## 2019-03-14 RX ADMIN — FLUPHENAZINE HYDROCHLORIDE 5 MG: 2.5 TABLET, FILM COATED ORAL at 06:08

## 2019-03-14 RX ADMIN — FLUVOXAMINE MALEATE 50 MG: 50 TABLET, FILM COATED ORAL at 06:08

## 2019-03-14 RX ADMIN — AMANTADINE HYDROCHLORIDE 100 MG: 100 CAPSULE, LIQUID FILLED ORAL at 11:40

## 2019-03-14 RX ADMIN — FLUVOXAMINE MALEATE 50 MG: 50 TABLET, FILM COATED ORAL at 18:42

## 2019-03-14 RX ADMIN — FLUVOXAMINE MALEATE 50 MG: 50 TABLET, FILM COATED ORAL at 21:25

## 2019-03-14 RX ADMIN — LIDOCAINE 1 PATCH: 50 PATCH CUTANEOUS at 07:49

## 2019-03-14 NOTE — PROGRESS NOTES
03/14/19 1400   Pain Assessment   Pain Assessment No/denies pain   Restrictions/Precautions   Precautions Cognitive; Fall Risk;Spinal precautions;Supervision on toilet/commode;Aspiration   Braces or Orthoses C/S Collar   Cognition   Overall Cognitive Status Impaired   Subjective   Subjective no changes from earlier, pt in bathroom start of session   QI: Sit to [address] Provided by [place] No physical assistance   Sit to Stand CARE Score 4   QI: Chair/Bed-to-Chair Transfer   Assistance Needed Supervision; Adaptive equipment   Assistance Provided by [place] No physical assistance   Comment RW   Chair/Bed-to-Chair Transfer CARE Score 4   Transfer Bed/Chair/Wheelchair   Limitations Noted In Balance; Endurance;LE Strength; Coordination   Adaptive Equipment Roller Walker   Stand Pivot Supervision   Sit to Stand Supervision   Stand to Sit Supervision   Findings will practice bed mobility next session   Bed, Chair, Wheelchair Transfer (FIM) 5 - Patient requires supervision/monitoring   QI: Walk 10 Feet   Assistance Needed Supervision; Adaptive equipment   Assistance Provided by [place] No physical assistance   Walk 10 Feet CARE Score 4   QI: Walk 50 Feet with Two Turns   Assistance Needed Supervision; Adaptive equipment   Assistance Provided by [place] No physical assistance   Walk 50 Feet with Two Turns CARE Score 4   QI: Walk 150 Feet   Assistance Needed Supervision; Adaptive equipment   Walk 150 Feet CARE Score 4   Ambulation   Does the patient walk? 2  Yes   Primary Discharge Mode of Locomotion Walk   Walk Assist Level Close Supervision   Gait Pattern Inconsistant Shilpa;Decreased foot clearance; Forward Flexion;Trendelenburg; Improper weight shift   Assist Device [place] Walked (feet) 350 ft   Limitations Noted In Balance; Coordination; Endurance;Speed;Strength   Findings report of LE fatigue, no LOB noted with RW   Walking (FIM) 5 - Patient requires supervision/monitoring AND distance 150 feet or more, no rest   QI: Wheel 50 Feet with Two Turns   Reason if not Attempted Activity not applicable   Wheel 50 Feet with Two Turns CARE Score 9   QI: Wheel 150 Feet   Reason if not Attempted Activity not applicable   Wheel 231 Feet CARE Score 9   Wheelchair mobility   QI: Does the patient use a wheelchair? 0  No   QI: Toilet Transfer   Assistance Needed Supervision; Adaptive equipment   Assistance Provided by [place] No physical assistance   Comment use of grab bars   Toilet Transfer CARE Score 4   Toilet Transfer   Surface Assessed Standard Toilet   Limitations Noted In Balance; Safety   Adaptive Equipment Grab Bar   Toilet Transfer (FIM) 5 - Patient requires supervision/monitoring   Equipment Use   NuStep L2 10mins LEs only   Assessment   Treatment Assessment pt had BM start of session  pt safely able to stand from toilet wtih use of grab bars, pt reports he can't do the things by himself he once did   educated pt on safety and progress and completing tasks on own if able with Sup and may require increase time but to perform as much as possible  session focused on general conditioning to progress wtih functional mobility  Problem List Decreased strength;Decreased range of motion;Decreased endurance; Impaired balance;Decreased mobility; Decreased coordination;Decreased cognition; Impaired judgement   Barriers to Discharge Inaccessible home environment;Decreased caregiver support   PT Barriers   Physical Impairment Decreased strength;Decreased endurance; Impaired balance;Decreased mobility; Decreased safety awareness;Orthopedic restrictions   Functional Limitation Standing;Stair negotiation   Plan   Treatment/Interventions Functional transfer training;LE strengthening/ROM; Endurance training;Patient/family training;Bed mobility;Gait training   Progress Progressing toward goals   Recommendation   Recommendation 24 hour supervision/assist;Home PT; Home with family support Equipment Recommended Walker   PT Therapy Minutes   PT Time In 1400   PT Time Out 1430   PT Total Time (minutes) 30   PT Mode of treatment - Individual (minutes) 30   PT Mode of treatment - Concurrent (minutes) 0   PT Mode of treatment - Group (minutes) 0   PT Mode of treatment - Co-treat (minutes) 0   PT Mode of Teatment - Total time(minutes) 30 minutes   Therapy Time missed   Time missed?  No

## 2019-03-14 NOTE — PROGRESS NOTES
Physical Medicine and Rehabilitation Progress Note  Dariel Hall 68 y o  male MRN: [id number]  Unit/Bed#: -01 Encounter: [id number]    Chief Complaints:  Negative thoughts    Subjective/Interval Events:   Extended discussion today held with patient and family regarding current conditions including mental health, CBT techniques, psych/medical/rehabilitation management, and disposition  Patient reports still having recurrent thoughts that he is bad and is going to hell that can make him feel uncomfortable and are distressing  He denies S/I or wanting to harm himself  He denies hallucinations  Patient reports that they may be slightly better than they were a week ago but still are fairly frequently and can interfere with sleep and QOL during day  Patient reports some anxiety related to this as well as mild depression but this is better  He denies fever, chills, sweats, lightheadedness, worsening strength or sensation, calf pain, or other complaints  ROS: A 10-point ROS was performed  Negative except as listed above  Overall Assessment/relevant history:  Dariel Hall  is a 68 y o  male with a past medical history schizophrenia, anxiety, depression, coronary artery disease, possible stroke, DVT/PE on chronic anticoagulation with Coumadin, obesity class 1, hypertension, urinary frequency on chronic Flomax, lightheadedness, occasional falls, memory impairment, left knee replacement, right total hip replacement, cervical laminectomy and fusion who fell backwards down stairs with brief few minutes loss of consciousness with neck and possible head trauma who was brought to [facility] initially and then transferred to [facility] on 3/3/19  CT head did not show acute findings, CT neck showed C5/C6 osteophyte fracture  Neurosurgery was consulted who recommended non operative management with cervical collar    Patient was complaining of some neck, back, and parasternal pain which were all believed to be related to trauma  Additional imaging of facial bones, thoracic and lumbar spine did not reveal acute fractures but did show chronic L3 compression fracture  Patient noted to have some increased difficulty sleeping and anxiety    Patient was evaluated by skilled therapies and was found to have significant decline in ADLs and ambulation appropriate for admission to [facility]      80-year-old male status post fall with brief loss of consciousness found to have C6-C7 vertebral osteophyte fracture and possible TBI presents with likely multifactorial imbalance possibly related to prior stroke, spinal stenosis, and now head injury, intermittent lightheadedness, acute on chronic cognitive deficits, suboptimal control of anxiety, depression, sleep, and schizophrenia with associated functional decline in ADLs and ambulation      Functional status (recent):    Transfers and ambulation supervision     Functional status on admission to [facility]:  PT: Transfers mod assist  OT: Dressing mod assist, Bathing, grooming min assist   ST: Problem solving and memory mod assist; comprehension min assist; expression supervision, social interaction supervision    Schizophrenia [facility])  Assessment & Plan  Still with sub-optimal control of paranoia at times  >Increase Fluphenazine to QID 3/14 as previously discussed with outpt psych  Recent improvements in degree of depression and anxiety; sleep stable   >current mgmt plan   >Fluphenazine increase to 5mg QID 3/14   >Risperdone 1 5mg qAM and 3mg QHS    >Fluvoxamine 50mg 5x/day    >Amantadine at home 100mg BID      >Suspect recent acute worsening related to be off home regimen which suspect will improve with reintroduction of medications at appropriate dosing; however patient was having some increased paranoia and perseveration which started a few days prior to fall (3 weeks after switching from from Trifluoperazine to fluphenazine and Cogentin stopped; family states he has been trialed in past off Trifluoperazine and could not tolerate switch to other meds  >Monitor closely with nursing and staff; Neuropsych c/s and following;   >Per outpt psych could increase fluphenazine to 5mg QID if needed > which I think would be reasonable next if needed  >If still fails consider obtaining and going back on Trifluoperazine but this was difficult due to decreased manufacturing apparently   >Some concern patient may have not been providing himself medications appropriately > recommend family training and assist c/ med mgmt    >Discussed with patient's outpt psych Dr Everette Fleischer 3/6  Patient presented c/ fair amount of paranoid delusions, anxiety, difficulty sleeping with occasional panic attacks off home regimen since recent admission prior, with recent fall/hospitalization possible head injury  Medications reviewed with his outpt pharmacy and patient's outpatient psychiatrist Dr Everette Fleischer  Patient recently should switched from Trifluoperazine to fluphenazine and Cogentin stopped  Patient/family noted some increased paranoid delusions starting a few days prior to fall  Home regimen per pharmacy:  Fluphenazine 5mg TID, Risperdal 1 5mg qday and 3mg QHS  Amantadine 100 mg b i d , Fluvoxamine 50mg 5x/day  Patient recently on much more modest dosing s/p recent possible BI         * Head injury, acute  Assessment & Plan  Fall with likely head trauma and brief loss of consciousness and acute osteophyte C6-C7 fracture  Mild increased confusion after fall also with some worsening mood/schizophrenia symptoms  >Recommend acute comprehensive interdisciplinary inpatient rehabilitation to include intensive skilled therapies (PT, OT) as outlined with oversight and management by rehabilitation physician as well as inpatient rehab level nursing, case management and weekly interdisciplinary team meetings     > overstimulation precautions, sleep-wake/agitation restlessness log  > optimize neuroleptic medication  > try to avoid sedative medications  > evaluate and manage fall risks          Cognitive impairment  Assessment & Plan  Likely premorbid; chronic generalized microvascular changes with possible old right lacunar internal capsule infarct and possible right frontal WM   >speech management  >Follow-up with Neurology after discharge  >On chronic home amantadine   >Recommend family assist with med mgmt after d/c    Fracture of sixth cervical vertebra [facility])  Assessment & Plan  C6/C7 osteophyte fracture context of recent fall and history of cervical decompression and fusion  Treatment recommendations per Neurosurgery  Non operative; continue cervical collar per Neurosurgery  Repeat C spine X-ray 3/19  Follow up with Neurosurgery after discharge    History of stroke  Assessment & Plan  Patient/family deny although evidence of possible old R lacunar infarct and focus in R frontal WM along with generalized micoangiopathic changes  >Recommend optimal BP mgmt  >Follow-up with neuro after d/c   >On full A/C for hx of VTE    Lightheadednessresolved as of 3/13/2019  Assessment & Plan  No recent reports  Chronic intermittent; consider med adjustments  Orthostatics negative recently; if necessary repeat particularly with adjustments in meds   PRN abdominal binder and EHSAN hose      Vascular skin disease  Assessment & Plan  Chronic venous insufficiency of BLE with some edema  >Podiatry c/s appreciated; EHSAN hose during day; Elevate when possible  >Lac-Hytrin cream BID  >Monitor for skin breakdown    Vitamin D insufficiency  Assessment & Plan  D3 2000 units qday     History of pulmonary embolism  Assessment & Plan  And DVT on chronic anticoagulation with Coumadin  Medicine consult to manage during acute rehab course  Regimen adjusted       Pain  Assessment & Plan  Remains well controlled  Continue scheduled Tylenol for now  Lidoderm patch  Avoid NSAIDs and avoid opiates     Class 1 obesity due to excess calories without serious comorbidity with body mass index (BMI) of 34 0 to 34 9 in adult  Assessment & Plan   on appropriate nutrition and activity  Adjustments accordingly during skilled therapy and with rehab nursing  Monitor skin closely for breakdown, wounds, rashes; keep clean and dry, turning Q2H   Follow-up with PCP after d/c      Chronic diastolic heart failure ([facility] [address] )  Assessment & Plan  Mgmt per IM     Chronic venous stasis dermatitis of both lower extremities  Assessment & Plan  Internal medicine consult to assist with management  Elevate legs when in bed and when in chair for prolonged periods of time  Consider Wilver veronica    Coronary artery disease involving native coronary artery of native heart without angina pectoris  Assessment & Plan  On chronic Coumadin  Optimal blood pressure control  Follow-up with PCP    Benign essential hypertension  Assessment & Plan  Medicine c/s to assist with mgmt  Amlodipine 5 mg daily, losartan 50 mg daily  Monitor blood pressure and orthostatics    # Bowel care:    - monitor for constipation, incontinence, and diarrhea  - goal 1 appropriate BM every 1-2 days  - recommend colace +/- senna and PRN bowel protocol     # Bladder care:   On chronic Flomax;  - monitor for retention, incontinence, signs/symptoms of UTI  - recommend voiding trial; nursing prompt to void followed by bladder scan starting Q4-6H or after each patient initiated void; nursing to record voided output and bladder scan totals; straight cath PRN >350-400 cc; if post void residual bladder scans are <150 cc x3 consecutive voids, can stop scans      # Skin:   - Nursing to turn patient Q2H if not adequately ambulatory, monitor for skin breakdown, rashes, and wounds if applicable     # At risk for venous thromboembolism:  - Recommend SCDs and mobilization  - warfarin     # Diet/Hydration:    Dysphagia 3 thin - recommend speech therapy     Disposition:   Home with estimated length of stay to next Wed     Follow-up:   PCP, Psychiatry, rehab, Neurosurgery, Neurology     CODE: Level 1: Full Code     Restrictions include: Fall precautions         ---------------------------------------------------------------------------------------------------------------------    Objective: Allergies and Medications per EMR    Physical Exam:  Vitals:    03/13/19 2012   BP: 121/67   Pulse: 78   Resp: 18   Temp: 99 7 °F (37 6 °C)   SpO2: 94%   General: Awake, alert in NAD  HENT:  MMM, cervical collar in place   Respiratory: Unlabored breathing, breath sounds equal, Lungs CTA, no wheezes, rales, or rhonchi  Cardiovascular: Regular rate and rhythm, no murmurs, rubs, or gallops  Gastrointestinal: Soft, non-tender, non-distended, normoactive bowel sounds  Genitourinary: No sandhu  SkiN/MSK/Extremities:  No calf edema or calf tenderness to palpation  Neurologic/Psych:   MENTAL STATUS: at recent baseline, orientation intact; able to follow simple commands appropriately; will perseverate on negative thoughts   Affect: Flattened    Diagnostic Studies: reviewed, no new imaging  No results found      Laboratory:    Results from last 7 days   Lab Units 03/11/19  0555   HEMOGLOBIN g/dL 13 6   HEMATOCRIT % 43 0   WBC Thousand/uL 4 50     Results from last 7 days   Lab Units 03/11/19  0555 03/08/19  0732   BUN mg/dL 15 17   POTASSIUM mmol/L 3 7 3 6   CHLORIDE mmol/L 101 104   CREATININE mg/dL 0 77 0 80     Results from last 7 days   Lab Units 03/12/19  0505 03/11/19  0555 03/10/19  0517   PROTIME seconds 31 4* 29 2* 31 3*   INR  3 03* 2 76* 3 02*        Patient Active Problem List   Diagnosis    Benign essential hypertension    Coronary artery disease involving native coronary artery of native heart without angina pectoris    Chronic depression    Chronic venous stasis dermatitis of both lower extremities    Deep venous thrombosis of lower extremity (HCC)    Chronic diastolic heart failure (HCC)    Osteoarthritis    Nocturnal enuresis    Idiopathic trigeminal neuralgia    Lacunar infarction    Class 1 obesity due to excess calories without serious comorbidity with body mass index (BMI) of 34 0 to 34 9 in adult    Schizophrenia (HCC)    Disability of walking    Pulmonary embolism (HCC)    Arthropathy    Mixed hyperlipidemia    Neurogenic claudication    Neck pain without injury    Abdominal wall hematoma    Spinal stenosis of lumbar region with neurogenic claudication    Lipoma of colon    Fracture of sixth cervical vertebra (HCC)    Rib pain on left side    Fall    Forgetfulness    Physical deconditioning    Head injury, acute    Pain    History of pulmonary embolism    Cognitive impairment    History of stroke    Vitamin D insufficiency    Vascular skin disease       ** Please Note: Fluency Direct voice to text software may have been used in the creation of this document  **    35 minutes or greater spent face-to-face with patient during this encounter and with coordination of care  Extended discussion today held with patient and family regarding current conditions including mental health, CBT techniques, psych/medical/rehabilitation management, and disposition      Kendra Medel MD, [facility]  Physical Medicine and Rehabilitation  Brain Injury Medicine

## 2019-03-14 NOTE — PROGRESS NOTES
03/14/19 0800   Pain Assessment   Pain Assessment No/denies pain   Restrictions/Precautions   Precautions Cognitive; Fall Risk;Supervision on toilet/commode   Braces or Orthoses C/S Collar   Memory Skills   Memory (FIM) 4 - Recognizes/recalls/performs 75-89%   Social Interaction (FIM) 5 - Interacts appropriately with others 90% of time   Speech/Language/Cognition Assessmetn   Treatment Assessment Pt engaging in functional math word problems, which primarily pertained to time  Pt was 8/10 accurate in answering questions, increasing to 10/10 accurate given minimal "talk through" w/ SLP  Pt then engaging in verbal problem solving task, in which pt was to predict a result from a situation  Pt was able to elicit at least one accurate result on 20/20 items and of those items, able to elaborate more than one result on 2 items  Pt was only noted to have flight of thoughts x1 during session, but able to be redirected back to tasks at hand appropriately  Will continue to benefit from SLP services to maximize overall cognitive linguistic skills at this time  Swallow Information   Current Risks for Dysphagia & Aspiration General debilitation;Brain injury;Cognitive deficit;Cervical spine injury/surgery   Current Symptoms/Concerns Difficulty chewing;Decreased oral intake   Current Diet Regular; Thin liquid   Baseline Diet Regular; Thin liquids   Consistencies Assessed and Performance   Materials Admnistered Soft/Level 3; Thin liquid   Oral Stage Mild impaired   Phargngeal Stage WFL   Swallow Mechanics WFL;Swallow initation; Appears prompt;Good Larygneal rise   Recommendations   Recommendations Dysphagia treatment   Diet Solid Recommendation Regular consistency   Diet Liquid Recommendation Thin liquid   Recommended Form of Meds As tolerated   General Precautions Aspiration precautions; Feed only when alert;Minimize distractions;Upright as possible for all oral intake;Remain upright for 45 mins after meals; Other (Comment)  (OOB for meals)   Compensatory Swallowing Strategies Alternate solids and liquids;Effortful swallow;Voluntary throat clear/cough to clear penetration   Results Reviewed with RN;PT/Family/Caregiver;MD   QI: [address] Provided by [place] No physical assistance   Eating CARE Score 6   Swallow Assessment   Swallow Treatment Assessment Pt observed w/ breakfast, in which diet has been upgraded to regular w/ thin liquids  Of note, all items received on tray were softer  Pt was able to setup tray and feed self w/o assistance  Consumed 25% of meal and 120cc of thin liquids by cup  Overall mastication given softer solids (eggs, banana) was slower but remains full/functional at this time w/o increased or overt oral residual/pocketing  A-p transfer of thin liquids by cup was [facility]  No anterior spillage noted w/ consistencies  Swallow initiation was prompt w/ all consistencies assessed and hyolaryngeal elevation WFL  No overt signs/sxs of aspiration noted  Will continue to recommend regular diet w/ thin liquids at this time  Brief f/u to monitor tolerance of diet w/o increased dysphagia or aspriation sxs  Swallow Assessment Prognosis   Prognosis Good   Prognosis Considerations Co-morbidities; Medical diagnosis; Medical prognosis   SLP Therapy Minutes   SLP Time In 0800   SLP Time Out 0900   SLP Total Time (minutes) 60   SLP Mode of treatment - Individual (minutes) 30   SLP Mode of treatment - Concurrent (minutes) 0   SLP Mode of treatment - Group (minutes) 30   SLP Mode of treatment - Co-treat (minutes) 0   SLP Mode of Teatment - Total time(minutes) 60 minutes   Therapy Time missed   Time missed?  No   Daily FIM Score   Problem solving (FIM) 4 - Solves basic problems 75-89% of time   Comprehension (FIM) 5 - Understands basic directions and conversation   Expression (FIM) 5 - Needs help/cues only RARELY (< 10% of the time)   Eating (FIM) 6 - Patient requires increased time or safety concern

## 2019-03-14 NOTE — TEAM CONFERENCE
Acute RehabilitationTeam Conference Note  Date: 3/14/2019   Time: 10:34 AM       Patient Name:  Codi Mesa  Medical Record Number: [id number]   YOB: 1945  Sex: Male          [facility]Bed:  [facility]  Payor Info:  Payor: [employer]  REP / Plan: [employer] [address]  REP / Product Type: Medicare HMO /      Admitting Diagnosis: Fracture [place]  [id number]]   Admit Date/Time:  3/5/2019  5:07 PM  Admission Comments: No comment available     Primary Diagnosis:  Head injury, acute  Principal Problem: Head injury, acute    Patient Active Problem List    Diagnosis Date Noted    Vascular skin disease 03/13/2019    Vitamin D insufficiency 03/08/2019    Pain 03/06/2019    History of pulmonary embolism 03/06/2019    Cognitive impairment 03/06/2019    History of stroke 03/06/2019    [facility] 03/04/2019    Rib pain on left side 03/04/2019    Fall 03/04/2019    Forgetfulness 03/04/2019    Physical deconditioning 03/04/2019    Head injury, acute 03/04/2019    Spinal stenosis of lumbar region with neurogenic claudication 02/18/2019    Abdominal wall hematoma 12/19/2018    Neck pain without injury 10/29/2018    Mixed hyperlipidemia 07/13/2018    Neurogenic claudication 07/13/2018    Chronic venous stasis dermatitis of both lower extremities 04/12/2017    Benign essential hypertension 12/09/2016    Coronary artery disease involving native coronary artery of native heart without angina pectoris 12/09/2016    Schizophrenia ([facility] ) 12/09/2016    Idiopathic trigeminal neuralgia 09/14/2016    Chronic depression 09/16/2015    Deep venous thrombosis of lower extremity ([facility] ) 10/02/2014    Chronic diastolic heart failure ([facility] ) 10/02/2014    Pulmonary embolism ([facility] ) 10/02/2014    Osteoarthritis 06/20/2014    Class 1 obesity due to excess calories without serious comorbidity with body mass index (BMI) of 34 0 to 34 9 in adult 06/20/2014    Disability of walking 06/20/2014    Arthropathy 06/20/2014    Lacunar infarction 05/05/2014    Nocturnal enuresis 07/17/2013    Lipoma of colon 01/11/2011       Physical Therapy:    Weight Bearing Status: Full Weight Bearing  Transfers: Supervision  Bed Mobility: Supervision  Amulation Distance (ft): 300 feet  Ambulation: Supervision  Assistive Device for Ambulation: Roller Walker  Number of Stairs: 21  Assistive Device for Stairs: Single Point Cane(with railing)  Stair Assistance: Contact Guard  Discharge Recommendations: Home with:  [address] with[de-identified] 24 Hour Supervision, Family Support    3/13/19  Patient making good progress thus far with skilled PT intervention  Patient overall S with bed mobility, transfers and gait using RW  Patient's goal at discharge is to utilize [facility] in which he is able to demonstrate ability to perform safely at /[facility] however, CGA requires at times 2* recent LOB with poor righting reactions noted  At this time, RW provides patient with the most (I) and safety  Discussed safety precautions this session especially with pacing during activity in event of urgency  Patient is intrusted on stay present and concerntrated during times for potential fall risk as he was observed trailing off during  step negotiation  Patient also able to initiate FF stairs this session but required verbal instruction for proper LE and SPC sequencing to increase safety  Spoke with OT who set up family training 3/18/19  Anticipate pending d/c home next week, home with family support and current recommendation for using RW      3/6/19  On evaluation, patient presenting with the following deficits: impaired strength and ROM, impaired static and dynamic sitting and standing balance, poor standing tolerance, and decreased overall functional mobility  Patient wearing CS collar during session in which he required education on spinal precautions  Patient is overall flat and tangential but is able to be redirected onto task   He perseverates on his mental health throughout session and would benefit from neuropsych consult (previously placed)  During functional assessment, patient able to perform bed mobility, transfers, gait and stairs with overall Stephy, mod A at times  He is a good rehab candidate with goals of Supervision; expected LOS 2 weeks  He requires skilled PT intervention at this time to maximize function and reduce caregiver burden  Occupational Therapy:  Eating: Supervision  Grooming: Contact Guard  Bathing: Contact Guard  Bathing: Contact Guard  Upper Body Dressing: Minimal Assistance  Lower Body Dressing: Minimal Assistance  Toileting: Contact Guard, Supervision  Tub/Shower Transfer: Minimal Assistance  Toilet Transfer: Contact Guard, Minimal Assistance  Cognition: Exceptions to WNL  Cognition: Behavioral Considerations, Decreased Safety  Orientation: Person, Place, Time, Situation  Discharge Recommendations: Home with:  [address] with[de-identified] Family Support       Pt made good progress in all ADLs this past week  Pt overall continues to still be limited by c/s collar and spinal precautions and requires reminders 25% of time to comply with precautions during ADL routines  Pt's other barriers include decreased standing balance, decreased new learning, dec memory, dec safety awareness, depression, and decreased endurance  Pt has progressed in functional transfers to CGA/CS with RW  Pt still occasionally needs verbal cueing to complete turns with RW  Spoke ot patient's son and wife and set up family training for MOnday due to pt's son's work schedule  Family training will focus on c/s collar training, and training for bathing/dressing  Pt continues to benefit from inpatient rehab to progress to mod I/supervision level       Jeanine Paulson OT      Speech Therapy:  Mode of Communication: Verbal  Cognition: Exceptions to WNL  Cognition: Decreased Memory, Decreased Attention, Decreased Comprehension  Orientation: Person, Place, Time, Situation  Swallowing: Exceptions to WNL  Swallowing: Oral Dysphagia(mild)  Diet Recommendations: Regular Diet, Thin  Discharge Recommendations: Home with:  [address] with[de-identified] 24 Hour Supervision, 24 Hour Assisteance, Outpatient Speech Therapy  Pt able to complete portions of the IGNACIO (Assessment of Language Related Functional Activities), where at this time, pt is demonstrating mild-moderate cognitive linguistic deficits, but will continue to benefit from completing assessment to further determine needs to be addressed by SLP services while on the ARC  Of note, attention was poor at times, where pt was unable "retrace" thoughts and elaborate further  Additionally noted was tangentile speech patterns and flight of thoughts during assessments, but was able to be re-directed back to activities appropriately  Pt also assessed for dysphagia, in which pt is edentulous, but pt denies food "avoidances" of certain regular food items  Current diet is level 3 w/ thin liquids at this time, in which pt does demonstrate mildly slowed mastication of consistencies, but does not pose to be an increased risk for aspiration at this time  At this time, pt will continue to benefit from SLP services to finish cognitive linguistic assessment to further determine focus of cognitive tx sessions and will f/u to establish safest least restrictive diet at this time  Update from week 3/14/19: Pt continues to be followed for dysphagia and cognitive linguistic tx sessions  Pt's current barriers remains decreased attention, which impacts STM recall given a number of activities  Pt does continue to be tangential at times during sessions, but remains easily re-directed back to tasks at hand  As for swallow function, pt has made progress and despite edentulous state as well as current C-collar placement, pt is demonstrating tolerance of regular diet w/ thin liquids at this time w/o increased risk of aspiration   Will continue to benefit from brief f/u for dysphagia tx and continue f/u for cognitive linguistic tx to maximize skills at this time  Nursing Notes:  Appetite: Fair  Diet Type: Regular/House                      Diet Patient/Family Education Complete: Yes    Type of Wound (LDA): (No wound)                       Bladder: 7 - Complete [place]     Bladder Patient/Family Education: Yes  Bowel: 6 - Modified [place]     Bowel Patient/Family Education: Yes  Pain Location: Head  Pain Orientation: Bilateral  Pain Score: 1                       Hospital Pain Intervention(s): Other (Comment), Rest, Ambulation/increased activity, Emotional support(RN notified and admin pain meds)  Pain Patient/Family Education: Yes  Medication Management/Safety  New Medication: Tylenol 975 mg two times daily and PRN order, LAC hydrin lotion for feet  Safe Administration: Yes  Medication Patient/Family Education Complete: (Unsure if family has been educated)    Pt admitted to [facility] s/p fall with  C6-C7 osteophyte fx- non operable  Pt wears Collar at all times  Scratch to rt side of neck under collar  Pt also has Large bruise with redness to left shoulder & And to buttocks  Pt has a Hx schizophrenia  &  OCD, currently on prolixin, Luvox, Risperdal  Pt states that he can be anxious and have panic attacks  Pain is managed with lidocaine patch, tylenol  Pt is on coumadin for anti-coag  Bp managed with Cozaar, Norvasc  Pt has Parkinsons and is on Symmetrel  He is continent of bladder and bowel  Constipation was relieved with suppository  Pt requires alarms for safety  This week we will monitor lab values, vital signs and work on pain management  We will monitor moods, restlessness and sleep patterns  We will prevent skin breakdown and teach pt to turn and reposition and weight shift, and perform routine skin checks  We will teach patient to check collar for any breakdown and keep clean  We will monitor for constipation and medicate per bowel regimen    Pt will practice safety awareness and remain free from falls  3/13 [facility] s/p fall with  C6-C7 osteophyte fx- non operable  Pt wears vista collar at all times  Scratch to rt side of neck under collar is healing  Bruise to left shoulder and buttocks resolving  Pt with a hx of schizophrenia  &  OCD, currently on prolixin, Luvox, Risperdal  Pt states that he can be anxious and have panic attacks  Pain is managed with lidocaine patch, tylenol  Pt is on coumadin for anti-coag  Bp managed with Cozaar, Norvasc  Pt has Parkinsons and is on Symmetrel  He is continent of bladder and bowel  Constipation was relieved with suppository  Pt requires alarms for safety  This week we will monitor lab values and vital signs  Pain is not an issue at this time  We will monitor moods, restlessness and sleep patterns  We will be mindful to keep newspaper out of pts room  We will prevent skin breakdown, teach pt to turn, reposition and weight shift, and perform routine skin checks  We will teach patient to check collar for any breakdown and keep clean  We will monitor for constipation and medicate per bowel regimen  Pt will practice safety awareness and remain free from falls  Case Management:     Discharge Planning  Living Arrangements: Spouse/significant other, Children  Support Systems: Spouse/significant other  Assistance Needed: yes  Type of Current Residence: Private residence  [facility]  Pt is participating well with therapy, and is expected to return home  Pt has been educated on potential for continued care, ie therapy and services  Pt has supports in the community  Following to assist with d/c planning needs  Is the patient actively participating in therapies?  yes  List any modifications to the treatment plan:     Barriers Interventions   Increase paranoia Medication adjustment   Spinal precautions, collar mgmt Cueing , repetitive education   Endurance, unsteady gait Energy conservation education, use of ae             Is the patient making expected progress toward goals? yes  List any update or changes to goals:     Medical Goals: Patient will be medically stable for discharge to [facility] upon completion of rehab program and Patient will be able to manage medical conditions and comorbid conditions with medications and follow up upon completion of rehab program    Weekly Team Goals:   Rehab Team Goals  ADL Team Goal: Patient will require supervision with ADLs with least restrictive device upon completion of rehab program  Bowel/Bladder Team Goal: Patient will be independent with bladder/bowel management with least restrictive device upon completion of rehab program  Transfer Team Goal: Patient will require supervision with transfers with least restrictive device upon completion of rehab program  Locomotion Team Goal: Patient will require supervision with locomotion with least restrictive device upon completion of rehab program  Cognitive Team Goal: Patient will require supervision for basic and complex tasks upon completion of rehab program    Discussion: pt presents with above barriers and is making progress  Pt requires use of walker due to unsteady gait, although currently supervision  Pt may be able to progress to a cane, contact guard on stairs with a cane  Family training scheduled for Monday  Pt is now on regular diet thin liquids  Pt is min a ub dressing, lower body contact guard, supervision toileting with occasional assist with clothing mgmt  Recommendations are for contd [facility] for rn pt and ot services  Anticipated Discharge Date:  3/20/19  [facility] Team Members Present: The following team members are supervising care for this patient and were present during this Weekly Team Conference      Physician: Dr rAmen Lyon MD  : Drea Alejandre MSW and YONI Tomas/ LCSW  Registered Nurse: Conrad Webster RN  Physical Therapist: Emmanuel Strange DPT  Occupational Therapist: Dinorah Vernon MS, OTR/L  Speech Therapist: Hasmukh Johnson MA, CCC-SLP  Other:

## 2019-03-14 NOTE — PROGRESS NOTES
03/14/19 1030   Restrictions/Precautions   Precautions Bed/chair alarms;Aspiration;Spinal precautions   Braces or Orthoses C/S Collar  (Ortho tech Maribeth Bolivar called for order of replcement pads)   Lifestyle   Intrinsic Gratification Pt reports enjoying reading the newspaper in the morning  pt reporting that he has been unable to sustain UE position for reading with increased fatigue  Pt educated on AE/activity modification  Pt is supplied with large rigid poster board with clips on the top to place on lap to hold paper at eye level to reduce strain on UE and reduce straining of neck  pt trials and is able to undo clips and advance paper pages  Pt is able to read articles with no UE complaints  QI: Putting On/Taking Off Footwear   Assistance Needed Set-up / clean-up   Comment foot over knee, minimal strain noted w/ reaching for laces/ Pt reports that he has trialed elastic laces in the past and did not feel they supported him well enough  Pts laces were re strung to hold shoe tounge in place when donning  Putting On/Taking Off Footwear CARE Score 5   QI: Sit to Stand   Assistance Needed Supervision   Sit to Stand CARE Score 4   QI: Chair/Bed-to-Chair Transfer   Assistance Needed Supervision   Chair/Bed-to-Chair Transfer CARE Score 4   Transfer Bed/Chair/Wheelchair   Limitations Noted In Endurance;Balance   Adaptive Equipment Roller Walker   Stand Pivot Supervision   Sit to Stand Supervision   Stand to Sit Supervision   Bed, Chair, Wheelchair Transfer (FIM) 5 - Patient requires supervision/monitoring   QI: [address] Needed Supervision   Comment increased time requires for management of clothing 2* decreased vision w/ C collar  Pt educated on varoius tips for compensating with use of tactile sensation rather than vision for management of belt  Toileting Hygiene CARE Score 4   Toileting   Able to [address] down yes, up yes     Able to Manage Clothing Closures Yes   Toileting (FIM) 5 - Patient requires supervision/monitoring   QI: Toilet Transfer   Assistance Needed Supervision   Toilet Transfer CARE Score 4   Toilet Transfer   Surface Assessed Raised Toilet   Adaptive Equipment Grab Bar   Toilet Transfer (FIM) 5 - Patient requires supervision/monitoring   Cognition   Overall Cognitive Status Impaired   Arousal/Participation Alert; Cooperative   Attention Attends with cues to redirect   Orientation Level Oriented X4   Memory Decreased short term memory   Following Commands Follows multistep commands with increased time or repetition   Comments Pt is in bright spirits after session with Dr Herminio Hammond   Activity Tolerance   Activity Tolerance Patient tolerated treatment well   Assessment   Treatment Assessment Skilled OT session focusing on LB dressing, ASPEN collar management, and leisure interests  See above for LB dressing and leisure interests  Pt participated in  [place] management in daily ADL routine  Pt educated on various methods for incorporating collar management in ADL routine, Recommend assist from wife for all collar management  Family training to be completed on Monday  Pt participates in verbal instruction, physical demonstration and written instruction about ASPEN collar care and management of changing pads and use of jayme collar for showering  Pt is able to replace single piece at a time when given, able to carryover back straps w/ verbal instruction  Training with family will be required to ensure carryover for caregiver  Pt continues to require skilled OT services to increase overall functional independence  and safety w/ ADLs and functional transfers  Prognosis Good   Problem List Decreased strength;Decreased range of motion;Decreased endurance; Impaired balance;Decreased mobility; Decreased coordination;Decreased cognition; Impaired judgement   Plan   Treatment/Interventions ADL retraining;Functional transfer training;LE strengthening/ROM; Therapeutic exercise; Endurance training;Cognitive reorientation;Patient/family training   Progress Progressing toward goals   Recommendation   OT Discharge Recommendation 24 hour supervision/assist   OT Therapy Minutes   OT Time In 1030   OT Time Out 1130   OT Total Time (minutes) 60   OT Mode of treatment - Individual (minutes) 60   OT Mode of treatment - Concurrent (minutes) 0   OT Mode of treatment - Group (minutes) 0   OT Mode of treatment - Co-treat (minutes) 0   OT Mode of Teatment - Total time(minutes) 60 minutes

## 2019-03-14 NOTE — PROGRESS NOTES
03/14/19 0901   Pain Assessment   Pain Assessment No/denies pain   Pain Score No Pain   Restrictions/Precautions   Precautions Bed/chair alarms;Cognitive; Fall Risk;Supervision on toilet/commode   Braces or Orthoses C/S Collar   Cognition   Overall Cognitive Status Impaired   Arousal/Participation Cooperative   Attention Attends with cues to redirect   Memory Decreased short term memory;Decreased recall of precautions   Following Commands Follows one step commands with increased time or repetition   Subjective   Subjective no physical pain reported; cont to express cognitive concerns   QI: Sit to [address] Provided by [place] No physical assistance   Sit to Stand CARE Score 4   QI: Chair/Bed-to-Chair Transfer   Assistance Needed Supervision; Adaptive equipment   Assistance Provided by [place] No physical assistance   Chair/Bed-to-Chair Transfer CARE Score 4   Transfer Bed/Chair/Wheelchair   Limitations Noted In Balance; Coordination;LE Strength   Adaptive Equipment Roller Walker   Stand Pivot Supervision   Sit to Stand Supervision   Stand to Sit Supervision   Findings CS for safety; cues for hand placement and RW management when sitting down   Bed, Chair, Wheelchair Transfer (FIM) 5 - Patient requires supervision/monitoring   QI: Car Transfer   Assistance Needed Verbal cues; Incidental touching   Assistance Provided by [place] Less than 25%   Car Transfer CARE Score 3   QI: Walk 10 Feet   Assistance Needed Supervision; Adaptive equipment   Assistance Provided by [place] No physical assistance   Walk 10 Feet CARE Score 4   QI: Walk 50 Feet with Two Turns   Assistance Needed Supervision; Adaptive equipment   Assistance Provided by [place] No physical assistance   Walk 50 Feet with Two Turns CARE Score 4   QI: Walk 150 Feet   Assistance Needed Supervision; Adaptive equipment   Assistance Provided by [place] No physical assistance   Walk 150 Feet CARE Score 4   QI: Walking 10 Feet on Uneven Surfaces   Reason if not Attempted Safety concerns   Walking 10 Feet on Uneven Surfaces CARE Score 88   Ambulation   Does the patient walk? 2  Yes   Primary Discharge Mode of Locomotion Walk   Walk Assist Level Close Supervision   Gait Pattern Inconsistant Shilpa;Decreased foot clearance; Improper weight shift;Trendelenburg   Assist Device Roller Erin Hatfield Walked (feet) 350 ft   Limitations Noted In Balance; Coordination; Endurance;Speed;Strength   Findings practiced 200' x2 with SPC, 200'x1 no AD   pt with several LOB requiring Stephy to recover during amb with SPC and No AD;  pt unable to self correct and keeps on going not fully undestanding LOB  cont recommendation to pt d/c with RW to decrease fall risk until balance and strength improves   Walking (FIM) 5 - Patient requires supervision/monitoring AND distance 150 feet or more, no rest   QI: Wheel 50 Feet with Two Turns   Reason if not Attempted Activity not applicable   Wheel 50 Feet with Two Turns CARE Score 9   QI: Wheel 150 Feet   Reason if not Attempted Activity not applicable   Wheel 806 Feet CARE Score 9   Wheelchair mobility   QI: Does the patient use a wheelchair? 0  No   QI: 1 Step (Curb)   Assistance Needed Incidental touching;Verbal cues; Adaptive equipment   Assistance Provided by [place] No physical assistance   Comment VCs for sequencing with RW   1 Step (Curb) CARE Score 4   QI: 4 Steps   Assistance Needed Incidental touching;Verbal cues; Supervision; Adaptive equipment   Assistance Provided by [place] No physical assistance   Comment FF   4 Steps CARE Score 4   QI: 12 Steps   Assistance Needed Incidental touching;Verbal cues; Supervision; Adaptive equipment   Assistance Provided by [place] No physical assistance   Comment FF; cane and HR   12 Steps CARE Score 4   Stairs   Type Stairs;Curb   # of Steps 12   Weight Bearing Precautions Fall Risk   Assist Devices Single Rail;Cane   Findings cues for sequencing with cane and nonreciprocal pattern  needs VCs during curb step VCs for foot placement before placing RW, CGA for safety   Stairs (FIM) 5 - Patient requires verbal cues AND goes up and down full flight (12- 14 stairs)   Therapeutic Interventions   Strengthening unsupported STS x10 for functional strengthening and balance  improved fwd wt shift   Balance amb with and without SPC to improve balance and rigthing reactions   had pt locating rooms around the unit to initiate stopping and scanning and trunk rotation   Assessment   Treatment Assessment pt had multiple LOB during gait training today  once when saying hello to nursing in the hallway and was close enough to the wall to lean again but did not stop walking requiring hands on A to correct  pt educated on stopping if needed to say hello to maintain balance due to decrease ability to turn his head  pt also unsteady with turns and trendelenburg causes pt to lose balance to the L   pt becomes easily distracted during functional tasks needing redirected for safety  pt states this has happened in the past and may be what contributed to his falls at home  cont recommendation for RW at home to improve functional ind but will cont to improve balance and righting reactions with SPC and without an AD  Problem List Decreased strength;Decreased endurance; Impaired balance;Decreased mobility; Impaired judgement;Orthopedic restrictions;Pain;Decreased skin integrity   Barriers to Discharge Inaccessible home environment;Decreased caregiver support   PT Barriers   Physical Impairment Decreased strength;Decreased endurance; Impaired balance;Decreased mobility; Decreased safety awareness;Orthopedic restrictions   Functional Limitation Standing;Stair negotiation   Plan   Treatment/Interventions Functional transfer training;LE strengthening/ROM; Endurance training;Patient/family training;Bed mobility;Gait training   Progress Progressing toward goals   Recommendation   Recommendation 24 hour supervision/assist;Home PT; Home with family support   Equipment Recommended Walker   PT Therapy Minutes   PT Time In 0900   PT Time Out 1000   PT Total Time (minutes) 60   PT Mode of treatment - Individual (minutes) 60   PT Mode of treatment - Concurrent (minutes) 0   PT Mode of treatment - Group (minutes) 0   PT Mode of treatment - Co-treat (minutes) 0   PT Mode of Teatment - Total time(minutes) 60 minutes   Therapy Time missed   Time missed?  No

## 2019-03-14 NOTE — SOCIAL WORK
Met with Pt to review team meeting  Pt is in agreement with d/c of 3 20, as well as [facility], PT/OT/RN  CM stated she would make the referral for Pt  Referral sent to [facility] for PT/OT/RN

## 2019-03-14 NOTE — PROGRESS NOTES
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 as OP and was on 5mg qd  Continue Coumadin = on 3/12/19, adjusted dosing from 2 5 x 3/5 x 4 to 2 5 x4/5 x 3  Repeat INR today is stable so no further dose change      BPH: Continue Flomax      Paranoid Schizophrenia: sees Dr Aditi Engel as OP  Continue Risperdal, Luvox, Prolixin  Prolixin was added recently  Hx Diastolic CHF:  Controlled w/o diuretics;  he has chronic LE edema     Hypokalemia:  Will give Kdur 40 meq x 1 today    Dispo:  TBA      Subjective:  denies any complaints      ROS:   GI: denies abdominal pain, change bowel habits or reflux symptoms  Neuro: No new neurologic changes  Respiratory: No Cough, SOB  Cardiovascular: No CP, palpitations   Psych:  No depression or anxiety    Scheduled Meds:    Current Facility-Administered Medications:  acetaminophen 650 mg Oral BID PRN Bala Lofton MD   acetaminophen 975 mg Oral BID (AM & Afternoon) Bala Lofton MD   amantadine 100 mg Oral BID Bala Lofton MD   amLODIPine 5 mg Oral Daily Bala Lofton MD   ammonium lactate  Topical BID Vielka Wise DPM   b complex-vitamin C-folic acid 1 capsule Oral Daily With Manuel Valenzuela MD   bisacodyl 10 mg Rectal Daily PRN Olvin Nicole DO   cholecalciferol 2,000 Units Oral Daily Bala Lofton MD   docusate sodium 100 mg Oral BID Bala Lofton MD   fluPHENAZine 5 mg Oral 4x Daily Bala Lofton MD   fluvoxaMINE 50 mg Oral 5x Daily Bala Lofton MD   lidocaine 1 patch Topical Daily Nafisa Garcia Margi Manzano MD   losartan 50 mg Oral Daily Day Chanel MD   ondansetron 4 mg Oral Q8H PRN Day Chanel MD   polyethylene glycol 17 g Oral Daily PRN Day Chanel MD   risperiDONE 0 5 mg Oral BID PRN Day Chanel MD   risperiDONE 1 5 mg Oral Daily Day Chanel MD   risperiDONE 3 mg Oral HS Day Chanel MD   tamsulosin 0 4 mg Oral Daily With Castro Antonio MD   warfarin 2 5 mg Oral Once per day on Mon Tue Wed Fri Dominique Sanchez PA-C   warfarin 5 mg Oral Once per day on Sun Thu Sat Dominique Sanchez PA-C       Labs:     Results from last 7 days   Lab Units 03/14/19  0455 03/11/19  0555   WBC Thousand/uL 5 20 4 50   HEMOGLOBIN g/dL 13 3 13 6   HEMATOCRIT % 40 9 43 0   PLATELETS Thousands/uL 241 229     Results from last 7 days   Lab Units 03/14/19  0455 03/11/19  0555   SODIUM mmol/L 138 139   POTASSIUM mmol/L 3 4* 3 7   CHLORIDE mmol/L 99* 101   CO2 mmol/L 32 33*   BUN mg/dL 12 15   CREATININE mg/dL 0 71 0 77   CALCIUM mg/dL 8 5 8 8         Results from last 7 days   Lab Units 03/14/19  0455 03/12/19  0505   INR  2 86* 3 03*            [unfilled]    Labs reviewed    Physical Examination:  Vitals:   Vitals:    03/13/19 0827 03/13/19 1311 03/13/19 2012 03/14/19 0532   BP: 137/89 117/63 121/67 140/74   BP Location:  Right arm Right arm Left arm   Pulse:  83 78 86   Resp:  17 18 16   Temp:  98 1 °F (36 7 °C) 99 7 °F (37 6 °C) 97 9 °F (36 6 °C)   TempSrc:  Oral Tympanic Oral   SpO2:  94% 94% 90%   Weight:       Height:         Constitutional:  NAD; pleasant; nontoxic   Neck: collar on  CV:  +S1, S2;  RRR; no rub/murmur  Pulmonary:  BBS without crackles/wheeze/rhonci; resp are unlabored  Abdominal:  soft, +BS, ND/NT  Skin:  no rashes  Musculoskeletal:  trace LE edema  :  no snadhu  Neurological:  AAO;  BURGOS 5/5        [ X ] Total time spent: 30 Mins and greater than 50% of this time was spent counseling/coordinating care      ** Please Note: Dragon 360 Dictation voice to text software may have been used in the creation of this document   **

## 2019-03-14 NOTE — PLAN OF CARE
Problem: Potential for Falls  Goal: Patient will remain free of falls  Description  INTERVENTIONS:  - Assess patient frequently for physical needs  -  Identify cognitive and physical deficits and behaviors that affect risk of falls    -  [place] fall precautions as indicated by assessment   - Educate patient/family on patient safety including physical limitations  - Instruct patient to call for assistance with activity based on assessment  - Modify environment to reduce risk of injury  - Consider OT/PT consult to assist with strengthening/mobility  Outcome: Progressing     Problem: Prexisting or High Potential for Compromised Skin Integrity  Goal: Skin integrity is maintained or improved  Description  INTERVENTIONS:  - Identify patients at risk for skin breakdown  - Assess and monitor skin integrity  - Assess and monitor nutrition and hydration status  - Monitor labs (i e  albumin)  - Assess for incontinence   - Turn and reposition patient  - Assist with mobility/ambulation  - Relieve pressure over bony prominences  - Avoid friction and shearing  - Provide appropriate hygiene as needed including keeping skin clean and dry  - Evaluate need for skin moisturizer/barrier cream  - Collaborate with interdisciplinary team (i e  Nutrition, Rehabilitation, etc )   - Patient/family teaching  Outcome: Progressing     Problem: PAIN - ADULT  Goal: Verbalizes/displays adequate comfort level or baseline comfort level  Description  Interventions:  - Encourage patient to monitor pain and request assistance  - Assess pain using appropriate pain scale  - Administer analgesics based on type and severity of pain and evaluate response  - Implement non-pharmacological measures as appropriate and evaluate response  - Consider cultural and social influences on pain and pain management  - Notify physician/advanced practitioner if interventions unsuccessful or patient reports new pain  Outcome: Progressing     Problem: SAFETY ADULT  Goal: Maintain or return to baseline ADL function  Description  INTERVENTIONS:  -  Assess patient's ability to carry out ADLs; assess patient's baseline for ADL function and identify physical deficits which impact ability to perform ADLs (bathing, care of mouth/teeth, toileting, grooming, dressing, etc )  - Assess/evaluate cause of self-care deficits   - Assess range of motion  - Assess patient's mobility; develop plan if impaired  - Assess patient's need for assistive devices and provide as appropriate  - Encourage maximum independence but intervene and supervise when necessary  ¯ Involve family in performance of ADLs  ¯ Assess for home care needs following discharge   ¯ Request OT consult to assist with ADL evaluation and planning for discharge  ¯ Provide patient education as appropriate  Outcome: Progressing  Goal: Maintain or return mobility status to optimal level  Description  INTERVENTIONS:  - Assess patient's baseline mobility status (ambulation, transfers, stairs, etc )    - Identify cognitive and physical deficits and behaviors that affect mobility  - Identify mobility aids required to assist with transfers and/or ambulation (gait belt, sit-to-stand, lift, walker, cane, etc )  - [place] fall precautions as indicated by assessment  - Record patient progress and toleration of activity level on Mobility SBAR; progress patient to next Phase/Stage  - Instruct patient to call for assistance with activity based on assessment  - Request Rehabilitation consult to assist with strengthening/weightbearing, etc   Outcome: Progressing     Problem: DISCHARGE PLANNING  Goal: Discharge to home or other facility with appropriate resources  Description  INTERVENTIONS:  - Identify barriers to discharge w/patient and caregiver  - Arrange for needed discharge resources and transportation as appropriate  - Identify discharge learning needs (meds, wound care, etc )  - Arrange for interpretive services to assist at discharge as needed  - Refer to Case Management Department for coordinating discharge planning if the patient needs post-hospital services based on physician/advanced practitioner order or complex needs related to functional status, cognitive ability, or social support system  Outcome: Progressing     Problem: GASTROINTESTINAL - ADULT  Goal: Maintains or returns to baseline bowel function  Description  INTERVENTIONS:  - Assess bowel function  - Encourage oral fluids to ensure adequate hydration  - Administer IV fluids as ordered to ensure adequate hydration  - Administer ordered medications as needed  - Encourage mobilization and activity  - Nutrition services referral to assist patient with appropriate food choices  Outcome: Progressing     Problem: SKIN/TISSUE INTEGRITY - ADULT  Goal: Skin integrity remains intact  Description  INTERVENTIONS  - Identify patients at risk for skin breakdown  - Assess and monitor skin integrity  - Assess and monitor nutrition and hydration status  - Monitor labs (i e  albumin)  - Assess for incontinence   - Turn and reposition patient  - Assist with mobility/ambulation  - Relieve pressure over bony prominences  - Avoid friction and shearing  - Provide appropriate hygiene as needed including keeping skin clean and dry  - Evaluate need for skin moisturizer/barrier cream  - Collaborate with interdisciplinary team (i e  Nutrition, Rehabilitation, etc )   - Patient/family teaching  Outcome: Progressing     Problem: MUSCULOSKELETAL - ADULT  Goal: Maintain or return mobility to safest level of function  Description  INTERVENTIONS:  - Assess patient's ability to carry out ADLs; assess patient's baseline for ADL function and identify physical deficits which impact ability to perform ADLs (bathing, care of mouth/teeth, toileting, grooming, dressing, etc )  - Assess/evaluate cause of self-care deficits   - Assess range of motion  - Assess patient's mobility; develop plan if impaired  - Assess patient's need for assistive devices and provide as appropriate  - Encourage maximum independence but intervene and supervise when necessary  - Involve family in performance of ADLs  - Assess for home care needs following discharge   - Request OT consult to assist with ADL evaluation and planning for discharge  - Provide patient education as appropriate  Outcome: Progressing  Goal: Maintain proper alignment of affected body part  Description  INTERVENTIONS:  - Support, maintain and protect limb and body alignment  - Provide pt/fam with appropriate education  Outcome: Progressing

## 2019-03-15 PROCEDURE — 99232 SBSQ HOSP IP/OBS MODERATE 35: CPT

## 2019-03-15 PROCEDURE — 97116 GAIT TRAINING THERAPY: CPT

## 2019-03-15 PROCEDURE — 97530 THERAPEUTIC ACTIVITIES: CPT

## 2019-03-15 PROCEDURE — 97110 THERAPEUTIC EXERCISES: CPT

## 2019-03-15 PROCEDURE — 97535 SELF CARE MNGMENT TRAINING: CPT

## 2019-03-15 RX ADMIN — RISPERIDONE 1.5 MG: 3 TABLET ORAL at 09:15

## 2019-03-15 RX ADMIN — Medication: at 09:16

## 2019-03-15 RX ADMIN — LOSARTAN POTASSIUM 50 MG: 50 TABLET, FILM COATED ORAL at 09:13

## 2019-03-15 RX ADMIN — FLUVOXAMINE MALEATE 50 MG: 50 TABLET, FILM COATED ORAL at 12:06

## 2019-03-15 RX ADMIN — VITAMIN D, TAB 1000IU (100/BT) 2000 UNITS: 25 TAB at 09:13

## 2019-03-15 RX ADMIN — ACETAMINOPHEN 975 MG: 325 TABLET ORAL at 15:05

## 2019-03-15 RX ADMIN — WARFARIN SODIUM 2.5 MG: 2.5 TABLET ORAL at 17:58

## 2019-03-15 RX ADMIN — FLUPHENAZINE HYDROCHLORIDE 5 MG: 2.5 TABLET, FILM COATED ORAL at 12:06

## 2019-03-15 RX ADMIN — AMLODIPINE BESYLATE 5 MG: 5 TABLET ORAL at 09:14

## 2019-03-15 RX ADMIN — FLUPHENAZINE HYDROCHLORIDE 5 MG: 2.5 TABLET, FILM COATED ORAL at 16:41

## 2019-03-15 RX ADMIN — RISPERIDONE 3 MG: 3 TABLET ORAL at 23:13

## 2019-03-15 RX ADMIN — LIDOCAINE 1 PATCH: 50 PATCH CUTANEOUS at 09:13

## 2019-03-15 RX ADMIN — Medication 1 CAPSULE: at 16:41

## 2019-03-15 RX ADMIN — FLUPHENAZINE HYDROCHLORIDE 5 MG: 2.5 TABLET, FILM COATED ORAL at 23:14

## 2019-03-15 RX ADMIN — ACETAMINOPHEN 975 MG: 325 TABLET ORAL at 05:25

## 2019-03-15 RX ADMIN — AMANTADINE HYDROCHLORIDE 100 MG: 100 CAPSULE, LIQUID FILLED ORAL at 09:14

## 2019-03-15 RX ADMIN — FLUPHENAZINE HYDROCHLORIDE 5 MG: 2.5 TABLET, FILM COATED ORAL at 05:24

## 2019-03-15 RX ADMIN — DOCUSATE SODIUM 100 MG: 100 CAPSULE, LIQUID FILLED ORAL at 09:13

## 2019-03-15 RX ADMIN — FLUVOXAMINE MALEATE 50 MG: 50 TABLET, FILM COATED ORAL at 17:57

## 2019-03-15 RX ADMIN — AMANTADINE HYDROCHLORIDE 100 MG: 100 CAPSULE, LIQUID FILLED ORAL at 17:57

## 2019-03-15 RX ADMIN — FLUVOXAMINE MALEATE 50 MG: 50 TABLET, FILM COATED ORAL at 23:15

## 2019-03-15 RX ADMIN — TAMSULOSIN HYDROCHLORIDE 0.4 MG: 0.4 CAPSULE ORAL at 16:41

## 2019-03-15 RX ADMIN — FLUVOXAMINE MALEATE 50 MG: 50 TABLET, FILM COATED ORAL at 05:24

## 2019-03-15 RX ADMIN — FLUVOXAMINE MALEATE 50 MG: 50 TABLET, FILM COATED ORAL at 15:05

## 2019-03-15 RX ADMIN — Medication: at 17:59

## 2019-03-15 NOTE — PLAN OF CARE
Problem: SAFETY ADULT  Goal: Maintain or return to baseline ADL function  Description  INTERVENTIONS:  -  Assess patient's ability to carry out ADLs; assess patient's baseline for ADL function and identify physical deficits which impact ability to perform ADLs (bathing, care of mouth/teeth, toileting, grooming, dressing, etc )  - Assess/evaluate cause of self-care deficits   - Assess range of motion  - Assess patient's mobility; develop plan if impaired  - Assess patient's need for assistive devices and provide as appropriate  - Encourage maximum independence but intervene and supervise when necessary  ¯ Involve family in performance of ADLs  ¯ Assess for home care needs following discharge   ¯ Request OT consult to assist with ADL evaluation and planning for discharge  ¯ Provide patient education as appropriate  Outcome: Progressing     Problem: Prexisting or High Potential for Compromised Skin Integrity  Goal: Skin integrity is maintained or improved  Description  INTERVENTIONS:  - Identify patients at risk for skin breakdown  - Assess and monitor skin integrity  - Assess and monitor nutrition and hydration status  - Monitor labs (i e  albumin)  - Assess for incontinence   - Turn and reposition patient  - Assist with mobility/ambulation  - Relieve pressure over bony prominences  - Avoid friction and shearing  - Provide appropriate hygiene as needed including keeping skin clean and dry  - Evaluate need for skin moisturizer/barrier cream  - Collaborate with interdisciplinary team (i e  Nutrition, Rehabilitation, etc )   - Patient/family teaching  Outcome: Progressing

## 2019-03-15 NOTE — PROGRESS NOTES
03/15/19 0700   Pain Assessment   Pain Assessment 0-10   Pain Score 4   Pain Type Acute pain   Pain Location Neck   Pain Orientation Posterior   Pain Radiating Towards shoulders/back off neck   Pain Descriptors Aching;Pressure   Pain Frequency Constant/continuous   Pain Onset Ongoing   Clinical Progression Gradually improving   Effect of Pain on Daily Activities pt tolerated all ADL tasks   Patient's Stated Pain Goal No pain   Hospital Pain Intervention(s) Rest;Shower/Bath   Response to Interventions pt tolerated   Restrictions/Precautions   Precautions Cognitive; Fall Risk;Supervision on toilet/commode;Spinal precautions   Weight Bearing Restrictions No   ROM Restrictions   (cerv spinal precautions)   Braces or Orthoses C/S Collar   Lifestyle   Autonomy "I feel tense all the time in my body and in my mind"   QI: Angelo Dawson 80 Provided by [place] No physical assistance   Comment S in stance for tooth brushing and moutwash use   Oral Hygiene CARE Score 4   Grooming   Able To Initiate Tasks; Acquire Items;Comb/Brush Hair;Wash/Dry Face;Brush/Clean Teeth;Wash/Dry Hands   Limitation Noted In Problem Solving; Safety;Strength   Findings Pt in stance at sink for grooming tasks  Pt able to retrieve items from around bathroom and on counter while maintaining balance  Pt inc in overall standing tolerance for completion  Grooming (FIM) 5 - Patient requires supervision/monitoring   QI: Shower/Bathe Self   Assistance Needed Supervision; Adaptive equipment   Assistance Provided by [place] No physical assistance   Comment Pt cont to benefit from tub bench seat while bathing due to dec endurance  Pt utilized grab bar in stance with reach to dave and buttocks  Pt completes BLE bathing with leg cross tech to maintain cerv spinal precautions     Shower/Bathe Self CARE Score 4   Bathing   Assessed Bath Style Shower   Anticipated D/C Bath Style Tub   Able to Gather/Transport Yes   Able to Adjust Water Temperature Yes   Able to Wash/Rinse/Dry (body part) Left Arm;Right Arm;L Upper Leg;R Upper Leg;L Lower Leg/Foot;R Lower Leg/Foot;Chest;Abdomen;Perineal Area; Buttocks   Limitations Noted in Endurance;Problem Solving; Safety;ROM   Positioning Seated;Standing   Adaptive Equipment Hand Held Shower; Shower Bars;Tub Bench   Findings  Pt compliant with cerv spinal precautions throughout bathing with NAIN COLLAR donned  Bathing (FIM) 5 - Patient requires supervision/monitoring but completes 10/10 parts   Tub/Shower Transfer   Limitations Noted In Endurance; Safety   Adaptive Equipment Transfer Bench;Grab Bars   Assessed Shower   Findings Pt completed transfer into shower using tub bench and simulating sit swivel by lifting legs over barrier  Pt S for transfer at this time and req no cuing today for hand placement  Shower Transfer (FIM) 5 - Patient requires supervision/monitoring   QI: Upper Body Dressing   Assistance Needed Supervision;Verbal cues   Assistance Provided by [place] No physical assistance   Comment Pt cont to req cuing to remove vneck from under collar prior to pulling shirt off over head  Pt then able to don button up sweater vest while in stance with slight unsteadiness but able to correct with cuing  Upper Body Dressing CARE Score 4   QI: Lower Body Dressing   Assistance Needed Supervision   Assistance Provided by [place] No physical assistance   Comment Pt able to doff/don pants using leg crossover tech while seated and maintaining cerv spinal precuations  Lower Body Dressing CARE Score 4   QI: Putting On/Taking Off Footwear   Assistance Needed Supervision   Assistance Provided by [place] No physical assistance   Comment Pt donned socks and shoes utilizing leg cross tech  Putting On/Taking Off Footwear CARE Score 4   Dressing/Undressing Clothing   Remove UB Clothes Pullover Shirt   Remove LB Clothes Pants; Undergarment;Socks   Don  [address] Pants;Undergarment;Socks   Limitations Noted In Endurance; Safety;Strength   Positioning Supported Sit;Standing   UB Dressing (FIM) 5 - Patient requires verbal cues   LB Dressing (FIM) 5 - Patient requires supervision/monitoring   QI: Sit to Stand   Assistance Needed Supervision   Assistance Provided by [place] No physical assistance   Sit to Stand CARE Score 4   QI: Chair/Bed-to-Chair Transfer   Assistance Needed Supervision; Adaptive equipment   Assistance Provided by [place] No physical assistance   Comment RW   Chair/Bed-to-Chair Transfer CARE Score 4   Transfer Bed/Chair/Wheelchair   Positioning Concerns Cognition   Limitations Noted In Endurance;Problem Solving;LE Strength   Adaptive Equipment Roller Walker   Findings CS w RW  Pt req occasional cuing for obstacles in environment due to inability to turn head L and R due to spinal precautions  Pt edu on scanning environment prior to mobilty at home to ensure obstacles are moved out of way  Bed, Chair, Wheelchair Transfer (FIM) 5 - Patient requires supervision/monitoring   QI: [address] Provided by [place] No physical assistance   Comment Pt req ext time to locate waist band of patns due to dec vision from C/S collar  Toileting Hygiene CARE Score 4   Toileting   Able to [address] down yes, up yes  Manage Hygiene Bladder   Limitations Noted In Safety;ROM   Toileting (FIM) 5 - Patient requires supervision/monitoring   QI: Toilet Transfer   Assistance Needed Supervision; Adaptive equipment   Assistance Provided by [place] No physical assistance   Comment use of grab bars   Toilet Transfer CARE Score 4   Toilet Transfer   Surface Assessed Raised Toilet   Transfer Technique Standard   Limitations Noted In Problem Solving;LE Strength; Safety   Adaptive Equipment Grab Bar   Toilet Transfer (FIM) 5 - Patient requires supervision/monitoring   Functional Standing Tolerance   Time 5min x 2   Activity grooming in stance Cognition   Overall Cognitive Status Impaired   Arousal/Participation Alert; Cooperative   Attention Attends with cues to redirect   Orientation Level Oriented X4   Memory Decreased short term memory   Following Commands Follows multistep commands with increased time or repetition   Activity Tolerance   Activity Tolerance Patient tolerated treatment well   Assessment   Treatment Assessment Pt participated in skilled OT session focusing on ADL retraining and functional transfers  Pt compliant with cervical spinal precautions throughout entire ADL  Pt with G insight and asks questions to OT to determine if action will break precaution prior to completing  Pt cont to req encouragement and redirection due to reported "dark thoughts " Pt engaged in discussion regarding overall progress since admission to rehab unit and pt cont to present with self-doubt regarding progress  Pt would benefit from cont therapy focusing on dynamic balance, safety awareness, item retrieval and transport with walker bag/tray, and overall act tolerance  Cont with POC  Prognosis Good   Problem List Decreased strength;Decreased range of motion;Decreased endurance; Impaired balance;Decreased mobility; Decreased coordination;Decreased cognition; Impaired judgement   Barriers to Discharge Inaccessible home environment;Decreased caregiver support   Plan   Treatment/Interventions Functional transfer training; Therapeutic exercise; Endurance training;Patient/family training   Progress Progressing toward goals   Recommendation   OT Discharge Recommendation 24 hour supervision/assist   OT Therapy Minutes   OT Time In 0700   OT Time Out 0830   OT Total Time (minutes) 90   OT Mode of treatment - Individual (minutes) 90   OT Mode of treatment - Concurrent (minutes) 0   OT Mode of treatment - Group (minutes) 0   OT Mode of treatment - Co-treat (minutes) 0   OT Mode of Teatment - Total time(minutes) 90 minutes   Therapy Time missed   Time missed?  No

## 2019-03-15 NOTE — PROGRESS NOTES
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  Initial DVT provoked due to knee surgery but PE not provoked  PCP manages AC as OP and was on 5mg qd  Continue Coumadin = on 3/12/19, adjusted dosing from 2 5 x 3/5 x 4 to 2 5 x4/5 x 3  Repeat INR AM;  no further dose change today      BPH: Continue Flomax      Paranoid Schizophrenia: sees Dr Myrna Thakur as OP  Continue Risperdal, Luvox, Prolixin  Prolixin was added recently  Hx Diastolic CHF:  Controlled w/o diuretics;  he has mild chronic LE edema by hx    Hypokalemia:  Will give Kdur 40 meq x 1 yesterday    Dispo:  3/20/19 home      Subjective:  denies any complaints      ROS:   GI: denies abdominal pain, change bowel habits or reflux symptoms  Neuro: No new neurologic changes  Respiratory: No Cough, SOB  Cardiovascular: No CP, palpitations   Psych:  No depression or anxiety    Scheduled Meds:    Current Facility-Administered Medications:  acetaminophen 650 mg Oral BID PRN Ladi Woo MD   acetaminophen 975 mg Oral BID (AM & Afternoon) Ladi Woo MD   amantadine 100 mg Oral BID Ladi Woo MD   amLODIPine 5 mg Oral Daily Ladi Woo MD   ammonium lactate  Topical BID Sheree Padilla DPM   b complex-vitamin C-folic acid 1 capsule Oral Daily With Jenna Kay MD   bisacodyl 10 mg Rectal Daily PRN Sen Door, DO   cholecalciferol 2,000 Units Oral Daily Ladi Woo MD   docusate sodium 100 mg Oral BID Ladi Woo MD   fluPHENAZine 5 mg Oral 4x Daily Ladi Woo MD   fluvoxaMINE 50 mg Oral 5x Daily Ladi Woo MD   lidocaine 1 patch Topical Daily Alin [place] Myles Bruner MD   losartan 50 mg Oral Daily Ladi Woo MD   ondansetron 4 mg Oral Q8H PRN Ladi Woo MD   polyethylene glycol 17 g Oral Daily PRN Ladi Woo MD   risperiDONE 0 5 mg Oral BID PRN Ladi Woo MD   risperiDONE 1 5 mg Oral Daily Ladi Woo MD   risperiDONE 3 mg Oral HS Ladi Woo MD   tamsulosin 0 4 mg Oral Daily With Jenna Kay MD   warfarin 2 5 mg Oral Once per day on Mon Tue Wed Fri Dominique Sanchez PA-C   warfarin 5 mg Oral Once per day on Sun Thu Sat Dominique Sanchez PA-C       Labs:     Results from last 7 days   Lab Units 03/14/19  0455 03/11/19  0555   WBC Thousand/uL 5 20 4 50   HEMOGLOBIN g/dL 13 3 13 6   HEMATOCRIT % 40 9 43 0   PLATELETS Thousands/uL 241 229     Results from last 7 days   Lab Units 03/14/19  0455 03/11/19  0555   SODIUM mmol/L 138 139   POTASSIUM mmol/L 3 4* 3 7   CHLORIDE mmol/L 99* 101   CO2 mmol/L 32 33*   BUN mg/dL 12 15   CREATININE mg/dL 0 71 0 77   CALCIUM mg/dL 8 5 8 8         Results from last 7 days   Lab Units 03/14/19  0455 03/12/19  0505   INR  2 86* 3 03*            [unfilled]    Labs reviewed    Physical Examination:  Vitals:   Vitals:    03/14/19 1342 03/14/19 1900 03/15/19 0510 03/15/19 0913   BP: 110/54 146/68 167/74 119/57   BP Location: Left arm Left arm Left arm Right arm   Pulse: 83 73 79 77   Resp: 18 18 20    Temp: 99 6 °F (37 6 °C) 97 7 °F (36 5 °C) 98 3 °F (36 8 °C)    TempSrc: Oral Oral Oral    SpO2: 92% 93% 92%    Weight:       Height:         Constitutional:  NAD; pleasant; nontoxic   Neck: collar on  CV:  +S1, S2;  RRR; no rub/murmur  Pulmonary:  BBS without crackles/wheeze/rhonci; resp are unlabored  Abdominal:  soft, +BS, ND/NT  Skin:  no rashes  Musculoskeletal:  trace LE edema  :  no sandhu  Neurological:  AAO;  LORETTA 5/5        [ X ] Total time spent: 30 Mins and greater than 50% of this time was spent counseling/coordinating care      ** Please Note: Dragon 360 Dictation voice to text software may have been used in the creation of this document   **

## 2019-03-15 NOTE — PROGRESS NOTES
03/15/19 1000   Pain Assessment   Pain Assessment No/denies pain   Restrictions/Precautions   Precautions Cognitive; Fall Risk;Spinal precautions;Supervision on toilet/commode   Braces or Orthoses C/S Collar   Cognition   Overall Cognitive Status Impaired   Arousal/Participation Cooperative   Attention Attends with cues to redirect   Memory Decreased short term memory;Decreased recall of recent events;Decreased recall of precautions   Following Commands Follows one step commands with increased time or repetition   Subjective   Subjective pt reported having inappropriate thoughts this morning; no c/o pain   QI: Sit to Stand   Assistance Needed Supervision   Assistance Provided by [place] No physical assistance   Sit to Stand CARE Score 4   QI: Chair/Bed-to-Chair Transfer   Assistance Needed Supervision; Adaptive equipment   Assistance Provided by [place] No physical assistance   Chair/Bed-to-Chair Transfer CARE Score 4   Transfer Bed/Chair/Wheelchair   Limitations Noted In Balance; Endurance;LE Strength   Adaptive Equipment Roller Walker   Stand Pivot Supervision   Sit to Stand Supervision   Stand to Sit Supervision   Findings tends to sit to soon in chair at times   Bed, Chair, Wheelchair Transfer (FIM) 5 - Patient requires supervision/monitoring   QI: Walk 10 Feet   Assistance Needed Supervision; Adaptive equipment   Assistance Provided by [place] No physical assistance   Comment with RW   Walk 10 Feet CARE Score 4   QI: Walk 50 Feet with Two Turns   Assistance Needed Supervision; Adaptive equipment   Assistance Provided by [place] No physical assistance   Comment with RW   Walk 50 Feet with Two Turns CARE Score 4   QI: Walk 150 Feet   Assistance Needed Supervision; Adaptive equipment   Assistance Provided by [place] No physical assistance   Comment with RW   Walk 150 Feet CARE Score 4   QI: Walking 10 Feet on Uneven Surfaces   Reason if not Attempted Safety concerns   Walking 10 Feet on Uneven Surfaces CARE Score 88 Ambulation   Does the patient walk? 2  Yes   Primary Discharge Mode of Locomotion Walk   Walk Assist Level Close Supervision   Gait Pattern Inconsistant Shilpa;Decreased foot clearance;Trendelenburg; Improper weight shift   Assist Device Favianer Erin Hatfield Walked (feet) 350 ft  (x2)   Limitations Noted In [facility] [place] Management;Posture; Safety;Speed;Strength;Swing   Findings practiced with SPC 500x1, 350x1  LOB with turns, Stephy to correct   Walking (FIM) 5 - Patient requires supervision/monitoring AND distance 150 feet or more, no rest   QI: Wheel 50 Feet with Two Turns   Reason if not Attempted Activity not applicable   Wheel 50 Feet with Two Turns CARE Score 9   QI: Wheel 150 Feet   Reason if not Attempted Activity not applicable   Wheel 271 Feet CARE Score 9   Wheelchair mobility   QI: Does the patient use a wheelchair? 0  No   QI: 12 Steps   Assistance Needed Physical assistance; Adaptive equipment   Assistance Provided by [place] Less than 25%   Comment FF; CGA/Stephy, pt retropulsive on bottom step when ascending  pt became lightheaded but able to complete steps after standing rest break   12 Steps CARE Score 3   Stairs   Type Stairs   # of Steps 24   Weight Bearing Precautions Fall Risk   Assist Devices Single Rail;Cane   Findings FF; CGA/Stephy, pt retropulsive on bottom step when ascending  pt became lightheaded but able to complete steps after standing rest break  pt wanted to perform FF x2; cues for cane placement; cont to be easily distracted and needs hands on for safety   Stairs (FIM) 4 - Patient requires steadying assist or light touching AND patient goes up and down full flight (12- 14 stairs)   QI: Toilet Transfer   Assistance Needed Supervision; Adaptive equipment   Assistance Provided by [place] No physical assistance   Comment with grab bars   Toilet Transfer CARE Score 4   Toilet Transfer   Surface Assessed Standard Toilet   Limitations Noted In Balance; Safety   Adaptive Equipment Grab Bar Findings urinated and had BM   Toilet Transfer (FIM) 5 - Patient requires supervision/monitoring   Therapeutic Interventions   Balance obstacle negotiation in hallway to work on sharp turns and tight spaces    Equipment Use   NuStep L2 10mins   Assessment   Treatment Assessment pt cont to remain a high fall risk without AD   pt becomes easily distracted decreasing rigthing reactions with functional mobility  pt cont to be unstable on FF of steps requiring hands on A to decrease fall risk  pt requires VCs for cane placement and sequencing descending steps due to inaibility to look down  pt will benefit from cont therapy to improve balance, safety and activity tolerance with functional mobility to decrease fall risk  Problem List Decreased strength;Decreased range of motion;Decreased endurance; Impaired balance;Decreased mobility; Decreased coordination;Decreased cognition; Impaired judgement   Barriers to Discharge Inaccessible home environment;Decreased caregiver support   PT Barriers   Physical Impairment Decreased strength;Decreased endurance; Impaired balance;Decreased mobility; Decreased safety awareness;Orthopedic restrictions   Functional Limitation Standing;Stair negotiation   Plan   Treatment/Interventions Functional transfer training;LE strengthening/ROM; Endurance training;Patient/family training;Bed mobility;Gait training   Progress Progressing toward goals   Recommendation   Recommendation 24 hour supervision/assist;Home PT; Home with family support   Equipment Recommended Walker   PT Therapy Minutes   PT Time In 1000   PT Time Out 1130   PT Total Time (minutes) 90   PT Mode of treatment - Individual (minutes) 90   PT Mode of treatment - Concurrent (minutes) 0   PT Mode of treatment - Group (minutes) 0   PT Mode of treatment - Co-treat (minutes) 0   PT Mode of Teatment - Total time(minutes) 90 minutes   Therapy Time missed   Time missed?  No

## 2019-03-16 PROBLEM — E87.6 HYPOKALEMIA: Status: ACTIVE | Noted: 2019-03-16

## 2019-03-16 LAB
INR PPP: 2.95 (ref 0.86–1.17)
PROTHROMBIN TIME: 30.8 SECONDS (ref 11.8–14.2)

## 2019-03-16 PROCEDURE — 97116 GAIT TRAINING THERAPY: CPT

## 2019-03-16 PROCEDURE — 92526 ORAL FUNCTION THERAPY: CPT

## 2019-03-16 PROCEDURE — 97110 THERAPEUTIC EXERCISES: CPT

## 2019-03-16 PROCEDURE — 85610 PROTHROMBIN TIME: CPT | Performed by: NURSE PRACTITIONER

## 2019-03-16 PROCEDURE — 97535 SELF CARE MNGMENT TRAINING: CPT

## 2019-03-16 RX ADMIN — RISPERIDONE 1.5 MG: 3 TABLET ORAL at 08:18

## 2019-03-16 RX ADMIN — AMANTADINE HYDROCHLORIDE 100 MG: 100 CAPSULE, LIQUID FILLED ORAL at 16:41

## 2019-03-16 RX ADMIN — FLUPHENAZINE HYDROCHLORIDE 5 MG: 2.5 TABLET, FILM COATED ORAL at 11:11

## 2019-03-16 RX ADMIN — FLUVOXAMINE MALEATE 50 MG: 50 TABLET, FILM COATED ORAL at 16:41

## 2019-03-16 RX ADMIN — FLUPHENAZINE HYDROCHLORIDE 5 MG: 2.5 TABLET, FILM COATED ORAL at 22:21

## 2019-03-16 RX ADMIN — FLUPHENAZINE HYDROCHLORIDE 5 MG: 2.5 TABLET, FILM COATED ORAL at 06:00

## 2019-03-16 RX ADMIN — LIDOCAINE 1 PATCH: 50 PATCH CUTANEOUS at 08:16

## 2019-03-16 RX ADMIN — FLUPHENAZINE HYDROCHLORIDE 5 MG: 2.5 TABLET, FILM COATED ORAL at 16:41

## 2019-03-16 RX ADMIN — Medication: at 08:17

## 2019-03-16 RX ADMIN — FLUVOXAMINE MALEATE 50 MG: 50 TABLET, FILM COATED ORAL at 22:21

## 2019-03-16 RX ADMIN — TAMSULOSIN HYDROCHLORIDE 0.4 MG: 0.4 CAPSULE ORAL at 16:40

## 2019-03-16 RX ADMIN — FLUVOXAMINE MALEATE 50 MG: 50 TABLET, FILM COATED ORAL at 11:11

## 2019-03-16 RX ADMIN — RISPERIDONE 3 MG: 3 TABLET ORAL at 22:21

## 2019-03-16 RX ADMIN — ACETAMINOPHEN 975 MG: 325 TABLET ORAL at 14:04

## 2019-03-16 RX ADMIN — AMLODIPINE BESYLATE 5 MG: 5 TABLET ORAL at 08:16

## 2019-03-16 RX ADMIN — AMANTADINE HYDROCHLORIDE 100 MG: 100 CAPSULE, LIQUID FILLED ORAL at 08:17

## 2019-03-16 RX ADMIN — Medication: at 16:44

## 2019-03-16 RX ADMIN — VITAMIN D, TAB 1000IU (100/BT) 2000 UNITS: 25 TAB at 08:16

## 2019-03-16 RX ADMIN — LOSARTAN POTASSIUM 50 MG: 50 TABLET, FILM COATED ORAL at 08:16

## 2019-03-16 RX ADMIN — WARFARIN SODIUM 5 MG: 5 TABLET ORAL at 16:41

## 2019-03-16 RX ADMIN — FLUVOXAMINE MALEATE 50 MG: 50 TABLET, FILM COATED ORAL at 06:00

## 2019-03-16 RX ADMIN — ACETAMINOPHEN 650 MG: 325 TABLET ORAL at 23:57

## 2019-03-16 RX ADMIN — FLUVOXAMINE MALEATE 50 MG: 50 TABLET, FILM COATED ORAL at 14:53

## 2019-03-16 RX ADMIN — ACETAMINOPHEN 975 MG: 325 TABLET ORAL at 05:59

## 2019-03-16 RX ADMIN — Medication 1 CAPSULE: at 16:40

## 2019-03-16 NOTE — PROGRESS NOTES
Physical Therapy Progress Note         03/16/19 1100   Pain Assessment   Pain Assessment 0-10   Pain Score No Pain   Restrictions/Precautions   Precautions Cognitive; Fall Risk;Supervision on toilet/commode;Spinal precautions   Braces or Orthoses C/S Collar   Subjective   Subjective pt states feeling okay ready for PT session    QI: Sit to Stand   Assistance Needed Supervision   Sit to Stand CARE Score 4   QI: Chair/Bed-to-Chair Transfer   Assistance Needed Supervision   Chair/Bed-to-Chair Transfer CARE Score 4   Transfer Bed/Chair/Wheelchair   Limitations Noted In Balance; Endurance;LE Strength   Car Transfer Supervision   All Transfer Supervision   Bed, Chair, Wheelchair Transfer (FIM) 5 - Patient requires supervision/monitoring   QI: Car Transfer   Assistance Needed Supervision;Verbal cues   Car Transfer CARE Score 4   QI: Walk 10 Feet   Assistance Needed Supervision   Walk 10 Feet CARE Score 4   QI: Walk 50 Feet with Two Turns   Assistance Needed Supervision   Walk 50 Feet with Two Turns CARE Score 4   QI: Walk 150 Feet   Assistance Needed Supervision   Walk 150 Feet CARE Score 4   Ambulation   Does the patient walk? 2  Yes   Primary Discharge Mode of Locomotion Walk   Walk Assist Level Supervision;Close Supervision   Gait Pattern Decreased foot clearance; Inconsistant Shilpa; Improper weight shift;Trendelenburg   Assist Device [place] Walked (feet) 200 ft  (x2)   Limitations Noted In Balance;Device Management;Posture; Safety;Speed;Strength   Walking (FIM) 5 - Patient requires supervision/monitoring AND distance 150 feet or more, no rest   Wheelchair mobility   Wheelchair (FIM) 0 - Activity does not occur   Therapeutic Interventions   Strengthening LAQ x20 standing/sitting marching x20 unspport w/o RW    Flexibility HS calf manual stretch sitting    Balance obstacle negotiation with RW walking in hallway    Equipment Use   NuStep L2 for 12 min    Assessment   Treatment Assessment pt perform thex ex's functional mobility during RW gait training to inc endurance , balance awareness in standing, strengthening /conditioning   Pt also perform STS x10 to inc B/L LE strengthening , pt will cont to benefit with Skilled PT to meet D/C goals    Barriers to Discharge Inaccessible home environment;Decreased caregiver support   PT Barriers   Physical Impairment Decreased strength;Decreased endurance; Impaired balance;Decreased mobility;Orthopedic restrictions;Pain;Decreased coordination;Decreased safety awareness   Plan   Treatment/Interventions Functional transfer training;LE strengthening/ROM; Therapeutic exercise; Endurance training;Patient/family training;Gait training   Progress Progressing toward goals   PT Therapy Minutes   PT Time In 1100   PT Time Out 1130   PT Total Time (minutes) 30   PT Mode of treatment - Individual (minutes) 30   PT Mode of treatment - Concurrent (minutes) 0   PT Mode of treatment - Group (minutes) 0   PT Mode of treatment - Co-treat (minutes) 0   PT Mode of Teatment - Total time(minutes) 30 minutes   Therapy Time missed   Time missed?  No     Annmarie Camp, PTA

## 2019-03-16 NOTE — PROGRESS NOTES
Internal Medicine Progress Note  Patient: Ysabel Rubin  Age/sex: 68 y o  male  Medical Record #: [id number]      ASSESSMENT/PLAN:  Ysabel Rubin  is seen and examined and management for following issues:      C6-7 osteophyte fracture: Pain control per primary service  F/u NS on d/c; etio of fall unclear      HTN: stable; continue Amlodipine 5mg daily and Losartan 50mg daily       History of DVT/PE/IVC filter: on life long AC  Initial DVT provoked due to knee surgery but PE not provoked  PCP manages AC as OP and was on 5mg qd  Continue Coumadin = on 3/12/19, adjusted dosing from 2 5 x 3 and 5 x 4 to 2 5 x4 and 5 x 3  No further dose change today      BPH: Continue Flomax      Paranoid Schizophrenia: sees Dr Otoniel Christie as OP  Continue Risperdal, Luvox, Prolixin  Prolixin was added recently  Hx Diastolic CHF:  Controlled w/o diuretics;  he has mild chronic LE edema by hx    Hypokalemia:  Kdur 40 meq x 1 3/14    Dispo:  3/20/19 home      Subjective:  denies any complaints      ROS:   GI: denies abdominal pain, change bowel habits or reflux symptoms  Neuro: No new neurologic changes  Respiratory: No Cough, SOB  Cardiovascular: No CP, palpitations   Psych:  No depression or anxiety    Scheduled Meds:    Current Facility-Administered Medications:  acetaminophen 650 mg Oral BID PRN Jessica Thibodeaux MD   acetaminophen 975 mg Oral BID (AM & Afternoon) Jessica Thibodeaux MD   amantadine 100 mg Oral BID Jessica Thibodeaux MD   amLODIPine 5 mg Oral Daily Jessica Thibodeaux MD   ammonium lactate  Topical BID Arianne Cárdenas DPM   b complex-vitamin C-folic acid 1 capsule Oral Daily With Betty Malloy MD   bisacodyl 10 mg Rectal Daily PRN Jovany Sanchez DO   cholecalciferol 2,000 Units Oral Daily Jessica Thibodeaux MD   docusate sodium 100 mg Oral BID Jessica Thibodeaux MD   fluPHENAZine 5 mg Oral 4x Daily Jessica Thibodeaux MD   fluvoxaMINE 50 mg Oral 5x Daily Jessica Thibodeaux MD   lidocaine 1 patch Topical Daily Jessica Thibodeaux MD   losartan 50 mg Oral Daily Isabel Adkins MD   ondansetron 4 mg Oral Q8H PRN Isabel Adkins MD   polyethylene glycol 17 g Oral Daily PRN Isabel Adkins MD   risperiDONE 0 5 mg Oral BID PRN Isabel Adkins MD   risperiDONE 1 5 mg Oral Daily Isabel Adkins MD   risperiDONE 3 mg Oral HS Isabel Adkins MD   tamsulosin 0 4 mg Oral Daily With Tricia Bal MD   warfarin 2 5 mg Oral Once per day on Mon Tue Wed Fri Dominique Sanchez PA-C   warfarin 5 mg Oral Once per day on Sun Thu Sat Dominique Sanchez PA-C       Labs:     Results from last 7 days   Lab Units 03/14/19  0455 03/11/19  0555   WBC Thousand/uL 5 20 4 50   HEMOGLOBIN g/dL 13 3 13 6   HEMATOCRIT % 40 9 43 0   PLATELETS Thousands/uL 241 229     Results from last 7 days   Lab Units 03/14/19  0455 03/11/19  0555   SODIUM mmol/L 138 139   POTASSIUM mmol/L 3 4* 3 7   CHLORIDE mmol/L 99* 101   CO2 mmol/L 32 33*   BUN mg/dL 12 15   CREATININE mg/dL 0 71 0 77   CALCIUM mg/dL 8 5 8 8         Results from last 7 days   Lab Units 03/16/19  0520 03/14/19  0455   INR  2 95* 2 86*            [unfilled]    Labs reviewed    Physical Examination:  Vitals:   Vitals:    03/15/19 1327 03/15/19 1959 03/16/19 0516 03/16/19 0716   BP: 135/66 131/62 141/67 113/67   BP Location: Right arm Right arm Right arm Right arm   Pulse: 77 70 75 84   Resp: 20 18 18    Temp: 97 9 °F (36 6 °C) 97 8 °F (36 6 °C) 98 2 °F (36 8 °C)    TempSrc: Oral Oral Oral    SpO2: 92% 93% 94%    Weight:       Height:         Constitutional:  NAD; pleasant; nontoxic   Neck: collar on  CV:  +S1, S2;  RRR; no rub/murmur  Pulmonary:  BBS without crackles/wheeze/rhonci; resp are unlabored  Abdominal:  soft, +BS, ND/NT  Skin:  no rashes  Musculoskeletal:  trace LLE edema  :  no sandhu  Neurological:  AAO;  BURGOS 5/5        [ X ] Total time spent: 30 Mins and greater than 50% of this time was spent counseling/coordinating care      ** Please Note: Dragon 360 Dictation voice to text software may have been used in the creation of this document   **

## 2019-03-16 NOTE — PROGRESS NOTES
03/16/19 1130   Pain Assessment   Pain Assessment No/denies pain   Pain Score No Pain   Restrictions/Precautions   Precautions Cognitive; Fall Risk;Supervision on toilet/commode   Swallow Information   Current Risks for Dysphagia & Aspiration General debilitation;Cognitive deficit;Cervical spine injury/surgery   Current Diet Regular; Thin liquid   Baseline Diet Regular; Thin liquids   Consistencies Assessed and Performance   Materials Admnistered Soft/Level 3;Regular/Solid; Thin liquid   Oral Stage Mild impaired   Phargngeal Stage WFL   Swallow Mechanics WFL;Swallow initation; Appears prompt;Good Larygneal rise   Esophageal Concerns No s/s reported   Recommendations   Risk for Aspiration None   Diet Solid Recommendation Regular consistency   Diet Liquid Recommendation Thin liquid   Recommended Form of Meds As desired; As tolerated   General Precautions Upright as possible for all oral intake;Remain upright for 45 mins after meals   Compensatory Swallowing Strategies Alternate solids and liquids   Results Reviewed with RN;PT/Family/Caregiver   QI: [place] Provided by [place] No physical assistance   Eating CARE Score 6   Swallow Assessment   Swallow Treatment Assessment Pt seen during lunch for f/u of regular/thin liquid diet w/a tuna salad sandwich w/lettuce, tomato soup, chips, and thin hot tea and thin water via cup  Pt was independent w/self feeding w/larger bite size but slower, prolonged mastication  However, mastication was fully functional for bolus formation  AP transfer appeared mild-mod delayed, but swallow initiation appeared prompt w/all consistencies  Hyolaryngeal excursion believed to pt good during palpation w/no overt s/sx penetration/aspiration w/any consistencies  Pt consumed 100% of meal and 210cc in timely manner  Pt therefore tolerating a regular/thin liquid diet w/no futher need for dysphagia tx at this time w/agreement from pt and nsg     Swallow Assessment Prognosis   Prognosis Good   Prognosis Considerations Co-morbidities; Medical diagnosis; Medical prognosis;Previous level of function;Severity of impairments;Ability to carry over   SLP Therapy Minutes   SLP Time In 1130   SLP Time Out 1200   SLP Total Time (minutes) 30   SLP Mode of treatment - Individual (minutes) 0   SLP Mode of treatment - Concurrent (minutes) 30   SLP Mode of treatment - Group (minutes) 0   SLP Mode of treatment - Co-treat (minutes) 0   SLP Mode of Teatment - Total time(minutes) 30 minutes   Therapy Time missed   Time missed?  No   Daily FIM Score   Eating (FIM) 6 - Patient requires increased time or safety concern

## 2019-03-16 NOTE — PLAN OF CARE
Problem: SAFETY ADULT  Goal: Maintain or return to baseline ADL function  Description  INTERVENTIONS:  -  Assess patient's ability to carry out ADLs; assess patient's baseline for ADL function and identify physical deficits which impact ability to perform ADLs (bathing, care of mouth/teeth, toileting, grooming, dressing, etc )  - Assess/evaluate cause of self-care deficits   - Assess range of motion  - Assess patient's mobility; develop plan if impaired  - Assess patient's need for assistive devices and provide as appropriate  - Encourage maximum independence but intervene and supervise when necessary  ¯ Involve family in performance of ADLs  ¯ Assess for home care needs following discharge   ¯ Request OT consult to assist with ADL evaluation and planning for discharge  ¯ Provide patient education as appropriate  Outcome: Progressing     Problem: Potential for Falls  Goal: Patient will remain free of falls  Description  INTERVENTIONS:  - Assess patient frequently for physical needs  -  Identify cognitive and physical deficits and behaviors that affect risk of falls    -  [place] fall precautions as indicated by assessment   - Educate patient/family on patient safety including physical limitations  - Instruct patient to call for assistance with activity based on assessment  - Modify environment to reduce risk of injury  - Consider OT/PT consult to assist with strengthening/mobility  Outcome: Progressing

## 2019-03-16 NOTE — PROGRESS NOTES
Physical Medicine and Rehabilitation Progress Note  Kaylin Ramos  68 y o  male MRN: [id number]  Unit/Bed#: -01 Encounter: [id number]    Chief Complaints:  More tired     Subjective/Interval Events:   Patient reports feeling a bit more tired in morning today as well as rest of day  He reports feeling slightly foggy at times  He denies headache, visual changes, changes in strength or sensation  Patient denies fever, chills, abdominal pain, lightheadedness at rest or with activity or other new complaints  He reports still having some negative thoughts about think he is a bad person at times  He denies worsening of these thoughts of in intensity or frequency  Again reviewed cognitive behavioral therapy techniques and plan should these thoughts worsen throughout the day  Patient denies wanting to harm himself  He reports therapy overall going well  ROS: A 10-point ROS was performed  Negative except as listed above  Overall Assessment/relevant history:  Kaylin Ramos  is a 68 y o  male with a past medical history schizophrenia, anxiety, depression, coronary artery disease, possible stroke, DVT/PE on chronic anticoagulation with Coumadin, obesity class 1, hypertension, urinary frequency on chronic Flomax, lightheadedness, occasional falls, memory impairment, left knee replacement, right total hip replacement, cervical laminectomy and fusion who fell backwards down stairs with brief few minutes loss of consciousness with neck and possible head trauma who was brought to [facility] and then transferred to [facility] on 3/3/19  CT head did not show acute findings, CT neck showed C5/C6 osteophyte fracture  Neurosurgery was consulted who recommended non operative management with cervical collar  Patient was complaining of some neck, back, and parasternal pain which were all believed to be related to trauma    Additional imaging of facial bones, thoracic and lumbar spine did not reveal acute fractures but did show chronic L3 compression fracture  Patient noted to have some increased difficulty sleeping and anxiety    Patient was evaluated by skilled therapies and was found to have significant decline in ADLs and ambulation appropriate for admission to [facility]      42-year-old male status post fall with brief loss of consciousness found to have C6-C7 vertebral osteophyte fracture and possible TBI presents with likely multifactorial imbalance possibly related to prior stroke, spinal stenosis, and now head injury, intermittent lightheadedness, acute on chronic cognitive deficits, suboptimal control of anxiety, depression, sleep, and schizophrenia with associated functional decline in ADLs and ambulation      Functional status (recent):    ST: Eating supervision; Problem solving min assist   Transfers and ambulation supervision     Functional status on admission to [facility]:  PT: Transfers mod assist  OT: Dressing mod assist, Bathing, grooming min assist   ST: Problem solving and memory mod assist; comprehension min assist; expression supervision, social interaction supervision    Schizophrenia ([facility] [address] )  Assessment & Plan  Stable again today, possible mild increase tiredness > monitor for SEs with recent adjustment in Fluphenazine   Still with sub-optimal control of negative/paranoid thoughts at times   >Increased Fluphenazine to QID 3/14 as previously discussed with outpt psych  Recent improvements in degree of depression and anxiety; sleep stable   >current mgmt plan   >Fluphenazine increase to 5mg QID 3/14   >Risperdone 1 5mg qAM and 3mg QHS    >Fluvoxamine 50mg 5x/day    >Amantadine at home 100mg BID      >Suspect recent acute worsening related to be off home regimen which suspect will improve with reintroduction of medications at appropriate dosing; however patient was having some increased paranoia and perseveration which started a few days prior to fall (3 weeks after switching from from Trifluoperazine to fluphenazine and Cogentin stopped; family states he has been trialed in past off Trifluoperazine and could not tolerate switch to other meds  >Monitor closely with nursing and staff; Neuropsych c/s and following;   >Per outpt psych could increase fluphenazine to 5mg QID if needed > which I think would be reasonable next if needed  >If still fails consider obtaining and going back on Trifluoperazine but this was difficult due to decreased manufacturing apparently   >Some concern patient may have not been providing himself medications appropriately > recommend family training and assist c/ med mgmt    >Discussed with patient's outpt psych Dr Dolly Coley 3/6  Patient presented c/ fair amount of paranoid delusions, anxiety, difficulty sleeping with occasional panic attacks off home regimen since recent admission prior, with recent fall/hospitalization possible head injury  Medications reviewed with his outpt pharmacy and patient's outpatient psychiatrist Dr Dolly Coley  Patient recently should switched from Trifluoperazine to fluphenazine and Cogentin stopped  Patient/family noted some increased paranoid delusions starting a few days prior to fall  Home regimen per pharmacy:  Fluphenazine 5mg TID, Risperdal 1 5mg qday and 3mg QHS  Amantadine 100 mg b i d , Fluvoxamine 50mg 5x/day  Patient recently on much more modest dosing s/p recent possible BI         * Head injury, acute  Assessment & Plan  Fall with likely head trauma and brief loss of consciousness and acute osteophyte C6-C7 fracture  Mild increased confusion after fall also with some worsening mood/schizophrenia symptoms  >Recommend acute comprehensive interdisciplinary inpatient rehabilitation to include intensive skilled therapies (PT, OT) as outlined with oversight and management by rehabilitation physician as well as inpatient rehab level nursing, case management and weekly interdisciplinary team meetings     > overstimulation precautions, sleep-wake/agitation restlessness log  > optimize neuroleptic medication  > try to avoid sedative medications  > evaluate and manage fall risks          Cognitive impairment  Assessment & Plan  Likely premorbid; chronic generalized microvascular changes with possible old right lacunar internal capsule infarct and possible right frontal WM   >speech management  >Follow-up with Neurology after discharge  >On chronic home amantadine   >Recommend family assist with med mgmt after d/c    Fracture of sixth cervical vertebra (HCC)  Assessment & Plan  C6/C7 osteophyte fracture context of recent fall and history of cervical decompression and fusion  Treatment recommendations per Neurosurgery  Non operative; continue cervical collar per Neurosurgery  Repeat C spine X-ray 3/19  Follow up with Neurosurgery after discharge    History of stroke  Assessment & Plan  Patient/family deny although evidence of possible old R lacunar infarct and focus in R frontal WM along with generalized micoangiopathic changes  >Recommend optimal BP mgmt  >Follow-up with neuro after d/c   >On full A/C for hx of VTE    Lightheadednessresolved as of 3/13/2019  Assessment & Plan  No recent reports  Chronic intermittent; consider med adjustments  Orthostatics negative recently; if necessary repeat particularly with adjustments in meds   PRN abdominal binder and EHSAN hose      Hypokalemia  Assessment & Plan  Mild, 3/14, repletion per IM     Vascular skin disease  Assessment & Plan  Chronic venous insufficiency of BLE with some edema  >Podiatry c/s appreciated; EHSAN hose during day; Elevate when possible  >Lac-Hytrin cream BID  >Monitor for skin breakdown    Vitamin D insufficiency  Assessment & Plan  D3 2000 units qday     History of pulmonary embolism  Assessment & Plan  And DVT on chronic anticoagulation with Coumadin  Medicine consult to manage during acute rehab course  Regimen adjusted       Pain  Assessment & Plan  Remains well controlled  Continue scheduled Tylenol for now  Lidoderm patch  Avoid NSAIDs and avoid opiates     Class 1 obesity due to excess calories without serious comorbidity with body mass index (BMI) of 34 0 to 34 9 in adult  Assessment & Plan   on appropriate nutrition and activity  Adjustments accordingly during skilled therapy and with rehab nursing  Monitor skin closely for breakdown, wounds, rashes; keep clean and dry, turning Q2H   Follow-up with PCP after d/c      Chronic diastolic heart failure ([place] [address] )  Assessment & Plan  Mgmt per IM     Chronic venous stasis dermatitis of both lower extremities  Assessment & Plan  Internal medicine consult to assist with management  Elevate legs when in bed and when in chair for prolonged periods of time  Consider Wilver veronica    Coronary artery disease involving native coronary artery of native heart without angina pectoris  Assessment & Plan  On chronic Coumadin  Optimal blood pressure control  Follow-up with PCP    Benign essential hypertension  Assessment & Plan  Medicine c/s to assist with mgmt  Amlodipine 5 mg daily, losartan 50 mg daily  Monitor blood pressure and orthostatics    # Bowel care:    - monitor for constipation, incontinence, and diarrhea  - goal 1 appropriate BM every 1-2 days  - recommend colace +/- senna and PRN bowel protocol     # Bladder care:   On chronic Flomax;  - monitor for retention, incontinence, signs/symptoms of UTI  - recommend voiding trial; nursing prompt to void followed by bladder scan starting Q4-6H or after each patient initiated void; nursing to record voided output and bladder scan totals; straight cath PRN >350-400 cc; if post void residual bladder scans are <150 cc x3 consecutive voids, can stop scans      # Skin:   - Nursing to turn patient Q2H if not adequately ambulatory, monitor for skin breakdown, rashes, and wounds if applicable     # At risk for venous thromboembolism:  - Recommend SCDs and mobilization  - warfarin     # Diet/Hydration:    Dysphagia 3 thin - recommend speech therapy     Disposition:   Home with estimated length of stay to next Wed     Follow-up:   PCP, Psychiatry, rehab, Neurosurgery, Neurology     CODE: Level 1: Full Code     Restrictions include: Fall precautions         ---------------------------------------------------------------------------------------------------------------------    Objective: Allergies and Medications per EMR    Physical Exam:  Temperature 97 8° F, pulse 70, respiratory rate 18, blood pressure 131/62, SpO2 93% on room air  General: Awake, alert in NAD  HENT:  MMM, cervical collar in place   Respiratory: Unlabored breathing, breath sounds equal, Lungs CTA, no wheezes, rales, or rhonchi  Cardiovascular: Regular rate and rhythm, no murmurs, rubs, or gallops  Gastrointestinal: Soft, non-tender, non-distended, normoactive bowel sounds  Genitourinary: No sandhu  SkiN/MSK/Extremities:  No calf edema or calf tenderness to palpation  Neurologic/Psych:   MENTAL STATUS: grossly stable  Affect: Flattened    Diagnostic Studies: reviewed, no new imaging  No results found      Laboratory:    Results from last 7 days   Lab Units 03/14/19  0455 03/11/19  0555   HEMOGLOBIN g/dL 13 3 13 6   HEMATOCRIT % 40 9 43 0   WBC Thousand/uL 5 20 4 50     Results from last 7 days   Lab Units 03/14/19  0455 03/11/19  0555   BUN mg/dL 12 15   POTASSIUM mmol/L 3 4* 3 7   CHLORIDE mmol/L 99* 101   CREATININE mg/dL 0 71 0 77     Results from last 7 days   Lab Units 03/16/19  0520 03/14/19  0455 03/12/19  0505   PROTIME seconds 30 8* 30 0* 31 4*   INR  2 95* 2 86* 3 03*        Patient Active Problem List   Diagnosis    Benign essential hypertension    Coronary artery disease involving native coronary artery of native heart without angina pectoris    Chronic depression    Chronic venous stasis dermatitis of both lower extremities    Deep venous thrombosis of lower extremity (HCC)    Chronic diastolic heart failure (HCC)    Osteoarthritis    Nocturnal enuresis    Idiopathic trigeminal neuralgia    Lacunar infarction    Class 1 obesity due to excess calories without serious comorbidity with body mass index (BMI) of 34 0 to 34 9 in adult    Schizophrenia (HCC)    Disability of walking    Pulmonary embolism (HCC)    Arthropathy    Mixed hyperlipidemia    Neurogenic claudication    Neck pain without injury    Abdominal wall hematoma    Spinal stenosis of lumbar region with neurogenic claudication    Lipoma of colon    Fracture of sixth cervical vertebra (HCC)    Rib pain on left side    Fall    Forgetfulness    Physical deconditioning    Head injury, acute    Pain    History of pulmonary embolism    Cognitive impairment    History of stroke    Vitamin D insufficiency    Vascular skin disease    Hypokalemia       ** Please Note: Fluency Direct voice to text software may have been used in the creation of this document  **    Total visit time:  At least 25 minutes, with more than 50% spent counseling/coordinating care    Angela Beasley MD, [address] [facility]  Physical Medicine and Rehabilitation  Brain Injury Medicine

## 2019-03-16 NOTE — PROGRESS NOTES
Physical Medicine and Rehabilitation Progress Note  Elizabeth Chance  68 y o  male MRN: [id number]  Unit/Bed#: -01 Encounter: [id number]    Chief Complaints:  Decline in ADLs    Subjective/Interval Events:   Patient reports he slept alright overnight  He reports feeling a little more tired in morning but about the same today overall  Patient reports negative thoughts (non-suicidal) are about the same and we reviewed CBT techniques and what to do if they are still causing him excessive stress  He reports anxiety or depression adequately controlled  Patient denies lightheadedness, changes in pain, strength, bowel function, calf pain or other complaints  ROS: A 10-point ROS was performed  Negative except as listed above  Overall Assessment/relevant history:  Elizabeth Chance  is a 68 y o  male with a past medical history schizophrenia, anxiety, depression, coronary artery disease, possible stroke, DVT/PE on chronic anticoagulation with Coumadin, obesity class 1, hypertension, urinary frequency on chronic Flomax, lightheadedness, occasional falls, memory impairment, left knee replacement, right total hip replacement, cervical laminectomy and fusion who fell backwards down stairs with brief few minutes loss of consciousness with neck and possible head trauma who was brought to [facility] initially and then transferred to [facility] on 3/3/19  CT head did not show acute findings, CT neck showed C5/C6 osteophyte fracture  Neurosurgery was consulted who recommended non operative management with cervical collar  Patient was complaining of some neck, back, and parasternal pain which were all believed to be related to trauma  Additional imaging of facial bones, thoracic and lumbar spine did not reveal acute fractures but did show chronic L3 compression fracture  Patient noted to have some increased difficulty sleeping and anxiety    Patient was evaluated by skilled therapies and was found to have significant decline in ADLs and ambulation appropriate for admission to [facility]      27-year-old male status post fall with brief loss of consciousness found to have C6-C7 vertebral osteophyte fracture and possible TBI presents with likely multifactorial imbalance possibly related to prior stroke, spinal stenosis, and now head injury, intermittent lightheadedness, acute on chronic cognitive deficits, suboptimal control of anxiety, depression, sleep, and schizophrenia with associated functional decline in ADLs and ambulation      Functional status (recent):    Transfers and ambulation supervision     Functional status on admission to [facility]:  PT: Transfers mod assist  OT: Dressing mod assist, Bathing, grooming min assist   ST: Problem solving and memory mod assist; comprehension min assist; expression supervision, social interaction supervision    Schizophrenia [facility])  Assessment & Plan  Stable   Still with sub-optimal control of negative/paranoid thoughts at times   >Increased Fluphenazine to QID 3/14 as previously discussed with outpt psych  Recent improvements in degree of depression and anxiety; sleep stable   >current mgmt plan   >Fluphenazine increase to 5mg QID 3/14   >Risperdone 1 5mg qAM and 3mg QHS    >Fluvoxamine 50mg 5x/day    >Amantadine at home 100mg BID      >Suspect recent acute worsening related to be off home regimen which suspect will improve with reintroduction of medications at appropriate dosing; however patient was having some increased paranoia and perseveration which started a few days prior to fall (3 weeks after switching from from Trifluoperazine to fluphenazine and Cogentin stopped; family states he has been trialed in past off Trifluoperazine and could not tolerate switch to other meds  >Monitor closely with nursing and staff; Neuropsych c/s and following;   >Per outpt psych could increase fluphenazine to 5mg QID if needed > which I think would be reasonable next if needed  >If still fails consider obtaining and going back on Trifluoperazine but this was difficult due to decreased manufacturing apparently   >Some concern patient may have not been providing himself medications appropriately > recommend family training and assist c/ med mgmt    >Discussed with patient's outpt psych Dr Manas Champagne 3/6  Patient presented c/ fair amount of paranoid delusions, anxiety, difficulty sleeping with occasional panic attacks off home regimen since recent admission prior, with recent fall/hospitalization possible head injury  Medications reviewed with his outpt pharmacy and patient's outpatient psychiatrist Dr Manas Champagne  Patient recently should switched from Trifluoperazine to fluphenazine and Cogentin stopped  Patient/family noted some increased paranoid delusions starting a few days prior to fall  Home regimen per pharmacy:  Fluphenazine 5mg TID, Risperdal 1 5mg qday and 3mg QHS  Amantadine 100 mg b i d , Fluvoxamine 50mg 5x/day  Patient recently on much more modest dosing s/p recent possible BI         * Head injury, acute  Assessment & Plan  Fall with likely head trauma and brief loss of consciousness and acute osteophyte C6-C7 fracture  Mild increased confusion after fall also with some worsening mood/schizophrenia symptoms  >Recommend acute comprehensive interdisciplinary inpatient rehabilitation to include intensive skilled therapies (PT, OT) as outlined with oversight and management by rehabilitation physician as well as inpatient rehab level nursing, case management and weekly interdisciplinary team meetings     > overstimulation precautions, sleep-wake/agitation restlessness log  > optimize neuroleptic medication  > try to avoid sedative medications  > evaluate and manage fall risks          Cognitive impairment  Assessment & Plan  Likely premorbid; chronic generalized microvascular changes with possible old right lacunar internal capsule infarct and possible right frontal WM   >speech management  >Follow-up with Neurology after discharge  >On chronic home amantadine   >Recommend family assist with med mgmt after d/c    Fracture of sixth cervical vertebra [facility])  Assessment & Plan  C6/C7 osteophyte fracture context of recent fall and history of cervical decompression and fusion  Treatment recommendations per Neurosurgery  Non operative; continue cervical collar per Neurosurgery  Repeat C spine X-ray 3/19  Follow up with Neurosurgery after discharge    History of stroke  Assessment & Plan  Patient/family deny although evidence of possible old R lacunar infarct and focus in R frontal WM along with generalized micoangiopathic changes  >Recommend optimal BP mgmt  >Follow-up with neuro after d/c   >On full A/C for hx of VTE    Lightheadednessresolved as of 3/13/2019  Assessment & Plan  No recent reports  Chronic intermittent; consider med adjustments  Orthostatics negative recently; if necessary repeat particularly with adjustments in meds   PRN abdominal binder and EHSAN hose      Vascular skin disease  Assessment & Plan  Chronic venous insufficiency of BLE with some edema  >Podiatry c/s appreciated; EHSAN hose during day; Elevate when possible  >Lac-Hytrin cream BID  >Monitor for skin breakdown    Vitamin D insufficiency  Assessment & Plan  D3 2000 units qday     History of pulmonary embolism  Assessment & Plan  And DVT on chronic anticoagulation with Coumadin  Medicine consult to manage during acute rehab course  Regimen adjusted       Pain  Assessment & Plan  Remains well controlled  Continue scheduled Tylenol for now  Lidoderm patch  Avoid NSAIDs and avoid opiates     Class 1 obesity due to excess calories without serious comorbidity with body mass index (BMI) of 34 0 to 34 9 in adult  Assessment & Plan   on appropriate nutrition and activity  Adjustments accordingly during skilled therapy and with rehab nursing  Monitor skin closely for breakdown, wounds, rashes; keep clean and dry, turning Q2H   Follow-up with PCP after d/c      Chronic diastolic heart failure (HCC)  Assessment & Plan  Mgmt per IM     Chronic venous stasis dermatitis of both lower extremities  Assessment & Plan  Internal medicine consult to assist with management  Elevate legs when in bed and when in chair for prolonged periods of time  Consider Wilver veronica    Coronary artery disease involving native coronary artery of native heart without angina pectoris  Assessment & Plan  On chronic Coumadin  Optimal blood pressure control  Follow-up with PCP    Benign essential hypertension  Assessment & Plan  Medicine c/s to assist with mgmt  Amlodipine 5 mg daily, losartan 50 mg daily  Monitor blood pressure and orthostatics    # Bowel care:    - monitor for constipation, incontinence, and diarrhea  - goal 1 appropriate BM every 1-2 days  - recommend colace +/- senna and PRN bowel protocol     # Bladder care: On chronic Flomax;  - monitor for retention, incontinence, signs/symptoms of UTI  - recommend voiding trial; nursing prompt to void followed by bladder scan starting Q4-6H or after each patient initiated void; nursing to record voided output and bladder scan totals; straight cath PRN >350-400 cc; if post void residual bladder scans are <150 cc x3 consecutive voids, can stop scans      # Skin:   - Nursing to turn patient Q2H if not adequately ambulatory, monitor for skin breakdown, rashes, and wounds if applicable     # At risk for venous thromboembolism:  - Recommend SCDs and mobilization  - warfarin     # Diet/Hydration:    Dysphagia 3 thin - recommend speech therapy     Disposition:   Home with estimated length of stay to next Wed     Follow-up:   PCP, Psychiatry, rehab, Neurosurgery, Neurology     CODE: Level 1: Full Code     Restrictions include: Fall precautions         ---------------------------------------------------------------------------------------------------------------------    Objective:     Allergies and Medications per EMR    Physical Exam:  Temperature 97 7° F, pulse 73, respiratory rate, 18, blood pressure 146/60, SpO2 92% on room air  General: Awake, alert in NAD  HENT:  MMM, cervical collar in place   Respiratory: Unlabored breathing, breath sounds equal, Lungs CTA, no wheezes, rales, or rhonchi  Cardiovascular: Regular rate and rhythm, no murmurs, rubs, or gallops  Gastrointestinal: Soft, non-tender, non-distended, normoactive bowel sounds  Genitourinary: No sandhu  SkiN/MSK/Extremities:  No calf edema or calf tenderness to palpation  Neurologic/Psych:   MENTAL STATUS: grossly stable  Affect: Flattened  Strength intact    Diagnostic Studies: reviewed, no new imaging  No results found      Laboratory:    Results from last 7 days   Lab Units 03/14/19  0455 03/11/19  0555   HEMOGLOBIN g/dL 13 3 13 6   HEMATOCRIT % 40 9 43 0   WBC Thousand/uL 5 20 4 50     Results from last 7 days   Lab Units 03/14/19  0455 03/11/19  0555   BUN mg/dL 12 15   POTASSIUM mmol/L 3 4* 3 7   CHLORIDE mmol/L 99* 101   CREATININE mg/dL 0 71 0 77     Results from last 7 days   Lab Units 03/16/19  0520 03/14/19  0455 03/12/19  0505   PROTIME seconds 30 8* 30 0* 31 4*   INR  2 95* 2 86* 3 03*        Patient Active Problem List   Diagnosis    Benign essential hypertension    Coronary artery disease involving native coronary artery of native heart without angina pectoris    Chronic depression    Chronic venous stasis dermatitis of both lower extremities    Deep venous thrombosis of lower extremity (HCC)    Chronic diastolic heart failure (HCC)    Osteoarthritis    Nocturnal enuresis    Idiopathic trigeminal neuralgia    Lacunar infarction    Class 1 obesity due to excess calories without serious comorbidity with body mass index (BMI) of 34 0 to 34 9 in adult    Schizophrenia (HCC)    Disability of walking    Pulmonary embolism (HCC)    Arthropathy    Mixed hyperlipidemia    Neurogenic claudication    Neck pain without injury  Abdominal wall hematoma    Spinal stenosis of lumbar region with neurogenic claudication    Lipoma of colon    Fracture of sixth cervical vertebra (HCC)    Rib pain on left side    Fall    Forgetfulness    Physical deconditioning    Head injury, acute    Pain    History of pulmonary embolism    Cognitive impairment    History of stroke    Vitamin D insufficiency    Vascular skin disease       ** Please Note: Fluency Direct voice to text software may have been used in the creation of this document  **    35 minutes or greater spent face-to-face with patient during this encounter and with coordination of care in the interdisciplinary team conference  Please refer to Acute Rehabilitation Team Conference Note in EMR        Adam Salinas MD, [address] [facility]  Physical Medicine and Rehabilitation  Brain Injury Medicine

## 2019-03-16 NOTE — ASSESSMENT & PLAN NOTE
Resolved recently  IM recommending d/c on Potassium chloride 10 mEq daily   BMP next week follow-up with PCP

## 2019-03-16 NOTE — PROGRESS NOTES
03/16/19 0700   Pain Assessment   Pain Assessment 0-10   Pain Score 2   Pain Type Acute pain   Pain Location Neck   Pain Orientation Posterior   Pain Radiating Towards shoulders   Pain Descriptors Aching; Sore   Pain Frequency Constant/continuous   Pain Onset Ongoing   Clinical Progression Gradually improving   Effect of Pain on Daily Activities tolerated all ADL tasks   Hospital Pain Intervention(s) Rest;Shower/Bath   Response to Interventions tolerated session with no inc in pain   Restrictions/Precautions   Precautions Cognitive; Fall Risk;Spinal precautions;Supervision on toilet/commode   Weight Bearing Restrictions No   ROM Restrictions   (cervical spine precautions)   Braces or Orthoses C/S Collar   QI: Oral Hygiene   Assistance Needed Supervision   Assistance Provided by [place] No physical assistance   Comment S in stance   Oral Hygiene CARE Score 4   Grooming   Able To Initiate Tasks; Acquire Items;Comb/Brush Hair;Wash/Dry Face;Brush/Clean Teeth;Wash/Dry Hands   Limitation Noted In Problem Solving; Safety   Findings Pt completed grooming in stance at sink  Pt unsafe to complete shaving indep at this time due to dec maintenance of cerv spinal precautions with C/S collar removed for shaving chin and neck  Pt seated in recliner with back of head support on pillow with neck in neutral position while OT completed shaving  Pt attempted to raise chin req cuing  Grooming (FIM) 4 - Patient completed  4/5 tasks   QI: Shower/Bathe Self   Assistance Needed Supervision;Verbal cues; Adaptive equipment   Assistance Provided by [place] No physical assistance   Comment Pt completed bathing in tub/shower with tub bench and use of vertical grab bar  Pt able to complete all bathing at S level  Pt maintained cerv spinal precuations     Shower/Bathe Self CARE Score 4   Bathing   Assessed Bath Style Tub   Anticipated D/C Bath Style Tub   Able to Gather/Transport Yes   Able to Adjust Water Temperature Yes   Able to Wash/Rinse/Dry (body part) Left Arm;Right Arm;L Upper Leg;R Upper Leg;L Lower Leg/Foot;R Lower Leg/Foot;Chest;Abdomen;Perineal Area; Buttocks   Limitations Noted in Endurance;ROM;Safety   Positioning Seated;Standing   Adaptive Equipment Shower Bars;Tub Bench;Hand Held Shower   Findings  Pt req assistance to remove VISTA collar and don NAIN collar for bathing  Bathing (FIM) 5 - Patient requires supervision/monitoring but completes 10/10 parts   Tub/Shower Transfer   Limitations Noted In Endurance;LE Strength   Adaptive Equipment Transfer Bench   Assessed Tub/shower combo   Findings Pt completed sit swivel at S level with use of tub bench  Pt able to lift BLEs over ledge of tub  Pt reports feeling inc confidence with this transfer and will request son purchases tub bench for home  Tub Transfer (FIM) 5 - Patient requires supervision/monitoring   QI: Upper Body Dressing   Assistance Needed Verbal cues   Assistance Provided by [place] No physical assistance   Comment Pt req cuing to pull collar of vneck out from C/S collar prior to pulling shirt over head  Pt able to don/doff shirt without phsyical assistance while seated  Pt then donned button up shirt in stance  Upper Body Dressing CARE Score 4   QI: Lower Body Dressing   Assistance Needed Supervision   Assistance Provided by [place] No physical assistance   Comment Pt thread belt through pants prior to utilizing leg crossover tech to donned pants over BLEs  Pt then S in stance for CM around hips  Lower Body Dressing CARE Score 4   QI: Putting On/Taking Off Footwear   Assistance Needed Supervision   Assistance Provided by [place] No physical assistance   Putting On/Taking Off Footwear CARE Score 4   Dressing/Undressing Clothing   Remove UB Clothes [address]; [facility] [address]; Undergarment;Socks   Limitations Noted In Endurance; Safety;ROM   Positioning Supported Sit;Standing   Findings cuing to progress through dressing tasks at time   UB Dressing (FIM) 5 - Patient requires supervision/monitoring   LB Dressing (FIM) 5 - Patient requires supervision/monitoring   QI: Sit to [address] Provided by [place] No physical assistance   Sit to Stand CARE Score 4   QI: Chair/Bed-to-Chair Transfer   Assistance Needed Supervision; Adaptive equipment   Assistance Provided by [place] No physical assistance   Comment RW   Chair/Bed-to-Chair Transfer CARE Score 4   Transfer Bed/Chair/Wheelchair   Positioning Concerns Cognition   Limitations Noted In Endurance;Problem Solving; Sequencing   Adaptive Equipment Roller Walker   Findings CS with RW; pt with inc awareness of obstacles in environment and transitions in lisandra today  Bed, Chair, Wheelchair Transfer (FIM) 5 - Patient requires supervision/monitoring   QI: [address] Provided by [place] No physical assistance   Arthur [address] Score 4   Toileting   Able to [address] down yes, up yes  Able to Manage Clothing Closures Yes   Manage Hygiene Bladder   Limitations Noted In ROM   Toileting (FIM) 5 - Patient requires supervision/monitoring   QI: Toilet Transfer   Assistance Needed Supervision; Adaptive equipment   Assistance Provided by [place] No physical assistance   Comment with grab bars   Toilet Transfer CARE Score 4   Toilet Transfer   Surface Assessed Raised Toilet   Transfer Technique Standard   Limitations Noted In Problem Solving; Safety   Adaptive Equipment Grab Bar   Toilet Transfer (FIM) 5 - Patient requires supervision/monitoring   Health Management   Health Management COLLAR PADS CHANGED DURING ADL   Cognition   Overall Cognitive Status Impaired   Arousal/Participation Alert; Cooperative   Attention Attends with cues to redirect   Orientation Level Oriented X4   Memory Decreased short term memory;Decreased recall of recent events;Decreased recall of precautions Following Commands Follows one step commands with increased time or repetition   Comments Reports feeling more confident with function this morning   Activity Tolerance   Activity Tolerance Patient tolerated treatment well   Assessment   Treatment Assessment Pt participated in skilled OT session focusing on ADL retraining and functional transfers  Pt cont to demo G overall compliance with spinal precautions only req one cue during shaving task to ensure maintenance  During stand>sit transfers, pt had quick descent to chair req cuing on slowing pace and utilizing upper extremities to inc safety with transfer  Pt would benefit from cont therapy focusing on dynamic balance, functional mobility with obstacle negotiation, and item transport with RW  Cont with POC  Prognosis Good   Problem List Decreased strength;Decreased range of motion;Decreased endurance;Decreased mobility; Decreased coordination;Decreased cognition; Impaired judgement; Impaired balance   Barriers to Discharge Inaccessible home environment;Decreased caregiver support   Plan   Treatment/Interventions Functional transfer training; Therapeutic exercise; Endurance training;Patient/family training   Progress Progressing toward goals   Recommendation   OT Discharge Recommendation 24 hour supervision/assist   OT Therapy Minutes   OT Time In 0700   OT Time Out 0800   OT Total Time (minutes) 60   OT Mode of treatment - Individual (minutes) 60   OT Mode of treatment - Concurrent (minutes) 0   OT Mode of treatment - Group (minutes) 0   OT Mode of treatment - Co-treat (minutes) 0   OT Mode of Teatment - Total time(minutes) 60 minutes   Therapy Time missed   Time missed?  No

## 2019-03-17 PROCEDURE — G0515 COGNITIVE SKILLS DEVELOPMENT: HCPCS

## 2019-03-17 PROCEDURE — 97110 THERAPEUTIC EXERCISES: CPT | Performed by: PHYSICAL THERAPIST

## 2019-03-17 PROCEDURE — 97530 THERAPEUTIC ACTIVITIES: CPT

## 2019-03-17 PROCEDURE — 97112 NEUROMUSCULAR REEDUCATION: CPT | Performed by: PHYSICAL THERAPIST

## 2019-03-17 PROCEDURE — 97530 THERAPEUTIC ACTIVITIES: CPT | Performed by: PHYSICAL THERAPIST

## 2019-03-17 RX ADMIN — TAMSULOSIN HYDROCHLORIDE 0.4 MG: 0.4 CAPSULE ORAL at 16:07

## 2019-03-17 RX ADMIN — FLUPHENAZINE HYDROCHLORIDE 5 MG: 2.5 TABLET, FILM COATED ORAL at 21:32

## 2019-03-17 RX ADMIN — FLUVOXAMINE MALEATE 50 MG: 50 TABLET, FILM COATED ORAL at 11:28

## 2019-03-17 RX ADMIN — FLUPHENAZINE HYDROCHLORIDE 5 MG: 2.5 TABLET, FILM COATED ORAL at 06:20

## 2019-03-17 RX ADMIN — FLUVOXAMINE MALEATE 50 MG: 50 TABLET, FILM COATED ORAL at 13:24

## 2019-03-17 RX ADMIN — VITAMIN D, TAB 1000IU (100/BT) 2000 UNITS: 25 TAB at 07:50

## 2019-03-17 RX ADMIN — RISPERIDONE 1.5 MG: 3 TABLET ORAL at 07:53

## 2019-03-17 RX ADMIN — Medication 1 CAPSULE: at 16:07

## 2019-03-17 RX ADMIN — LOSARTAN POTASSIUM 50 MG: 50 TABLET, FILM COATED ORAL at 07:50

## 2019-03-17 RX ADMIN — Medication: at 17:25

## 2019-03-17 RX ADMIN — AMANTADINE HYDROCHLORIDE 100 MG: 100 CAPSULE, LIQUID FILLED ORAL at 07:52

## 2019-03-17 RX ADMIN — AMLODIPINE BESYLATE 5 MG: 5 TABLET ORAL at 07:51

## 2019-03-17 RX ADMIN — FLUVOXAMINE MALEATE 50 MG: 50 TABLET, FILM COATED ORAL at 06:22

## 2019-03-17 RX ADMIN — ACETAMINOPHEN 975 MG: 325 TABLET ORAL at 13:24

## 2019-03-17 RX ADMIN — FLUPHENAZINE HYDROCHLORIDE 5 MG: 2.5 TABLET, FILM COATED ORAL at 16:07

## 2019-03-17 RX ADMIN — RISPERIDONE 3 MG: 3 TABLET ORAL at 21:32

## 2019-03-17 RX ADMIN — AMANTADINE HYDROCHLORIDE 100 MG: 100 CAPSULE, LIQUID FILLED ORAL at 17:26

## 2019-03-17 RX ADMIN — ACETAMINOPHEN 975 MG: 325 TABLET ORAL at 06:20

## 2019-03-17 RX ADMIN — LIDOCAINE 1 PATCH: 50 PATCH CUTANEOUS at 07:47

## 2019-03-17 RX ADMIN — WARFARIN SODIUM 5 MG: 5 TABLET ORAL at 17:26

## 2019-03-17 RX ADMIN — FLUVOXAMINE MALEATE 50 MG: 50 TABLET, FILM COATED ORAL at 21:32

## 2019-03-17 RX ADMIN — FLUPHENAZINE HYDROCHLORIDE 5 MG: 2.5 TABLET, FILM COATED ORAL at 11:28

## 2019-03-17 RX ADMIN — FLUVOXAMINE MALEATE 50 MG: 50 TABLET, FILM COATED ORAL at 17:26

## 2019-03-17 RX ADMIN — Medication: at 07:48

## 2019-03-17 NOTE — PROGRESS NOTES
Internal Medicine Progress Note  Patient: Dina Lovelace  Age/sex: 68 y o  male  Medical Record #: [id number]      ASSESSMENT/PLAN:  Dina Lovelace  is seen and examined and management for following issues:      C6-7 osteophyte fracture: Pain control per primary service  F/u NS on d/c; etio of fall unclear      HTN: stable; continue Amlodipine 5mg daily and Losartan 50mg daily       History of DVT/PE/IVC filter: on life long AC  Initial DVT provoked due to knee surgery but PE not provoked  PCP manages AC as OP and was on 5mg qd  Continue Coumadin = on 3/12/19, adjusted dosing from 2 5 x 3 and 5 x 4 to 2 5 x4 and 5 x 3  No further dose change today = INR AM     BPH: Continue Flomax      Paranoid Schizophrenia: sees Dr Maxime Cedillo as OP  Continue Risperdal, Luvox, Prolixin  Prolixin was added recently  Hx Diastolic CHF:  Controlled w/o diuretics;  he has mild chronic LE edema by hx    Hypokalemia:  Kdur 40 meq x 1 3/14  BMP AM    Dispo:  3/20/19 home      Subjective:  denies any complaints      ROS:   GI: denies abdominal pain, change bowel habits or reflux symptoms  Neuro: No new neurologic changes  Respiratory: No Cough, SOB  Cardiovascular: No CP, palpitations   Psych:  No depression or anxiety    Scheduled Meds:    Current Facility-Administered Medications:  acetaminophen 650 mg Oral BID PRN Sofi Wyatt MD   acetaminophen 975 mg Oral BID (AM & Afternoon) Sofi Wyatt MD   amantadine 100 mg Oral BID Sofi Wyatt MD   amLODIPine 5 mg Oral Daily Sofi Wyatt MD   ammonium lactate  Topical BID Hilary Mayo DPM   b complex-vitamin C-folic acid 1 capsule Oral Daily With Nilsa Justice MD   bisacodyl 10 mg Rectal Daily PRN Belia Pena DO   cholecalciferol 2,000 Units Oral Daily Sofi Wyatt MD   docusate sodium 100 mg Oral BID Sofi Wyatt MD   fluPHENAZine 5 mg Oral 4x Daily Sofi Wyatt MD   fluvoxaMINE 50 mg Oral 5x Daily Sofi Wyatt MD   lidocaine 1 patch Topical Daily [facility] Renuka Mclean MD   losartan 50 mg Oral Daily [place] Lower, MD   ondansetron 4 mg Oral Q8H PRN Narda Lower, MD   polyethylene glycol 17 g Oral Daily PRN Narda Lower, MD   risperiDONE 0 5 mg Oral BID PRN [place] Lower, MD   risperiDONE 1 5 mg Oral Daily Narda Lower, MD   risperiDONE 3 mg Oral HS [place] Lower, MD   tamsulosin 0 4 mg Oral Daily With Jenni Powell MD   warfarin 2 5 mg Oral Once per day on Mon Tue Wed Fri Dominique Sanchez PA-C   warfarin 5 mg Oral Once per day on Sun Thu Sat Dominique Sanchez PA-C       Labs:     Results from last 7 days   Lab Units 03/14/19  0455 03/11/19  0555   WBC Thousand/uL 5 20 4 50   HEMOGLOBIN g/dL 13 3 13 6   HEMATOCRIT % 40 9 43 0   PLATELETS Thousands/uL 241 229     Results from last 7 days   Lab Units 03/14/19  0455 03/11/19  0555   SODIUM mmol/L 138 139   POTASSIUM mmol/L 3 4* 3 7   CHLORIDE mmol/L 99* 101   CO2 mmol/L 32 33*   BUN mg/dL 12 15   CREATININE mg/dL 0 71 0 77   CALCIUM mg/dL 8 5 8 8         Results from last 7 days   Lab Units 03/16/19  0520 03/14/19  0455   INR  2 95* 2 86*            [unfilled]    Labs reviewed    Physical Examination:  Vitals:   Vitals:    03/16/19 1249 03/16/19 2007 03/17/19 0513 03/17/19 0744   BP: (!) 102/42 115/66 138/78 122/67   BP Location: Right arm Right arm Left arm Right arm   Pulse: 82 94 94 71   Resp: 18 20 17    Temp: (!) 97 4 °F (36 3 °C) 97 6 °F (36 4 °C) 98 2 °F (36 8 °C)    TempSrc: Oral Tympanic Oral    SpO2: 95% 91% 92%    Weight:       Height:         Constitutional:  NAD; pleasant; nontoxic   Neck: collar on  CV:  +S1, S2;  RRR; no rub/murmur  Pulmonary:  BBS without crackles/wheeze/rhonci; resp are unlabored  Abdominal:  soft, +BS, ND/NT  Skin:  no rashes  Musculoskeletal:  trace LLE edema  :  no sandhu  Neurological:  AAO;  BURGOS 5/5        [ X ] Total time spent: 30 Mins and greater than 50% of this time was spent counseling/coordinating care      ** Please Note: Dragon 360 Dictation voice to text software may have been used in the creation of this document   **

## 2019-03-17 NOTE — PLAN OF CARE
Problem: SKIN/TISSUE INTEGRITY - ADULT  Goal: Skin integrity remains intact  Description  INTERVENTIONS  - Identify patients at risk for skin breakdown  - Assess and monitor skin integrity  - Assess and monitor nutrition and hydration status  - Monitor labs (i e  albumin)  - Assess for incontinence   - Turn and reposition patient  - Assist with mobility/ambulation  - Relieve pressure over bony prominences  - Avoid friction and shearing  - Provide appropriate hygiene as needed including keeping skin clean and dry  - Evaluate need for skin moisturizer/barrier cream  - Collaborate with interdisciplinary team (i e  Nutrition, Rehabilitation, etc )   - Patient/family teaching  Outcome: Progressing

## 2019-03-17 NOTE — PROGRESS NOTES
[address] [facility]  OT Daily Treatment Note           03/17/19 1030   Pain Assessment   Pain Assessment No/denies pain   Pain Score No Pain   QI: Sit to Stand   Assistance Needed Incidental touching   Assistance Provided by [place] No physical assistance   Sit to Stand CARE Score 4   QI: Chair/Bed-to-Chair Transfer   Assistance Needed Incidental touching   Assistance Provided by [place] No physical assistance   Chair/Bed-to-Chair Transfer CARE Score 4   Transfer Bed/Chair/Wheelchair   Adaptive Equipment Roller [employer], Chair, Wheelchair Transfer (FIM) 4 - Patient requires steadying assist or light touching   Health Management   Health Management Patient participated in simulated medication management with 5 simulated medications and medication planner  He completed with modA and vc's to complete for correct dosage  He demonstrated difficulty with dosage multiple times a day  He reported his son can assist him at home  Recommending patient has assistance at home with this task  Functional Standing Tolerance   Time Patient tolerated 4 mins of standing x2 trials with B/L UE support   Cognition   Comments Keeshan participated in moderately difficulty parquetry puzzle with Stephy  He requires assistance for problem solving with orientation of puzzle pieces when fitting multiple colors to form a shape  He requires increased time to complete  Assessment   Treatment Assessment Patient tolerated AM OT session fair  He is pleasant and cooperative throughout  Patient is very talkative throughout session requiring cues to stay on task and increased time to complete  He demonstrates difficulty with higher level probelm solving tasks  Patient is unable to recall/follow spinal precautions without cues  Patient completed UE arm bike for 5 mins x2 without resistance within spinal precautions     Plan   Progress Progressing toward goals   OT Therapy Minutes   OT Time In 1030   OT Time Out 1130   OT Total Time (minutes) 60   OT Mode of treatment - Individual (minutes) 60   OT Mode of treatment - Concurrent (minutes) 0   OT Mode of treatment - Group (minutes) 0   OT Mode of treatment - Co-treat (minutes) 0   OT Mode of Teatment - Total time(minutes) 60 minutes   Therapy Time missed   Time missed? No               Patient left with call bell in reach and alarms in place

## 2019-03-17 NOTE — PROGRESS NOTES
03/17/19 0900   Pain Assessment   Pain Assessment No/denies pain   Pain Score No Pain   Subjective   Subjective Pt agreeable to PT, was  brought to rehab room by aide prior to session starting  Therapist escorted pt back to room end of session with all call bells in reach  QI: Roll Left and Right   Reason if not Attempted Activity not applicable   Roll Left and Right CARE Score 9   QI: Sit to Lying   Reason if not Attempted Activity not applicable   Sit to Lying CARE Score 9   QI: Lying to Sitting on Side of Bed   Reason if not Attempted Activity not applicable   Lying to Sitting on Side of Bed CARE Score 9   QI: Sit to Stand   Assistance Needed Incidental touching   Assistance Provided by [place] No physical assistance   Comment CGA   Sit to Stand CARE Score 4   QI: Chair/Bed-to-Chair Transfer   Assistance Needed Incidental touching   Assistance Provided by [place] No physical assistance   Comment CGA   Chair/Bed-to-Chair Transfer CARE Score 4   Transfer Bed/Chair/Wheelchair   Bed, Chair, Wheelchair Transfer (FIM) 4 - Patient requires steadying assist or light touching   QI: Car Transfer   Reason if not Attempted Safety concerns   Car Transfer CARE Score 88   QI: Walk 10 Feet   Assistance Needed Supervision   Walk 10 Feet CARE Score 4   QI: Walk 50 Feet with Two Turns   Assistance Needed Supervision   Walk 50 Feet with Two Turns CARE Score 4   QI: Walk 150 Feet   Assistance Needed Supervision   Walk 150 Feet CARE Score 4   QI: Walking 10 Feet on Uneven Surfaces   Reason if not Attempted Safety concerns   Walking 10 Feet on Uneven Surfaces CARE Score 88   Ambulation   Does the patient walk? 2  Yes   Walking (FIM) 4 - Patient requires steadying assist or light touching AND distance 150 feet or more, no rest   Wheelchair mobility   QI: Does the patient use a wheelchair? 0   No   QI: 1 Step (Curb)   Assistance Needed Incidental touching;Physical assistance   1 Step (Curb) CARE Score -   QI: 4 Steps   Assistance Needed Incidental touching   4 Steps CARE Score 4   QI: 12 Steps   Reason if not Attempted Safety concerns   12 Steps CARE Score 88   Stairs   Type Stairs   # of Steps 4   Assist Devices Bilateral Rail   Findings  stairs consective on 6 inch side 2 reps for total of 4 steps    QI: Picking Up Object   Reason if not Attempted Safety concerns   Picking Up Object CARE Score 88   QI: Toilet Transfer   Reason if not Attempted Safety concerns   Toilet Transfer CARE Score 88   Therapeutic Interventions   Strengthening sit to stand STG HHA 2 sets, 10 reps    Balance side stepping with SPC 20 feet 3 times down and back  Equipment Use   NuStep 10 minutes lvl 1    Assessment   Treatment Assessment Pt tolerated sesssion well this date with focus on gait wtih limited use of AD of RW, with CGA with HHA once on 150 foot lap, was able to complete 300 feet with CGA  Challenged with trainier step secondary to fatigue with focus on amb  no loss of balance with side stepping with SPC for balance  Zaria Tejada will continue to benefit from skilled PT to improve functional mobiltiy to highest level possible  Plan   Treatment/Interventions Functional transfer training;LE strengthening/ROM; Elevations; Therapeutic exercise;Gait training   Progress Progressing toward goals   PT Therapy Minutes   PT Time In 0900   PT Time Out 1000   PT Total Time (minutes) 60   PT Mode of treatment - Individual (minutes) 60   PT Mode of treatment - Concurrent (minutes) 0   PT Mode of treatment - Group (minutes) 0   PT Mode of treatment - Co-treat (minutes) 0   PT Mode of Teatment - Total time(minutes) 60 minutes   Therapy Time missed   Time missed?  No

## 2019-03-18 PROBLEM — E87.6 HYPOKALEMIA: Status: RESOLVED | Noted: 2019-03-16 | Resolved: 2019-03-18

## 2019-03-18 LAB
ANION GAP SERPL CALCULATED.3IONS-SCNC: 7 MMOL/L (ref 4–13)
BASOPHILS # BLD AUTO: 0.05 THOUSANDS/ΜL (ref 0–0.1)
BASOPHILS NFR BLD AUTO: 1 % (ref 0–1)
BUN SERPL-MCNC: 8 MG/DL (ref 5–25)
CALCIUM SERPL-MCNC: 8.7 MG/DL (ref 8.3–10.1)
CHLORIDE SERPL-SCNC: 100 MMOL/L (ref 100–108)
CO2 SERPL-SCNC: 32 MMOL/L (ref 21–32)
CREAT SERPL-MCNC: 0.76 MG/DL (ref 0.6–1.3)
EOSINOPHIL # BLD AUTO: 0.11 THOUSAND/ΜL (ref 0–0.61)
EOSINOPHIL NFR BLD AUTO: 2 % (ref 0–6)
ERYTHROCYTE [DISTWIDTH] IN BLOOD BY AUTOMATED COUNT: 14.6 % (ref 11.6–15.1)
GFR SERPL CREATININE-BSD FRML MDRD: 91 ML/MIN/1.73SQ M
GLUCOSE SERPL-MCNC: 81 MG/DL (ref 65–140)
HCT VFR BLD AUTO: 43.2 % (ref 36.5–49.3)
HGB BLD-MCNC: 13.6 G/DL (ref 12–17)
IMM GRANULOCYTES # BLD AUTO: 0.02 THOUSAND/UL (ref 0–0.2)
IMM GRANULOCYTES NFR BLD AUTO: 0 % (ref 0–2)
INR PPP: 3.17 (ref 0.86–1.17)
LYMPHOCYTES # BLD AUTO: 1.22 THOUSANDS/ΜL (ref 0.6–4.47)
LYMPHOCYTES NFR BLD AUTO: 22 % (ref 14–44)
MCH RBC QN AUTO: 29.2 PG (ref 26.8–34.3)
MCHC RBC AUTO-ENTMCNC: 31.5 G/DL (ref 31.4–37.4)
MCV RBC AUTO: 93 FL (ref 82–98)
MONOCYTES # BLD AUTO: 0.53 THOUSAND/ΜL (ref 0.17–1.22)
MONOCYTES NFR BLD AUTO: 10 % (ref 4–12)
NEUTROPHILS # BLD AUTO: 3.66 THOUSANDS/ΜL (ref 1.85–7.62)
NEUTS SEG NFR BLD AUTO: 65 % (ref 43–75)
NRBC BLD AUTO-RTO: 0 /100 WBCS
PLATELET # BLD AUTO: 287 THOUSANDS/UL (ref 149–390)
PMV BLD AUTO: 9.3 FL (ref 8.9–12.7)
POTASSIUM SERPL-SCNC: 3.5 MMOL/L (ref 3.5–5.3)
PROTHROMBIN TIME: 32.5 SECONDS (ref 11.8–14.2)
RBC # BLD AUTO: 4.65 MILLION/UL (ref 3.88–5.62)
SODIUM SERPL-SCNC: 139 MMOL/L (ref 136–145)
WBC # BLD AUTO: 5.59 THOUSAND/UL (ref 4.31–10.16)

## 2019-03-18 PROCEDURE — 80048 BASIC METABOLIC PNL TOTAL CA: CPT | Performed by: NURSE PRACTITIONER

## 2019-03-18 PROCEDURE — 99232 SBSQ HOSP IP/OBS MODERATE 35: CPT

## 2019-03-18 PROCEDURE — 97530 THERAPEUTIC ACTIVITIES: CPT

## 2019-03-18 PROCEDURE — 97112 NEUROMUSCULAR REEDUCATION: CPT

## 2019-03-18 PROCEDURE — G0515 COGNITIVE SKILLS DEVELOPMENT: HCPCS

## 2019-03-18 PROCEDURE — 85610 PROTHROMBIN TIME: CPT | Performed by: NURSE PRACTITIONER

## 2019-03-18 PROCEDURE — 85025 COMPLETE CBC W/AUTO DIFF WBC: CPT | Performed by: NURSE PRACTITIONER

## 2019-03-18 RX ORDER — WARFARIN SODIUM 5 MG/1
5 TABLET ORAL
Status: DISCONTINUED | OUTPATIENT
Start: 2019-03-20 | End: 2019-03-20 | Stop reason: HOSPADM

## 2019-03-18 RX ORDER — POTASSIUM CHLORIDE 750 MG/1
10 TABLET, EXTENDED RELEASE ORAL DAILY
Status: DISCONTINUED | OUTPATIENT
Start: 2019-03-19 | End: 2019-03-20 | Stop reason: HOSPADM

## 2019-03-18 RX ORDER — POTASSIUM CHLORIDE 20 MEQ/1
20 TABLET, EXTENDED RELEASE ORAL ONCE
Status: COMPLETED | OUTPATIENT
Start: 2019-03-18 | End: 2019-03-18

## 2019-03-18 RX ORDER — WARFARIN SODIUM 2.5 MG/1
2.5 TABLET ORAL
Status: DISCONTINUED | OUTPATIENT
Start: 2019-03-19 | End: 2019-03-20 | Stop reason: HOSPADM

## 2019-03-18 RX ADMIN — RISPERIDONE 1.5 MG: 3 TABLET ORAL at 08:15

## 2019-03-18 RX ADMIN — TAMSULOSIN HYDROCHLORIDE 0.4 MG: 0.4 CAPSULE ORAL at 17:12

## 2019-03-18 RX ADMIN — FLUPHENAZINE HYDROCHLORIDE 5 MG: 2.5 TABLET, FILM COATED ORAL at 06:02

## 2019-03-18 RX ADMIN — FLUVOXAMINE MALEATE 50 MG: 50 TABLET, FILM COATED ORAL at 06:03

## 2019-03-18 RX ADMIN — FLUVOXAMINE MALEATE 50 MG: 50 TABLET, FILM COATED ORAL at 11:31

## 2019-03-18 RX ADMIN — VITAMIN D, TAB 1000IU (100/BT) 2000 UNITS: 25 TAB at 08:10

## 2019-03-18 RX ADMIN — Medication: at 08:10

## 2019-03-18 RX ADMIN — FLUVOXAMINE MALEATE 50 MG: 50 TABLET, FILM COATED ORAL at 17:12

## 2019-03-18 RX ADMIN — AMLODIPINE BESYLATE 5 MG: 5 TABLET ORAL at 08:09

## 2019-03-18 RX ADMIN — FLUVOXAMINE MALEATE 50 MG: 50 TABLET, FILM COATED ORAL at 21:05

## 2019-03-18 RX ADMIN — Medication 1 CAPSULE: at 17:12

## 2019-03-18 RX ADMIN — RISPERIDONE 3 MG: 3 TABLET ORAL at 21:06

## 2019-03-18 RX ADMIN — LIDOCAINE 1 PATCH: 50 PATCH CUTANEOUS at 11:35

## 2019-03-18 RX ADMIN — DOCUSATE SODIUM 100 MG: 100 CAPSULE, LIQUID FILLED ORAL at 17:12

## 2019-03-18 RX ADMIN — FLUPHENAZINE HYDROCHLORIDE 5 MG: 2.5 TABLET, FILM COATED ORAL at 17:12

## 2019-03-18 RX ADMIN — FLUVOXAMINE MALEATE 50 MG: 50 TABLET, FILM COATED ORAL at 13:34

## 2019-03-18 RX ADMIN — Medication 2 APPLICATION: at 21:07

## 2019-03-18 RX ADMIN — POTASSIUM CHLORIDE 20 MEQ: 1500 TABLET, EXTENDED RELEASE ORAL at 11:31

## 2019-03-18 RX ADMIN — ACETAMINOPHEN 975 MG: 325 TABLET ORAL at 13:33

## 2019-03-18 RX ADMIN — ACETAMINOPHEN 975 MG: 325 TABLET ORAL at 06:02

## 2019-03-18 RX ADMIN — FLUPHENAZINE HYDROCHLORIDE 5 MG: 2.5 TABLET, FILM COATED ORAL at 11:31

## 2019-03-18 RX ADMIN — AMANTADINE HYDROCHLORIDE 100 MG: 100 CAPSULE, LIQUID FILLED ORAL at 17:12

## 2019-03-18 RX ADMIN — AMANTADINE HYDROCHLORIDE 100 MG: 100 CAPSULE, LIQUID FILLED ORAL at 08:09

## 2019-03-18 RX ADMIN — FLUPHENAZINE HYDROCHLORIDE 5 MG: 2.5 TABLET, FILM COATED ORAL at 21:05

## 2019-03-18 RX ADMIN — LOSARTAN POTASSIUM 50 MG: 50 TABLET, FILM COATED ORAL at 08:09

## 2019-03-18 RX ADMIN — DOCUSATE SODIUM 100 MG: 100 CAPSULE, LIQUID FILLED ORAL at 08:10

## 2019-03-18 NOTE — PROGRESS NOTES
Internal Medicine Progress Note  Patient: Dustin Brock  Age/sex: 68 y o  male  Medical Record #: [id number]      ASSESSMENT/PLAN:  Dustin Brock  is seen and examined and management for following issues:      C6-7 osteophyte fracture: Pain control per primary service  F/u NS on d/c; etio of fall unclear      HTN: stable; continue Amlodipine 5mg daily and Losartan 50mg daily       History of DVT/PE/IVC filter: on life long AC  Initial DVT provoked due to knee surgery but PE not provoked  PCP manages AC as OP and was on 5mg qd  Continue Coumadin = on 3/12/19, adjusted dosing from 2 5 x 3 and 5 x 4 to 2 5 x4 and 5 x 3  INR now 3 17 = will hold x 1 and reduce dose to 5 x 1 and 2 5 x 6  BPH: Continue Flomax      Paranoid Schizophrenia: sees Dr Lashonda Nowak as OP  Continue Risperdal, Luvox, Prolixin  Prolixin was added recently  Hx Diastolic CHF:  Controlled w/o diuretics;  he has mild chronic LE edema by hx    Hypokalemia:  Kdur 40 meq x 1 3/14  Will give 20 meq now and start 10 meq qd  Dispo:  3/20/19 home      Subjective:  denies any complaints      ROS:   GI: denies abdominal pain, change bowel habits or reflux symptoms  Neuro: No new neurologic changes  Respiratory: No Cough, SOB  Cardiovascular: No CP, palpitations   Psych:  No depression or anxiety    Scheduled Meds:    Current Facility-Administered Medications:  acetaminophen 650 mg Oral BID PRN Arlet Guzman MD   acetaminophen 975 mg Oral BID (AM & Afternoon) Arlet Guzman MD   amantadine 100 mg Oral BID Arlet Guzman MD   amLODIPine 5 mg Oral Daily Arlet Guzman MD   ammonium lactate  Topical BID Mahidelores Hines, [id number]   b complex-vitamin C-folic acid 1 capsule Oral Daily With Deepali Beard MD   bisacodyl 10 mg Rectal Daily PRN Sharie Pallas, DO   cholecalciferol 2,000 Units Oral Daily Arlet Guzman MD   docusate sodium 100 mg Oral BID Arlet Guzman MD   fluPHENAZine 5 mg Oral 4x Daily Arlet Guzman MD   fluvoxaMINE 50 mg Oral 5x Daily David Query, MD   lidocaine 1 patch Topical Daily David Query, MD   losartan 50 mg Oral Daily David Query, MD   ondansetron 4 mg Oral Q8H PRN David Query, MD   polyethylene glycol 17 g Oral Daily PRN David Query, MD   risperiDONE 0 5 mg Oral BID PRN David Query, MD   risperiDONE 1 5 mg Oral Daily David Query, MD   risperiDONE 3 mg Oral HS David Query, MD   tamsulosin 0 4 mg Oral Daily With [place] Chang MD   warfarin 2 5 mg Oral Once per day on Mon Tue Wed Fri Dominique Sanchez PA-C   warfarin 5 mg Oral Once per day on Sun Thu Sat Dominique Sanchez PA-C       Labs:     Results from last 7 days   Lab Units 03/18/19  0449 03/14/19  0455   WBC Thousand/uL 5 59 5 20   HEMOGLOBIN g/dL 13 6 13 3   HEMATOCRIT % 43 2 40 9   PLATELETS Thousands/uL 287 241     Results from last 7 days   Lab Units 03/18/19  0449 03/14/19  0455   SODIUM mmol/L 139 138   POTASSIUM mmol/L 3 5 3 4*   CHLORIDE mmol/L 100 99*   CO2 mmol/L 32 32   BUN mg/dL 8 12   CREATININE mg/dL 0 76 0 71   CALCIUM mg/dL 8 7 8 5         Results from last 7 days   Lab Units 03/18/19  0449 03/16/19  0520   INR  3 17* 2 95*            [unfilled]    Labs reviewed    Physical Examination:  Vitals:   Vitals:    03/17/19 0744 03/17/19 1307 03/17/19 2027 03/18/19 0445   BP: 122/67 132/61 157/73 145/75   BP Location: Right arm Right arm Right arm Left arm   Pulse: 71 70 73 89   Resp:  17 20 20   Temp:  97 8 °F (36 6 °C) 98 6 °F (37 °C) 98 1 °F (36 7 °C)   TempSrc:  Oral Oral Oral   SpO2:  96% 93% 93%   Weight:       Height:         Constitutional:  NAD; pleasant; nontoxic   Neck: collar on  CV:  +S1, S2;  RRR; no rub/murmur  Pulmonary:  BBS without crackles/wheeze/rhonci; resp are unlabored  Abdominal:  soft, +BS, ND/NT  Skin:  no rashes  Musculoskeletal:  trace LLE edema  :  no sandhu  Neurological:  AAO;  BURGOS 5/5        [ X ] Total time spent: 30 Mins and greater than 50% of this time was spent counseling/coordinating care      ** Please Note: Dragon 360 Dictation voice to text software may have been used in the creation of this document   **

## 2019-03-18 NOTE — PROGRESS NOTES
Physical Medicine and Rehabilitation Progress Note  Jose Mohan 68 y o  male MRN: [id number]  Unit/Bed#: -01 Encounter: [id number]    Chief Complaints:  Decline in function    Subjective/Interval Events:   Patient reports having a busy day today in therapy  Reports it was helpful to talk with a    Patient reports still at times having negative thoughts as they would prefer not to have but denies suicidal or violent ideations  Patient reports sleeping adequately and denies hallucinations  Patient reports mild anxiety at times but not too bad and denies significant depression  Patient's family reports that the negative thoughts and emotions have been somewhat up and down by the day for instance he had a good day on Saturday not as good day on Sunday and is doing better today so far  He reports some mild neck pain at times but not too bad denies radiating arm pain or changes in strength or sensation  He denies lightheadedness, fever, chills, sweats, calf pain, or other complaints  ROS: A 10-point ROS was performed  Negative except as listed above  Overall Assessment/relevant history:  Jose Mohan  is a 68 y o  male with a past medical history schizophrenia, anxiety, depression, coronary artery disease, possible stroke, DVT/PE on chronic anticoagulation with Coumadin, obesity class 1, hypertension, urinary frequency on chronic Flomax, lightheadedness, occasional falls, memory impairment, left knee replacement, right total hip replacement, cervical laminectomy and fusion who fell backwards down stairs with brief few minutes loss of consciousness with neck and possible head trauma who was brought to [facility] initially and then transferred to [facility] on 3/3/19  CT head did not show acute findings, CT neck showed C5/C6 osteophyte fracture  Neurosurgery was consulted who recommended non operative management with cervical collar    Patient was complaining of some neck, back, and parasternal pain which were all believed to be related to trauma  Additional imaging of facial bones, thoracic and lumbar spine did not reveal acute fractures but did show chronic L3 compression fracture  Patient noted to have some increased difficulty sleeping and anxiety    Patient was evaluated by skilled therapies and was found to have significant decline in ADLs and ambulation appropriate for admission to [facility]      71-year-old male status post fall with brief loss of consciousness found to have C6-C7 vertebral osteophyte fracture and possible TBI presents with likely multifactorial imbalance possibly related to prior stroke, spinal stenosis, and now head injury, intermittent lightheadedness, acute on chronic cognitive deficits, suboptimal control of anxiety, depression, sleep, and schizophrenia with associated functional decline in ADLs and ambulation      Functional status (recent):    Transfers min assist to supervision, ambulation supervision    Functional status on admission to [facility]:  PT: Transfers mod assist  OT: Dressing mod assist, Bathing, grooming min assist   ST: Problem solving and memory mod assist; comprehension min assist; expression supervision, social interaction supervision    Schizophrenia [facility])  Assessment & Plan  Variable control of paranoid/delusional thoughts today acceptable control   >Recent increase Fluphenazine to QID 3/14 as previously discussed with outpt psych  Recent improvements in degree of depression and anxiety; sleep stable   >current mgmt plan   >Fluphenazine increase to 5mg QID 3/14   >Risperdone 1 5mg qAM and 3mg QHS    >Fluvoxamine 50mg 5x/day    >Amantadine at home 100mg BID      >Suspect recent acute worsening related to be off home regimen which suspect will improve with reintroduction of medications at appropriate dosing; however patient was having some increased paranoia and perseveration which started a few days prior to fall (3 weeks after switching from from Trifluoperazine to fluphenazine and Cogentin stopped; family states he has been trialed in past off Trifluoperazine and could not tolerate switch to other meds  >Monitor closely with nursing and staff; Neuropsych c/s and following;   >Per outpt psych could increase fluphenazine to 5mg QID if needed > which I think would be reasonable next if needed  >If still fails consider obtaining and going back on Trifluoperazine but this was difficult due to decreased manufacturing apparently   >Some concern patient may have not been providing himself medications appropriately > recommend family training and assist c/ med mgmt    >Discussed with patient's outpt psych Dr Manas Champagne 3/6  Patient presented c/ fair amount of paranoid delusions, anxiety, difficulty sleeping with occasional panic attacks off home regimen since recent admission prior, with recent fall/hospitalization possible head injury  Medications reviewed with his outpt pharmacy and patient's outpatient psychiatrist Dr Manas Champagne  Patient recently should switched from Trifluoperazine to fluphenazine and Cogentin stopped  Patient/family noted some increased paranoid delusions starting a few days prior to fall  Home regimen per pharmacy:  Fluphenazine 5mg TID, Risperdal 1 5mg qday and 3mg QHS  Amantadine 100 mg b i d , Fluvoxamine 50mg 5x/day  Patient recently on much more modest dosing s/p recent possible BI         * Head injury, acute  Assessment & Plan  Fall with likely head trauma and brief loss of consciousness and acute osteophyte C6-C7 fracture  Mild increased confusion after fall also with some worsening mood/schizophrenia symptoms  >Recommend acute comprehensive interdisciplinary inpatient rehabilitation to include intensive skilled therapies (PT, OT) as outlined with oversight and management by rehabilitation physician as well as inpatient rehab level nursing, case management and weekly interdisciplinary team meetings     > overstimulation precautions, sleep-wake/agitation restlessness log  > optimize neuroleptic medication  > try to avoid sedative medications  > evaluate and manage fall risks          Cognitive impairment  Assessment & Plan  Likely premorbid; chronic generalized microvascular changes with possible old right lacunar internal capsule infarct and possible right frontal WM   >speech management  >Follow-up with Neurology after discharge  >On chronic home amantadine   >Recommend family assist with med mgmt after d/c    Fracture of sixth cervical vertebra (HCC)  Assessment & Plan  C6/C7 osteophyte fracture context of recent fall and history of cervical decompression and fusion  Treatment recommendations per Neurosurgery  Non operative; continue cervical collar per Neurosurgery  Repeat C spine X-ray 3/19 ordered   Follow up with Neurosurgery after discharge    History of stroke  Assessment & Plan  Patient/family deny although evidence of possible old R lacunar infarct and focus in R frontal WM along with generalized micoangiopathic changes  >Recommend optimal BP mgmt  >Follow-up with neuro after d/c   >On full A/C for hx of VTE    Lightheadednessresolved as of 3/13/2019  Assessment & Plan  No recent reports  Chronic intermittent; consider med adjustments  Orthostatics negative recently; if necessary repeat particularly with adjustments in meds   PRN abdominal binder and EHSAN hose      Vascular skin disease  Assessment & Plan  Chronic venous insufficiency of BLE with some edema  >Podiatry c/s appreciated; EHSAN hose during day; Elevate when possible  >Lac-Hytrin cream BID  >Monitor for skin breakdown    Vitamin D insufficiency  Assessment & Plan  D3 2000 units qday     History of pulmonary embolism  Assessment & Plan  And DVT on chronic anticoagulation with Coumadin  Medicine consult to manage during acute rehab course  Regimen adjusted       Pain  Assessment & Plan  Remains well controlled  Continue scheduled Tylenol for now  Lidoderm patch  Avoid NSAIDs and avoid opiates     Class 1 obesity due to excess calories without serious comorbidity with body mass index (BMI) of 34 0 to 34 9 in adult  Assessment & Plan   on appropriate nutrition and activity  Adjustments accordingly during skilled therapy and with rehab nursing  Monitor skin closely for breakdown, wounds, rashes; keep clean and dry, turning Q2H   Follow-up with PCP after d/c      Chronic diastolic heart failure ([address] )  Assessment & Plan  Mgmt per IM     Chronic venous stasis dermatitis of both lower extremities  Assessment & Plan  Internal medicine consult to assist with management  Elevate legs when in bed and when in chair for prolonged periods of time  Consider Wilver veronica    Coronary artery disease involving native coronary artery of native heart without angina pectoris  Assessment & Plan  On chronic Coumadin  Optimal blood pressure control  Follow-up with PCP    Benign essential hypertension  Assessment & Plan  Medicine c/s to assist with mgmt  Amlodipine 5 mg daily, losartan 50 mg daily  Monitor blood pressure and orthostatics    Hypokalemiaresolved as of 3/18/2019  Assessment & Plan  Resolved 3/18    # Bowel care:    - monitor for constipation, incontinence, and diarrhea  - goal 1 appropriate BM every 1-2 days  - recommend colace +/- senna and PRN bowel protocol     # Bladder care:   On chronic Flomax;  - monitor for retention, incontinence, signs/symptoms of UTI  - recommend voiding trial; nursing prompt to void followed by bladder scan starting Q4-6H or after each patient initiated void; nursing to record voided output and bladder scan totals; straight cath PRN >350-400 cc; if post void residual bladder scans are <150 cc x3 consecutive voids, can stop scans      # Skin:   - Nursing to turn patient Q2H if not adequately ambulatory, monitor for skin breakdown, rashes, and wounds if applicable     # At risk for venous thromboembolism:  - Recommend SCDs and mobilization  - warfarin     # Diet/Hydration:    Dysphagia 3 thin - recommend speech therapy     Disposition:   Home with estimated length of stay to Wed     Follow-up:   PCP, Psychiatry, Psychology rehab, Neurosurgery, Neurology     CODE: Level 1: Full Code     Restrictions include: Fall precautions         ---------------------------------------------------------------------------------------------------------------------    Objective: Allergies and Medications per EMR    Physical Exam:  Vitals:    03/18/19 1319   BP: 116/55   Pulse: 83   Resp: 16   Temp: 98 °F (36 7 °C)   SpO2: 94%     General: Awake, alert in NAD  HENT:  MMM, cervical collar in place   Respiratory: Unlabored breathing, breath sounds equal, Lungs CTA, no wheezes, rales, or rhonchi  Cardiovascular: Regular rate and rhythm, no murmurs, rubs, or gallops  Gastrointestinal: Soft, non-tender, non-distended, normoactive bowel sounds  Genitourinary: No sandhu  SkiN/MSK/Extremities:  No calf edema or calf tenderness to palpation  Neurologic/Psych:   MENTAL STATUS: grossly stable  Affect: Flattened  Strength intact     Diagnostic Studies: reviewed, no new imaging  No results found      Laboratory:    Results from last 7 days   Lab Units 03/18/19  0449 03/14/19  0455   HEMOGLOBIN g/dL 13 6 13 3   HEMATOCRIT % 43 2 40 9   WBC Thousand/uL 5 59 5 20     Results from last 7 days   Lab Units 03/18/19  0449 03/14/19  0455   BUN mg/dL 8 12   POTASSIUM mmol/L 3 5 3 4*   CHLORIDE mmol/L 100 99*   CREATININE mg/dL 0 76 0 71     Results from last 7 days   Lab Units 03/18/19  0449 03/16/19  0520 03/14/19  0455   PROTIME seconds 32 5* 30 8* 30 0*   INR  3 17* 2 95* 2 86*        Patient Active Problem List   Diagnosis    Benign essential hypertension    Coronary artery disease involving native coronary artery of native heart without angina pectoris    Chronic depression    Chronic venous stasis dermatitis of both lower extremities    Deep venous thrombosis of lower extremity (HCC)    Chronic diastolic heart failure (HCC)    Osteoarthritis    Nocturnal enuresis    Idiopathic trigeminal neuralgia    Lacunar infarction    Class 1 obesity due to excess calories without serious comorbidity with body mass index (BMI) of 34 0 to 34 9 in adult    Schizophrenia (HCC)    Disability of walking    Pulmonary embolism (HCC)    Arthropathy    Mixed hyperlipidemia    Neurogenic claudication    Neck pain without injury    Abdominal wall hematoma    Spinal stenosis of lumbar region with neurogenic claudication    Lipoma of colon    Fracture of sixth cervical vertebra (HCC)    Rib pain on left side    Fall    Forgetfulness    Physical deconditioning    Head injury, acute    Pain    History of pulmonary embolism    Cognitive impairment    History of stroke    Vitamin D insufficiency    Vascular skin disease       ** Please Note: Fluency Direct voice to text software may have been used in the creation of this document  **    Total visit time: At least 25 minutes, with more than 50% spent counseling/coordinating care  Counseling includes discussion with patient re: progress in therapies, functional issues observed by therapy staff, and discussion with patient regarding their current medical state and wellbeing  Coordination of patient's care was performed in conjunction with Internal Medicine service to monitor patient's vitals, labs, and management of their comorbidities        Fidel Ghosh MD, [facility]  Physical Medicine and Rehabilitation  Brain Injury Medicine

## 2019-03-18 NOTE — PROGRESS NOTES
03/18/19 [place] Involvement Patient active with Worship   Spiritual Beliefs/Perceptions   Concept of God Accepting   God's Role in Disease Natural   Relationship with God Close   Stress Factors   Patient Stress Factors Other (Comment)  (Theological Issue)   Coping Responses   Patient Coping Anxiety   Plan of Care   Comments Cultivated a relationship of care and support with patient  Provided theological support/discussion  Encouraged use of spiritual resources     Assessment Completed by: Unit visit

## 2019-03-18 NOTE — PROGRESS NOTES
03/18/19 1500   Pain Assessment   Pain Assessment No/denies pain   Restrictions/Precautions   Precautions Cognitive; Fall Risk;Spinal precautions;Supervision on toilet/commode   Braces or Orthoses C/S Collar   Cognition   Overall Cognitive Status Impaired   Arousal/Participation Cooperative   Attention Attends with cues to redirect   Memory Decreased short term memory;Decreased recall of precautions   Following Commands Follows one step commands with increased time or repetition   Subjective   Subjective pt with no changes; pt ready for therapy   QI: Sit to [address] Provided by [place] No physical assistance   Sit to Stand CARE Score 4   QI: Chair/Bed-to-Chair Transfer   Assistance Needed Verbal cues; Supervision; Adaptive equipment   Assistance Provided by [place] No physical assistance   Comment RW   Chair/Bed-to-Chair Transfer CARE Score 4   Transfer Bed/Chair/Wheelchair   Limitations Noted In Balance; Coordination;Problem Solving;LE Strength   Adaptive Equipment Roller Walker   Stand Pivot Supervision   Sit to Stand Supervision   Stand to [address]  [address]  [place], Chair, Wheelchair Transfer (FIM) 5 - Patient requires verbal cues   QI: Walk 10 Feet   Assistance Needed Supervision; Adaptive equipment   Assistance Provided by [place] No physical assistance   Walk 10 Feet CARE Score 4   QI: Walk 50 Feet with Two Turns   Assistance Needed Supervision; Adaptive equipment   Assistance Provided by [place] No physical assistance   Walk 50 Feet with Two Turns CARE Score 4   QI: Walk 150 Feet   Assistance Needed Supervision; Adaptive equipment   Assistance Provided by [place] No physical assistance   Walk 150 Feet CARE Score 4   QI: Walking 10 Feet on Uneven Surfaces   Reason if not Attempted Safety concerns   Walking 10 Feet on Uneven Surfaces CARE Score 88   Ambulation   Does the patient walk? 2   Yes   Primary Discharge Mode of Locomotion Walk   Walk Assist Level Supervision   Gait Pattern Slow Shilpa; Inconsistant Shilpa;Decreased foot clearance; Improper weight shift   Assist Device Roller [place] Walked (feet) 350 ft   Limitations Noted In Balance;Speed;Strength; Safety   Walking (FIM) 5 - Patient requires supervision/monitoring AND distance 150 feet or more, no rest   QI: Wheel 50 Feet with Two Turns   Reason if not Attempted Activity not applicable   Wheel 50 Feet with Two Turns CARE Score 9   QI: Wheel 150 Feet   Reason if not Attempted Activity not applicable   Wheel 519 Feet CARE Score 9   Wheelchair mobility   QI: Does the patient use a wheelchair? 0  No   QI: 12 Steps   Assistance Needed Incidental touching;Verbal cues; Adaptive equipment   Assistance Provided by [place] Less than 25%   Comment with HR and SPC   12 Steps CARE Score 3   Stairs   Type Stairs   # of Steps 12   Weight Bearing Precautions Fall Risk   Assist Devices Single Rail;Cane   Findings FF x2 with L HR and SPC; FT done with son and spouse  pt reports feeling dizzy, only when descending   Stairs (FIM) 4 - Patient requires steadying assist or light touching AND patient goes up and down full flight (12- 14 stairs)   Other Comments   Comments FT done with son and spouse  pt performed steps and amb with RW   reviewed with family having two RWs for home to keep one on first floor and one on top floor and using cane on steps only  wife performed steps with patient not feeling confident of her own safety  instructed family on guarding and positioning during amb with RW   FT set up again for tomorrow  Assessment   Treatment Assessment FT done and will cont tomorrow to improve overall safety rashaun on stair training  pt has to do FF during the day and educated on safety cues for stairs and what to do if pt feels dizzy on steps  suggested gait belt to have a better support for pt's spouse  no questions at this time from family and will cont with family training tomorrow for safe d/c home Wednesday     Family/Caregiver Present son and wife   Problem List Decreased strength;Decreased range of motion;Decreased endurance;Decreased mobility; Decreased coordination;Decreased cognition; Impaired judgement; Impaired balance   Barriers to Discharge Inaccessible home environment   PT Barriers   Physical Impairment Decreased strength;Decreased endurance; Impaired balance;Decreased mobility;Orthopedic restrictions;Pain;Decreased coordination;Decreased safety awareness   Functional Limitation Standing;Stair negotiation   Plan   Treatment/Interventions Functional transfer training;LE strengthening/ROM; Endurance training;Patient/family training;Bed mobility;Gait training   Progress Progressing toward goals   Recommendation   Recommendation 24 hour supervision/assist;Home PT; Home with family support   Equipment Recommended Walker   PT Therapy Minutes   PT Time In 1500   PT Time Out 1530   PT Total Time (minutes) 30   PT Mode of treatment - Individual (minutes) 30   PT Mode of treatment - Concurrent (minutes) 0   PT Mode of treatment - Group (minutes) 0   PT Mode of treatment - Co-treat (minutes) 0   PT Mode of Teatment - Total time(minutes) 30 minutes   Therapy Time missed   Time missed?  No

## 2019-03-18 NOTE — PROGRESS NOTES
03/18/19 0930   Pain Assessment   Pain Assessment No/denies pain   Restrictions/Precautions   Precautions Bed/chair alarms;Cognitive; Fall Risk;Supervision on toilet/commode   Braces or Orthoses C/S Collar   Memory Skills   Memory (FIM) 4 - Recalls 2 of 3 steps   Social Interaction (FIM) 5 - Interacts appropriately with others 90% of time   Speech/Language/Cognition Assessmetn   Treatment Assessment Session engaged in completing I ADL tasks, such as mock check book management, reading comprehension givne a checkbook register as well as mock medicine labels and writing out mock checks  Pt's ability to write out mock check was 13/15 accurate in writing down correct information, noting errors when writing digits into boxed area on the check  When completing reading comprehesion task given a pre-recorded check register to asnwer questions on, pt was 5/10 accurate, increasing to 8/10 accuracy given review of information  Pt then givn mock medication bottles, in which pt's ability to comprehend quesrtions and appropriately answer questions was 18/21 accurate, increasing to 21/21 given review of errors  Pt will continue to benefit from SLP Services at this time to maximize cognitive linguistic skills  SLP Therapy Minutes   SLP Time In 0930   SLP Time Out 1030   SLP Total Time (minutes) 60   SLP Mode of treatment - Individual (minutes) 60   SLP Mode of treatment - Concurrent (minutes) 0   SLP Mode of treatment - Group (minutes) 0   SLP Mode of treatment - Co-treat (minutes) 0   SLP Mode of Teatment - Total time(minutes) 60 minutes   Therapy Time missed   Time missed?  No   Daily FIM Score   Problem solving (FIM) 3 - Solves basic problmes 50-74% of time   Comprehension (FIM) 4 - Understands basic info/conversation 75-90% of time   Expression (FIM) 5 - Needs staff member to set up communication device (trach valve, communication boards)

## 2019-03-18 NOTE — PROGRESS NOTES
03/18/19 0840   Pain Assessment   Pain Assessment No/denies pain   Restrictions/Precautions   Precautions Cognitive; Fall Risk;Spinal precautions;Supervision on toilet/commode   Braces or Orthoses C/S Collar   Cognition   Overall Cognitive Status Impaired   Arousal/Participation Cooperative   Attention Attends with cues to redirect   Subjective   Subjective no complaints at this time   QI: Sit to [address] Provided by [place] No physical assistance   Sit to Stand CARE Score 4   QI: Chair/Bed-to-Chair Transfer   Assistance Needed Supervision; Adaptive equipment   Assistance Provided by [place] No physical assistance   Comment RW   Chair/Bed-to-Chair Transfer CARE Score 4   Transfer Bed/Chair/Wheelchair   Limitations Noted In Balance;Problem Solving;LE Strength   Adaptive Equipment Roller Walker   Stand Pivot Supervision   Sit to Stand Supervision   Stand to Sit Supervision   Findings needs cues for RW management and to keep in front before sitting   Bed, Chair, Wheelchair Transfer (FIM) 5 - Patient requires verbal cues   QI: Walk 10 [address] [facility] Provided by [place] No physical assistance   Walk 10 Feet CARE Score 4   QI: Walk 50 Feet with Two [facility] Provided by [place] No physical assistance   Walk 50 Feet with Two Turns CARE Score 4   QI: Walk [address]; Adaptive equipment   Assistance Provided by [place] No physical assistance   Walk 150 Feet CARE Score 4   Ambulation   Does the patient walk? 2  Yes   Primary Discharge Mode of Locomotion Walk   Walk Assist Level Supervision   Gait Pattern Inconsistant Shilpa;Decreased foot clearance; Improper weight shift   Assist Device [place] Walked (feet) 350 ft   Limitations Noted In Balance; Coordination; Endurance;Posture;Speed;Strength;Swing   Findings 200' x1 no AD for balance training   Walking (FIM) 5 - Patient requires supervision/monitoring AND distance 150 feet or more, no rest   Wheelchair mobility   QI: Does the patient use a wheelchair? 0  No   Therapeutic Interventions   Strengthening unsupported STS for functional strengthening and balance   Flexibility B gastroc and HS x3mins   Equipment Use   NuStep 10mins L2   Assessment   Treatment Assessment session focused on general conditioning and improving activity tolerance  pt cont to need VCs for hand placement and RW management with functional txs  pt amb with no AD to improve balance and righting reactions  no LOB noted during session  will cont to work on functional mobility and activity tolerance to decrease fall risk for d/c home  Problem List Decreased strength;Decreased range of motion;Decreased endurance;Decreased mobility; Decreased coordination;Decreased cognition; Impaired judgement; Impaired balance   Barriers to Discharge Inaccessible home environment;Decreased caregiver support   PT Barriers   Physical Impairment Decreased strength;Decreased endurance; Impaired balance;Decreased mobility;Orthopedic restrictions;Pain;Decreased coordination;Decreased safety awareness   Functional Limitation Standing;Stair negotiation   Plan   Treatment/Interventions Functional transfer training;LE strengthening/ROM; Endurance training;Patient/family training;Bed mobility;Gait training   Progress Progressing toward goals   Recommendation   Recommendation 24 hour supervision/assist;Home PT; Home with family support   PT Therapy Minutes   PT Time In 200   PT Time Out 0900   PT Total Time (minutes) 20   PT Mode of treatment - Individual (minutes) 20   PT Mode of treatment - Concurrent (minutes) 0   PT Mode of treatment - Group (minutes) 0   PT Mode of treatment - Co-treat (minutes) 0   PT Mode of Teatment - Total time(minutes) 20 minutes   Therapy Time missed   Time missed?  No

## 2019-03-18 NOTE — PROGRESS NOTES
03/18/19 1240   Pain Assessment   Pain Assessment No/denies pain   Pain Score No Pain   Restrictions/Precautions   Precautions Cognitive; Fall Risk;Spinal precautions   ROM Restrictions   (cervical spinal precautions)   Braces or Orthoses C/S Collar   Lifestyle   Autonomy "I feel pretty good about this all"   Tub/Shower Transfer   Findings Pt performed dry tub transfer x 3 with wife and son participating  Recommending that pt has tub bench and grab bar for inc safety with trasnfer  With recommended equipement and MIN v/c from family for hand placement, pt able to complete transfer at overall S level  SON REPORTS THAT HE WILL PURCHASE GRAB BARS AND TUB BENCH INDEP  QI: Sit to Stand   Assistance Needed Supervision   Assistance Provided by [place] No physical assistance   Sit to Stand CARE Score 4   QI: Chair/Bed-to-Chair Transfer   Assistance Needed Supervision   Assistance Provided by [place] No physical assistance   Chair/Bed-to-Chair Transfer CARE Score 4   Transfer Bed/Chair/Wheelchair   Positioning Concerns Cognition   Limitations Noted In Endurance;Problem Solving; Sequencing   Adaptive Equipment Roller [employer] Son and wife present for transfer training  Educated that pt does not req physical assistance for trasnfer but req cuing for hand placement during sit<>stands and cuing for safety awareness in environment due to dec ability to turn head/neck with spinal precautions and C/S collar donned  Family able to provide appropriate cuing  Bed, Chair, Wheelchair Transfer (FIM) 5 - Patient requires supervision/monitoring   QI: [address] Provided by [place] No physical assistance   [place] 83 Score 4   Toileting   Able to [address] down yes, up yes     Able to [address] Yes   Manage Hygiene Bladder   Limitations Noted In Safety   Toileting (FIM) 5 - Patient requires supervision/monitoring   QI: Toilet Transfer   Assistance Needed Supervision; Adaptive equipment   Assistance Provided by [place] No physical assistance   Comment with grab bars   Toilet Transfer CARE Score 4   Toilet Transfer   Surface Assessed Raised Toilet   Transfer Technique Standard   Limitations Noted In LE Strength; Safety   Findings Pt completed toilet transfers with use of grab bar at S level  Son reports that they have commode at home from prior surgery  Son will place commode over toilet to inc indep with sit<>stand transfers  Toilet Transfer (FIM) 5 - Patient requires supervision/monitoring   Health Management   Health Management C/S COLLAR MANAGEMENT: Son and wife engaged in collar management  At this time, pt unable to change collar and pads indep due to cog deficits limiting ability to maintain proper head positioning  Son and wife edu on doffing/donning VISTA and NAIN collar  Educated that collar must remain on at all times until follow-up with doctor to discontinue  Wife and son demo ability to change collar and pads during session and report no questions or concerns  Provided pt with proper cuing to maintain head position during change  Provided with handouts  Cognition   Overall Cognitive Status Impaired   Arousal/Participation Cooperative; Alert   Attention Attends with cues to redirect   Orientation Level Oriented X4   Memory Decreased short term memory;Decreased recall of recent events   Following Commands Follows one step commands with increased time or repetition   Comments Pt pleasent and engaged this session  Activity Tolerance   Activity Tolerance Patient tolerated treatment well   Assessment   OT Family training done with: Erin Miller (wife) and Willy Zhang (son)   Assessment of family training Pt and family participated in skilled OT session focusing on education, functional transfers, and equipment recommendation  Recommending commode over toilet (previously owned), grab bar in shower, and tub bench for inc indep with ADL tasks   Son reports that he will purchase and install all equipment prior to discharge home  Family informed that current recommendation is for 24hr S/A including bathing/tub transfers, medication management, collar management, and IADL completion at home due to continued cog deficits limiting safety  Pt would benefit from cont therapy focusing on dynamic balance, act tolerance, and overall safety awareness for functional task completion  Cont with POC  Prognosis Good   Problem List Decreased strength;Decreased range of motion;Decreased endurance;Decreased mobility; Decreased coordination;Decreased cognition; Impaired judgement; Impaired balance   Barriers to Discharge Inaccessible home environment   Plan   Treatment/Interventions ADL retraining;Functional transfer training; Therapeutic exercise; Endurance training   Progress Progressing toward goals   Recommendation   OT Discharge Recommendation 24 hour supervision/assist   Equipment Recommended   (tub bench, grab bars in shower)   OT Therapy Minutes   OT Time In 1240   OT Time Out 1410   OT Total Time (minutes) 90   OT Mode of treatment - Individual (minutes) 90   OT Mode of treatment - Concurrent (minutes) 0   OT Mode of treatment - Group (minutes) 0   Therapy Time missed   Time missed?  No

## 2019-03-18 NOTE — PROGRESS NOTES
03/18/19 1030   Pain Assessment   Pain Assessment No/denies pain   Pain Score No Pain   Restrictions/Precautions   Precautions Cognitive; Fall Risk;Supervision on toilet/commode;Spinal precautions   Weight Bearing Restrictions No   ROM Restrictions   (cervical spine precautions )   Braces or Orthoses C/S Collar   Cognition   Overall Cognitive Status Impaired   Arousal/Participation Cooperative   Attention Attends with cues to redirect   Orientation Level Oriented X4   Memory Decreased short term memory;Decreased recall of recent events   Following Commands Follows one step commands with increased time or repetition   Subjective   Subjective patient agreeable to participate in PT session    QI: Sit to [address] Provided by [place] No physical assistance   Comment using RW; able to carryover teaching from this AM for proper hand placement during transfers    Sit to Stand CARE Score 4   QI: Chair/Bed-to-Chair Transfer   Assistance Needed Supervision   Assistance Provided by [place] No physical assistance   Comment using RW    Chair/Bed-to-Chair Transfer CARE Score 4   Transfer Bed/Chair/Wheelchair   Limitations Noted In [place];LE Strength   Adaptive Equipment Roller Walker   Stand Pivot Supervision   Sit to Stand Supervision   Stand to Sit Supervision   Bed, Chair, Wheelchair Transfer (FIM) 5 - Patient requires supervision/monitoring   QI: [address] Provided by [place] No physical assistance   Comment using RW    Walk 10 Feet CARE Score 4   QI: Walk 50 Feet with Two [address] Provided by [place] No physical assistance   Comment using RW    Walk 50 Feet with Two Turns CARE Score 4   QI: Walk 150 [address] Provided by [place] No physical assistance   Comment using RW    Walk 150 Feet CARE Score 4   QI: Walking 10 Feet on Uneven Surfaces   Reason if not Attempted Safety concerns   Walking 10 Feet on Uneven Surfaces CARE Score 88   Ambulation   Does the patient walk? 2  Yes   Primary Discharge Mode of Locomotion Walk   Walk Assist Level Supervision   Gait Pattern Inconsistant Shilpa;Trendelenburg; Improper weight shift   Assist Device [place] Walked (feet) 300 ft  (x2)   Limitations Noted In Balance; Coordination;Speed;Strength;Swing   Walking (FIM) 5 - Patient requires supervision/monitoring AND distance 150 feet or more, no rest   QI: Wheel 50 Feet with Two Turns   Reason if not Attempted Activity not applicable   Wheel 50 Feet with Two Turns CARE Score 9   QI: Wheel 150 Feet   Reason if not Attempted Activity not applicable   Wheel 876 Feet CARE Score 9   Wheelchair mobility   QI: Does the patient use a wheelchair? 0  No   Wheelchair (FIM) 0 - Activity does not occur   QI: 4 Steps   Assistance Needed Incidental touching   Assistance Provided by [place] No physical assistance   Comment verbal instruction   4 Steps CARE Score 4   QI: 12 Steps   Assistance Needed Incidental touching   Assistance Provided by [place] No physical assistance   12 Steps CARE Score 4   Stairs   Type Stairs   # of Steps 12   Weight Bearing Precautions Fall Risk   Assist Devices Single Rail;Cane   Findings Patient instructed to focus during potentially unsafe tasks such as stairs  He reports, "I feel myself coming into a cloud", strategized ways to come out of the "cloud" however carryover likely unreliable    Stairs (FIM) 4 - Patient requires steadying assist or light touching AND patient goes up and down full flight (12- 14 stairs)   QI: Picking Up Object   Reason if not Attempted Safety concerns   Picking Up Object CARE Score 88   Therapeutic Interventions   Neuromuscular Re-Education lateral ambulation at counter top and reaching into cupboards to simulate making coffee   Other Discussed with patient tasks he is responsible for at time of discharge   He reports waking up in the morning and feeding the cat  Practiced carrying small bowl while ambulating short distances using SPC  No LOB noted  He is instructed to keep items at waist level and not to bend forward to place bowl on floor  He reports the cat eats from the counter top  Patient also reports assisting with laundry at home in which he was encouraged to modify assistance provided and help with folding laundry in a seated position  Assessment   Family/Caregiver Present Patient making good progress with skilled PT intervention in preparation for d/c home with family support, S and use of RW 3/20/19  Patient able to demonstrate ability to ambulate using RW at S with good carryover noted approximating RW during turns  And for hand placement during transfers  Patient instructed on recommendations to utilize RW on 1 level while using a SPC and handrail on stairs and keeping additional RW on 2nd level  At this time, patient's biggest barrier is his periods of time where he is not present in the moment or concentrating on task  Discussed strategies to utilize during mobility to ensure safety with tasks such as stair negotiation  Family training to be performed this PM  Patient continues to benefit from skilled PT intervention to address deficits and progress functional mobility (I) and safety  Problem List Decreased endurance; Impaired balance;Decreased mobility; Decreased coordination;Orthopedic restrictions   PT Barriers   Functional Limitation Stair negotiation   Plan   Treatment/Interventions Functional transfer training;LE strengthening/ROM; Elevations; Endurance training; Therapeutic exercise;Cognitive reorientation;Patient/family training;Equipment eval/education; Bed mobility;Gait training; Compensatory technique education   Progress Progressing toward goals   Recommendation   Recommendation 24 hour supervision/assist;Home PT; Home with family support   Equipment Recommended Walker   PT Equipment ordered 3/15/19   PT Therapy Minutes   PT Time In 1030   PT Time Out 1130   PT Total Time (minutes) 60   PT Mode of treatment - Individual (minutes) 60   PT Mode of treatment - Concurrent (minutes) 0   PT Mode of treatment - Group (minutes) 0   PT Mode of treatment - Co-treat (minutes) 0   PT Mode of Teatment - Total time(minutes) 60 minutes   Therapy Time missed   Time missed?  No

## 2019-03-19 ENCOUNTER — APPOINTMENT (INPATIENT)
Dept: RADIOLOGY | Facility: HOSPITAL | Age: 74
DRG: 949 | End: 2019-03-19
Payer: COMMERCIAL

## 2019-03-19 PROBLEM — G95.19 NEUROGENIC CLAUDICATION (HCC): Status: RESOLVED | Noted: 2018-07-13 | Resolved: 2019-03-19

## 2019-03-19 PROBLEM — M48.062 SPINAL STENOSIS OF LUMBAR REGION WITH NEUROGENIC CLAUDICATION: Status: RESOLVED | Noted: 2019-02-18 | Resolved: 2019-03-19

## 2019-03-19 PROBLEM — S30.1XXA ABDOMINAL WALL HEMATOMA: Status: RESOLVED | Noted: 2018-12-19 | Resolved: 2019-03-19

## 2019-03-19 PROBLEM — R07.81 RIB PAIN ON LEFT SIDE: Status: RESOLVED | Noted: 2019-03-04 | Resolved: 2019-03-19

## 2019-03-19 PROCEDURE — 97530 THERAPEUTIC ACTIVITIES: CPT

## 2019-03-19 PROCEDURE — 72040 X-RAY EXAM NECK SPINE 2-3 VW: CPT

## 2019-03-19 PROCEDURE — 97110 THERAPEUTIC EXERCISES: CPT

## 2019-03-19 PROCEDURE — 97535 SELF CARE MNGMENT TRAINING: CPT

## 2019-03-19 PROCEDURE — G0515 COGNITIVE SKILLS DEVELOPMENT: HCPCS

## 2019-03-19 PROCEDURE — 99232 SBSQ HOSP IP/OBS MODERATE 35: CPT

## 2019-03-19 PROCEDURE — 97116 GAIT TRAINING THERAPY: CPT

## 2019-03-19 RX ORDER — DOCUSATE SODIUM 100 MG/1
100 CAPSULE, LIQUID FILLED ORAL 2 TIMES DAILY
Qty: 60 CAPSULE | Refills: 0 | Status: SHIPPED | OUTPATIENT
Start: 2019-03-20 | End: 2020-01-01 | Stop reason: DRUGHIGH

## 2019-03-19 RX ORDER — AMMONIUM LACTATE 12 G/100G
CREAM TOPICAL 2 TIMES DAILY
Qty: 385 G | Refills: 0 | Status: SHIPPED | OUTPATIENT
Start: 2019-03-19 | End: 2019-06-28 | Stop reason: ALTCHOICE

## 2019-03-19 RX ORDER — RISPERIDONE 3 MG/1
TABLET, FILM COATED ORAL
Qty: 45 TABLET | Refills: 0
Start: 2019-03-19 | End: 2020-01-01

## 2019-03-19 RX ORDER — FLUPHENAZINE HYDROCHLORIDE 5 MG/1
5 TABLET ORAL 4 TIMES DAILY
Qty: 120 TABLET | Refills: 0 | Status: SHIPPED | OUTPATIENT
Start: 2019-03-19 | End: 2019-01-01 | Stop reason: DRUGHIGH

## 2019-03-19 RX ORDER — ACETAMINOPHEN 325 MG/1
650 TABLET ORAL 3 TIMES DAILY PRN
Qty: 100 TABLET | Refills: 0
Start: 2019-03-19

## 2019-03-19 RX ADMIN — DOCUSATE SODIUM 100 MG: 100 CAPSULE, LIQUID FILLED ORAL at 18:20

## 2019-03-19 RX ADMIN — ACETAMINOPHEN 975 MG: 325 TABLET ORAL at 06:03

## 2019-03-19 RX ADMIN — WARFARIN SODIUM 2.5 MG: 2.5 TABLET ORAL at 18:19

## 2019-03-19 RX ADMIN — FLUVOXAMINE MALEATE 50 MG: 50 TABLET, FILM COATED ORAL at 21:01

## 2019-03-19 RX ADMIN — Medication: at 21:00

## 2019-03-19 RX ADMIN — LIDOCAINE 1 PATCH: 50 PATCH CUTANEOUS at 08:02

## 2019-03-19 RX ADMIN — AMANTADINE HYDROCHLORIDE 100 MG: 100 CAPSULE, LIQUID FILLED ORAL at 18:18

## 2019-03-19 RX ADMIN — RISPERIDONE 1.5 MG: 3 TABLET ORAL at 08:03

## 2019-03-19 RX ADMIN — FLUVOXAMINE MALEATE 50 MG: 50 TABLET, FILM COATED ORAL at 18:19

## 2019-03-19 RX ADMIN — Medication 1 CAPSULE: at 16:40

## 2019-03-19 RX ADMIN — LOSARTAN POTASSIUM 50 MG: 50 TABLET, FILM COATED ORAL at 08:02

## 2019-03-19 RX ADMIN — RISPERIDONE 3 MG: 3 TABLET ORAL at 21:01

## 2019-03-19 RX ADMIN — VITAMIN D, TAB 1000IU (100/BT) 2000 UNITS: 25 TAB at 08:01

## 2019-03-19 RX ADMIN — TAMSULOSIN HYDROCHLORIDE 0.4 MG: 0.4 CAPSULE ORAL at 16:40

## 2019-03-19 RX ADMIN — FLUPHENAZINE HYDROCHLORIDE 5 MG: 2.5 TABLET, FILM COATED ORAL at 21:01

## 2019-03-19 RX ADMIN — FLUPHENAZINE HYDROCHLORIDE 5 MG: 2.5 TABLET, FILM COATED ORAL at 11:54

## 2019-03-19 RX ADMIN — FLUVOXAMINE MALEATE 50 MG: 50 TABLET, FILM COATED ORAL at 11:08

## 2019-03-19 RX ADMIN — ACETAMINOPHEN 975 MG: 325 TABLET ORAL at 13:59

## 2019-03-19 RX ADMIN — FLUPHENAZINE HYDROCHLORIDE 5 MG: 2.5 TABLET, FILM COATED ORAL at 16:40

## 2019-03-19 RX ADMIN — FLUPHENAZINE HYDROCHLORIDE 5 MG: 2.5 TABLET, FILM COATED ORAL at 06:03

## 2019-03-19 RX ADMIN — AMLODIPINE BESYLATE 5 MG: 5 TABLET ORAL at 08:01

## 2019-03-19 RX ADMIN — FLUVOXAMINE MALEATE 50 MG: 50 TABLET, FILM COATED ORAL at 06:03

## 2019-03-19 RX ADMIN — AMANTADINE HYDROCHLORIDE 100 MG: 100 CAPSULE, LIQUID FILLED ORAL at 08:04

## 2019-03-19 RX ADMIN — POTASSIUM CHLORIDE 10 MEQ: 750 TABLET, EXTENDED RELEASE ORAL at 08:02

## 2019-03-19 RX ADMIN — DOCUSATE SODIUM 100 MG: 100 CAPSULE, LIQUID FILLED ORAL at 08:02

## 2019-03-19 RX ADMIN — Medication: at 08:02

## 2019-03-19 RX ADMIN — FLUVOXAMINE MALEATE 50 MG: 50 TABLET, FILM COATED ORAL at 13:59

## 2019-03-19 NOTE — PLAN OF CARE
Problem: Potential for Falls  Goal: Patient will remain free of falls  Description  INTERVENTIONS:  - Assess patient frequently for physical needs  -  Identify cognitive and physical deficits and behaviors that affect risk of falls    -  [place] fall precautions as indicated by assessment   - Educate patient/family on patient safety including physical limitations  - Instruct patient to call for assistance with activity based on assessment  - Modify environment to reduce risk of injury  - Consider OT/PT consult to assist with strengthening/mobility  Outcome: Progressing     Problem: Prexisting or High Potential for Compromised Skin Integrity  Goal: Skin integrity is maintained or improved  Description  INTERVENTIONS:  - Identify patients at risk for skin breakdown  - Assess and monitor skin integrity  - Assess and monitor nutrition and hydration status  - Monitor labs (i e  albumin)  - Assess for incontinence   - Turn and reposition patient  - Assist with mobility/ambulation  - Relieve pressure over bony prominences  - Avoid friction and shearing  - Provide appropriate hygiene as needed including keeping skin clean and dry  - Evaluate need for skin moisturizer/barrier cream  - Collaborate with interdisciplinary team (i e  Nutrition, Rehabilitation, etc )   - Patient/family teaching  Outcome: Progressing     Problem: PAIN - ADULT  Goal: Verbalizes/displays adequate comfort level or baseline comfort level  Description  Interventions:  - Encourage patient to monitor pain and request assistance  - Assess pain using appropriate pain scale  - Administer analgesics based on type and severity of pain and evaluate response  - Implement non-pharmacological measures as appropriate and evaluate response  - Consider cultural and social influences on pain and pain management  - Notify physician/advanced practitioner if interventions unsuccessful or patient reports new pain  Outcome: Progressing     Problem: SAFETY ADULT  Goal: Maintain or return to baseline ADL function  Description  INTERVENTIONS:  -  Assess patient's ability to carry out ADLs; assess patient's baseline for ADL function and identify physical deficits which impact ability to perform ADLs (bathing, care of mouth/teeth, toileting, grooming, dressing, etc )  - Assess/evaluate cause of self-care deficits   - Assess range of motion  - Assess patient's mobility; develop plan if impaired  - Assess patient's need for assistive devices and provide as appropriate  - Encourage maximum independence but intervene and supervise when necessary  ¯ Involve family in performance of ADLs  ¯ Assess for home care needs following discharge   ¯ Request OT consult to assist with ADL evaluation and planning for discharge  ¯ Provide patient education as appropriate  Outcome: Progressing  Goal: Maintain or return mobility status to optimal level  Description  INTERVENTIONS:  - Assess patient's baseline mobility status (ambulation, transfers, stairs, etc )    - Identify cognitive and physical deficits and behaviors that affect mobility  - Identify mobility aids required to assist with transfers and/or ambulation (gait belt, sit-to-stand, lift, walker, cane, etc )  - [place] fall precautions as indicated by assessment  - Record patient progress and toleration of activity level on Mobility SBAR; progress patient to next Phase/Stage  - Instruct patient to call for assistance with activity based on assessment  - Request Rehabilitation consult to assist with strengthening/weightbearing, etc   Outcome: Progressing     Problem: DISCHARGE PLANNING  Goal: Discharge to home or other facility with appropriate resources  Description  INTERVENTIONS:  - Identify barriers to discharge w/patient and caregiver  - Arrange for needed discharge resources and transportation as appropriate  - Identify discharge learning needs (meds, wound care, etc )  - Arrange for interpretive services to assist at discharge as needed  - Refer to Case Management Department for coordinating discharge planning if the patient needs post-hospital services based on physician/advanced practitioner order or complex needs related to functional status, cognitive ability, or social support system  Outcome: Progressing     Problem: GASTROINTESTINAL - ADULT  Goal: Maintains or returns to baseline bowel function  Description  INTERVENTIONS:  - Assess bowel function  - Encourage oral fluids to ensure adequate hydration  - Administer IV fluids as ordered to ensure adequate hydration  - Administer ordered medications as needed  - Encourage mobilization and activity  - Nutrition services referral to assist patient with appropriate food choices  Outcome: Progressing     Problem: SKIN/TISSUE INTEGRITY - ADULT  Goal: Skin integrity remains intact  Description  INTERVENTIONS  - Identify patients at risk for skin breakdown  - Assess and monitor skin integrity  - Assess and monitor nutrition and hydration status  - Monitor labs (i e  albumin)  - Assess for incontinence   - Turn and reposition patient  - Assist with mobility/ambulation  - Relieve pressure over bony prominences  - Avoid friction and shearing  - Provide appropriate hygiene as needed including keeping skin clean and dry  - Evaluate need for skin moisturizer/barrier cream  - Collaborate with interdisciplinary team (i e  Nutrition, Rehabilitation, etc )   - Patient/family teaching  Outcome: Progressing     Problem: MUSCULOSKELETAL - ADULT  Goal: Maintain or return mobility to safest level of function  Description  INTERVENTIONS:  - Assess patient's ability to carry out ADLs; assess patient's baseline for ADL function and identify physical deficits which impact ability to perform ADLs (bathing, care of mouth/teeth, toileting, grooming, dressing, etc )  - Assess/evaluate cause of self-care deficits   - Assess range of motion  - Assess patient's mobility; develop plan if impaired  - Assess patient's need for assistive devices and provide as appropriate  - Encourage maximum independence but intervene and supervise when necessary  - Involve family in performance of ADLs  - Assess for home care needs following discharge   - Request OT consult to assist with ADL evaluation and planning for discharge  - Provide patient education as appropriate  Outcome: Progressing  Goal: Maintain proper alignment of affected body part  Description  INTERVENTIONS:  - Support, maintain and protect limb and body alignment  - Provide pt/fam with appropriate education  Outcome: Progressing

## 2019-03-19 NOTE — PROGRESS NOTES
03/19/19 1000   Pain Assessment   Pain Assessment No/denies pain   Pain Score No Pain   Restrictions/Precautions   Precautions Cognitive; Fall Risk;Supervision on toilet/commode;Spinal precautions   Braces or Orthoses C/S Collar   QI: Eating   Assistance Needed Independent   Eating CARE Score 6   Eating Assessment   Positioning Upright;Out of Bed   Safety Needs Increase Time   Meal Assessed Lunch   Intake Mode PO   Eating (FIM) 6 - Patient requires increased time or safety concern   QI: Oral Hygiene   Assistance Needed Supervision   Assistance Provided by [place] No physical assistance   Comment Pt completed grooming tasks while in stance at sink with use of RW for support  Pt reuqires supervision for safety   Oral Hygiene CARE Score 4   Grooming   Able To Initiate Tasks; Acquire Items; Wash/Dry Face;Brush/Clean Teeth;Wash/Dry Hands   Limitation Noted In Safety   Grooming (FIM) 5 - Patient requires supervision/monitoring   QI: Shower/Bathe Self   Assistance Needed Supervision;Verbal cues   Assistance Provided by Catarina No physical assistance   Comment Pt completed bathing while seated on tub/bench  Pt able to wash all body parts  Crossing leg over opposing knee to wash b/l feet  Pt ins tance with grab bar support to wash dave and buttock area  Pt utlized LH showerhead to rinse all body parts  Pt requires verbal cueing while drying off with towel  Pt attempting to stand with towel under feet and lift his legs up tod ry  Pt educated on fall risk and suggested to sit to dry feet  Post verbal instruction pt sat and completing drying off  Shower/Bathe Self CARE Score 4   Bathing   Assessed Bath Style Shower   Anticipated D/C Bath Style Tub   Able to Gather/Transport Yes   Able to [employer] Temperature Yes   Able to Wash/Rinse/Dry (body part) Left Arm;Right Arm;L Upper Leg;R Upper Leg;L Lower Leg/Foot;R Lower Leg/Foot;Chest;Abdomen;Perineal Area; Buttocks   Limitations Noted in Balance; Endurance; Coordination;Problem Solving; Safety; Sequencing;Strength   Positioning Standing;Seated   Adaptive Equipment Shower Bars;Hand Held Shower; Shower Seat   Bathing (FIM) 5 - Patient requires verbal cues but completes 10/10 parts   Tub/Shower Transfer   Adaptive Equipment Transfer Bench;Seat with Back;Grab Bars   Assessed Shower   Shower Transfer (FIM) 5 - Patient requires supervision/monitoring   QI: Upper Body Dressing   Assistance Needed Verbal cues   Assistance Provided by [place] No physical assistance   Comment Pt able to don/doff pull over shirt  Pt attempting to don button up shirt but patient with difficulty sequencing  Pt able to thread b/l UE's into button up shirt and button shirt with verbal cues to maintain spinal precautions  Upper Body Dressing CARE Score 4   QI: Lower Body Dressing   Assistance Needed Supervision   Assistance Provided by [place] No physical assistance   Comment Pt able to thread belt through pants while seated on chair  Pt able to thread b/l LE's through pants and abelt o using crossing leg method to thread pants and underwear over feet  Pt overall is at CS for pulling pants up over hips   Lower Body Dressing CARE Score 4   QI: Putting On/Taking Off [address] Needed Verbal cues   Assistance Provided by [place] No physical assistance   Comment Pt able to don EHSAN stocks from home without assistance  Pt able to put on shoes on without untying  Putting On/Taking Off Footwear CARE Score 4   Dressing/Undressing Clothing   Remove UB Clothes Pullover Shirt; Button Shirt   Remove LB Clothes Socks;Pants; [address]; [facility] [address]; Undergarment;TEDs; Shoes   Limitations Noted In Endurance   Positioning Supported Sit;Standing   UB Dressing (FIM) 5 - Patient requires verbal cues   LB Dressing (FIM) 5 - Patient requires verbal cues   QI: Sit to Stand   Assistance Needed Supervision;Verbal cues   Assistance Provided by [place] No physical assistance   Comment Pt utlizing RW for sit to stand transfers cueing for hand placement  Pt floping down in chair  Sit to Stand CARE Score 4   QI: Chair/Bed-to-Chair Transfer   Assistance Needed Supervision   Assistance Provided by [place] No physical assistance   Chair/Bed-to-Chair Transfer CARE Score 4   Transfer Bed/Chair/Wheelchair   Stand Pivot Supervision   Sit to Stand Supervision   Stand to Sit Supervision   Supine to Sit Supervision   Bed, Chair, Wheelchair Transfer (FIM) 5 - Patient requires supervision/monitoring   QI: [address] Provided by [place] No physical assistance   Toileting Hygiene CARE Score 4   Toileting   Able to [address] down yes, up yes  Able to [employer] [address] Yes   Manage Hygiene Bladder   Limitations Noted In Safety   Toileting (FIM) 5 - Patient requires supervision/monitoring   QI: Toilet Transfer   Assistance Needed Supervision   Assistance Provided by [place] No physical assistance   Toilet Transfer CARE Score 4   Toilet Transfer   Transfer Technique Standard   Limitations Noted In Balance; Endurance;Problem Solving;ROM;Safety; Sequencing;LE Strength   Adaptive Equipment Grab Bar   Toilet Transfer (FIM) 5 - Patient requires supervision/monitoring   Cognition   Overall Cognitive Status Impaired   Arousal/Participation Alert; Cooperative   Attention Within functional limits   Orientation Level Oriented X4   Memory Decreased short term memory   Following Commands Follows one step commands with increased time or repetition   Activity Tolerance   Activity Tolerance Patient tolerated treatment well   Assessment   Treatment Assessment Pt engaged in OT treatment session with focus on ADL routine  Pt continues to require supervision and verbal cues for bathing/dressing safety  Pt requires cueing for appropriate footwear and for safety in shower to prevent falls   Therapist assisted wtih changing to shower Katie collar and changing pads on Kim collar  Pt continues to be limited by increased anxious behavior and decreased confidence in self  Pt would benefit from home OT to focus on UE strength and endurance and higher level balance  Pt is safe to d/c home with supervision for all ADL/IADL tasks  Family training was compelted with son and wife  Recommend tub bench, and grab bars placed in shower  Pt    Prognosis Good   Problem List Decreased strength;Decreased endurance; Impaired balance;Decreased mobility; Impaired judgement;Decreased safety awareness;Decreased cognition   Plan   Treatment/Interventions ADL retraining;Functional transfer training;LE strengthening/ROM; Endurance training;Patient/family training;Gait training; Compensatory technique education   Progress Progressing toward goals   Recommendation   OT Discharge Recommendation 24 hour supervision/assist   OT Therapy Minutes   OT Time In 1000   OT Time Out 1100   OT Total Time (minutes) 60   OT Mode of treatment - Individual (minutes) 60   OT Mode of treatment - Concurrent (minutes) 0   OT Mode of treatment - Group (minutes) 0   OT Mode of treatment - Co-treat (minutes) 0   OT Mode of Teatment - Total time(minutes) 60 minutes   Therapy Time missed   Time missed?  No

## 2019-03-19 NOTE — PROGRESS NOTES
Physical Medicine and Rehabilitation Progress Note  Yash Villalta  68 y o  male MRN: [id number]  Unit/Bed#: -01 Encounter: [id number]    Chief Complaints:  Decline in function    Subjective/Interval Events:   Patient reports sleeping well overnight  He notes still having negative thoughts that perhaps staff have it out for him at times but he knows this is not real   He reports that he is still hopeful  Patient reports looking forward to going home tomorrow  Patient felt better after performing some cognitive behavioral therapy techniques  Patient notes sometimes feeling a little bit cloudy but without visual changes, changes in strength or sensation  He denies fever, chills, abdominal pain, calf pain, or other complaints  ROS: A 10-point ROS was performed  Negative except as listed above  Overall Assessment/relevant history:  Yash Villalta  is a 68 y o  male with a past medical history schizophrenia, anxiety, depression, coronary artery disease, possible stroke, DVT/PE on chronic anticoagulation with Coumadin, obesity class 1, hypertension, urinary frequency on chronic Flomax, lightheadedness, occasional falls, memory impairment, left knee replacement, right total hip replacement, cervical laminectomy and fusion who fell backwards down stairs with brief few minutes loss of consciousness with neck and possible head trauma who was brought to [facility] initially and then transferred to [facility] on 3/3/19  CT head did not show acute findings, CT neck showed C5/C6 osteophyte fracture  Neurosurgery was consulted who recommended non operative management with cervical collar  Patient was complaining of some neck, back, and parasternal pain which were all believed to be related to trauma  Additional imaging of facial bones, thoracic and lumbar spine did not reveal acute fractures but did show chronic L3 compression fracture    Patient noted to have some increased difficulty sleeping and anxiety    Patient was evaluated by skilled therapies and was found to have significant decline in ADLs and ambulation appropriate for admission to [facility]      73-year-old male status post fall with brief loss of consciousness found to have C6-C7 vertebral osteophyte fracture and possible TBI presents with likely multifactorial imbalance possibly related to prior stroke, spinal stenosis, and now head injury, intermittent lightheadedness, acute on chronic cognitive deficits, suboptimal control of anxiety, depression, sleep, and schizophrenia with associated functional decline in ADLs and ambulation      Functional status (recent):    Transfers min assist to supervision, ambulation supervision    Functional status on admission to [facility]:  PT: Transfers mod assist  OT: Dressing mod assist, Bathing, grooming min assist   ST: Problem solving and memory mod assist; comprehension min assist; expression supervision, social interaction supervision    Schizophrenia ([facility] [address] )  Assessment & Plan  Stable degree of paranoid/delusional thoughts   >Continue recently increased Fluphenazine to QID 3/14 as previously discussed with outpt psych  >Follow-up with outpt psych Dr Tim Gómez after d/c > family trying to move up appt  >Continue CBT techniques; recommend outpt psychology as well   >Obtain orthostatics  Recent improvements in degree of depression and anxiety; sleep stable   >current mgmt plan   >Fluphenazine increase to 5mg QID 3/14   >Risperdone 1 5mg qAM and 3mg QHS    >Fluvoxamine 50mg 5x/day    >Amantadine at home 100mg BID      >Suspect recent acute worsening related to be off home regimen which suspect will improve with reintroduction of medications at appropriate dosing; however patient was having some increased paranoia and perseveration which started a few days prior to fall (3 weeks after switching from from Trifluoperazine to fluphenazine and Cogentin stopped; family states he has been trialed in past off Trifluoperazine and could not tolerate switch to other meds  >Monitor closely with nursing and staff; Neuropsych c/s and following;   >Per outpt psych could increase fluphenazine to 5mg QID if needed > which I think would be reasonable next if needed  >If still fails consider obtaining and going back on Trifluoperazine but this was difficult due to decreased manufacturing apparently   >Some concern patient may have not been providing himself medications appropriately > recommend family training and assist c/ med mgmt    >Discussed with patient's outpt psych Dr Bharath Velazquez 3/6  Patient presented c/ fair amount of paranoid delusions, anxiety, difficulty sleeping with occasional panic attacks off home regimen since recent admission prior, with recent fall/hospitalization possible head injury  Medications reviewed with his outpt pharmacy and patient's outpatient psychiatrist Dr Bharath Velazquez  Patient recently should switched from Trifluoperazine to fluphenazine and Cogentin stopped  Patient/family noted some increased paranoid delusions starting a few days prior to fall  Home regimen per pharmacy:  Fluphenazine 5mg TID, Risperdal 1 5mg qday and 3mg QHS  Amantadine 100 mg b i d , Fluvoxamine 50mg 5x/day  Patient recently on much more modest dosing s/p recent possible BI         * Head injury, acute  Assessment & Plan  Fall with likely head trauma and brief loss of consciousness and acute osteophyte C6-C7 fracture  Mild increased confusion after fall also with some worsening mood/schizophrenia symptoms  >Recommend acute comprehensive interdisciplinary inpatient rehabilitation to include intensive skilled therapies (PT, OT) as outlined with oversight and management by rehabilitation physician as well as inpatient rehab level nursing, case management and weekly interdisciplinary team meetings     > overstimulation precautions, sleep-wake/agitation restlessness log  > optimize neuroleptic medication  > try to avoid sedative medications  > evaluate and manage fall risks          Cognitive impairment  Assessment & Plan  Likely premorbid; chronic generalized microvascular changes with possible old right lacunar internal capsule infarct and possible right frontal WM   >speech management  >Follow-up with Neurology after discharge  >On chronic home amantadine   >Recommend family assist with med mgmt after d/c    Fracture of sixth cervical vertebra [facility])  Assessment & Plan  C6/C7 osteophyte fracture context of recent fall and history of cervical decompression and fusion  Treatment recommendations per Neurosurgery  Non operative; continue cervical collar per Neurosurgery  Repeat C spine X-ray 3/19 ordered   Follow up with Neurosurgery after discharge    History of stroke  Assessment & Plan  Patient/family deny although evidence of possible old R lacunar infarct and focus in R frontal WM along with generalized micoangiopathic changes  >Recommend optimal BP mgmt  >Follow-up with neuro after d/c   >On full A/C for hx of VTE    Lightheadednessresolved as of 3/13/2019  Assessment & Plan  No recent reports  Chronic intermittent; consider med adjustments  Orthostatics negative recently; if necessary repeat particularly with adjustments in meds   PRN abdominal binder and EHSAN hose      Vascular skin disease  Assessment & Plan  Chronic venous insufficiency of BLE with some edema  >Podiatry c/s appreciated; EHSAN hose during day; Elevate when possible  >Lac-Hytrin cream BID  >Monitor for skin breakdown    Vitamin D insufficiency  Assessment & Plan  D3 2000 units qday     History of pulmonary embolism  Assessment & Plan  And DVT on chronic anticoagulation with Coumadin  Medicine consult to manage during acute rehab course  Regimen adjusted       Pain  Assessment & Plan  Remains well controlled  Continue scheduled Tylenol for now  Lidoderm patch  Avoid NSAIDs and avoid opiates     Class 1 obesity due to excess calories without serious comorbidity with body mass index (BMI) of 34 0 to 34 9 in adult  Assessment & Plan   on appropriate nutrition and activity  Adjustments accordingly during skilled therapy and with rehab nursing  Monitor skin closely for breakdown, wounds, rashes; keep clean and dry, turning Q2H   Follow-up with PCP after d/c      Chronic diastolic heart failure ([facility] [address] )  Assessment & Plan  Mgmt per IM     Chronic venous stasis dermatitis of both lower extremities  Assessment & Plan  Internal medicine consult to assist with management  Elevate legs when in bed and when in chair for prolonged periods of time  Consider Wilver veronica    Coronary artery disease involving native coronary artery of native heart without angina pectoris  Assessment & Plan  On chronic Coumadin  Optimal blood pressure control  Follow-up with PCP    Benign essential hypertension  Assessment & Plan  Medicine c/s to assist with mgmt  Amlodipine 5 mg daily, losartan 50 mg daily  Monitor blood pressure and orthostatics    Hypokalemiaresolved as of 3/18/2019  Assessment & Plan  Resolved 3/18    # Bowel care:    - monitor for constipation, incontinence, and diarrhea  - goal 1 appropriate BM every 1-2 days  - recommend colace +/- senna and PRN bowel protocol     # Bladder care:   On chronic Flomax;  - monitor for retention, incontinence, signs/symptoms of UTI  - recommend voiding trial; nursing prompt to void followed by bladder scan starting Q4-6H or after each patient initiated void; nursing to record voided output and bladder scan totals; straight cath PRN >350-400 cc; if post void residual bladder scans are <150 cc x3 consecutive voids, can stop scans      # Skin:   - Nursing to turn patient Q2H if not adequately ambulatory, monitor for skin breakdown, rashes, and wounds if applicable     # At risk for venous thromboembolism:  - Recommend SCDs and mobilization  - warfarin     # Diet/Hydration:    Dysphagia 3 thin - recommend speech therapy     Disposition:   Home with estimated length of stay to Wed     Follow-up:   PCP, Psychiatry, Psychology rehab, Neurosurgery, Neurology     CODE: Level 1: Full Code     Restrictions include: Fall precautions         ---------------------------------------------------------------------------------------------------------------------    Objective: Allergies and Medications per EMR    Physical Exam:  Vitals:    03/19/19 0512   BP: 154/78   Pulse: 90   Resp: 18   Temp: 98 2 °F (36 8 °C)   SpO2: 91%     General: Awake, alert in NAD  HENT:  MMM, cervical collar in place   Respiratory: Unlabored breathing, breath sounds equal, Lungs CTA, no wheezes, rales, or rhonchi  Cardiovascular: Regular rate and rhythm, no murmurs, rubs, or gallops  Gastrointestinal: Soft, non-tender, non-distended, normoactive bowel sounds  Genitourinary: No sandhu  SkiN/MSK/Extremities:  No calf edema or calf tenderness to palpation  Neurologic/Psych:   MENTAL STATUS: grossly stable  Affect: Flattened    Diagnostic Studies: reviewed, no new imaging  No results found      Laboratory:    Results from last 7 days   Lab Units 03/18/19 0449 03/14/19  0455   HEMOGLOBIN g/dL 13 6 13 3   HEMATOCRIT % 43 2 40 9   WBC Thousand/uL 5 59 5 20     Results from last 7 days   Lab Units 03/18/19 0449 03/14/19  0455   BUN mg/dL 8 12   POTASSIUM mmol/L 3 5 3 4*   CHLORIDE mmol/L 100 99*   CREATININE mg/dL 0 76 0 71     Results from last 7 days   Lab Units 03/18/19 0449 03/16/19  0520 03/14/19  0455   PROTIME seconds 32 5* 30 8* 30 0*   INR  3 17* 2 95* 2 86*        Patient Active Problem List   Diagnosis    Benign essential hypertension    Coronary artery disease involving native coronary artery of native heart without angina pectoris    Chronic depression    Chronic venous stasis dermatitis of both lower extremities    Deep venous thrombosis of lower extremity (HCC)    Chronic diastolic heart failure (HCC)    Osteoarthritis    Nocturnal enuresis    Idiopathic trigeminal neuralgia  Lacunar infarction    Class 1 obesity due to excess calories without serious comorbidity with body mass index (BMI) of 34 0 to 34 9 in adult    Schizophrenia (HCC)    Disability of walking    Pulmonary embolism (HCC)    Arthropathy    Mixed hyperlipidemia    Neurogenic claudication    Neck pain without injury    Abdominal wall hematoma    Spinal stenosis of lumbar region with neurogenic claudication    Lipoma of colon    Fracture of sixth cervical vertebra (HCC)    Rib pain on left side    Fall    Forgetfulness    Physical deconditioning    Head injury, acute    Pain    History of pulmonary embolism    Cognitive impairment    History of stroke    Vitamin D insufficiency    Vascular skin disease       ** Please Note: Fluency Direct voice to text software may have been used in the creation of this document  **    Total visit time: At least 25 minutes, with more than 50% spent counseling/coordinating care  Counseling includes discussion with patient re: progress in therapies, functional issues observed by therapy staff, and discussion with patient regarding their current medical state and wellbeing  Coordination of patient's care was performed in conjunction with Internal Medicine service to monitor patient's vitals, labs, and management of their comorbidities        King Quesada MD, [facility]  Physical Medicine and Rehabilitation  Brain Injury Medicine

## 2019-03-19 NOTE — PROGRESS NOTES
03/19/19 1230   Pain Assessment   Pain Assessment 0-10   Restrictions/Precautions   Precautions Cognitive; Fall Risk;Spinal precautions;Supervision on toilet/commode   Braces or Orthoses C/S Collar   Cognition   Overall Cognitive Status Impaired   Subjective   Subjective pt states feeling ok today   QI: Roll Left and Right   Assistance Needed Supervision   Assistance Provided by [place] No physical assistance   Roll Left and Right CARE Score 4   QI: Sit to [address] Provided by [place] No physical assistance   Sit to Lying CARE Score 4   QI: Lying to Sitting on Side of Bed   Assistance Needed Supervision   Assistance Provided by [place] No physical assistance   Lying to Sitting on Side of Bed CARE Score 4   QI: Sit to [address] Provided by [place] No physical assistance   Sit to Stand CARE Score 4   QI: Chair/Bed-to-Chair Transfer   Assistance Needed Supervision; Adaptive equipment   Assistance Provided by [place] No physical assistance   Comment RW   Chair/Bed-to-Chair Transfer CARE Score 4   Transfer Bed/Chair/Wheelchair   Limitations Noted In Balance; Coordination;LE Strength   Adaptive Equipment Roller Walker   All Transfer Supervision   Findings CS/CGA without RW for safety   Bed, Chair, Wheelchair Transfer (FIM) 5 - Patient requires supervision/monitoring   QI: Car Transfer   Assistance Needed Verbal cues; Supervision   Assistance Provided by [place] No physical assistance   Car Transfer CARE Score 4   QI: Walk 10 Feet   Assistance Needed Supervision; Adaptive equipment   Assistance Provided by [place] No physical assistance   Walk 10 Feet CARE Score 4   QI: Walk 50 Feet with Two Turns   Assistance Needed Supervision; Adaptive equipment   Assistance Provided by [place] No physical assistance   Walk 50 Feet with Two Turns CARE Score 4   QI: Walk 150 Feet   Assistance Needed Supervision; Adaptive equipment   Assistance Provided by [place] No physical assistance   Walk 150 Feet CARE Score 4   QI: Walking 10 Feet on Uneven Surfaces   Reason if not Attempted Safety concerns   Walking 10 Feet on Uneven Surfaces CARE Score 88   Ambulation   Does the patient walk? 2  Yes   Primary Discharge Mode of Locomotion Walk   Walk Assist Level Close Supervision   Gait Pattern Inconsistant Shilpa; Slow Shilpa;Decreased foot clearance; Improper weight shift; Forward Flexion   Assist Device Favianer Erin Hatfield Walked (feet) 350 ft   Limitations Noted In Balance;Speed;Strength   Findings practiced 350' with SPC x2 and 150' x1 with no AD for balance training   Walking (FIM) 5 - Patient requires supervision/monitoring AND distance 150 feet or more, no rest   QI: Wheel 50 Feet with Two Turns   Reason if not Attempted Activity not applicable   Wheel 50 Feet with Two Turns CARE Score 9   QI: Wheel 150 Feet   Reason if not Attempted Activity not applicable   Wheel 397 Feet CARE Score 9   Wheelchair mobility   QI: Does the patient use a wheelchair? 0  No   QI: 1 Step (Curb)   Assistance Needed Verbal cues; Supervision; Adaptive equipment   Assistance Provided by [place] No physical assistance   1 Step (Curb) CARE Score 4   QI: 4 Steps   Assistance Needed Incidental touching; Adaptive equipment   Assistance Provided by [place] No physical assistance   Comment SPC and HR   4 Steps CARE Score 4   QI: 12 Steps   Assistance Needed Incidental touching; Adaptive equipment   Assistance Provided by [place] No physical assistance   Comment SPC and HR   12 Steps CARE Score 4   Stairs   Type Stairs   # of Steps 12   Weight Bearing Precautions Fall Risk   Assist Devices Single Rail;Cane   Findings pt wanted to practice steps during session, completed FF x3 t/o session    once performed with pt and pt's son   Stairs (FIM) 4 - Patient requires steadying assist or light touching AND patient goes up and down full flight (12- 14 stairs)   Therapeutic Interventions   Strengthening unsupported STS x10 for functional strengthening and balance  Equipment Use   NuStep 10mins L2   Assessment   Treatment Assessment pt functioning at  with RW for d/c home  pt practiced FF during session with including family training with son  reviewed home recommendations for home in regards to stair training and performing with son at this time for safety and performing only when needed  gave a HEP to review at home couple times a day in addition to home therapy  discussed with pt what to review with home therapy when there as well to improve functional ind   pt reported at start of session they had second walker for at home and can cancel order  pt to return home at Sup level using RW to decrease fall risk  pt to use SPC on steps only and amb with cane with therapy only  pt to cont focus on balance, safety and activity tolerance to progress with functional mobility and Ind  Problem List Decreased strength;Decreased endurance; Impaired balance;Decreased mobility; Impaired judgement;Decreased safety awareness;Decreased cognition   Barriers to Discharge Inaccessible home environment   PT Barriers   Physical Impairment Decreased strength;Decreased endurance; Impaired balance;Decreased mobility;Orthopedic restrictions;Pain;Decreased coordination;Decreased safety awareness   Functional Limitation Walking;Transfers;Standing;Stair negotiation;Car transfers   Plan   Treatment/Interventions Functional transfer training;LE strengthening/ROM; Endurance training;Patient/family training;Gait training   Progress Progressing toward goals   Recommendation   Recommendation 24 hour supervision/assist;Home PT; Home with family support   PT Therapy Minutes   PT Time In 1230   PT Time Out 1400   PT Total Time (minutes) 90   PT Mode of treatment - Individual (minutes) 90   PT Mode of treatment - Concurrent (minutes) 0   PT Mode of treatment - Group (minutes) 0   PT Mode of treatment - Co-treat (minutes) 0   PT Mode of Teatment - Total time(minutes) 90 minutes   Therapy Time missed   Time missed?  No

## 2019-03-19 NOTE — PROGRESS NOTES
03/19/19 0900   Pain Assessment   Pain Assessment No/denies pain   Restrictions/Precautions   Precautions Cognitive; Fall Risk;Supervision on toilet/commode   Braces or Orthoses C/S Collar   Memory Skills   Memory (FIM) 4 - Recalls 2 of 3 steps  (mod A given higher level tasks)   Social Interaction (FIM) 5 - Interacts appropriately with others 90% of time   Speech/Language/Cognition Assessmetn   Treatment Assessment Session began w/ review of current medication list which pt is presently on while on the [facility]  Pt was able to accurately ID medication that were taken at home (total of 9) vs  New medications (total of 6) presented in list  Pt was 9/9 accurate in ability to verbalize reason for all home medications, but was only 3/6 accurate in ability to verbalize reason for taking new medications  SLP continued to educate pt in allowing assistance when filling out pill box once at home w/ family (son vs  Wife) to ensure correct amounts are placed in the appropriate time slots on the pill box  Pt stated that he was not taking "correct" amounts given the times to be taken  Pt demonstrating insight to errors, but SLP again re-educating on assistance in completing medication management task  Remainder of session focusing on STM recall tasks  Completing visual recall task given picture scenes, in which pt's ability to recall items was 9/10 accurate and upon repetition given a new picture, pt was 9/11 accurate w/ recalling items in picture  When given auditory recall given 4 words to recall given category inclusion, pt was 12/16 accurate, increasing to 14/16 when given semantic cues and 16/16 given repetition cues  Pt then challenged given short paragraph to recall 2-3 items within paragraphs  Pt was 10/13 accurate in recalling items and then increasing to 13/13 given semantic cues  Last challenge was given 4 words to recall items given category exclusion   Pt was 4/8 accurate to recall items, needing repetition cues to increased ability to recall to 8/8  Upon d/w pt, pt wishes to continue ST services at time of d/c due to pt stating that skills are "still not where they should be " Will speak to CM to include home ST services upon d/c to continue to maximize skills at this time  SLP Therapy Minutes   SLP Time In 0900   SLP Time Out 1000   SLP Total Time (minutes) 60   SLP Mode of treatment - Individual (minutes) 60   SLP Mode of treatment - Concurrent (minutes) 0   SLP Mode of treatment - Group (minutes) 0   SLP Mode of treatment - Co-treat (minutes) 0   SLP Mode of Teatment - Total time(minutes) 60 minutes   Therapy Time missed   Time missed?  No   Daily FIM Score   Problem solving (FIM) 4 - Solves basic problems 75-89% of time   Comprehension (FIM) 5 - Understands basic directions and conversation   Expression (FIM) 5 - Needs help/cues only RARELY (< 10% of the time)

## 2019-03-19 NOTE — SOCIAL WORK
Speech also requesting cont'd home services, antwon ecin'd to [facility] asking for the service to be added to the referral

## 2019-03-19 NOTE — PROGRESS NOTES
Internal Medicine Progress Note  Patient: Donzell Runner  Age/sex: 68 y o  male  Medical Record #: [id number]      ASSESSMENT/PLAN:  Donzell Runner  is seen and examined and management for following issues:      C6-7 osteophyte fracture: Pain control per primary service  F/u NS on d/c; etio of fall unclear      HTN: stable; continue Amlodipine 5mg daily and Losartan 50mg daily as at home       History of DVT/PE/IVC filter: on life long AC  Initial DVT provoked due to knee surgery but PE not provoked  PCP manages AC as OP and was on 5mg qd  Continue Coumadin = on 3/12/19, adjusted dosing from 2 5 x 3 and 5 x 4 to 2 5 x4 and 5 x 3  INR  Yesterday was 3 17 =  held x 1 and reduced dose to 5 x 1 and 2 5 x 6  INR tomorrow 3/20/19  BPH: Continue Flomax as at home      Paranoid Schizophrenia: sees Dr Alpa Calderon as OP  Continue Risperdal, Luvox, Prolixin  Prolixin was added recently  Hx Diastolic CHF:  Controlled w/o diuretics;  he has mild chronic LE edema by hx    Hypokalemia:  Kdur 40 meq x 1 3/14  Gave 20 meq KCL yesterday and started 10 meq qd  Dispo:  3/20/19 home;  Continue Flomax, Cozaar 50mg qd, Norvasc 5mg qd, Kdur 10 meq qd (new), Vitamin D  BMP Thursday along with INR      Subjective:  denies any complaints      ROS:   GI: denies abdominal pain, change bowel habits or reflux symptoms  Neuro: No new neurologic changes  Respiratory: No Cough, SOB  Cardiovascular: No CP, palpitations   Psych:  No depression or anxiety    Scheduled Meds:    Current Facility-Administered Medications:  acetaminophen 650 mg Oral BID PRN Negin Pickett MD   acetaminophen 975 mg Oral BID (AM & Afternoon) Negin Pickett MD   amantadine 100 mg Oral BID Negin Pickett MD   amLODIPine 5 mg Oral Daily Negin Pickett MD   ammonium lactate  Topical BID Mliss Mems, DPM   b complex-vitamin C-folic acid 1 capsule Oral Daily With Moni Becker MD   bisacodyl 10 mg Rectal Daily PRN Melanie Devlin DO   cholecalciferol 2,000 Units Oral Daily Rafael Gustafson, MD   docusate sodium 100 mg Oral BID Rafael Gustafson, MD   fluPHENAZine 5 mg Oral 4x Daily Rafael Gustafson, MD   fluvoxaMINE 50 mg Oral 5x Daily Rafael Gustafson, MD   lidocaine 1 patch Topical Daily Rafael Gustafson, MD   losartan 50 mg Oral Daily Rafael Gustafson, MD   ondansetron 4 mg Oral Q8H PRN Rafael Gustafson, MD   polyethylene glycol 17 g Oral Daily PRN Rafael Gustafson, MD   potassium chloride 10 mEq Oral Daily MATY Pang   risperiDONE 0 5 mg Oral BID PRN Rafael Gustafson, MD   risperiDONE 1 5 mg Oral Daily Rafael Gustafson, MD   risperiDONE 3 mg Oral HS Rafael Gustafson, MD   tamsulosin 0 4 mg Oral Daily With Yaneli Christie MD   warfarin 2 5 mg Oral Daily (warfarin) MATY Pang   [START ON 3/20/2019] warfarin 5 mg Oral Daily (warfarin) MATY Pang       Labs:     Results from last 7 days   Lab Units 03/18/19  0449 03/14/19  0455   WBC Thousand/uL 5 59 5 20   HEMOGLOBIN g/dL 13 6 13 3   HEMATOCRIT % 43 2 40 9   PLATELETS Thousands/uL 287 241     Results from last 7 days   Lab Units 03/18/19  0449 03/14/19  0455   SODIUM mmol/L 139 138   POTASSIUM mmol/L 3 5 3 4*   CHLORIDE mmol/L 100 99*   CO2 mmol/L 32 32   BUN mg/dL 8 12   CREATININE mg/dL 0 76 0 71   CALCIUM mg/dL 8 7 8 5         Results from last 7 days   Lab Units 03/18/19  0449 03/16/19  0520   INR  3 17* 2 95*            [unfilled]    Labs reviewed    Physical Examination:  Vitals:   Vitals:    03/18/19 0445 03/18/19 1319 03/18/19 1956 03/19/19 0512   BP: 145/75 116/55 126/65 154/78   BP Location: Left arm Right arm Right arm Left arm   Pulse: 89 83 72 90   Resp: 20 16 18 18   Temp: 98 1 °F (36 7 °C) 98 °F (36 7 °C) 97 9 °F (36 6 °C) 98 2 °F (36 8 °C)   TempSrc: Oral Oral Oral Oral   SpO2: 93% 94% 95% 91%   Weight:       Height:         Constitutional:  NAD; pleasant; nontoxic   Neck: collar on  CV:  +S1, S2;  RRR; no rub/murmur  Pulmonary:  BBS without crackles/wheeze/rhonci; resp are unlabored  Abdominal:  soft, +BS, ND/NT  Skin:  no rashes  Musculoskeletal:  trace LLE edema  :  no sandhu  Neurological:  AAO;  BURGOS 5/5        [ X ] Total time spent: 30 Mins and greater than 50% of this time was spent counseling/coordinating care  ** Please Note: Dragon 360 Dictation voice to text software may have been used in the creation of this document   **

## 2019-03-20 VITALS
BODY MASS INDEX: 30.01 KG/M2 | HEIGHT: 65 IN | DIASTOLIC BLOOD PRESSURE: 66 MMHG | TEMPERATURE: 97.8 F | HEART RATE: 78 BPM | OXYGEN SATURATION: 92 % | WEIGHT: 180.12 LBS | RESPIRATION RATE: 19 BRPM | SYSTOLIC BLOOD PRESSURE: 120 MMHG

## 2019-03-20 PROBLEM — Z79.01 ANTICOAGULATED ON WARFARIN: Status: ACTIVE | Noted: 2019-03-20

## 2019-03-20 LAB
INR PPP: 2.36 (ref 0.86–1.17)
INR PPP: 2.36 (ref 0.86–1.17)
PROTHROMBIN TIME: 25.9 SECONDS (ref 11.8–14.2)

## 2019-03-20 PROCEDURE — 85610 PROTHROMBIN TIME: CPT | Performed by: NURSE PRACTITIONER

## 2019-03-20 PROCEDURE — 99239 HOSP IP/OBS DSCHRG MGMT >30: CPT

## 2019-03-20 RX ORDER — POTASSIUM CHLORIDE 750 MG/1
10 TABLET, EXTENDED RELEASE ORAL DAILY
Qty: 30 TABLET | Refills: 0 | Status: SHIPPED | OUTPATIENT
Start: 2019-03-21 | End: 2019-04-16 | Stop reason: SDUPTHER

## 2019-03-20 RX ORDER — WARFARIN SODIUM 5 MG/1
TABLET ORAL
Qty: 30 TABLET | Refills: 0 | Status: SHIPPED | OUTPATIENT
Start: 2019-03-20 | End: 2019-04-16 | Stop reason: SDUPTHER

## 2019-03-20 RX ADMIN — POTASSIUM CHLORIDE 10 MEQ: 750 TABLET, EXTENDED RELEASE ORAL at 08:48

## 2019-03-20 RX ADMIN — LOSARTAN POTASSIUM 50 MG: 50 TABLET, FILM COATED ORAL at 08:49

## 2019-03-20 RX ADMIN — VITAMIN D, TAB 1000IU (100/BT) 2000 UNITS: 25 TAB at 08:48

## 2019-03-20 RX ADMIN — FLUPHENAZINE HYDROCHLORIDE 5 MG: 2.5 TABLET, FILM COATED ORAL at 06:05

## 2019-03-20 RX ADMIN — RISPERIDONE 1.5 MG: 3 TABLET ORAL at 08:48

## 2019-03-20 RX ADMIN — FLUVOXAMINE MALEATE 50 MG: 50 TABLET, FILM COATED ORAL at 10:35

## 2019-03-20 RX ADMIN — DOCUSATE SODIUM 100 MG: 100 CAPSULE, LIQUID FILLED ORAL at 08:49

## 2019-03-20 RX ADMIN — ACETAMINOPHEN 975 MG: 325 TABLET ORAL at 06:05

## 2019-03-20 RX ADMIN — AMANTADINE HYDROCHLORIDE 100 MG: 100 CAPSULE, LIQUID FILLED ORAL at 08:47

## 2019-03-20 RX ADMIN — FLUPHENAZINE HYDROCHLORIDE 5 MG: 2.5 TABLET, FILM COATED ORAL at 10:35

## 2019-03-20 RX ADMIN — AMLODIPINE BESYLATE 5 MG: 5 TABLET ORAL at 08:48

## 2019-03-20 RX ADMIN — Medication: at 08:49

## 2019-03-20 RX ADMIN — FLUVOXAMINE MALEATE 50 MG: 50 TABLET, FILM COATED ORAL at 06:05

## 2019-03-20 NOTE — NURSING NOTE
Pt stable for discharge  Under supervision level to home   Pt and son and wife given instructions  All understands son will be managing meds at home  Aware that pt takes meds whole with applesauce   Pt no complaints

## 2019-03-20 NOTE — CASE MANAGEMENT
Team Discharge Summary  Pt made good progress and returned home, with familial supports  Pt will continue therapies, OT/PT/Speech and RN, with [facility]  Information was placed on d/c instructions  Family was present for d/c, and is aware of Pts functional ability

## 2019-03-20 NOTE — PLAN OF CARE
Problem: Potential for Falls  Goal: Patient will remain free of falls  Description  INTERVENTIONS:  - Assess patient frequently for physical needs  -  Identify cognitive and physical deficits and behaviors that affect risk of falls    -  Skippers fall precautions as indicated by assessment   - Educate patient/family on patient safety including physical limitations  - Instruct patient to call for assistance with activity based on assessment  - Modify environment to reduce risk of injury  - Consider OT/PT consult to assist with strengthening/mobility  Outcome: Adequate for Discharge

## 2019-03-20 NOTE — DISCHARGE INSTRUCTIONS
Please see your doctors listed in the follow up providers section of your discharge paperwork, and take the discharge paperwork with you to your appointments  Should you develop feelings of significant hopelessness, severe depression, severe anxiety, or suicide you should call 911 or obtain transportation to nearest ER  [place] Suicide Prevention Lifeline is [phone] and is available 24 hrs/day  If you have any questions or concerns regarding your acute rehabilitation stay including issues with medications, rehabilitation, and follow-up plan, please call:   [address] [place] [address] at [phone]  You should have the following blood work done after discharge:  PT/INR Fri 3/22/19 and Tues 3/26/19 > follow-up results with PCP to be sure you are on appropriate dose of warfarin  [facility] 3/26/19 > follow-up results with PCP to be sure you are on appropriate dose of potassium    Coumadin/Warfarin instructions: You have been prescribed warfarin (coumadin) > Currently 2 5mg Sun, Mon, Tues, Thurs, Friday, Saturday and 5mg Wednesday  This medication is used to prevent clots  Too much or too little coumadin can lead to serious and even life-threatening complications such as severe bleeding, clots, and strokes  Your PT/INR lab goal is 2-3  You will need to have your PT/INR lab drawn frequently at first and followed closely by your outpatient doctor  Check with your outpatient warfarin provider to ensure your warfarin dose does not need to be adjusted  It is not uncommon to need changes in warfarin dosing particularly early on  If you notice black stools, bloody stools, vomit blood, develop new weakness, slurred speech, confusion or have any other concerning symptoms call 911 or obtain transportation to nearest emergency room immediately        Contact PCP for follow-up management as you have done in past     Should you develop fevers, chills, new weakness, changes in sensation, difficulty speaking, facial weakness, confusion, or other concerning symptoms please call 911 and/or obtain transportation to nearest ER immediately  Please note you are restricted from driving/operating a motorized vehicle/operating heavy machinery/etc     You are restricted from driving until cleared by outpatient physician and cleared through a formal driving evaluation  Please note changes may have been made to your medications please refer to your discharge paperwork for your current medications and take this list with you to all your doctors appointments for your doctors to review  Please do not resume a home medication unless the medication reconciliation sheet indicates to do so, please do not assume that a medication that you were given a prescription for is the same as a medication you have at home based on both medications having the same name as dosages and frequency may have changed  Do not drink alcohol while on current medications as this can increase your risk of injury or severe complication  Please check your blood pressure prior to taking your blood pressure medications and keep a log that you will bring with you to your follow-up doctors' appointments  >Please contact your family doctor or cardiologist immediately for a blood pressure below 100/50 and do not take your blood pressure medications until speaking with them  >Please contact your family doctor or cardiologist as soon as possible for blood pressure greater than 160/100    Please avoid NSAID (including but not limited to advil, aleve, motrin, naproxen, ibuprofen, mobic, meloxicam, diclofenac etc) medications as NSAID medications may increase your risk of bleeding (which can be life-threatening)        Please wear your cervical collar at all times until [facility] by [facility]    Please follow your spine precautions as instructed until cleared by [facility]    No pushing/pulling/lifting greater than 5-10 lbs until cleared by [facility]    Unless specifically noted in your medication list provided to you in your discharge paper work, please discuss with your family doctor prior to resuming any vitamins, minerals, supplements you may have been taking prior to your hospitalization     Please note a summary of your hospital stay with relevant information for your doctors will try to be sent to them  Please confirm with your doctors at your follow up visits that they have received this summary and have them contact [address] [place] [address] if they have not received them along with any other medical records they may require  Carine Dyer Phone Number:  [phone]    Cervical Fracture   WHAT YOU NEED TO KNOW:   · A cervical fracture is a break in 1 or more of the 7 cervical vertebrae (bones) in your neck  Cervical vertebrae support your head and allow your neck to bend and twist  The vertebrae enclose and protect the spinal cord, which controls your ability to move  Cervical fractures can happen after injuring the neck in motor vehicle accidents, falls, diving, and contact sports  Because a cervical fracture can also harm the spinal cord, it can be a very serious injury  DISCHARGE INSTRUCTIONS:   Medicines:   · Pain medicine: You may be given medicine to take away or decrease pain  Do not wait until the pain is severe before you take your medicine  · Take your medicine as directed  Contact your healthcare provider if you think your medicine is not helping or if you have side effects  Tell him or her if you are allergic to any medicine  Keep a list of the medicines, vitamins, and herbs you take  Include the amounts, and when and why you take them  Bring the list or the pill bottles to follow-up visits  Carry your medicine list with you in case of an emergency    Follow up with your spine specialist or healthcare provider as directed:  Write down your questions so you remember to ask them during your visits  Skin and brace care:  Skin breakdown can lead to deep wounds caused by pressure or pulling on your skin  Check your chin, ears, back of your head, and shoulders for redness or sores if you are wearing a brace  Check the skin daily around halo brace pins for signs of infection, such as redness or bad-smelling drainage  Change your vest lining if it gets wet  Ask your healthcare provider how to care for your halo pins and vest  Ask your healthcare provider for more information about using a halo brace, semirigid collar, or soft collar  Therapy: A physical therapist and an occupational therapist may exercise your arms, legs, and hands  They may also teach you new ways to do things around the house  A speech therapist may work with you to help you talk or swallow  Wound care:  Ask your healthcare provider to show you how to care for your wound  Contact your spine specialist or healthcare provider if:   · You have a fever  · You see a skin rash, redness, or sores under your brace  · You have problems swallowing while you are wearing your halo brace  · Your neck pain is not getting better even with treatment  · You have questions or concerns about your cervical fracture, medicine, or care  Seek care immediately or call 911 if:   · You have a sudden, severe headache with nausea and vomiting  · You are seeing double or cannot see out of 1 eye  · You cannot stay awake  · The pins in your halo brace have loosened or look deeper in the skin than before  · You feel new weakness or numbness in your hands or fingers  · You are short of breath  · You cannot feel or move your arms or legs  · You cough up blood  · You feel lightheaded, short of breath, and have chest pain  You cough up blood  · You feel lightheaded, short of breath, and have chest pain      · Your arm or leg feels warm, tender, and painful  It may look swollen and red  © 2017 [address] [employer] Information is for End User's use only and may not be sold, redistributed or otherwise used for commercial purposes  All illustrations and images included in CareNotes® are the copyrighted property of [employer]  or Dawit Hdez  The above information is an  only  It is not intended as medical advice for individual conditions or treatments  Talk to your doctor, nurse or pharmacist before following any medical regimen to see if it is safe and effective for you  Schizophrenia   WHAT YOU NEED TO KNOW:   Schizophrenia is a long-term mental disease that affects how your brain works  It is a disease that may change how you think, feel, and behave  You may not be able to know what is real and what is not real  Your thoughts may not be clear, or may jump from one topic to another  DISCHARGE INSTRUCTIONS:   Medicines:   · Antipsychotics: These help decrease psychotic symptoms and severe agitation  You may need antiparkinson medicine to control muscle stiffness, twitches, and restlessness caused by antipsychotic medicines  · Antianxiety medicine: This medicine may be given to decrease anxiety and help you feel calm and relaxed  · Antidepressants: These help with symptoms of depression and anxiety  · Mood stabilizers: These control mood swings  · Tranquilizers: These increase feelings of being calm and relaxed  · Take your medicine as directed  Contact your healthcare provider if you think your medicine is not helping or if you have side effects  Tell him or her if you are allergic to any medicine  Keep a list of the medicines, vitamins, and herbs you take  Include the amounts, and when and why you take them  Bring the list or the pill bottles to follow-up visits  Carry your medicine list with you in case of an emergency    Follow up with your healthcare provider or psychiatrist as directed:  Write down your questions so you remember to ask them during your visits  Treatment settings: You may need to continue your treatments after you leave the hospital  You may be treated in the following programs:  · Crisis residential program:  This is a program where you live in a home-care facility  Healthcare providers work in these homes just like in hospitals  This program is helpful especially when you are having a relapse (your symptoms return)  · Day treatment program:  This program provides a chance to learn and practice skills  This also provides long-term support so you may have an improved quality of life  · Outpatient program:  An outpatient program is when you meet regularly with your therapist  [place] may meet one-to-one with your therapist, or you might meet with your therapist in a group  · Partial care program:  A partial care program is also called day hospitalization or partial hospitalization  This is group therapy and lasts 4 to 6 hours a day, 3 to 5 days a week  It may help you avoid going into the hospital or help you get out of the hospital sooner  It may also help you get symptoms under control and avoid a relapse  Therapy:   · Assertive community treatment:  A team of healthcare providers or psychiatrists and support groups in your community help you with your therapy  · Cognitive behavior therapy: This therapy helps you to change certain behaviors  It will help you handle symptoms such as hallucinations and delusions  · Illness-management skills: This type of therapy teaches you what you can do to help manage your disease  · Family psychoeducation:  Your family will be part of your therapy  · Social skills training: This training helps you learn how to get along with other people  · Supported employment: This is a form of therapy where you are placed into a job that fits your skills  It will help give you independence and self-confidence    Manage your symptoms:   · Do not stop taking your medicines:  Tell your healthcare provider or psychiatrist if you have any problems with or questions about your medicines  · Do not stop your therapies: It is normal to have doubts about or feel discomfort with your therapy  Tell your healthcare provider or psychiatrist if you are not comfortable or have questions about your therapies  · Get regular sleep:  Try to get 6 to 8 hours of sleep each night  Tell your healthcare provider or psychiatrist if you are not able to sleep, or if you are sleeping too much  · Do not drink alcohol:  Alcohol interacts with medicine used to treat schizophrenia  For support and more information:   · [facility]  [address]  [facility]  , [address] [facility] , [address] [facility]  Phone: [phone]  Phone: [phone]  Web Address: http://www yang com/  org  · [address] [facility], Public Information & Communication Branch  [address], [address], [address]  [place] [address]   Phone: [phone]  Phone: [phone]  Web Address: [facility]  Contact your healthcare provider or psychiatrist if:   · You feel that you are having symptoms of schizophrenia  · You are not able to sleep well, or are sleeping more than usual     · You cannot eat or are eating more than usual     · You have questions or concerns about your condition or care  Seek care immediately or call 911 if:   · You think about killing yourself or someone else  · You have a rash, swelling, or trouble breathing after you take your medicine  © 2017 [address] [facility] Information is for End User's use only and may not be sold, redistributed or otherwise used for commercial purposes  All illustrations and images included in CareNotes® are the copyrighted property of [employer]  or Dawit Hdez  The above information is an  only   It is not intended as medical advice for individual conditions or treatments  Talk to your doctor, nurse or pharmacist before following any medical regimen to see if it is safe and effective for you

## 2019-03-20 NOTE — DISCHARGE SUMMARY
Discharge Summary - PMR   Kevan Kline  68 y o  male MRN: [id number]  Unit/Bed#: -01 Encounter: [id number]    Admission Date: 3/5/2019     Discharge Date:  03/20/2019    Rehabilitation/Etiologic Diagnosis:  Brain Dysfunction:  02 22  Traumatic, Closed Injury  >Acute closed head injury with brief loss of consciousness     Discharge Diagnoses:      Patient Active Problem List   Diagnosis    Benign essential hypertension    Coronary artery disease involving native coronary artery of native heart without angina pectoris    Chronic depression    Chronic venous stasis dermatitis of both lower extremities    History of DVT of lower extremity    Chronic diastolic heart failure (HCC)    Class 1 obesity due to excess calories without serious comorbidity with body mass index (BMI) of 34 0 to 34 9 in adult    Schizophrenia ([facility] )    Disability of walking    Fracture of sixth cervical vertebra ([facility] )    Forgetfulness    Physical deconditioning    Head injury, acute    Pain    History of pulmonary embolism    Cognitive impairment    History of stroke    Vitamin D insufficiency    Anticoagulated on warfarin     Acute Rehabilitation Center Course:   (with significant findings, care, complications, treatment, and services provided):      Dillan Valdez   is a 68 y  o  male with a past medical history schizophrenia, anxiety, depression, coronary artery disease, possible stroke, DVT/PE on chronic anticoagulation with Coumadin, obesity class 1, hypertension, urinary frequency on chronic Flomax, lightheadedness, occasional falls, memory impairment, left knee replacement, right total hip replacement, cervical laminectomy and fusion who fell backwards down stairs with brief few minutes loss of consciousness with neck and possible head trauma who was brought to [facility] initially and then transferred to [facility] on 3/3/19   CT head did not show acute findings, CT neck showed C5/C6 osteophyte fracture   Neurosurgery was consulted who recommended non operative management with cervical collar   Patient was complaining of some neck, back, and parasternal pain which were all believed to be related to trauma   Additional imaging of facial bones, thoracic and lumbar spine did not reveal acute fractures but did show chronic L3 compression fracture   Patient noted to have some increased difficulty sleeping and anxiety   Patient was evaluated by skilled therapies and was found to have significant decline in ADLs and ambulation and [facility] [address] [facility]        72-year-old male status post fall with brief loss of consciousness found to have C6-C7 vertebral osteophyte fracture and possible TBI presents with likely multifactorial imbalance possibly related to prior stroke, spinal stenosis, and now head injury, intermittent lightheadedness, acute on chronic cognitive deficits, suboptimal control of anxiety, depression, sleep, and schizophrenia with associated functional decline in ADLs and ambulation      Patient participated in a comprehensive interdisciplinary inpatient rehabilitation program which included involvment of MD, therapies (PT, OT, and SLP), RN, CM/SW, dietary, and psychology services  He made appropriate functional gains and was able to be advanced to a supervision level of assist and considered safe for discharge home with family  Please see below for patient's hospital course and day to day management of medical needs in problem list format      Functional status on admission to ARC:  PT: Transfers mod assist  OT: Dressing mod assist, Bathing, grooming min assist   ST: Problem solving and memory mod assist; comprehension min assist; expression supervision, social interaction supervision     Functional status on discharge from ARC:  PT:  Transfers and ambulation greater than 150 feet supervision  OT:  Dressing, bathing, grooming, and all other ADLs largely supervision  ST:  Problem solving and memory Min assist; comprehension, expression, and social interaction supervision     Anticoagulated on warfarin  Assessment & Plan  Patient presented after fall with significantly supratx INR on prior home regimen  Medicine c/s and mgmt during ARC course  Recommend discharge on:    (INR>warfarin dose) Mgmt per IM during ARC course   3/14: 2 86>5mg; 3/15: x>5mg; 3/16: 2 95>2 5mg; 3/17: x>5mg; 3/18: 3 17>held; 3/19: x>2 5mg; 3/20: 2 36    Warfarin 2 5mg daily (Su,M,Tu,Th,Sa) except Wednesday which should be 5mg   PT/INR Friday and Tuesday > patient/family counseled to follow-up results with PCP    Hypokalemia  Assessment & Plan  Resolved recently  IM recommending d/c on Potassium chloride 10 mEq daily   BMP next week follow-up with PCP    [facility]  Assessment & Plan  Improved from admission and recently stable degree of paranoid/delusional thoughts largely regarding feeling like he is a bad person or something bad might happen; patient is appropriately functional with family at this time without S/Is; he will likely need additional adjustments after d/c with his outpatient psychiatrist, Dr Swartz Grief       >Suspect recent acute worsening at time of transfer to rehab related to be off home regimen as well as as head injury; however patient was having some increased paranoia and perseveration which started a few days prior to fall (3 weeks after switching from from Trifluoperazine to fluphenazine and Cogentin stopped; family states he has been trialed in past off Trifluoperazine and could not tolerate switch to other meds)  >Discussed with patient's outpt psych Dr Emelina Solis 3/6  Patient presented c/ fair amount of paranoid delusions, anxiety, difficulty sleeping with occasional panic attacks off home regimen since recent admission prior, with recent fall/hospitalization possible head injury    >Fluphenazine was increased to QID on 3/14 for sub-optimal control of above; some mild improvements since  >Sleep significantly improved from early course  >CBT with psychology and myself helpful and recommended to continue after d/c    D/C meds   >Fluphenazine increased to 5mg QID 3/14   >Risperdone 1 5mg qAM and 3mg QHS    >Fluvoxamine 50mg 5x/day    >Amantadine at home 100mg BID   Follow-up   >Psychiatry, Dr Aditi Engel   >Psychology, Dr Yumiko Rodas         * Head injury, acute  [address] with likely head trauma and brief loss of consciousness and acute osteophyte C6-C7 fracture  Mild increased confusion after fall also with some worsening mood/schizophrenia symptoms > improving  Completed skilled therapies in ARC setting   > overstimulation precautions, ensure optimal sleep wake cycle  > optimize neuroleptic medication  > try to avoid sedative medications  > evaluate and manage fall risks    No Driving  Follow-up PMR, psychology and  PT, OT, ST        Cognitive impairment  Assessment & Plan  Likely premorbid; chronic generalized microvascular changes with possible old right lacunar internal capsule infarct and possible right frontal WM   >speech management  >Follow-up with Neurology after discharge  >On chronic home amantadine   >Recommend family assist with med mgmt after d/c    Fracture of sixth cervical vertebra (HCC)  Assessment & Plan  C6/C7 osteophyte fracture context of recent fall and history of cervical decompression and fusion  Treatment recommendations per Neurosurgery  Non operative;  Repeat C spine X-ray 3/19 > stable  Follow up with Neurosurgery 2 weeks after discharge   > continue cervical collar per Neurosurgery-PA discussed prior to d/c; cervical spine precautions      History of stroke  Assessment & Plan  Patient/family deny although evidence of possible old R lacunar infarct and focus in R frontal WM along with generalized micoangiopathic changes  >Recommend optimal BP mgmt  >On full A/C for hx of VTE  >Follow-up with PCP    Lightheadednessresolved as of 3/13/2019  Assessment & Plan  No recent reports  Chronic intermittent; consider med adjustments  Orthostatics negative recently; if necessary repeat particularly with adjustments in meds   PRN abdominal binder and EHSAN hose      Chronic venous stasis dermatitis of both lower extremities  Assessment & Plan  Chronic venous insufficiency of BLE with some edema  >Podiatry c/s appreciated; EHSAN hose during day; Elevate when possible  >Lac-Hytrin cream BID  >Monitor for skin breakdown    Vitamin D insufficiency  Assessment & Plan  D3 2000 units qday     History of pulmonary embolism  Assessment & Plan  And DVT on chronic anticoagulation with Coumadin  Medicine consult to manage during acute rehab course  Regimen adjusted       Pain  Assessment & Plan  Remains well controlled  APAP PRN  Avoid NSAIDs and avoid opiates     Class 1 obesity due to excess calories without serious comorbidity with body mass index (BMI) of 34 0 to 34 9 in adult  Assessment & Plan   on appropriate nutrition and activity  Adjustments accordingly during skilled therapy and with rehab nursing  Monitor skin closely for breakdown, wounds, rashes; keep clean and dry, turning Q2H   Follow-up with PCP after d/c      Chronic diastolic heart failure ([address] )  Assessment & Plan  Mgmt per IM   No signs of decompensation      Coronary artery disease involving native coronary artery of native heart without angina pectoris  Assessment & Plan  On chronic Coumadin  Optimal blood pressure control  Follow-up with PCP    Benign essential hypertension  Assessment & Plan  Medicine c/s to assist with mgmt during course  No evidence of orthostasis during course  D/C on home Amlodipine 5 mg daily, losartan 50 mg daily      # Bowel care:  mild constipation at times  -  recommend colace and PRN miralax     # Bladder care:  Function adequate   On chronic Flomax;      # Diet/Hydration:    Regular, thin      Disposition:   Home with family      Follow-up:   PCP, Psychiatry, Psychology, Rehab, Neurosurgery  Consider referral to neurology at discretion of PCP with hx of CVA and cog deficits      CODE: Level 1: Full Code     Restrictions include:  Fall precautions; no driving, 52-1 supervision     Imaging:  No results found  Pertinent Recent Labs (See EPIC EMR or request additional records if needed):  See above  Results for Jane Sahu (MRN [id number]) as of 3/20/2019 11:47   Ref  Range 3/14/2019 04:55 3/16/2019 05:20 3/18/2019 04:49   Sodium Latest Ref Range: 136 - 145 mmol/L 138  139   Potassium Latest Ref Range: 3 5 - 5 3 mmol/L 3 4 (L)  3 5   Chloride Latest Ref Range: 100 - 108 mmol/L 99 (L)  100   CO2 Latest Ref Range: 21 - 32 mmol/L 32  32   Anion Gap Latest Ref Range: 4 - 13 mmol/L 7  7   BUN Latest Ref Range: 5 - 25 mg/dL 12  8   Creatinine Latest Ref Range: 0 60 - 1 30 mg/dL 0 71  0 76   Glucose, Random Latest Ref Range: 65 - 140 mg/dL 84  81   Calcium Latest Ref Range: 8 3 - 10 1 mg/dL 8 5  8 7   eGFR Latest Units: ml/min/1 73sq m 94  91   WBC Latest Ref Range: 4 31 - 10 16 Thousand/uL 5 20  5 59   Red Blood Cell Count Latest Ref Range: 3 88 - 5 62 Million/uL 4 48  4 65   Hemoglobin Latest Ref Range: 12 0 - 17 0 g/dL 13 3  13 6   HCT Latest Ref Range: 36 5 - 49 3 % 40 9  43 2   MCV Latest Ref Range: 82 - 98 fL 91  93   MCH Latest Ref Range: 26 8 - 34 3 pg 29 7  29 2   MCHC Latest Ref Range: 31 4 - 37 4 g/dL 32 5  31 5   RDW Latest Ref Range: 11 6 - 15 1 % 14 3  14 6   Platelet Count Latest Ref Range: 149 - 390 Thousands/uL 241  287   Results for Jane Sahu (MRN [id number]) as of 3/20/2019 11:47   Ref   Range 3/10/2019 05:17 3/11/2019 05:55 3/12/2019 05:05 3/14/2019 04:55 3/16/2019 05:20 3/18/2019 04:49 3/19/2019 14:19 3/20/2019 05:12   INR Latest Ref Range: 0 86 - 1 17  3 02 (H) 2 76 (H) 3 03 (H) 2 86 (H) 2 95 (H) 3 17 (H)  2 36 (H)       Procedures Performed During ARC Admission: None    Discharge Physical Examination:  Vitals:    03/20/19 0841   BP: 120/66   Pulse: 78   Resp:    Temp:    SpO2:    General: Awake, alert in NAD  HENT:  Cervical collar in place,  MMM  Respiratory: Unlabored breathing, breath sounds equal, Lungs CTA, no wheezes, rales, or rhonchi  Cardiovascular: Regular rate and rhythm, no murmurs, rubs, or gallops  Gastrointestinal: Soft, non-tender, mildly-distended, normoactive bowel sounds  Genitourinary: No sandhu  SkiN/MSK/Extremities: 1+ stable calf edema and venous stasis skin changes  Neurologic/Psych:   MENTAL STATUS: awake, oriented to person, place, time, and situation  Affect:  Euthymic  Strength/MMT:  Near 5/5 throughout     HPI:   Edilma Dubois  is a 68 y o  male with a past medical history schizophrenia, anxiety, depression, coronary artery disease, possible stroke, DVT/PE on chronic anticoagulation with Coumadin, obesity class 1, hypertension, urinary frequency on chronic Flomax, lightheadedness, occasional falls, memory impairment, left knee replacement, right total hip replacement, cervical laminectomy and fusion who fell backwards down stairs with brief few minutes loss of consciousness with neck and possible head trauma who was [facility] to [facility] initially and then transferred to [facility]"" on 3/3/19  CT head did not show acute findings, CT neck showed C5/C6 osteophyte fracture  Neurosurgery was consulted who recommended non operative management with cervical collar  Patient was complaining of some neck, back, and parasternal pain which were all believed to be related to trauma  Additional imaging of facial bones, thoracic and lumbar spine did not reveal acute fractures but did show chronic L3 compression fracture  Patient noted to have some increased difficulty sleeping and anxiety    Patient was evaluated by skilled therapies and was found to have significant decline in ADLs and ambulation appropriate for admission to [place] [address]      On evaluation patient is oriented to self, time, place, month, and largely to situation  Patient reports mild neck pain but this is improving  He notes some headache since the fall  He denies light sensitivity or sensitivity to sound  Patient has slight difficulty swallowing but this may be due to having the cervical collar on  He notes occasional nonproductive cough without shortness of breath  He denies chest pain  Patient reports last bowel movement was several days ago but still reports having some gas without abdominal pain or nausea  Patient reports chronic intermittent lightheadedness at home with occasional fall  Family is at the bedside reports some chronic memory problems who last few years somewhat worse over the last year  They report he has had several falls over last few years but overall functionally has been doing fairly well  Apparently patient had been on trifuoperazine and Cogentin which was recently switched to fluphenazine alone which they thought was because they had difficulty obtaining older trifuoperazine from   They state his mental health docs in past tried to switch him off of  trifuoperazine in past but they could not because his anxiety and deluisions would worsens on other meds  He also is on multiple other psych meds which have not recently changed  They and patient note change above occurred about 3 weeks ago and he was doing ok until a few days before fall when he started having increased delusions and perseveration that he was going to die and go to hell  He reports still having this thought stuck in his mind which he states has been a chronic theme for him particularly when he is having more symptoms  He also notes some increased anxiety and even occasional panic attack last several days particularly at night making sleeping difficult  He denies SI, hallucinations or other complaints        Review of Systems:     Complete review of systems obtained      Please see HPI for details with other significant symptoms or history listed here: Otherwise, 14 point review of systems completed and was otherwise unremarkable  Condition at Discharge: good     Discharge instructions/Information to patient and family:   See after visit summary for information provided to patient and family  Provisions for Follow-Up Care:  See after visit summary for information related to follow-up care and any pertinent home health orders  Disposition: Home with 24-7 family supervision and [facility] PT, OT, ST     Planned Readmission:  No    Discharge Statement   Total time spent examining patient, counseling patient (or patient/family) on condition, medication, rehabilitation/medical plan, and coordinating care on day of discharge: at least 45 minutes  Greater than 50% of the total time was spent examining patient, answering all questions, directly discussing plan of care and post-discharge instructions with patient (or patient and family)  Additional time spent on coordinating care and other discharge activities  Discharge Medications:  See after visit summary for reconciled discharge medications provided to patient and family

## 2019-03-20 NOTE — ASSESSMENT & PLAN NOTE
Patient presented after fall with significantly supratx INR on prior home regimen  Medicine c/s and mgmt during ARC course  Recommend discharge on:    (INR>warfarin dose) Mgmt per IM during ARC course   3/14: 2 86>5mg; 3/15: x>5mg; 3/16: 2 95>2 5mg; 3/17: x>5mg; 3/18: 3 17>held; 3/19: x>2 5mg; 3/20: 2 36    Warfarin 2 5mg daily (Su,M,Tu,Th,Sa) except Wednesday which should be 5mg   PT/INR Friday and Tuesday > patient/family counseled to follow-up results with PCP

## 2019-03-20 NOTE — SPEECH THERAPY NOTE
SLP Discharge Summary    Pt was discharged home w/ family support on 3/20/19  At time of d/c, pt was able to achieve 6/6 LTG's  Pt able to complete the IGNACIO (Assessment of Language Related Functional Activities), where at this time, pt is demonstrated mild-moderate cognitive linguistic deficits  Pt was noted to have decreased attention, where pt was unable "retrace" thoughts and elaborate further  Additionally noted was tangential speech patterns and flight of thoughts during assessments, but was able to be re-directed back to activities appropriately  Along w/ decreased attention, pt's STM recall is decreased, but improves given visual recall strategies  Pt is demonstrating ability to complete functional money management as well as beginning to improve w/ medication management, but is aware to continue to have supervision and assistance w/ these tasks at this time  In addition to cognitive linguistic tx, pt was followed for dysphagia tx, where pt was initially on level 3 w/ thin liquids at this time, in which pt does demonstrate mildly slowed mastication of consistencies due to edentulous state but does not pose to be an increased risk for aspiration at this time  Pt was able to make progress and despite edentulous state as well as current C-collar placement, pt is demonstrating tolerance of regular diet w/ thin liquids at this time w/o increased risk of aspiration  No further dysphagia tx warranted at time of d/c but pt will continue to benefit from home SLP services to maximize cognitive linguistic skills at this time

## 2019-03-20 NOTE — TELEPHONE ENCOUNTER
3/20/19  PTS SON CALLED    SCHED PT FOR 2 WEEK FOLLOW UP WITH [facility]PR[facility] WITH ED ON 4/2/19 @ 8:45 AM

## 2019-03-21 ENCOUNTER — TELEPHONE (OUTPATIENT)
Dept: NEUROLOGY | Facility: CLINIC | Age: 74
End: 2019-03-21

## 2019-03-21 NOTE — PHYSICAL THERAPY NOTE
PT DISCHARGE SUMMARY    PATIENT MADE GOOD PROGRESS WITH SKILLED PT INTERVENTION DURING HIS STAY AT [facility]  PATIENT ABLE TO MEET ALL LTGS OF SUPERVISION  HE WAS UNABLE TO PROGRESS BEYOND SUPERVISION AT THIS TIME 2* COGNITIVE DEFICITS AND IMPAIRMENTS RELATED TO MENTAL HEALTH WHICH IN TURN AFFECTED HIS CONCENTRATION AND SAFETY WITH ACTIVITIES  DURING STAY, PATIENT ABLE TO PERFORM MOBILITY WITH SPC AND NO AD HOWEVER IS RECOMMENDED USE OF RW FOR TIME OF DISCHARGE  PATIENT WITH RW ON 1ST AND SECOND LEVELS OF HOME WHICH HE IS RECOMMENDED TO UTILIZE AT ALL TIMES WHILE USING [address] [place] [address] FOR STAIR NEGOTIATION  WIFE AND SON PRESENT FOR SESSIONS OF FAMILY TRAINING IN WHICH THEY WERE ABLE TO VERBALIZE AND DEMONSTRATE UNDERSTANDING OF TEACHING  PATIENT IS RECOMMENDED TO FOLLOW UP WITH HHPT AND PROGRESS TO OPPT TO CONTINUE TO ADDRESS DEFICITS IN BALANCE, SAFETY AND OVERALL FUNCTIONAL STRENGTH TO MAXIMIZE FUNCTION AND REDUCE CAREGIVER BURDEN       Sim Smaller, PT

## 2019-03-21 NOTE — TELEPHONE ENCOUNTER
----- Message from Flora Vásquez MD sent at 3/20/2019 10:43 AM EDT -----  Regarding: Josefina Meals Follow-up with Dr Tavo Núñez or Dr Ida Gonzalez

## 2019-03-22 ENCOUNTER — ANTICOAG VISIT (OUTPATIENT)
Dept: FAMILY MEDICINE CLINIC | Facility: CLINIC | Age: 74
End: 2019-03-22

## 2019-03-22 ENCOUNTER — TELEPHONE (OUTPATIENT)
Dept: FAMILY MEDICINE CLINIC | Facility: CLINIC | Age: 74
End: 2019-03-22

## 2019-03-22 DIAGNOSIS — Z86.718 HISTORY OF DVT OF LOWER EXTREMITY: ICD-10-CM

## 2019-03-22 NOTE — PROGRESS NOTES
OT DISCHARGE SUMMARY    Pt made good progress with skilled OT intervention during his acute rehab stay  Pt met all LTGs of supervision for ADLs  At time of d/c pt was using RW for fxnl mobility  Pt remained at supervision level for all tasks due to impaired cognition, perseverative thoughts and distractability related to chronic mental health disorder  Family training was completed with wife and son who were present for full ADL including tub/shower transfer  Family also engaged in C/S collar management including changing pads and reapplying collar  At time of d/c pt Recommended tub bench as pt was doing sit swivel technique for tub/shower transfer  Also recommended grab bars for tub  Son given handout to purchase independently  Ordered commode for d/c  Pt is safe to dc home with family support and full supervision for all mobility and ADL tasks  Pt recommended for home OT/PT      Jeanine Paulson, OT

## 2019-03-22 NOTE — TELEPHONE ENCOUNTER
[facility] Sally nurse calling for [facility] results of 2 7  tegan

## 2019-03-25 ENCOUNTER — TRANSITIONAL CARE MANAGEMENT (OUTPATIENT)
Dept: FAMILY MEDICINE CLINIC | Facility: CLINIC | Age: 74
End: 2019-03-25

## 2019-03-25 ENCOUNTER — OFFICE VISIT (OUTPATIENT)
Dept: FAMILY MEDICINE CLINIC | Facility: CLINIC | Age: 74
End: 2019-03-25
Payer: COMMERCIAL

## 2019-03-25 VITALS
BODY MASS INDEX: 30.64 KG/M2 | DIASTOLIC BLOOD PRESSURE: 78 MMHG | TEMPERATURE: 97.6 F | WEIGHT: 181.3 LBS | SYSTOLIC BLOOD PRESSURE: 136 MMHG | HEART RATE: 85 BPM | RESPIRATION RATE: 18 BRPM | OXYGEN SATURATION: 99 %

## 2019-03-25 DIAGNOSIS — Z86.711 HISTORY OF PULMONARY EMBOLISM: ICD-10-CM

## 2019-03-25 DIAGNOSIS — S12.500A CLOSED DISPLACED FRACTURE OF SIXTH CERVICAL VERTEBRA, UNSPECIFIED FRACTURE MORPHOLOGY, INITIAL ENCOUNTER (HCC): Primary | ICD-10-CM

## 2019-03-25 DIAGNOSIS — I87.2 CHRONIC VENOUS STASIS DERMATITIS OF BOTH LOWER EXTREMITIES: ICD-10-CM

## 2019-03-25 DIAGNOSIS — E66.09 CLASS 1 OBESITY DUE TO EXCESS CALORIES WITHOUT SERIOUS COMORBIDITY WITH BODY MASS INDEX (BMI) OF 34.0 TO 34.9 IN ADULT: ICD-10-CM

## 2019-03-25 DIAGNOSIS — R26.2 DISABILITY OF WALKING: ICD-10-CM

## 2019-03-25 DIAGNOSIS — R68.89 FORGETFULNESS: ICD-10-CM

## 2019-03-25 DIAGNOSIS — I10 BENIGN ESSENTIAL HYPERTENSION: ICD-10-CM

## 2019-03-25 DIAGNOSIS — Z79.01 ANTICOAGULATED ON WARFARIN: ICD-10-CM

## 2019-03-25 DIAGNOSIS — I25.10 CORONARY ARTERY DISEASE INVOLVING NATIVE CORONARY ARTERY OF NATIVE HEART WITHOUT ANGINA PECTORIS: ICD-10-CM

## 2019-03-25 DIAGNOSIS — F20.0 PARANOID SCHIZOPHRENIA (HCC): ICD-10-CM

## 2019-03-25 PROCEDURE — 99495 TRANSJ CARE MGMT MOD F2F 14D: CPT | Performed by: FAMILY MEDICINE

## 2019-03-25 NOTE — ASSESSMENT & PLAN NOTE
Has lost 20# since his hospitalization on 3/3/19  Appetite is getting back to normal since being home

## 2019-03-25 NOTE — PROGRESS NOTES
TCM Call (since 2/22/2019)     Date and time call was made  3/25/2019  4:49 PM    Hospital care reviewed  Records not available    Patient was hospitialized at  [facility]    Date of Admission  03/05/19    Date of discharge  03/20/19    Diagnosis  Fall    Disposition  Rehabilitation center    Were the patients medications reviewed and updated  Yes    Current Symptoms  -- slight neck pain      TCM Call (since 2/22/2019)     Post hospital issues  Poor ADL (Activities of Daily Living) performance    Should patient be enrolled in anticoag monitoring? Yes    Scheduled for follow up? Yes    Referrals needed  neurosurgeon     Did you obtain your prescribed medications  Yes    Do you need help managing your prescriptions or medications  No    Is transportation to your appointment needed  No    I have advised the patient to call PCP with any new or worsening symptoms  [address] members    Support System  Therapist    The type of support provided  Physical    Do you have social support  No, not at all    Are you recieving any outpatient services  Yes    What type of services  physical therapist    Are you recieving home care services  Yes    Types of home care services  Nurse visit    Are you using any community resources  No    Current waiver services  No    Have you fallen in the last 12 months  Yes    How many times  5    Interperter language line needed  No    Counseling  Patient; Family    Counseling topics  Importance of RX compliance;  Activities of daily living

## 2019-03-25 NOTE — PROGRESS NOTES
Assessment/Plan:    Class 1 obesity due to excess calories without serious comorbidity with body mass index (BMI) of 34 0 to 34 9 in adult  Has lost 20# since his hospitalization on 3/3/19  Appetite is getting back to normal since being home  Disability of walking  Since discharge from Rehab, spends most of the day, downstairs  Walks with RW by himself and does well - getting home PT and OT  Walks up the stairs at night with a SPC and assist from his son  Coronary artery disease involving native coronary artery of native heart without angina pectoris  Asymptomatic and stable  Medications reconciled today  Benign essential hypertension  Normal blood pressure even in the rehab and since being at home  Visiting nurses are monitoring his blood pressure  His losartan had been discontinued in rehab  His other medications have stayed the same  Medications all reconciled today  Chronic venous stasis dermatitis of both lower extremities  Peripheral edema has gone away and    Paranoid schizophrenia [facility])  His son tells me that his issues with paranoia and schizophrenia had gotten worse prior to his fall but then his medication has been rearranged in the hospital and has an upcoming appointment with Psychiatry  According to his son his paranoid schizophrenia is now well controlled  History of pulmonary embolism  Patient had no severe injuries related to the warfarin and is back on the warfarin on a regular basis  Recent protime was therapeutic         Diagnoses and all orders for this visit:    Closed displaced fracture of sixth cervical vertebra, unspecified fracture morphology, initial encounter ([facility] [address] )    [facility]    Forgetfulness    Class 1 obesity due to excess calories without serious comorbidity with body mass index (BMI) of 34 0 to 34 9 in adult    Anticoagulated on warfarin    Disability of walking    Coronary artery disease involving native coronary artery of native heart without angina pectoris    Benign essential hypertension    Chronic venous stasis dermatitis of both lower extremities    History of pulmonary embolism          Subjective:     Chief Complaint   Patient presents with    Transition of Care Management        Patient ID: Codi Mesa  is a 68 y o  male  On 03/03/2019 patient fell down about a 3rd of a flight of stairs at home and his son hurting fall and found him regaining consciousness at the bottom of the stairs  He was actually able to drive the patient to the emergency room in his automobile and without calling the ambulance  In the emergency room patient had a full trauma workup and was found to have a fracture line through osteophytes at C5-C6  Because of this he was transferred to the main hospital and also was evaluated by neuro surgery  He was put in a hard cervical collar and after workup in observation he was discharged to [facility] on 03/05/2019  He was at [facility] from 03/05 through 03/20/2019 and was then discharged to home  He did well with physical therapy and he had lab work done on 03/18/2019 which was all normal including a BMP and CBC  Since he has been at home his appetite has improved and he is getting services at home including OT, PT, and VNA  His son has moved in to help with his care and is Junior Rhodes and his wife who also live in the house  Junior Rhodes spend most of his days on the 1st floor and has a set up there  He walks with a rolling walker by himself and does quite well  When he has to go up steps to go to bed at night he uses single-point cane and with a cyst from his son  He sees Dr Neha chen for Psychiatry check on 04/01/2019 and neurosurgical recheck on 04/02/2019  He does not have a lot of neck pain but does use Tylenol once or twice a day p r n  [place] [facility] - post hospital and post Rehab       The following portions of the patient's history were reviewed and updated as appropriate: allergies, current medications, past family history, past medical history, past social history, past surgical history and problem list     Review of Systems   Constitutional: Negative for activity change, appetite change, fatigue, fever and unexpected weight change  Overall, has lost 20# during this whole ordeal since the fall on 3/3/19  Has no more edema  Appetite has improved since coming home on 3/20/19  HENT: Negative for congestion, dental problem and sneezing  Eyes: Negative for discharge and visual disturbance  Respiratory: Negative for cough, chest tightness, shortness of breath and wheezing  Cardiovascular: Negative for chest pain, palpitations and leg swelling  Gastrointestinal: Negative for abdominal pain, constipation, diarrhea, nausea and vomiting  Endocrine: Negative for polydipsia and polyuria  Genitourinary: Negative for dysuria and frequency  Musculoskeletal: Positive for neck pain  Negative for arthralgias  For neck pain, has APAP, 650mg PO every 8 hours PRN -- uses once or twice a day for neck pain  Skin: Negative for rash  Allergic/Immunologic: Negative for environmental allergies and food allergies  Neurological: Negative for headaches  Hematological: Negative for adenopathy  Psychiatric/Behavioral: Negative for agitation, behavioral problems, hallucinations and sleep disturbance  Objective:  Vitals:    03/25/19 1644   BP: 136/78   BP Location: Left arm   Patient Position: Sitting   Cuff Size: Large   Pulse: 85   Resp: 18   Temp: 97 6 °F (36 4 °C)   TempSrc: Temporal   SpO2: 99%   Weight: 82 2 kg (181 lb 4 8 oz)      Physical Exam   Constitutional: He is oriented to person, place, and time  He appears well-developed and well-nourished  HENT:   Head: Normocephalic  Nose: Nose normal    Mouth/Throat: Oropharynx is clear and moist    Eyes: Pupils are equal, round, and reactive to light  Conjunctivae are normal  Right eye exhibits no discharge   Left eye exhibits no discharge  Neck: Normal range of motion  Neck supple  No thyromegaly present  Cardiovascular: Normal rate, regular rhythm and normal heart sounds  No murmur heard  Pulmonary/Chest: Effort normal and breath sounds normal    Abdominal: Soft  Bowel sounds are normal  There is no tenderness  Genitourinary: Penis normal    Musculoskeletal: Normal range of motion  Lymphadenopathy:     He has no cervical adenopathy  Neurological: He is alert and oriented to person, place, and time  Skin: Skin is warm  No rash noted  No erythema  Psychiatric: He has a normal mood and affect  His behavior is normal  Thought content normal  His speech is not rapid and/or pressured, not delayed and not slurred  Cognition and memory are impaired  He exhibits abnormal recent memory  Patient Instructions     Patient Instructions   No change in present medications  Recheck protime in 3 weeks and warfarin dose stays the same  Continue walking with the rolling walker for stability and fall prevention

## 2019-03-25 NOTE — ASSESSMENT & PLAN NOTE
Normal blood pressure even in the rehab and since being at home  Visiting nurses are monitoring his blood pressure  His losartan had been discontinued in rehab  His other medications have stayed the same  Medications all reconciled today

## 2019-03-25 NOTE — ASSESSMENT & PLAN NOTE
Since discharge from Rehab, spends most of the day, downstairs  Walks with RW by himself and does well - getting home PT and OT  Walks up the stairs at night with a SPC and assist from his son

## 2019-03-25 NOTE — ASSESSMENT & PLAN NOTE
Patient had no severe injuries related to the warfarin and is back on the warfarin on a regular basis  Recent protime was therapeutic

## 2019-03-25 NOTE — PATIENT INSTRUCTIONS
No change in present medications  Recheck protime in 3 weeks and warfarin dose stays the same  Continue walking with the rolling walker for stability and fall prevention

## 2019-03-25 NOTE — ASSESSMENT & PLAN NOTE
His son tells me that his issues with paranoia and schizophrenia had gotten worse prior to his fall but then his medication has been rearranged in the hospital and has an upcoming appointment with Psychiatry  According to his son his paranoid schizophrenia is now well controlled

## 2019-03-26 ENCOUNTER — TELEPHONE (OUTPATIENT)
Dept: FAMILY MEDICINE CLINIC | Facility: CLINIC | Age: 74
End: 2019-03-26

## 2019-03-27 ENCOUNTER — TELEPHONE (OUTPATIENT)
Dept: FAMILY MEDICINE CLINIC | Facility: CLINIC | Age: 74
End: 2019-03-27

## 2019-03-28 NOTE — CONSULTS
Consultation/Progress note - Milady Parrish  68 y o  male MRN: [id number]    Unit/Bed#: -01 Encounter: [id number]      Assessment/Plan     Assessment:  Pt participated in psychology services to address coping skills to address symptoms of schizophrenia, adjustment, and motivation in rehab  Pt presented in euthymic mood, full range affect  Discussed progress on rehab goals; reviewed cognitive-behavioral coping skills to help pt manage mood and maintain motivation  Continued discussion of utilizing CBT skills to manage thoughts to affect pt mood  Provided supportive counseling  Pt was engaged and cooperative; he is motivated in rehab sessions and is making progress on addressing goals  He has difficulty at times focusing on coping skills to manage mood; pt was encouraged to practice skills  Dx: F20 9 Schizophrenia (by history)  Code: [id number]      Plan:  Continue psychology services while pt is at [facility]       Consults    Review of Systems    Historical Information   Past Medical History:   Diagnosis Date    Ankle edema 04/2004    chronic    Arthritis 03/16/2011    right knee    Ataxic gait 11/14/2011    Baker's cyst 04/2005    (djd)-left knee    Cardiac disease     Cataract     Chronic pain     Coronary artery disease     Depression     DVT (deep venous thrombosis) (HCC)     Epistaxis 01/09/2015    Forgetfulness 3/4/2019    Hand tingling 02/05/2010    tingling right forearm and hand-CTS on EMG    Hematuria 11/22/2016    ER-3 way sandhu to irrigate bladder with 3 L NS    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    Hypertension     Lipoma 01/11/2011    in hepatic flexure    Migraine headache 02/2007    optic    Obesity     [facility] embol[facility] [address] ) 09/18/2014    bilateral PE + acute DVT LLE     Rib fracture 03/2007    left 8th rib 2nd degree fall    Schizophrenia ([facility] [address] ) 04/2009    acute exacerbation    Sciatic leg pain 07/20/2011    left    Stenosis of cervix     with cervical radiculomyelopathy    Suicidal ideation 04/2009    Syncope 01/09/2015    Tardive dyskinesia 02/28/2011    Torn meniscus 04/2004    lateral, knee, left    Varicose vein of leg 04/2004    Venous insufficiency of leg 04/2004    chronic    Vertigo 05/12/2008    transient-questionable BPPV     Past Surgical History:   Procedure Laterality Date    CATARACT EXTRACTION Right     10/2/2009    CATARACT EXTRACTION      12/4/2009    COLONOSCOPY      with biopsy    IVC FILTER INSERTION      anticoagulation-9/23/2014    KNEE ARTHROCENTESIS       and cortisone injection    KNEE ARTHROSCOPY Left     5/18/2004    LAMINECTOMY      C3-C6 and left sided cervical foraminotomies-10/14/2008    OTHER SURGICAL HISTORY      DONNY x 1    REPLACEMENT TOTAL HIP W/  RESURFACING IMPLANTS      TOTAL HIP ARTHROPLASTY Right     02/07/2017    TOTAL KNEE ARTHROPLASTY Left     3/21/2005     Social History   Social History     Substance and Sexual Activity   Alcohol Use Not Currently    Alcohol/week: 0 0 oz    Frequency: Never    Binge frequency: Never    Comment: no drinking     Social History     Substance and Sexual Activity   Drug Use No     Social History     Tobacco Use   Smoking Status Never Smoker   Smokeless Tobacco Never Used   Tobacco Comment    no passive smoke exposure   Meds/Allergies    Current Facility-Administered Medications   Medication Dose Route Frequency    acetaminophen (TYLENOL) tablet 975 mg  975 mg Oral Q8H [address]    amantadine (SYMMETREL) capsule 100 mg  100 mg Oral BID    amLODIPine (NORVASC) tablet 5 mg  5 mg Oral Daily    bisacodyl (DULCOLAX) rectal suppository 10 mg  10 mg Rectal Daily PRN    cholecalciferol (VITAMIN D3) tablet 1,000 Units  1,000 Units Oral Daily    docusate sodium (COLACE) capsule 100 mg  100 mg Oral BID    fluPHENAZine (PROLIXIN) tablet 5 mg  5 mg Oral Q8H [address]    fluvoxaMINE (LUVOX) tablet 50 mg  50 mg Oral 4x Daily    lidocaine (LIDODERM) 5 % patch 1 patch  1 patch Topical Daily  losartan (COZAAR) tablet 50 mg  50 mg Oral Daily    ondansetron (ZOFRAN-ODT) dispersible tablet 4 mg  4 mg Oral Q8H PRN    polyethylene glycol (MIRALAX) packet 17 g  17 g Oral Daily PRN    risperiDONE (RisperDAL) tablet 0 5 mg  0 5 mg Oral BID PRN    risperiDONE (RisperDAL) tablet 1 5 mg  1 5 mg Oral Daily    risperiDONE (RisperDAL) tablet 3 mg  3 mg Oral HS    tamsulosin (FLOMAX) capsule 0 4 mg  0 4 mg Oral Daily With Dinner    warfarin (COUMADIN) tablet 5 mg  5 mg Oral Daily (warfarin)               Allergies   Allergen Reactions    Lisinopril Cough       Objective   Vitals: Blood pressure 120/66, pulse 78, temperature 97 8 °F (36 6 °C), temperature source Oral, resp  rate 19, height 5' 4 5" (1 638 m), weight 81 7 kg (180 lb 1 9 oz), SpO2 92 %      Invasive Devices          None          Physical Exam

## 2019-03-28 NOTE — CONSULTS
Consultation/Progress note - Nano Sweeneyggchela  68 y o  male MRN: [id number]    Unit/Bed#: -01 Encounter: [id number]      Assessment/Plan     Assessment:  Pt participated in psychology services to address coping skills, adjustment, and motivation in rehab  Pt presented in depressed and anxious mood, full range affect  Discussed progress on rehab goals; explored pt irrational thoughts and reviewed cognitive-behavioral coping skills to help pt created more adaptive thoughts and reduce mood disturbance  Provided supportive counseling  Pt was engaged and cooperative; He is willing to practice coping skills in session and was encouraged to utilize CBT skills daily to help manage mood  Dx: F20 9 Schizophrenia (by history)   Code: [id number]      Plan:  Continue psychology services while pt is at [facility]       Consults    Review of Systems    Historical Information   Past Medical History:   Diagnosis Date    Ankle edema 04/2004    chronic    Arthritis 03/16/2011    right knee    Ataxic gait 11/14/2011    Baker's cyst 04/2005    (djd)-left knee    Cardiac disease     Cataract     Chronic pain     Coronary artery disease     Depression     DVT (deep venous thrombosis) (HCC)     Epistaxis 01/09/2015    Forgetfulness 3/4/2019    Hand tingling 02/05/2010    tingling right forearm and hand-CTS on EMG    Hematuria 11/22/2016    ER-3 way sandhu to irrigate bladder with 3 L NS    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    Hypertension     Lipoma 01/11/2011    in hepatic flexure    Migraine headache 02/2007    optic    Obesity     Pulmonary embolism ([facility] [address] ) 09/18/2014    bilateral PE + acute DVT LLE     Rib fracture 03/2007    left 8th rib 2nd degree fall    Schizophrenia ([facility] [address] ) 04/2009    acute exacerbation    Sciatic leg pain 07/20/2011    left    Stenosis of cervix     with cervical radiculomyelopathy    Suicidal ideation 04/2009    Syncope 01/09/2015    Tardive dyskinesia 02/28/2011    Torn meniscus 04/2004 lateral, knee, left    Varicose vein of leg 04/2004    Venous insufficiency of leg 04/2004    chronic    Vertigo 05/12/2008    transient-questionable BPPV     Past Surgical History:   Procedure Laterality Date    CATARACT EXTRACTION Right     10/2/2009    CATARACT EXTRACTION      12/4/2009    COLONOSCOPY      with biopsy    IVC FILTER INSERTION      anticoagulation-9/23/2014    KNEE ARTHROCENTESIS       and cortisone injection    KNEE ARTHROSCOPY Left     5/18/2004    LAMINECTOMY      C3-C6 and left sided cervical foraminotomies-10/14/2008    OTHER SURGICAL HISTORY      DONNY x 1    REPLACEMENT TOTAL HIP W/  RESURFACING IMPLANTS      TOTAL HIP ARTHROPLASTY Right     02/07/2017    TOTAL KNEE ARTHROPLASTY Left     3/21/2005     Social History   Social History     Substance and Sexual Activity   Alcohol Use Not Currently    Alcohol/week: 0 0 oz    Frequency: Never    Binge frequency: Never    Comment: no drinking     Social History     Substance and Sexual Activity   Drug Use No     Social History     Tobacco Use   Smoking Status Never Smoker   Smokeless Tobacco Never Used   Tobacco Comment    no passive smoke exposure   Meds/Allergies    Current Facility-Administered Medications   Medication Dose Route Frequency    acetaminophen (TYLENOL) tablet 975 mg  975 mg Oral Q8H [facility]    amantadine (SYMMETREL) capsule 100 mg  100 mg Oral BID    amLODIPine (NORVASC) tablet 5 mg  5 mg Oral Daily    bisacodyl (DULCOLAX) rectal suppository 10 mg  10 mg Rectal Daily PRN    cholecalciferol (VITAMIN D3) tablet 1,000 Units  1,000 Units Oral Daily    docusate sodium (COLACE) capsule 100 mg  100 mg Oral BID    fluPHENAZine (PROLIXIN) tablet 5 mg  5 mg Oral Q8H [facility]    fluvoxaMINE (LUVOX) tablet 50 mg  50 mg Oral 4x Daily    lidocaine (LIDODERM) 5 % patch 1 patch  1 patch Topical Daily    losartan (COZAAR) tablet 50 mg  50 mg Oral Daily    ondansetron (ZOFRAN-ODT) dispersible tablet 4 mg  4 mg Oral Q8H PRN    polyethylene glycol (MIRALAX) packet 17 g  17 g Oral Daily PRN    risperiDONE (RisperDAL) tablet 0 5 mg  0 5 mg Oral BID PRN    risperiDONE (RisperDAL) tablet 1 5 mg  1 5 mg Oral Daily    risperiDONE (RisperDAL) tablet 3 mg  3 mg Oral HS    tamsulosin (FLOMAX) capsule 0 4 mg  0 4 mg Oral Daily With Dinner    warfarin (COUMADIN) tablet 5 mg  5 mg Oral Daily (warfarin)                   Allergies   Allergen Reactions    Lisinopril Cough       Objective   Vitals: Blood pressure 120/66, pulse 78, temperature 97 8 °F (36 6 °C), temperature source Oral, resp  rate 19, height 5' 4 5" (1 638 m), weight 81 7 kg (180 lb 1 9 oz), SpO2 92 %      Invasive Devices          None          Physical Exam

## 2019-03-28 NOTE — CONSULTS
Consultation/Progress note - Lois Barrera  68 y o  male MRN: [id number]    Unit/Bed#: -01 Encounter: [id number]      Assessment/Plan     Assessment:  Pt participated in psychology services to address coping skills to manage schizophrenia symptoms, adjustment, and motivation in rehab  Pt presented in euthymic mood, full range affect  Discussed progress on rehab goals; pt will be dc this week  Reviewed cognitive-behavioral coping skills to use at home to help pt manage mood and address irrational thoughts  Provided supportive counseling  Pt was engaged and cooperative; pt was encouraged to continue using CBT skills after dc and contact this writer for session if needed  Dx: F20 9 Schizophrenia (by history)  Code: [id number]      Plan:  Continue psychology services while pt is at [facility]; pt was given info for outpt counseling       Consults    Review of Systems    Historical Information   Past Medical History:   Diagnosis Date    Ankle edema 04/2004    chronic    Arthritis 03/16/2011    right knee    Ataxic gait 11/14/2011    Baker's cyst 04/2005    (djd)-left knee    Cardiac disease     Cataract     Chronic pain     Coronary artery disease     Depression     DVT (deep venous thrombosis) (HCC)     Epistaxis 01/09/2015    Forgetfulness 3/4/2019    Hand tingling 02/05/2010    tingling right forearm and hand-CTS on EMG    Hematuria 11/22/2016    ER-3 way sandhu to irrigate bladder with 3 L NS    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    Hypertension     Lipoma 01/11/2011    in hepatic flexure    Migraine headache 02/2007    optic    Obesity     Pulmonary embolism ([facility] [address] ) 09/18/2014    bilateral PE + acute DVT LLE     Rib fracture 03/2007    left 8th rib 2nd degree fall    Schizophrenia ([facility] [address] ) 04/2009    acute exacerbation    Sciatic leg pain 07/20/2011    left    Stenosis of cervix     with cervical radiculomyelopathy    Suicidal ideation 04/2009    Syncope 01/09/2015    Tardive dyskinesia 02/28/2011    Torn meniscus 04/2004    lateral, knee, left    Varicose vein of leg 04/2004    Venous insufficiency of leg 04/2004    chronic    Vertigo 05/12/2008    transient-questionable BPPV     Past Surgical History:   Procedure Laterality Date    CATARACT EXTRACTION Right     10/2/2009    CATARACT EXTRACTION      12/4/2009    COLONOSCOPY      with biopsy    IVC FILTER INSERTION      anticoagulation-9/23/2014    KNEE ARTHROCENTESIS       and cortisone injection    KNEE ARTHROSCOPY Left     5/18/2004    LAMINECTOMY      C3-C6 and left sided cervical foraminotomies-10/14/2008    OTHER SURGICAL HISTORY      DONNY x 1    REPLACEMENT TOTAL HIP W/  RESURFACING IMPLANTS      TOTAL HIP ARTHROPLASTY Right     02/07/2017    TOTAL KNEE ARTHROPLASTY Left     3/21/2005     Social History   Social History     Substance and Sexual Activity   Alcohol Use Not Currently    Alcohol/week: 0 0 oz    Frequency: Never    Binge frequency: Never    Comment: no drinking     Social History     Substance and Sexual Activity   Drug Use No     Social History     Tobacco Use   Smoking Status Never Smoker   Smokeless Tobacco Never Used   Tobacco Comment    no passive smoke exposure   Meds/Allergies    Current Facility-Administered Medications   Medication Dose Route Frequency    acetaminophen (TYLENOL) tablet 975 mg  975 mg Oral Q8H [address]    amantadine (SYMMETREL) capsule 100 mg  100 mg Oral BID    amLODIPine (NORVASC) tablet 5 mg  5 mg Oral Daily    bisacodyl (DULCOLAX) rectal suppository 10 mg  10 mg Rectal Daily PRN    cholecalciferol (VITAMIN D3) tablet 1,000 Units  1,000 Units Oral Daily    docusate sodium (COLACE) capsule 100 mg  100 mg Oral BID    fluPHENAZine (PROLIXIN) tablet 5 mg  5 mg Oral Q8H [address]    fluvoxaMINE (LUVOX) tablet 50 mg  50 mg Oral 4x Daily    lidocaine (LIDODERM) 5 % patch 1 patch  1 patch Topical Daily    losartan (COZAAR) tablet 50 mg  50 mg Oral Daily    ondansetron (ZOFRAN-ODT) dispersible tablet 4 mg  4 mg Oral Q8H PRN    polyethylene glycol (MIRALAX) packet 17 g  17 g Oral Daily PRN    risperiDONE (RisperDAL) tablet 0 5 mg  0 5 mg Oral BID PRN    risperiDONE (RisperDAL) tablet 1 5 mg  1 5 mg Oral Daily    risperiDONE (RisperDAL) tablet 3 mg  3 mg Oral HS    tamsulosin (FLOMAX) capsule 0 4 mg  0 4 mg Oral Daily With Dinner    warfarin (COUMADIN) tablet 5 mg  5 mg Oral Daily (warfarin)               Allergies   Allergen Reactions    Lisinopril Cough       Objective   Vitals: Blood pressure 120/66, pulse 78, temperature 97 8 °F (36 6 °C), temperature source Oral, resp  rate 19, height 5' 4 5" (1 638 m), weight 81 7 kg (180 lb 1 9 oz), SpO2 92 %      Invasive Devices          None          Physical Exam

## 2019-03-29 ENCOUNTER — TELEPHONE (OUTPATIENT)
Dept: FAMILY MEDICINE CLINIC | Facility: CLINIC | Age: 74
End: 2019-03-29

## 2019-04-01 ENCOUNTER — HOSPITAL ENCOUNTER (OUTPATIENT)
Dept: RADIOLOGY | Facility: HOSPITAL | Age: 74
Discharge: HOME | End: 2019-04-01
Payer: COMMERCIAL

## 2019-04-01 DIAGNOSIS — S12.500A CLOSED DISPLACED FRACTURE OF SIXTH CERVICAL VERTEBRA, UNSPECIFIED FRACTURE MORPHOLOGY, INITIAL ENCOUNTER (HCC): ICD-10-CM

## 2019-04-01 PROCEDURE — 72040 X-RAY EXAM NECK SPINE 2-3 VW: CPT

## 2019-04-02 ENCOUNTER — HOSPITAL ENCOUNTER (OUTPATIENT)
Dept: RADIOLOGY | Facility: HOSPITAL | Age: 74
Discharge: HOME | End: 2019-04-02
Payer: COMMERCIAL

## 2019-04-02 ENCOUNTER — OFFICE VISIT (OUTPATIENT)
Dept: NEUROSURGERY | Facility: CLINIC | Age: 74
End: 2019-04-02
Payer: COMMERCIAL

## 2019-04-02 ENCOUNTER — TRANSCRIBE ORDERS (OUTPATIENT)
Dept: RADIOLOGY | Facility: HOSPITAL | Age: 74
End: 2019-04-02

## 2019-04-02 VITALS
DIASTOLIC BLOOD PRESSURE: 80 MMHG | WEIGHT: 168 LBS | TEMPERATURE: 97.5 F | HEART RATE: 86 BPM | SYSTOLIC BLOOD PRESSURE: 134 MMHG | HEIGHT: 65 IN | BODY MASS INDEX: 27.99 KG/M2 | RESPIRATION RATE: 16 BRPM

## 2019-04-02 DIAGNOSIS — S12.501D CLOSED NONDISPLACED FRACTURE OF SIXTH CERVICAL VERTEBRA WITH ROUTINE HEALING, UNSPECIFIED FRACTURE MORPHOLOGY, SUBSEQUENT ENCOUNTER: ICD-10-CM

## 2019-04-02 DIAGNOSIS — S12.501D CLOSED NONDISPLACED FRACTURE OF SIXTH CERVICAL VERTEBRA WITH ROUTINE HEALING, UNSPECIFIED FRACTURE MORPHOLOGY, SUBSEQUENT ENCOUNTER: Primary | ICD-10-CM

## 2019-04-02 PROCEDURE — 99213 OFFICE O/P EST LOW 20 MIN: CPT | Performed by: PHYSICIAN ASSISTANT

## 2019-04-02 PROCEDURE — 72040 X-RAY EXAM NECK SPINE 2-3 VW: CPT

## 2019-04-03 ENCOUNTER — TELEPHONE (OUTPATIENT)
Dept: FAMILY MEDICINE CLINIC | Facility: CLINIC | Age: 74
End: 2019-04-03

## 2019-04-03 DIAGNOSIS — M12.9 ARTHROPATHY: ICD-10-CM

## 2019-04-03 DIAGNOSIS — R26.2 DISABILITY OF WALKING: ICD-10-CM

## 2019-04-03 DIAGNOSIS — M15.9 PRIMARY OSTEOARTHRITIS INVOLVING MULTIPLE JOINTS: ICD-10-CM

## 2019-04-03 DIAGNOSIS — S12.500A CLOSED DISPLACED FRACTURE OF SIXTH CERVICAL VERTEBRA, UNSPECIFIED FRACTURE MORPHOLOGY, INITIAL ENCOUNTER (HCC): Primary | ICD-10-CM

## 2019-04-10 ENCOUNTER — CLINICAL SUPPORT (OUTPATIENT)
Dept: FAMILY MEDICINE CLINIC | Facility: CLINIC | Age: 74
End: 2019-04-10
Payer: COMMERCIAL

## 2019-04-10 ENCOUNTER — ANTICOAG VISIT (OUTPATIENT)
Dept: FAMILY MEDICINE CLINIC | Facility: CLINIC | Age: 74
End: 2019-04-10

## 2019-04-10 ENCOUNTER — DOCUMENTATION (OUTPATIENT)
Dept: FAMILY MEDICINE CLINIC | Facility: CLINIC | Age: 74
End: 2019-04-10

## 2019-04-10 ENCOUNTER — TELEPHONE (OUTPATIENT)
Dept: FAMILY MEDICINE CLINIC | Facility: CLINIC | Age: 74
End: 2019-04-10

## 2019-04-10 DIAGNOSIS — Z86.718 HISTORY OF DVT OF LOWER EXTREMITY: Primary | ICD-10-CM

## 2019-04-10 LAB
INR PPP: 1.35 (ref 0.86–1.17)
INR PPP: 1.35 (ref 0.86–1.17)
PROTHROMBIN TIME: 16.8 SECONDS (ref 11.8–14.2)

## 2019-04-10 PROCEDURE — 85610 PROTHROMBIN TIME: CPT | Performed by: FAMILY MEDICINE

## 2019-04-10 PROCEDURE — 36415 COLL VENOUS BLD VENIPUNCTURE: CPT | Performed by: FAMILY MEDICINE

## 2019-04-15 ENCOUNTER — TELEPHONE (OUTPATIENT)
Dept: FAMILY MEDICINE CLINIC | Facility: CLINIC | Age: 74
End: 2019-04-15

## 2019-04-16 DIAGNOSIS — Z86.718 HISTORY OF DVT OF LOWER EXTREMITY: ICD-10-CM

## 2019-04-16 DIAGNOSIS — E87.6 HYPOKALEMIA: ICD-10-CM

## 2019-04-16 DIAGNOSIS — Z86.711 HISTORY OF PULMONARY EMBOLUS (PE): ICD-10-CM

## 2019-04-16 RX ORDER — POTASSIUM CHLORIDE 750 MG/1
10 TABLET, EXTENDED RELEASE ORAL DAILY
Qty: 30 TABLET | Refills: 0 | Status: SHIPPED | OUTPATIENT
Start: 2019-04-16 | End: 2019-04-25 | Stop reason: SDUPTHER

## 2019-04-16 RX ORDER — WARFARIN SODIUM 5 MG/1
TABLET ORAL
Qty: 30 TABLET | Refills: 0 | Status: SHIPPED | OUTPATIENT
Start: 2019-04-16 | End: 2019-04-25 | Stop reason: SDUPTHER

## 2019-04-25 DIAGNOSIS — Z86.718 HISTORY OF DVT OF LOWER EXTREMITY: ICD-10-CM

## 2019-04-25 DIAGNOSIS — E87.6 HYPOKALEMIA: ICD-10-CM

## 2019-04-25 DIAGNOSIS — Z86.711 HISTORY OF PULMONARY EMBOLUS (PE): ICD-10-CM

## 2019-04-25 RX ORDER — POTASSIUM CHLORIDE 750 MG/1
10 TABLET, EXTENDED RELEASE ORAL DAILY
Qty: 30 TABLET | Refills: 5 | Status: SHIPPED | OUTPATIENT
Start: 2019-04-25 | End: 2019-01-01 | Stop reason: SDUPTHER

## 2019-04-25 RX ORDER — WARFARIN SODIUM 5 MG/1
TABLET ORAL
Qty: 30 TABLET | Refills: 5 | Status: SHIPPED | OUTPATIENT
Start: 2019-04-25 | End: 2019-05-29 | Stop reason: SDUPTHER

## 2019-04-26 ENCOUNTER — ANTICOAG VISIT (OUTPATIENT)
Dept: FAMILY MEDICINE CLINIC | Facility: CLINIC | Age: 74
End: 2019-04-26

## 2019-04-26 ENCOUNTER — CLINICAL SUPPORT (OUTPATIENT)
Dept: FAMILY MEDICINE CLINIC | Facility: CLINIC | Age: 74
End: 2019-04-26
Payer: COMMERCIAL

## 2019-04-26 ENCOUNTER — TELEPHONE (OUTPATIENT)
Dept: FAMILY MEDICINE CLINIC | Facility: CLINIC | Age: 74
End: 2019-04-26

## 2019-04-26 DIAGNOSIS — Z79.01 ANTICOAGULATED ON WARFARIN: Primary | ICD-10-CM

## 2019-04-26 LAB
INR PPP: 1.79 (ref 0.86–1.17)
INR PPP: 1.79 (ref 0.86–1.17)
PROTHROMBIN TIME: 20.9 SECONDS (ref 11.8–14.2)

## 2019-04-26 PROCEDURE — 85610 PROTHROMBIN TIME: CPT | Performed by: FAMILY MEDICINE

## 2019-04-26 PROCEDURE — 36415 COLL VENOUS BLD VENIPUNCTURE: CPT | Performed by: FAMILY MEDICINE

## 2019-04-29 ENCOUNTER — TELEPHONE (OUTPATIENT)
Dept: FAMILY MEDICINE CLINIC | Facility: CLINIC | Age: 74
End: 2019-04-29

## 2019-05-20 ENCOUNTER — OFFICE VISIT (OUTPATIENT)
Dept: FAMILY MEDICINE CLINIC | Facility: CLINIC | Age: 74
End: 2019-05-20
Payer: COMMERCIAL

## 2019-05-20 VITALS
RESPIRATION RATE: 18 BRPM | SYSTOLIC BLOOD PRESSURE: 124 MMHG | BODY MASS INDEX: 32 KG/M2 | WEIGHT: 180.6 LBS | TEMPERATURE: 99.2 F | DIASTOLIC BLOOD PRESSURE: 64 MMHG | OXYGEN SATURATION: 94 % | HEART RATE: 92 BPM | HEIGHT: 63 IN

## 2019-05-20 DIAGNOSIS — R26.2 DISABILITY OF WALKING: ICD-10-CM

## 2019-05-20 DIAGNOSIS — S12.500D CLOSED DISPLACED FRACTURE OF SIXTH CERVICAL VERTEBRA WITH ROUTINE HEALING, UNSPECIFIED FRACTURE MORPHOLOGY, SUBSEQUENT ENCOUNTER: ICD-10-CM

## 2019-05-20 DIAGNOSIS — N13.8 BPH WITH URINARY OBSTRUCTION: ICD-10-CM

## 2019-05-20 DIAGNOSIS — F20.0 PARANOID SCHIZOPHRENIA (HCC): ICD-10-CM

## 2019-05-20 DIAGNOSIS — E66.09 CLASS 1 OBESITY DUE TO EXCESS CALORIES WITHOUT SERIOUS COMORBIDITY WITH BODY MASS INDEX (BMI) OF 34.0 TO 34.9 IN ADULT: ICD-10-CM

## 2019-05-20 DIAGNOSIS — N40.1 BPH WITH URINARY OBSTRUCTION: ICD-10-CM

## 2019-05-20 DIAGNOSIS — I87.2 CHRONIC VENOUS STASIS DERMATITIS OF BOTH LOWER EXTREMITIES: ICD-10-CM

## 2019-05-20 DIAGNOSIS — I25.10 CORONARY ARTERY DISEASE INVOLVING NATIVE CORONARY ARTERY OF NATIVE HEART WITHOUT ANGINA PECTORIS: ICD-10-CM

## 2019-05-20 DIAGNOSIS — Z79.01 ANTICOAGULATED ON WARFARIN: ICD-10-CM

## 2019-05-20 DIAGNOSIS — I50.32 CHRONIC DIASTOLIC HEART FAILURE (HCC): ICD-10-CM

## 2019-05-20 DIAGNOSIS — I10 BENIGN ESSENTIAL HYPERTENSION: Primary | ICD-10-CM

## 2019-05-20 LAB
ANION GAP SERPL CALCULATED.3IONS-SCNC: 6 MMOL/L (ref 4–13)
BUN SERPL-MCNC: 15 MG/DL (ref 5–25)
CALCIUM SERPL-MCNC: 8.9 MG/DL (ref 8.3–10.1)
CHLORIDE SERPL-SCNC: 105 MMOL/L (ref 100–108)
CO2 SERPL-SCNC: 31 MMOL/L (ref 21–32)
CREAT SERPL-MCNC: 0.92 MG/DL (ref 0.6–1.3)
GFR SERPL CREATININE-BSD FRML MDRD: 83 ML/MIN/1.73SQ M
GLUCOSE SERPL-MCNC: 106 MG/DL (ref 65–140)
INR PPP: 2.7 (ref 0.86–1.17)
POTASSIUM SERPL-SCNC: 3.4 MMOL/L (ref 3.5–5.3)
PROTHROMBIN TIME: 28.7 SECONDS (ref 11.8–14.2)
SODIUM SERPL-SCNC: 142 MMOL/L (ref 136–145)

## 2019-05-20 PROCEDURE — 3078F DIAST BP <80 MM HG: CPT | Performed by: FAMILY MEDICINE

## 2019-05-20 PROCEDURE — 80048 BASIC METABOLIC PNL TOTAL CA: CPT | Performed by: FAMILY MEDICINE

## 2019-05-20 PROCEDURE — 36415 COLL VENOUS BLD VENIPUNCTURE: CPT | Performed by: FAMILY MEDICINE

## 2019-05-20 PROCEDURE — 3074F SYST BP LT 130 MM HG: CPT | Performed by: FAMILY MEDICINE

## 2019-05-20 PROCEDURE — 85610 PROTHROMBIN TIME: CPT | Performed by: FAMILY MEDICINE

## 2019-05-20 PROCEDURE — 99214 OFFICE O/P EST MOD 30 MIN: CPT | Performed by: FAMILY MEDICINE

## 2019-05-21 ENCOUNTER — TELEPHONE (OUTPATIENT)
Dept: FAMILY MEDICINE CLINIC | Facility: CLINIC | Age: 74
End: 2019-05-21

## 2019-05-21 ENCOUNTER — ANTICOAG VISIT (OUTPATIENT)
Dept: FAMILY MEDICINE CLINIC | Facility: CLINIC | Age: 74
End: 2019-05-21

## 2019-05-28 ENCOUNTER — TELEPHONE (OUTPATIENT)
Dept: FAMILY MEDICINE CLINIC | Facility: CLINIC | Age: 74
End: 2019-05-28

## 2019-05-29 DIAGNOSIS — Z86.718 HISTORY OF DVT OF LOWER EXTREMITY: ICD-10-CM

## 2019-05-29 DIAGNOSIS — Z86.711 HISTORY OF PULMONARY EMBOLUS (PE): ICD-10-CM

## 2019-05-29 RX ORDER — WARFARIN SODIUM 5 MG/1
TABLET ORAL
Qty: 30 TABLET | Refills: 5 | Status: SHIPPED | OUTPATIENT
Start: 2019-05-29 | End: 2019-08-05 | Stop reason: SDUPTHER

## 2019-05-31 ENCOUNTER — ANTICOAG VISIT (OUTPATIENT)
Dept: FAMILY MEDICINE CLINIC | Facility: CLINIC | Age: 74
End: 2019-05-31

## 2019-06-17 ENCOUNTER — ANTICOAG VISIT (OUTPATIENT)
Dept: FAMILY MEDICINE CLINIC | Facility: CLINIC | Age: 74
End: 2019-06-17

## 2019-06-17 ENCOUNTER — CLINICAL SUPPORT (OUTPATIENT)
Dept: FAMILY MEDICINE CLINIC | Facility: CLINIC | Age: 74
End: 2019-06-17
Payer: COMMERCIAL

## 2019-06-17 DIAGNOSIS — Z86.718 HISTORY OF DVT OF LOWER EXTREMITY: Primary | ICD-10-CM

## 2019-06-17 LAB
INR PPP: 2.64 (ref 0.86–1.17)
INR PPP: 2.64 (ref 0.86–1.17)
PROTHROMBIN TIME: 28.2 SECONDS (ref 11.8–14.2)

## 2019-06-17 PROCEDURE — 85610 PROTHROMBIN TIME: CPT | Performed by: FAMILY MEDICINE

## 2019-06-17 PROCEDURE — 36415 COLL VENOUS BLD VENIPUNCTURE: CPT | Performed by: FAMILY MEDICINE

## 2019-06-18 ENCOUNTER — ANTICOAG VISIT (OUTPATIENT)
Dept: FAMILY MEDICINE CLINIC | Facility: CLINIC | Age: 74
End: 2019-06-18

## 2019-06-18 LAB — INR PPP: 2.56 (ref 0.86–1.17)

## 2019-06-19 ENCOUNTER — ANTICOAG VISIT (OUTPATIENT)
Dept: FAMILY MEDICINE CLINIC | Facility: CLINIC | Age: 74
End: 2019-06-19

## 2019-06-19 ENCOUNTER — TELEPHONE (OUTPATIENT)
Dept: FAMILY MEDICINE CLINIC | Facility: CLINIC | Age: 74
End: 2019-06-19

## 2019-06-19 LAB
INR PPP: 2.64 (ref 0.84–1.19)
INR PPP: 2.64 (ref 0.86–1.17)

## 2019-06-20 ENCOUNTER — TREATMENT (OUTPATIENT)
Dept: FAMILY MEDICINE CLINIC | Facility: CLINIC | Age: 74
End: 2019-06-20

## 2019-06-25 ENCOUNTER — TELEPHONE (OUTPATIENT)
Dept: FAMILY MEDICINE CLINIC | Facility: CLINIC | Age: 74
End: 2019-06-25

## 2019-06-25 ENCOUNTER — ANTICOAG VISIT (OUTPATIENT)
Dept: FAMILY MEDICINE CLINIC | Facility: CLINIC | Age: 74
End: 2019-06-25

## 2019-06-28 ENCOUNTER — OFFICE VISIT (OUTPATIENT)
Dept: FAMILY MEDICINE CLINIC | Facility: CLINIC | Age: 74
End: 2019-06-28
Payer: COMMERCIAL

## 2019-06-28 VITALS
SYSTOLIC BLOOD PRESSURE: 124 MMHG | HEIGHT: 63 IN | DIASTOLIC BLOOD PRESSURE: 78 MMHG | OXYGEN SATURATION: 96 % | BODY MASS INDEX: 32.46 KG/M2 | RESPIRATION RATE: 20 BRPM | TEMPERATURE: 97.9 F | HEART RATE: 78 BPM | WEIGHT: 183.2 LBS

## 2019-06-28 DIAGNOSIS — E66.09 CLASS 1 OBESITY DUE TO EXCESS CALORIES WITHOUT SERIOUS COMORBIDITY WITH BODY MASS INDEX (BMI) OF 34.0 TO 34.9 IN ADULT: ICD-10-CM

## 2019-06-28 DIAGNOSIS — Z79.01 ANTICOAGULATED ON WARFARIN: ICD-10-CM

## 2019-06-28 DIAGNOSIS — I50.32 CHRONIC DIASTOLIC HEART FAILURE (HCC): ICD-10-CM

## 2019-06-28 DIAGNOSIS — R53.83 OTHER FATIGUE: ICD-10-CM

## 2019-06-28 DIAGNOSIS — F20.0 PARANOID SCHIZOPHRENIA (HCC): Primary | ICD-10-CM

## 2019-06-28 DIAGNOSIS — R26.2 DISABILITY OF WALKING: ICD-10-CM

## 2019-06-28 LAB
ALBUMIN SERPL BCP-MCNC: 3.8 G/DL (ref 3.5–5)
ALP SERPL-CCNC: 80 U/L (ref 46–116)
ALT SERPL W P-5'-P-CCNC: 44 U/L (ref 12–78)
ANION GAP SERPL CALCULATED.3IONS-SCNC: 6 MMOL/L (ref 4–13)
AST SERPL W P-5'-P-CCNC: 34 U/L (ref 5–45)
BASOPHILS # BLD AUTO: 0.05 THOUSANDS/ΜL (ref 0–0.1)
BASOPHILS NFR BLD AUTO: 1 % (ref 0–1)
BILIRUB SERPL-MCNC: 0.4 MG/DL (ref 0.2–1)
BUN SERPL-MCNC: 15 MG/DL (ref 5–25)
CALCIUM SERPL-MCNC: 8.7 MG/DL (ref 8.3–10.1)
CHLORIDE SERPL-SCNC: 106 MMOL/L (ref 100–108)
CO2 SERPL-SCNC: 30 MMOL/L (ref 21–32)
CREAT SERPL-MCNC: 0.78 MG/DL (ref 0.6–1.3)
EOSINOPHIL # BLD AUTO: 0.15 THOUSAND/ΜL (ref 0–0.61)
EOSINOPHIL NFR BLD AUTO: 3 % (ref 0–6)
ERYTHROCYTE [DISTWIDTH] IN BLOOD BY AUTOMATED COUNT: 14 % (ref 11.6–15.1)
GFR SERPL CREATININE-BSD FRML MDRD: 90 ML/MIN/1.73SQ M
GLUCOSE SERPL-MCNC: 83 MG/DL (ref 65–140)
HCT VFR BLD AUTO: 42.7 % (ref 36.5–49.3)
HGB BLD-MCNC: 13.4 G/DL (ref 12–17)
IMM GRANULOCYTES # BLD AUTO: 0.02 THOUSAND/UL (ref 0–0.2)
IMM GRANULOCYTES NFR BLD AUTO: 0 % (ref 0–2)
LYMPHOCYTES # BLD AUTO: 1.37 THOUSANDS/ΜL (ref 0.6–4.47)
LYMPHOCYTES NFR BLD AUTO: 28 % (ref 14–44)
MCH RBC QN AUTO: 29.6 PG (ref 26.8–34.3)
MCHC RBC AUTO-ENTMCNC: 31.4 G/DL (ref 31.4–37.4)
MCV RBC AUTO: 94 FL (ref 82–98)
MONOCYTES # BLD AUTO: 0.51 THOUSAND/ΜL (ref 0.17–1.22)
MONOCYTES NFR BLD AUTO: 11 % (ref 4–12)
NEUTROPHILS # BLD AUTO: 2.74 THOUSANDS/ΜL (ref 1.85–7.62)
NEUTS SEG NFR BLD AUTO: 57 % (ref 43–75)
NRBC BLD AUTO-RTO: 0 /100 WBCS
PLATELET # BLD AUTO: 198 THOUSANDS/UL (ref 149–390)
PMV BLD AUTO: 9.3 FL (ref 8.9–12.7)
POTASSIUM SERPL-SCNC: 3.9 MMOL/L (ref 3.5–5.3)
PROT SERPL-MCNC: 6.9 G/DL (ref 6.4–8.2)
RBC # BLD AUTO: 4.53 MILLION/UL (ref 3.88–5.62)
SODIUM SERPL-SCNC: 142 MMOL/L (ref 136–145)
TSH SERPL DL<=0.05 MIU/L-ACNC: 3.32 UIU/ML (ref 0.36–3.74)
WBC # BLD AUTO: 4.84 THOUSAND/UL (ref 4.31–10.16)

## 2019-06-28 PROCEDURE — 1036F TOBACCO NON-USER: CPT | Performed by: FAMILY MEDICINE

## 2019-06-28 PROCEDURE — 99214 OFFICE O/P EST MOD 30 MIN: CPT | Performed by: FAMILY MEDICINE

## 2019-06-28 PROCEDURE — 80053 COMPREHEN METABOLIC PANEL: CPT | Performed by: FAMILY MEDICINE

## 2019-06-28 PROCEDURE — 1160F RVW MEDS BY RX/DR IN RCRD: CPT | Performed by: FAMILY MEDICINE

## 2019-06-28 PROCEDURE — 36415 COLL VENOUS BLD VENIPUNCTURE: CPT | Performed by: FAMILY MEDICINE

## 2019-06-28 PROCEDURE — 84443 ASSAY THYROID STIM HORMONE: CPT | Performed by: FAMILY MEDICINE

## 2019-06-28 PROCEDURE — 3008F BODY MASS INDEX DOCD: CPT | Performed by: FAMILY MEDICINE

## 2019-06-28 PROCEDURE — 85025 COMPLETE CBC W/AUTO DIFF WBC: CPT | Performed by: FAMILY MEDICINE

## 2019-07-11 ENCOUNTER — TELEPHONE (OUTPATIENT)
Dept: FAMILY MEDICINE CLINIC | Facility: CLINIC | Age: 74
End: 2019-07-11

## 2019-07-20 DIAGNOSIS — N40.0 BENIGN PROSTATIC HYPERPLASIA, UNSPECIFIED WHETHER LOWER URINARY TRACT SYMPTOMS PRESENT: ICD-10-CM

## 2019-07-20 RX ORDER — TAMSULOSIN HYDROCHLORIDE 0.4 MG/1
CAPSULE ORAL
Qty: 90 CAPSULE | Refills: 3 | Status: SHIPPED | OUTPATIENT
Start: 2019-07-20 | End: 2019-01-01 | Stop reason: SDUPTHER

## 2019-07-22 ENCOUNTER — CLINICAL SUPPORT (OUTPATIENT)
Dept: FAMILY MEDICINE CLINIC | Facility: CLINIC | Age: 74
End: 2019-07-22
Payer: COMMERCIAL

## 2019-07-22 DIAGNOSIS — Z79.01 ANTICOAGULATED ON WARFARIN: Primary | ICD-10-CM

## 2019-07-22 LAB
INR PPP: 2.6 (ref 0.84–1.19)
INR PPP: 2.6 (ref 0.84–1.19)
PROTHROMBIN TIME: 27.3 SECONDS (ref 11.6–14.5)

## 2019-07-22 PROCEDURE — 36415 COLL VENOUS BLD VENIPUNCTURE: CPT

## 2019-07-22 PROCEDURE — 85610 PROTHROMBIN TIME: CPT | Performed by: FAMILY MEDICINE

## 2019-07-23 ENCOUNTER — ANTICOAG VISIT (OUTPATIENT)
Dept: FAMILY MEDICINE CLINIC | Facility: CLINIC | Age: 74
End: 2019-07-23

## 2019-07-23 ENCOUNTER — TELEPHONE (OUTPATIENT)
Dept: FAMILY MEDICINE CLINIC | Facility: CLINIC | Age: 74
End: 2019-07-23

## 2019-07-23 NOTE — TELEPHONE ENCOUNTER
INR 2 6 (7/22/19) currently on coumadin 2 5mg every Monday and Friday; 5 mg other days of the week   Last INR 2 64 (6/19/19)

## 2019-07-23 NOTE — PROGRESS NOTES
INR 2 6 (7/22/19) - currently on coumadin 2 5mg every Monday and Friday; 5mg other days of the week, Last INR 2 6 (6/19/19)    Patient notified with coumadin instructions

## 2019-08-05 DIAGNOSIS — Z86.711 HISTORY OF PULMONARY EMBOLUS (PE): ICD-10-CM

## 2019-08-05 DIAGNOSIS — Z86.718 HISTORY OF DVT OF LOWER EXTREMITY: ICD-10-CM

## 2019-08-05 RX ORDER — WARFARIN SODIUM 5 MG/1
TABLET ORAL
Qty: 30 TABLET | Refills: 5 | Status: SHIPPED | OUTPATIENT
Start: 2019-08-05 | End: 2019-08-06 | Stop reason: SDUPTHER

## 2019-08-06 ENCOUNTER — OFFICE VISIT (OUTPATIENT)
Dept: FAMILY MEDICINE CLINIC | Facility: CLINIC | Age: 74
End: 2019-08-06
Payer: COMMERCIAL

## 2019-08-06 VITALS
TEMPERATURE: 98 F | DIASTOLIC BLOOD PRESSURE: 80 MMHG | SYSTOLIC BLOOD PRESSURE: 154 MMHG | OXYGEN SATURATION: 94 % | HEIGHT: 63 IN | BODY MASS INDEX: 33.36 KG/M2 | HEART RATE: 83 BPM | WEIGHT: 188.3 LBS

## 2019-08-06 DIAGNOSIS — Z86.718 HISTORY OF DVT OF LOWER EXTREMITY: ICD-10-CM

## 2019-08-06 DIAGNOSIS — Z86.711 HISTORY OF PULMONARY EMBOLUS (PE): ICD-10-CM

## 2019-08-06 DIAGNOSIS — G44.209 ACUTE NON INTRACTABLE TENSION-TYPE HEADACHE: Primary | ICD-10-CM

## 2019-08-06 PROCEDURE — 99213 OFFICE O/P EST LOW 20 MIN: CPT | Performed by: NURSE PRACTITIONER

## 2019-08-06 RX ORDER — WARFARIN SODIUM 5 MG/1
5 TABLET ORAL
Qty: 30 TABLET | Refills: 5 | Status: SHIPPED | OUTPATIENT
Start: 2019-08-06 | End: 2020-01-01

## 2019-08-06 NOTE — PATIENT INSTRUCTIONS
Acute Headache   WHAT YOU NEED TO KNOW:   What is an acute headache? An acute headache is pain or discomfort that starts suddenly and gets worse quickly  You may have an acute headache only when you feel stress or eat certain foods  Other acute headache pain can happen every day, and sometimes several times a day  What are the most common types of acute headache? · Tension headache  is the most common type of headache  These headaches typically occur in the late afternoon and go away by evening  The pain is usually mild or moderate  You may have problems tolerating bright light or loud noise  The pain is usually across the forehead or in the back of the head, often only on one side  These headaches may occur every day  · Migraine headaches  cause moderate or severe pain  The headache generally lasts from 1 to 3 days and tends to come back  Pain is usually on only one side, but it may change sides  Migraines often occur in the temple, the back of the head, or behind the eye  The pain may throb or be sharp and steady  · A migraine with aura  means you see or feel something before a migraine  You may see a small spot surrounded by bright zigzag lines  Other signs or symptoms may follow the aura  · Cluster headache  pain is usually only on one side  It often causes severe pain, and can last for 30 minutes to 2 hours  These headaches may occur 1 or 2 times each day, more often at night  The pain may wake you  What causes acute headaches? The cause of your headache may not be known   The following conditions can trigger a headache:  · Stress or tension, hours or even days after stressful events    · Fatigue, a lack of sleep or changes in your usual sleep pattern, or a nap during the day    · Menstruation, especially after pregnancy, or use of birth control pills or hormone replacement therapy    · Food such as cured meats, artificial sweeteners, alcohol, dark chocolate, and MSG     · Suddenly not having caffeine if you usually have larger amounts    · A medical problem, such as an infection, tooth pain, neck or sinus pain, thyroid problems, or a tumor    · A head injury  How is the type of acute headache diagnosed and treated? Your healthcare provider will ask you to describe your pain and rate it on a scale from 1 to 10  Tell the provider how often you have headaches and how long they last  Also describe any other symptoms you have along with headaches, such as dizziness or blurred vision  · Medicines  may be given to manage or prevent headaches  The medicine will depend on the type of acute headache you have  Do not wait until the pain is severe before you take your medicine  You may be able to take over-the-counter pain medicines as needed  Examples include NSAIDs and acetaminophen  Ask your healthcare provider which medicine is right for you  Ask how much to take and when to take it  Follow directions  These medicines can cause stomach bleeding or kidney or liver damage if not taken correctly  · Biofeedback  may be used to help you manage stress  Electrodes (wires) are placed on your body and attached to a monitor  You will learn how to change stress reactions  For example, you learn to slow your heart rate when you become upset  · Cognitive behavior therapy,  or stress management, may be used with other therapies to prevent headaches  What can I do to manage my symptoms? · Apply heat or ice  on the headache area  Use a heat or ice pack  For an ice pack, you can also put crushed ice in a plastic bag  Cover the pack or bag with a towel before you apply it to your skin  Ice and heat both help decrease pain, and heat also helps decrease muscle spasms  Apply heat for 20 to 30 minutes every 2 hours  Apply ice for 15 to 20 minutes every hour  Apply heat or ice for as long and for as many days as directed  You may alternate heat and ice  · Relax your muscles    Lie down in a comfortable position and close your eyes  Relax your muscles slowly  Start at your toes and work your way up your body  · Keep a record of your headaches  Write down when your headaches start and stop  Include your symptoms and what you were doing when the headache began  Record what you ate or drank for 24 hours before the headache started  Describe the pain and where it hurts  Keep track of what you did to treat your headache and if it worked  What can I do to prevent an acute headache? · Avoid anything that triggers an acute headache  Examples include exposure to chemicals, going to high altitude, or not getting enough sleep  Create a regular sleep routine  Go to sleep at the same time and wake up at the same time each day  Do not use electronic devices before bedtime  These may trigger a headache or prevent you from sleeping well  · Do not smoke  Nicotine and other chemicals in cigarettes and cigars can trigger an acute headache or make it worse  Ask your healthcare provider for information if you currently smoke and need help to quit  E-cigarettes or smokeless tobacco still contain nicotine  Talk to your healthcare provider before you use these products  · Limit alcohol as directed  Alcohol can trigger an acute headache or make it worse  If you have cluster headaches, do not drink alcohol during an episode  For other types of headaches, ask your healthcare provider if it is safe for you to drink alcohol  Ask how much is safe for you to drink, and how often  · Exercise as directed  Exercise can reduce tension and help with headache pain  Aim for 30 minutes of physical activity on most days of the week  Your healthcare provider can help you create an exercise plan  · Eat a variety of healthy foods  Healthy foods include fruits, vegetables, low-fat dairy products, lean meats, fish, whole grains, and cooked beans   Your healthcare provider or dietitian can help you create meals plans if you need to avoid foods that trigger headaches  When should I seek immediate care? · You have severe pain  · You have numbness or weakness on one side of your face or body  · You have a headache that occurs after a blow to the head, a fall, or other trauma  · You have a headache, are forgetful or confused, or have trouble speaking  · You have a headache, stiff neck, and a fever  When should I contact my healthcare provider? · You have a constant headache and are vomiting  · You have a headache each day that does not get better, even after treatment  · You have changes in your headaches, or new symptoms that occur when you have a headache  · You have questions or concerns about your condition or care  CARE AGREEMENT:   You have the right to help plan your care  Learn about your health condition and how it may be treated  Discuss treatment options with your caregivers to decide what care you want to receive  You always have the right to refuse treatment  The above information is an  only  It is not intended as medical advice for individual conditions or treatments  Talk to your doctor, nurse or pharmacist before following any medical regimen to see if it is safe and effective for you  © 2017 [facility] [facility] Information is for End User's use only and may not be sold, redistributed or otherwise used for commercial purposes  All illustrations and images included in CareNotes® are the copyrighted property of [employer]  or Dawit Hdez

## 2019-08-06 NOTE — PROGRESS NOTES
Subjective:   Chief Complaint   Patient presents with    Headache     about 2 or 3 weeks, taken Tylenol at night for the pain         Patient ID: Thomas Blackburn  is a 68 y o  male  Presents today for complaints of a headache over the last 2-3 weeks  Describes it as a pressure feeling in his head the really hurts the headache can last anywhere from a few seconds to an hour  The headaches come and go they do not last   He did sustain a fall recently for which he was hospitalized and his scans were negative  He is however going to physical therapy for treatment of his neck and lower back  If he does take his Tylenol in the evening when he has the headache it does resolve it as well  He is taking Tylenol Arthritis and should be taking it 650 mg 2 to 3 times a day as needed  He typically is only taking 325 of Tylenol arthritis in the evening  Discussion with patient to include trying 650 of Tylenol in the morning and in the evening to see if this resolves his headaches  If headaches are not resolving doing this on a consistent basis he will give us a call      The following portions of the patient's history were reviewed and updated as appropriate: allergies, current medications, past family history, past medical history, past social history, past surgical history and problem list     Review of Systems   Constitutional: Negative for chills and fever  Respiratory: Negative for cough, shortness of breath and wheezing  Cardiovascular: Negative for chest pain, palpitations and leg swelling  Neurological: Positive for headaches  Negative for dizziness, speech difficulty, weakness and numbness  Psychiatric/Behavioral: Negative for dysphoric mood  The patient is not nervous/anxious            Objective:  Vitals:    08/06/19 1109   BP: 154/80   BP Location: Left arm   Patient Position: Sitting   Cuff Size: Adult   Pulse: 83   Temp: 98 °F (36 7 °C)   TempSrc: Temporal   SpO2: 94%   Weight: 85 4 kg (188 lb 4 8 oz)   Height: 5' 3" (1 6 m)      Physical Exam   Constitutional: He appears well-developed and well-nourished  Eyes: Pupils are equal, round, and reactive to light  Conjunctivae and EOM are normal  Right eye exhibits no discharge  Left eye exhibits no discharge  Neck: Neck supple  Decreased range of motion present  No thyromegaly present  Cardiovascular: Normal rate, regular rhythm, normal heart sounds and intact distal pulses  Pulmonary/Chest: Effort normal and breath sounds normal  No respiratory distress  He has no wheezes  Lymphadenopathy:     He has no cervical adenopathy  Assessment/Plan:    No problem-specific Assessment & Plan notes found for this encounter         Diagnoses and all orders for this visit:    Acute non intractable tension-type headache  Comments:  Tylenol 650 mg twice daily morning and evening

## 2019-08-21 PROBLEM — I10 HYPERTENSION: Status: ACTIVE | Noted: 2019-01-01

## 2019-08-21 PROBLEM — R51.9 PRESSURE IN HEAD: Status: ACTIVE | Noted: 2019-01-01

## 2019-08-21 NOTE — PROGRESS NOTES
Subjective:   Chief Complaint   Patient presents with    Headache    Fatigue        Patient ID: Mackenzie Chow  is a 68 y o  male  Presents today for follow-up of pressure in his head  The pressure does not occur every day yesterday he had none  Today he has felt pressure and once he takes 1 Tylenol Arthritis tablet and within an hour of the feeling of pressure has resolved  The pressure can come back within 8 hours of taking the Tylenol sometimes less  He denies that it is pain he just feels pressure at the top of his head  He also has feelings of drowsiness when he takes that Tylenol  He is fine when he is up and moving about even though he feels little drowsy, however when he sits down he starts nodding off to sleep  Reassured him that it takes anywhere from 45 minutes to an hour for oral medication to work and then it it is not unusual to fall asleep when year sitting down for long periods of time  His blood pressure has been slightly elevated while at physical therapy recently as well  Discussed with patient to check his blood pressure when he is feeling the pressure in his head and record  Discussed after he starts experiencing pain with the pressure expressly if it is the most intense pain he has ever experienced he should seek emergency room help  The following portions of the patient's history were reviewed and updated as appropriate: allergies, current medications, past family history, past medical history, past social history, past surgical history and problem list     Review of Systems   Constitutional: Negative for chills and fever  Respiratory: Negative for cough, shortness of breath and wheezing  Cardiovascular: Negative for chest pain, palpitations and leg swelling  Musculoskeletal: Positive for gait problem (uses walker)  Neurological: Negative for dizziness, speech difficulty, weakness, numbness and headaches (pressure)     Psychiatric/Behavioral: Dysphoric mood: being treated  The patient is not nervous/anxious (being treated)  Objective:  Vitals:    08/21/19 1520   BP: 124/68   BP Location: Left arm   Patient Position: Sitting   Cuff Size: Large   Pulse: 88   Resp: 18   Temp: 98 °F (36 7 °C)   TempSrc: Temporal   SpO2: 94%   Weight: 85 3 kg (188 lb)   Height: 5' 3" (1 6 m)      Physical Exam   Constitutional: He is oriented to person, place, and time  He appears well-developed and well-nourished  Eyes: Pupils are equal, round, and reactive to light  Conjunctivae and EOM are normal  Right eye exhibits no discharge  Left eye exhibits no discharge  Cardiovascular: Normal rate, regular rhythm, normal heart sounds and intact distal pulses  Pulmonary/Chest: Effort normal and breath sounds normal  No respiratory distress  He has no wheezes  Neurological: He is alert and oriented to person, place, and time  Skin: Skin is warm and dry  Psychiatric: He has a normal mood and affect  His behavior is normal          Assessment/Plan:    No problem-specific Assessment & Plan notes found for this encounter  Diagnoses and all orders for this visit:    Pressure in head  Comments:  Take blood pressure when experiencing the pressure and record   Bring to follow up  appointment    Essential hypertension  -     Blood Pressure Monitoring (BLOOD PRESSURE MONITOR/L CUFF) MISC; by Does not apply route daily

## 2019-09-03 NOTE — TELEPHONE ENCOUNTER
NR 2 84 (9/3/19) - currently on coumadin 2,5 mg every Monday and Friday; 5 mg other days of the week   Last INR 2 60 (7/22/19)

## 2019-09-03 NOTE — PROGRESS NOTES
INR 2 84 (9/3/19) - currently on coumadin 2,5 mg every Monday and Friday; 5 mg other days of the week   Last INR 2 60 (7/22/19)

## 2019-09-24 NOTE — ANESTHESIA PREPROCEDURE EVALUATION
Review of Systems/Medical History  Patient summary reviewed  Chart reviewed  No history of anesthetic complications     Cardiovascular  Negative cardio ROS Hyperlipidemia, Hypertension controlled, CAD , DVT  PE,  Pulmonary  Negative pulmonary ROS        GI/Hepatic  Negative GI/hepatic ROS   Bowel prep       Negative  ROS        Endo/Other    Obesity    GYN  Negative gynecology ROS          Hematology    Coagulation disorder currently taking oral anticoagulants,    Musculoskeletal  Back pain , lumbar pain, Sciatica,   Arthritis     Neurology  Negative neurology ROS   Headaches,    Psychology   Depression , being treated for depression, Schizophrenia (paranoid)             Physical Exam    Airway    Mallampati score: III  TM Distance: >3 FB  Neck ROM: full     Dental   No notable dental hx     Cardiovascular  Comment: Negative ROS, Rhythm: regular, Rate: normal, Cardiovascular exam normal    Pulmonary  Pulmonary exam normal Breath sounds clear to auscultation,     Other Findings        Anesthesia Plan  ASA Score- 3     Anesthesia Type- IV sedation with anesthesia with ASA Monitors  Additional Monitors:   Airway Plan:         Plan Factors-Patient not instructed to abstain from smoking on day of procedure  Patient did not smoke on day of surgery  Induction- intravenous  Postoperative Plan-     Informed Consent- Anesthetic plan and risks discussed with patient  I personally reviewed this patient with the CRNA  Discussed and agreed on the Anesthesia Plan with the CRNA  Augie Abarca

## 2019-09-24 NOTE — DISCHARGE INSTRUCTIONS
Colonoscopy   WHAT YOU NEED TO KNOW:   A colonoscopy is a procedure to examine the inside of your colon (intestine) with a scope  Polyps or tissue growths may have been removed during your colonoscopy  It is normal to feel bloated and to have some abdominal discomfort  You should be passing gas  If you have hemorrhoids or you had polyps removed, you may have a small amount of bleeding  DISCHARGE INSTRUCTIONS:   Seek care immediately if:   · You have a large amount of bright red blood in your bowel movements  · Your abdomen is hard and firm and you have severe pain  · You have sudden trouble breathing  Contact your healthcare provider if:   · You develop a rash or hives  · You have a fever within 24 hours of your procedure  · You have not had a bowel movement for 3 days after your procedure  · You have questions or concerns about your condition or care  Activity:   · Do not lift, strain, or run  for 3 days after your procedure  · Rest after your procedure  You have been given medicine to relax you  Do not  drive or make important decisions until the day after your procedure  Return to your normal activity as directed  · Relieve gas and discomfort from bloating  by lying on your right side with a heating pad on your abdomen  You may need to take short walks to help the gas move out  Eat small meals until bloating is relieved  If you had polyps removed: For 7 days after your procedure:  · Do not  take aspirin  · Do not  go on long car rides  Help prevent constipation:   · Eat a variety of healthy foods  Healthy foods include fruit, vegetables, whole-grain breads, low-fat dairy products, beans, lean meat, and fish  Ask if you need to be on a special diet  Your healthcare provider may recommend that you eat high-fiber foods such as cooked beans  Fiber helps you have regular bowel movements  · Drink liquids as directed    Adults should drink between 9 and 13 eight-ounce cups of liquid every day  Ask what amount is best for you  For most people, good liquids to drink are water, juice, and milk  · Exercise as directed  Talk to your healthcare provider about the best exercise plan for you  Exercise can help prevent constipation, decrease your blood pressure and improve your health  Follow up with your healthcare provider as directed:  Write down your questions so you remember to ask them during your visits  © 2017 [address] [employer] Information is for End User's use only and may not be sold, redistributed or otherwise used for commercial purposes  All illustrations and images included in CareNotes® are the copyrighted property of [employer]  or Dawit Hdez  The above information is an  only  It is not intended as medical advice for individual conditions or treatments  Talk to your doctor, nurse or pharmacist before following any medical regimen to see if it is safe and effective for you  Diverticulosis   WHAT YOU NEED TO KNOW:   Diverticulosis is a condition that causes small pockets called diverticula to form in your intestine  These pockets make it difficult for bowel movements to pass through your digestive system  DISCHARGE INSTRUCTIONS:   Seek care immediately if:   · You have severe pain on the left side of your lower abdomen  · Your bowel movements are bright or dark red  Contact your healthcare provider if:   · You have a fever and chills  · You feel dizzy or lightheaded  · You have nausea, or you are vomiting  · You have a change in your bowel movements  · You have questions or concerns about your condition or care  Medicines:   · Medicines  to soften your bowel movements may be given  You may also need medicines to treat symptoms such as bloating and pain  · Take your medicine as directed  Contact your healthcare provider if you think your medicine is not helping or if you have side effects   Tell him or her if you are allergic to any medicine  Keep a list of the medicines, vitamins, and herbs you take  Include the amounts, and when and why you take them  Bring the list or the pill bottles to follow-up visits  Carry your medicine list with you in case of an emergency  Self-care: The goal of treatment is to manage any symptoms you have and prevent other problems such as diverticulitis  Diverticulitis is swelling or infection of the diverticula  Your healthcare provider may recommend any of the following:  · Eat a variety of high-fiber foods  High-fiber foods help you have regular bowel movements  High-fiber foods include cooked beans, fruits, vegetables, and some cereals  Most adults need 25 to 35 grams of fiber each day  Your healthcare provider may recommend that you have more  Ask your healthcare provider how much fiber you need  Increase fiber slowly  You may have abdominal discomfort, bloating, and gas if you add fiber to your diet too quickly  You may need to take a fiber supplement if you are not getting enough fiber from food  · Drink liquids as directed  You may need to drink 2 to 3 liters (8 to 12 cups) of liquids every day  Ask your healthcare provider how much liquid to drink each day and which liquids are best for you  · Apply heat  on your abdomen for 20 to 30 minutes every 2 hours for as many days as directed  Heat helps decrease pain and muscle spasms  Help prevent diverticulitis or other symptoms: The following may help decrease your risk for diverticulitis or symptoms, such as bleeding  Talk to your provider about these or other things you can do to prevent problems that may occur with diverticulosis  · Exercise regularly  Ask your healthcare provider about the best exercise plan for you  Exercise can help you have regular bowel movements  Get 30 minutes of exercise on most days of the week  · Maintain a healthy weight    Ask your healthcare provider how much you should weigh  Ask him or her to help you create a weight loss plan if you are overweight  · Do not smoke  Nicotine and other chemicals in cigarettes increase your risk for diverticulitis  Ask your healthcare provider for information if you currently smoke and need help to quit  E-cigarettes or smokeless tobacco still contain nicotine  Talk to your healthcare provider before you use these products  · Ask your healthcare provider if it is safe to take NSAIDs  NSAIDs may increase your risk of diverticulitis  Follow up with your healthcare provider as directed:  Write down your questions so you remember to ask them during your visits  © 2017 [address] [facility] Information is for End User's use only and may not be sold, redistributed or otherwise used for commercial purposes  All illustrations and images included in CareNotes® are the copyrighted property of [employer]  or Dawit Hdez  The above information is an  only  It is not intended as medical advice for individual conditions or treatments  Talk to your doctor, nurse or pharmacist before following any medical regimen to see if it is safe and effective for you

## 2019-09-26 PROBLEM — Z86.73 HISTORY OF STROKE: Status: RESOLVED | Noted: 2019-03-06 | Resolved: 2019-01-01

## 2019-09-26 PROBLEM — Z86.711 HISTORY OF PULMONARY EMBOLISM: Status: RESOLVED | Noted: 2019-03-06 | Resolved: 2019-01-01

## 2019-09-26 PROBLEM — S09.90XA HEAD INJURY, ACUTE: Status: RESOLVED | Noted: 2019-03-04 | Resolved: 2019-01-01

## 2019-09-26 PROBLEM — R52 PAIN: Status: RESOLVED | Noted: 2019-03-06 | Resolved: 2019-01-01

## 2019-09-26 NOTE — ASSESSMENT & PLAN NOTE
Followed by [facility] every 3 months  Patient says that he feels better on the present combination of medication  Still has intermittant daytime drowsiness

## 2019-09-26 NOTE — PROGRESS NOTES
Assessment/Plan:    Benign essential hypertension  HBPM: SBP = 140s and low 150s and the DBP = 60s to 80s  Labs reviewed from 6/28/19 - CBC, CMP, and TSH = WNL  Blood pressure is controlled on present treatment regimen  Patient misses no doses and tolerates the meds without side effects  Patient avoids salt  Discussed diet, exercise and weight control  No change in present meds  Continue HBPM     Disability of walking  Chronic back pain and imbalance related to Psych meds  Self ambulatory with rolling walker  No recent falls  Goes to PT for gait, strength and balance, once a week  Paranoid schizophrenia ([facility] [address] )  Followed by [facility] every 3 months  Patient says that he feels better on the present combination of medication  Still has intermittant daytime drowsiness  Class 1 obesity due to excess calories without serious comorbidity with body mass index (BMI) of 34 0 to 34 9 in adult  BMI = 33 8 - 9/26/19  Has gained 3# over the past 6 weeks  No formal exercise but goes to PT, once a week  No specific diet  Recommend a Herat Healthy Diet  Anticoagulated on warfarin  On thjs for h/o recurrent DVT  No bleeding issues  Diagnoses and all orders for this visit:    Benign essential hypertension    History of DVT of lower extremity    Class 1 obesity due to excess calories without serious comorbidity with body mass index (BMI) of 34 0 to 34 9 in adult    Anticoagulated on warfarin    Chronic depression    Disability of walking    Need for influenza vaccination  -     FLUZONE HIGH-DOSE: influenza vaccine, high-dose, preservative-free 0 5 mL    Paranoid schizophrenia ([facility] [address] )    Chronic venous stasis dermatitis of both lower extremities    Other orders  -     fluPHENAZine (PROLIXIN) 10 MG tablet; Take 10 mg by mouth daily  -     fluvoxaMINE (LUVOX) 50 mg tablet;  Take 50 mg by mouth 5 (five) times a day          Subjective:     Chief Complaint   Patient presents with    Hypertension        Patient ID: Abraham Chung  is a 68 y o  male  HPI : Multiple Chronic Medical Conditions    The following portions of the patient's history were reviewed and updated as appropriate: allergies, current medications, past family history, past medical history, past social history, past surgical history and problem list     Review of Systems   Constitutional: Negative for activity change, appetite change, fatigue, fever and unexpected weight change  HENT: Negative for congestion, dental problem and sneezing  Eyes: Negative for discharge and visual disturbance  Respiratory: Negative for cough, chest tightness, shortness of breath and wheezing  Cardiovascular: Positive for leg swelling  Negative for chest pain and palpitations  Gastrointestinal: Negative for abdominal pain, constipation, diarrhea, nausea and vomiting  Endocrine: Negative for polydipsia and polyuria  Genitourinary: Negative for dysuria and frequency  Musculoskeletal: Positive for back pain  Negative for arthralgias  Independently self ambulatory with rolling walker  Skin: Negative for rash  Allergic/Immunologic: Negative for environmental allergies and food allergies  Neurological: Negative for tremors, speech difficulty and headaches  Hematological: Negative for adenopathy  Psychiatric/Behavioral: Negative for behavioral problems and sleep disturbance  Objective:  Vitals:    09/26/19 0937   BP: 128/74   BP Location: Left arm   Patient Position: Sitting   Cuff Size: Large   Pulse: 74   Resp: 18   Temp: 97 9 °F (36 6 °C)   TempSrc: Temporal   SpO2: 96%   Weight: 86 8 kg (191 lb 4 8 oz)   Height: 5' 3" (1 6 m)      Physical Exam   Constitutional: He is oriented to person, place, and time  He appears well-developed and well-nourished  HENT:   Head: Normocephalic  Mouth/Throat: Oropharynx is clear and moist    Eyes: Pupils are equal, round, and reactive to light  Conjunctivae are normal  Right eye exhibits no discharge  Left eye exhibits no discharge  Neck: Normal range of motion  Neck supple  No thyromegaly present  Cardiovascular: Normal rate, regular rhythm and normal heart sounds  No murmur heard  Pulmonary/Chest: Effort normal and breath sounds normal    Abdominal: Soft  Bowel sounds are normal  There is no tenderness  Musculoskeletal: Normal range of motion  He exhibits edema  He exhibits no tenderness  Feet:    Lymphadenopathy:     He has no cervical adenopathy  Neurological: He is alert and oriented to person, place, and time  Self ambulatory with rolling walker  Mild cog-wheeling of BUE  Skin: Skin is warm  No rash noted  Psychiatric: He has a normal mood and affect  His behavior is normal        Patient Instructions   No change in present medications  Continue to avoid salt as noted below and also I am giving you a copy of a heart healthy diet  AVOID THESE HIGH SALT FOODS:  SNACKS THAT TASTE SALTY: PRETZELS/CHIPSPOPCORN  CANNED SOUPS AND VEGGIES  FROZEN FOODS/ MICROWAVEABLE FOODS/ CANNED FOODS  AVOID ADDING EXTRA SALT TO THE MEALS     Heart Healthy Diet   WHAT YOU NEED TO KNOW:   A heart healthy diet is an eating plan low in total fat, unhealthy fats, and sodium (salt)  A heart healthy diet helps decrease your risk for heart disease and stroke  Limit the amount of fat you eat to 25% to 35% of your total daily calories  Limit sodium to less than 2,300 mg each day  DISCHARGE INSTRUCTIONS:   Healthy fats:  Healthy fats can help improve cholesterol levels  The risk for heart disease is decreased when cholesterol levels are normal  Choose healthy fats, such as the following:  · Unsaturated fat  is found in foods such as soybean, canola, olive, corn, and safflower oils  It is also found in soft tub margarine that is made with liquid vegetable oil  · Omega-3 fat  is found in certain fish, such as salmon, tuna, and trout, and in walnuts and flaxseed    Unhealthy fats:  Unhealthy fats can cause unhealthy cholesterol levels in your blood and increase your risk of heart disease  Limit unhealthy fats, such as the following:  · Cholesterol  is found in animal foods, such as eggs and lobster, and in dairy products made from whole milk  Limit cholesterol to less than 300 milligrams (mg) each day  You may need to limit cholesterol to 200 mg each day if you have heart disease  · Saturated fat  is found in meats, such as dahl and hamburger  It is also found in chicken or turkey skin, whole milk, and butter  Limit saturated fat to less than 7% of your total daily calories  Limit saturated fat to less than 6% if you have heart disease or are at increased risk for it  · Trans fat  is found in packaged foods, such as potato chips and cookies  It is also in hard margarine, some fried foods, and shortening  Avoid trans fats as much as possible    Heart healthy foods and drinks to include:  Ask your dietitian or healthcare provider how many servings to have from each of the following food groups:  · Grains:      ¨ Whole-wheat breads, cereals, and pastas, and brown rice    ¨ Low-fat, low-sodium crackers and chips    · Vegetables:      ¨ Broccoli, green beans, green peas, and spinach    ¨ Collards, kale, and lima beans    ¨ Carrots, sweet potatoes, tomatoes, and peppers    ¨ Canned vegetables with no salt added    · Fruits:      ¨ Bananas, peaches, pears, and pineapple    ¨ Grapes, raisins, and dates    ¨ Oranges, tangerines, grapefruit, orange juice, and grapefruit juice    ¨ Apricots, mangoes, melons, and papaya    ¨ Raspberries and strawberries    ¨ Canned fruit with no added sugar    · Low-fat dairy products:      ¨ Nonfat (skim) milk, 1% milk, and low-fat almond, cashew, or soy milks fortified with calcium    ¨ Low-fat cheese, regular or frozen yogurt, and cottage cheese    · Meats and proteins , such as lean cuts of beef and pork (loin, leg, round), skinless chicken and turkey, legumes, soy products, egg whites, and nuts  Foods and drinks to limit or avoid:  Ask your dietitian or healthcare provider about these and other foods that are high in unhealthy fat, sodium, and sugar:  · Snack or packaged foods , such as frozen dinners, cookies, macaroni and cheese, and cereals with more than 300 mg of sodium per serving    · Canned or dry mixes  for cakes, soups, sauces, or gravies    · Vegetables with added sodium , such as instant potatoes, vegetables with added sauces, or regular canned vegetables    · Other foods high in sodium , such as ketchup, barbecue sauce, salad dressing, pickles, olives, soy sauce, and miso    · High-fat dairy foods  such as whole or 2% milk, cream cheese, or sour cream, and cheeses     · High-fat protein foods  such as high-fat cuts of beef (T-bone steaks, ribs), chicken or turkey with skin, and organ meats, such as liver    · Cured or smoked meats , such as hot dogs, dahl, and sausage    · Unhealthy fats and oils , such as butter, stick margarine, shortening, and cooking oils such as coconut or palm oil    · Food and drinks high in sugar , such as soft drinks (soda), sports drinks, sweetened tea, candy, cake, cookies, pies, and doughnuts  Other diet guidelines to follow:   · Eat more foods containing omega-3 fats  Eat fish high in omega-3 fats at least 2 times a week  · Limit alcohol  Too much alcohol can damage your heart and raise your blood pressure  Women should limit alcohol to 1 drink a day  Men should limit alcohol to 2 drinks a day  A drink of alcohol is 12 ounces of beer, 5 ounces of wine, or 1½ ounces of liquor  · Choose low-sodium foods  High-sodium foods can lead to high blood pressure  Add little or no salt to food you prepare  Use herbs and spices in place of salt  · Eat more fiber  to help lower cholesterol levels  Eat at least 5 servings of fruits and vegetables each day  Eat 3 ounces of whole-grain foods each day   Legumes (beans) are also a good source of fiber   Lifestyle guidelines:   · Do not smoke  Nicotine and other chemicals in cigarettes and cigars can cause lung and heart damage  Ask your healthcare provider for information if you currently smoke and need help to quit  E-cigarettes or smokeless tobacco still contain nicotine  Talk to your healthcare provider before you use these products  · Exercise regularly  to help you maintain a healthy weight and improve your blood pressure and cholesterol levels  Ask your healthcare provider about the best exercise plan for you  Do not start an exercise program without asking your healthcare provider  Follow up with your healthcare provider as directed:  Write down your questions so you remember to ask them during your visits  © 2017 [address] Information is for End User's use only and may not be sold, redistributed or otherwise used for commercial purposes  All illustrations and images included in CareNotes® are the copyrighted property of [employer] M [employer] Inc  or [employer] Hdez  The above information is an  only  It is not intended as medical advice for individual conditions or treatments  Talk to your doctor, nurse or pharmacist before following any medical regimen to see if it is safe and effective for you  Influenza Vaccine   WHAT YOU NEED TO KNOW:   The influenza vaccine is an injection given to help prevent influenza (flu)  The flu is caused by a virus  The virus spreads from person to person through coughing and sneezing  Several types of viruses cause the flu  The viruses change over time, so new vaccines are made each year  The vaccine begins to protect you about 2 weeks after you get it  The flu shot usually injected into your upper arm  It may be given in your thigh  You may get a vaccine with a weak or dead virus  DISCHARGE INSTRUCTIONS:   Call 911 for any of the following:   · Your mouth and throat are swollen      · You are wheezing or have trouble breathing  · You have chest pain or your heart is beating faster than normal for you  · You feel like you are going to faint  Return to the emergency department if:   · Your face is red or swollen  · You have hives that spread over your body  · You feel weak or dizzy  Contact your healthcare provider if:   · You have increased pain, redness, or swelling around the area where the shot was given  · You have questions or concerns about the influenza vaccine  Apply a warm compress  to the injection area if you got a flu shot  Apply the compress as directed to decrease pain and swelling  Follow up with your healthcare provider as directed:  Write down your questions so you remember to ask them during your visits  © 2017 [address] Information is for End User's use only and may not be sold, redistributed or otherwise used for commercial purposes  All illustrations and images included in CareNotes® are the copyrighted property of [employer]  or Dawit Hdez  The above information is an  only  It is not intended as medical advice for individual conditions or treatments  Talk to your doctor, nurse or pharmacist before following any medical regimen to see if it is safe and effective for you

## 2019-09-26 NOTE — PATIENT INSTRUCTIONS
No change in present medications  Continue to avoid salt as noted below and also I am giving you a copy of a heart healthy diet  AVOID THESE HIGH SALT FOODS:  SNACKS THAT TASTE SALTY: PRETZELS/CHIPSPOPCORN  CANNED SOUPS AND VEGGIES  FROZEN FOODS/ MICROWAVEABLE FOODS/ CANNED FOODS  AVOID ADDING EXTRA SALT TO THE MEALS     Heart Healthy Diet   WHAT YOU NEED TO KNOW:   A heart healthy diet is an eating plan low in total fat, unhealthy fats, and sodium (salt)  A heart healthy diet helps decrease your risk for heart disease and stroke  Limit the amount of fat you eat to 25% to 35% of your total daily calories  Limit sodium to less than 2,300 mg each day  DISCHARGE INSTRUCTIONS:   Healthy fats:  Healthy fats can help improve cholesterol levels  The risk for heart disease is decreased when cholesterol levels are normal  Choose healthy fats, such as the following:  · Unsaturated fat  is found in foods such as soybean, canola, olive, corn, and safflower oils  It is also found in soft tub margarine that is made with liquid vegetable oil  · Omega-3 fat  is found in certain fish, such as salmon, tuna, and trout, and in walnuts and flaxseed  Unhealthy fats:  Unhealthy fats can cause unhealthy cholesterol levels in your blood and increase your risk of heart disease  Limit unhealthy fats, such as the following:  · Cholesterol  is found in animal foods, such as eggs and lobster, and in dairy products made from whole milk  Limit cholesterol to less than 300 milligrams (mg) each day  You may need to limit cholesterol to 200 mg each day if you have heart disease  · Saturated fat  is found in meats, such as dahl and hamburger  It is also found in chicken or turkey skin, whole milk, and butter  Limit saturated fat to less than 7% of your total daily calories  Limit saturated fat to less than 6% if you have heart disease or are at increased risk for it       · Trans fat  is found in packaged foods, such as potato chips and cookies  It is also in hard margarine, some fried foods, and shortening  Avoid trans fats as much as possible    Heart healthy foods and drinks to include:  Ask your dietitian or healthcare provider how many servings to have from each of the following food groups:  · Grains:      ¨ Whole-wheat breads, cereals, and pastas, and brown rice    ¨ Low-fat, low-sodium crackers and chips    · Vegetables:      ¨ Broccoli, green beans, green peas, and spinach    ¨ Collards, kale, and lima beans    ¨ Carrots, sweet potatoes, tomatoes, and peppers    ¨ Canned vegetables with no salt added    · Fruits:      ¨ Bananas, peaches, pears, and pineapple    ¨ Grapes, raisins, and dates    ¨ Oranges, tangerines, grapefruit, orange juice, and grapefruit juice    ¨ Apricots, mangoes, melons, and papaya    ¨ Raspberries and strawberries    ¨ Canned fruit with no added sugar    · Low-fat dairy products:      ¨ Nonfat (skim) milk, 1% milk, and low-fat almond, cashew, or soy milks fortified with calcium    ¨ Low-fat cheese, regular or frozen yogurt, and cottage cheese    · Meats and proteins , such as lean cuts of beef and pork (loin, leg, round), skinless chicken and turkey, legumes, soy products, egg whites, and nuts  Foods and drinks to limit or avoid:  Ask your dietitian or healthcare provider about these and other foods that are high in unhealthy fat, sodium, and sugar:  · Snack or packaged foods , such as frozen dinners, cookies, macaroni and cheese, and cereals with more than 300 mg of sodium per serving    · Canned or dry mixes  for cakes, soups, sauces, or gravies    · Vegetables with added sodium , such as instant potatoes, vegetables with added sauces, or regular canned vegetables    · Other foods high in sodium , such as ketchup, barbecue sauce, salad dressing, pickles, olives, soy sauce, and miso    · High-fat dairy foods  such as whole or 2% milk, cream cheese, or sour cream, and cheeses     · High-fat protein foods  such as high-fat cuts of beef (T-bone steaks, ribs), chicken or turkey with skin, and organ meats, such as liver    · Cured or smoked meats , such as hot dogs, dahl, and sausage    · Unhealthy fats and oils , such as butter, stick margarine, shortening, and cooking oils such as coconut or palm oil    · Food and drinks high in sugar , such as soft drinks (soda), sports drinks, sweetened tea, candy, cake, cookies, pies, and doughnuts  Other diet guidelines to follow:   · Eat more foods containing omega-3 fats  Eat fish high in omega-3 fats at least 2 times a week  · Limit alcohol  Too much alcohol can damage your heart and raise your blood pressure  Women should limit alcohol to 1 drink a day  Men should limit alcohol to 2 drinks a day  A drink of alcohol is 12 ounces of beer, 5 ounces of wine, or 1½ ounces of liquor  · Choose low-sodium foods  High-sodium foods can lead to high blood pressure  Add little or no salt to food you prepare  Use herbs and spices in place of salt  · Eat more fiber  to help lower cholesterol levels  Eat at least 5 servings of fruits and vegetables each day  Eat 3 ounces of whole-grain foods each day  Legumes (beans) are also a good source of fiber  Lifestyle guidelines:   · Do not smoke  Nicotine and other chemicals in cigarettes and cigars can cause lung and heart damage  Ask your healthcare provider for information if you currently smoke and need help to quit  E-cigarettes or smokeless tobacco still contain nicotine  Talk to your healthcare provider before you use these products  · Exercise regularly  to help you maintain a healthy weight and improve your blood pressure and cholesterol levels  Ask your healthcare provider about the best exercise plan for you  Do not start an exercise program without asking your healthcare provider  Follow up with your healthcare provider as directed:  Write down your questions so you remember to ask them during your visits     © 2017 [facility] [address] is for End User's use only and may not be sold, redistributed or otherwise used for commercial purposes  All illustrations and images included in CareNotes® are the copyrighted property of A [employer] M [employer] Inc  or Dawit Hdez  The above information is an  only  It is not intended as medical advice for individual conditions or treatments  Talk to your doctor, nurse or pharmacist before following any medical regimen to see if it is safe and effective for you  Influenza Vaccine   WHAT YOU NEED TO KNOW:   The influenza vaccine is an injection given to help prevent influenza (flu)  The flu is caused by a virus  The virus spreads from person to person through coughing and sneezing  Several types of viruses cause the flu  The viruses change over time, so new vaccines are made each year  The vaccine begins to protect you about 2 weeks after you get it  The flu shot usually injected into your upper arm  It may be given in your thigh  You may get a vaccine with a weak or dead virus  DISCHARGE INSTRUCTIONS:   Call 911 for any of the following:   · Your mouth and throat are swollen  · You are wheezing or have trouble breathing  · You have chest pain or your heart is beating faster than normal for you  · You feel like you are going to faint  Return to the emergency department if:   · Your face is red or swollen  · You have hives that spread over your body  · You feel weak or dizzy  Contact your healthcare provider if:   · You have increased pain, redness, or swelling around the area where the shot was given  · You have questions or concerns about the influenza vaccine  Apply a warm compress  to the injection area if you got a flu shot  Apply the compress as directed to decrease pain and swelling  Follow up with your healthcare provider as directed:  Write down your questions so you remember to ask them during your visits    © 2017 [employer] [facility] is for End User's use only and may not be sold, redistributed or otherwise used for commercial purposes  All illustrations and images included in CareNotes® are the copyrighted property of A [employer] M [employer] Inc  or Dawit Hdez  The above information is an  only  It is not intended as medical advice for individual conditions or treatments  Talk to your doctor, nurse or pharmacist before following any medical regimen to see if it is safe and effective for you

## 2019-09-26 NOTE — ASSESSMENT & PLAN NOTE
BMI = 33 8 - 9/26/19  Has gained 3# over the past 6 weeks  No formal exercise but goes to PT, once a week  No specific diet  Recommend a Herat Healthy Diet

## 2019-09-26 NOTE — ASSESSMENT & PLAN NOTE
Chronic back pain and imbalance related to Psych meds  Self ambulatory with rolling walker  No recent falls  Goes to PT for gait, strength and balance, once a week

## 2019-09-26 NOTE — ASSESSMENT & PLAN NOTE
HBPM: SBP = 140s and low 150s and the DBP = 60s to 80s  Labs reviewed from 6/28/19 - CBC, CMP, and TSH = WNL  Blood pressure is controlled on present treatment regimen  Patient misses no doses and tolerates the meds without side effects  Patient avoids salt  Discussed diet, exercise and weight control  No change in present meds   Continue HBPM

## 2019-10-22 NOTE — TELEPHONE ENCOUNTER
Patient would like ammonium lacte prescribed since he has had this in the past so he can use it on his legs  please advise

## 2019-11-21 NOTE — TELEPHONE ENCOUNTER
INR 2 23 (11/21/19) currently on coumadin 2 5 mg Monday and Friday; 5 mg other days of the week   Last INR 2 18 (10/16/19)

## 2020-01-01 ENCOUNTER — OFFICE VISIT (OUTPATIENT)
Dept: FAMILY MEDICINE CLINIC | Facility: CLINIC | Age: 75
End: 2020-01-01
Payer: COMMERCIAL

## 2020-01-01 ENCOUNTER — TELEPHONE (OUTPATIENT)
Dept: UROLOGY | Facility: MEDICAL CENTER | Age: 75
End: 2020-01-01

## 2020-01-01 ENCOUNTER — PROCEDURE VISIT (OUTPATIENT)
Dept: UROLOGY | Facility: CLINIC | Age: 75
End: 2020-01-01
Payer: COMMERCIAL

## 2020-01-01 ENCOUNTER — TELEPHONE (OUTPATIENT)
Dept: UROLOGY | Facility: AMBULATORY SURGERY CENTER | Age: 75
End: 2020-01-01

## 2020-01-01 ENCOUNTER — TREATMENT (OUTPATIENT)
Dept: FAMILY MEDICINE CLINIC | Facility: CLINIC | Age: 75
End: 2020-01-01

## 2020-01-01 ENCOUNTER — HOSPITAL ENCOUNTER (OUTPATIENT)
Dept: CT IMAGING | Facility: HOSPITAL | Age: 75
Discharge: HOME | End: 2020-06-11
Payer: COMMERCIAL

## 2020-01-01 ENCOUNTER — ANTICOAG VISIT (OUTPATIENT)
Dept: FAMILY MEDICINE CLINIC | Facility: CLINIC | Age: 75
End: 2020-01-01

## 2020-01-01 ENCOUNTER — CLINICAL SUPPORT (OUTPATIENT)
Dept: FAMILY MEDICINE CLINIC | Facility: CLINIC | Age: 75
End: 2020-01-01
Payer: COMMERCIAL

## 2020-01-01 ENCOUNTER — APPOINTMENT (OUTPATIENT)
Dept: WOUND CARE | Facility: HOSPITAL | Age: 75
End: 2020-01-01
Payer: COMMERCIAL

## 2020-01-01 ENCOUNTER — TELEPHONE (OUTPATIENT)
Dept: UROLOGY | Facility: CLINIC | Age: 75
End: 2020-01-01

## 2020-01-01 ENCOUNTER — TELEPHONE (OUTPATIENT)
Dept: FAMILY MEDICINE CLINIC | Facility: CLINIC | Age: 75
End: 2020-01-01

## 2020-01-01 ENCOUNTER — HOSPITAL ENCOUNTER (EMERGENCY)
Facility: HOSPITAL | Age: 75
End: 2020-08-08
Attending: EMERGENCY MEDICINE | Admitting: EMERGENCY MEDICINE
Payer: COMMERCIAL

## 2020-01-01 ENCOUNTER — TELEMEDICINE (OUTPATIENT)
Dept: UROLOGY | Facility: AMBULATORY SURGERY CENTER | Age: 75
End: 2020-01-01
Payer: COMMERCIAL

## 2020-01-01 ENCOUNTER — HOSPITAL ENCOUNTER (INPATIENT)
Facility: HOSPITAL | Age: 75
LOS: 3 days | Discharge: HOME | DRG: 605 | End: 2020-04-09
Attending: EMERGENCY MEDICINE | Admitting: HOSPITALIST
Payer: COMMERCIAL

## 2020-01-01 ENCOUNTER — TRANSITIONAL CARE MANAGEMENT (OUTPATIENT)
Dept: FAMILY MEDICINE CLINIC | Facility: CLINIC | Age: 75
End: 2020-01-01

## 2020-01-01 VITALS
RESPIRATION RATE: 18 BRPM | HEART RATE: 80 BPM | SYSTOLIC BLOOD PRESSURE: 126 MMHG | TEMPERATURE: 97.3 F | WEIGHT: 200.7 LBS | BODY MASS INDEX: 35.56 KG/M2 | DIASTOLIC BLOOD PRESSURE: 64 MMHG | HEIGHT: 63 IN | OXYGEN SATURATION: 94 %

## 2020-01-01 VITALS
RESPIRATION RATE: 18 BRPM | OXYGEN SATURATION: 98 % | HEIGHT: 63 IN | DIASTOLIC BLOOD PRESSURE: 68 MMHG | WEIGHT: 198.41 LBS | TEMPERATURE: 97.4 F | HEART RATE: 76 BPM | SYSTOLIC BLOOD PRESSURE: 139 MMHG | BODY MASS INDEX: 35.16 KG/M2

## 2020-01-01 VITALS
DIASTOLIC BLOOD PRESSURE: 82 MMHG | OXYGEN SATURATION: 98 % | WEIGHT: 192.5 LBS | HEART RATE: 86 BPM | HEIGHT: 63 IN | SYSTOLIC BLOOD PRESSURE: 142 MMHG | RESPIRATION RATE: 18 BRPM | BODY MASS INDEX: 34.11 KG/M2 | TEMPERATURE: 98.4 F

## 2020-01-01 VITALS
TEMPERATURE: 97.5 F | WEIGHT: 193.5 LBS | SYSTOLIC BLOOD PRESSURE: 130 MMHG | RESPIRATION RATE: 20 BRPM | HEART RATE: 91 BPM | BODY MASS INDEX: 34.29 KG/M2 | DIASTOLIC BLOOD PRESSURE: 76 MMHG | HEIGHT: 63 IN | OXYGEN SATURATION: 96 %

## 2020-01-01 VITALS
SYSTOLIC BLOOD PRESSURE: 134 MMHG | WEIGHT: 199.7 LBS | HEART RATE: 87 BPM | BODY MASS INDEX: 35.38 KG/M2 | HEIGHT: 63 IN | DIASTOLIC BLOOD PRESSURE: 82 MMHG | RESPIRATION RATE: 18 BRPM | TEMPERATURE: 98 F | OXYGEN SATURATION: 100 %

## 2020-01-01 VITALS
HEART RATE: 91 BPM | DIASTOLIC BLOOD PRESSURE: 70 MMHG | OXYGEN SATURATION: 99 % | BODY MASS INDEX: 34.94 KG/M2 | TEMPERATURE: 98.3 F | RESPIRATION RATE: 18 BRPM | WEIGHT: 197.2 LBS | HEIGHT: 63 IN | SYSTOLIC BLOOD PRESSURE: 138 MMHG

## 2020-01-01 VITALS
BODY MASS INDEX: 34.91 KG/M2 | HEIGHT: 63 IN | HEART RATE: 72 BPM | DIASTOLIC BLOOD PRESSURE: 68 MMHG | SYSTOLIC BLOOD PRESSURE: 140 MMHG | TEMPERATURE: 98 F | WEIGHT: 197 LBS

## 2020-01-01 VITALS — RESPIRATION RATE: 19 BRPM

## 2020-01-01 DIAGNOSIS — I50.32 CHRONIC DIASTOLIC HEART FAILURE (HCC): ICD-10-CM

## 2020-01-01 DIAGNOSIS — I46.9 CARDIAC ARREST (HCC): Primary | ICD-10-CM

## 2020-01-01 DIAGNOSIS — F20.0 PARANOID SCHIZOPHRENIA (HCC): ICD-10-CM

## 2020-01-01 DIAGNOSIS — Z86.718 HISTORY OF DVT OF LOWER EXTREMITY: Primary | ICD-10-CM

## 2020-01-01 DIAGNOSIS — Z86.718 HISTORY OF DVT OF LOWER EXTREMITY: ICD-10-CM

## 2020-01-01 DIAGNOSIS — Z79.01 ANTICOAGULATED ON WARFARIN: ICD-10-CM

## 2020-01-01 DIAGNOSIS — S12.500D CLOSED DISPLACED FRACTURE OF SIXTH CERVICAL VERTEBRA WITH ROUTINE HEALING, UNSPECIFIED FRACTURE MORPHOLOGY, SUBSEQUENT ENCOUNTER: ICD-10-CM

## 2020-01-01 DIAGNOSIS — R26.2 DISABILITY OF WALKING: ICD-10-CM

## 2020-01-01 DIAGNOSIS — K64.8 INTERNAL HEMORRHOID: ICD-10-CM

## 2020-01-01 DIAGNOSIS — I27.82 OTHER CHRONIC PULMONARY EMBOLISM WITHOUT ACUTE COR PULMONALE (HCC): ICD-10-CM

## 2020-01-01 DIAGNOSIS — Z23 NEED FOR VACCINATION: Primary | ICD-10-CM

## 2020-01-01 DIAGNOSIS — M54.2 NECK PAIN WITHOUT INJURY: ICD-10-CM

## 2020-01-01 DIAGNOSIS — R31.0 HEMATURIA, GROSS: ICD-10-CM

## 2020-01-01 DIAGNOSIS — I10 ESSENTIAL HYPERTENSION, BENIGN: ICD-10-CM

## 2020-01-01 DIAGNOSIS — E87.6 HYPOKALEMIA: ICD-10-CM

## 2020-01-01 DIAGNOSIS — E66.09 CLASS 1 OBESITY DUE TO EXCESS CALORIES WITHOUT SERIOUS COMORBIDITY WITH BODY MASS INDEX (BMI) OF 34.0 TO 34.9 IN ADULT: ICD-10-CM

## 2020-01-01 DIAGNOSIS — L03.115 CELLULITIS OF RIGHT LEG WITHOUT FOOT: Primary | ICD-10-CM

## 2020-01-01 DIAGNOSIS — Z86.711 HISTORY OF PULMONARY EMBOLUS (PE): ICD-10-CM

## 2020-01-01 DIAGNOSIS — R53.83 OTHER FATIGUE: ICD-10-CM

## 2020-01-01 DIAGNOSIS — Z00.00 ANNUAL PHYSICAL EXAM: Primary | ICD-10-CM

## 2020-01-01 DIAGNOSIS — L03.115 CELLULITIS AND ABSCESS OF RIGHT LEG: Primary | ICD-10-CM

## 2020-01-01 DIAGNOSIS — N39.41 URGE INCONTINENCE OF URINE: ICD-10-CM

## 2020-01-01 DIAGNOSIS — I10 ESSENTIAL HYPERTENSION: ICD-10-CM

## 2020-01-01 DIAGNOSIS — E78.2 MIXED HYPERLIPIDEMIA: ICD-10-CM

## 2020-01-01 DIAGNOSIS — R31.0 GROSS HEMATURIA: ICD-10-CM

## 2020-01-01 DIAGNOSIS — R31.0 GROSS HEMATURIA: Primary | ICD-10-CM

## 2020-01-01 DIAGNOSIS — L03.115 CELLULITIS OF RIGHT LEG WITHOUT FOOT: ICD-10-CM

## 2020-01-01 DIAGNOSIS — I25.10 CORONARY ARTERY DISEASE INVOLVING NATIVE CORONARY ARTERY OF NATIVE HEART WITHOUT ANGINA PECTORIS: ICD-10-CM

## 2020-01-01 DIAGNOSIS — R31.0 HEMATURIA, GROSS: Primary | ICD-10-CM

## 2020-01-01 DIAGNOSIS — I87.2 CHRONIC VENOUS STASIS DERMATITIS OF BOTH LOWER EXTREMITIES: ICD-10-CM

## 2020-01-01 DIAGNOSIS — I10 BENIGN ESSENTIAL HYPERTENSION: ICD-10-CM

## 2020-01-01 DIAGNOSIS — R41.89 COGNITIVE IMPAIRMENT: ICD-10-CM

## 2020-01-01 DIAGNOSIS — S80.11XA TRAUMATIC HEMATOMA OF RIGHT LOWER LEG, INITIAL ENCOUNTER: Primary | ICD-10-CM

## 2020-01-01 DIAGNOSIS — R53.81 PHYSICAL DECONDITIONING: ICD-10-CM

## 2020-01-01 DIAGNOSIS — L03.90 CELLULITIS: ICD-10-CM

## 2020-01-01 DIAGNOSIS — Z15.89: ICD-10-CM

## 2020-01-01 DIAGNOSIS — R51.9 PRESSURE IN HEAD: ICD-10-CM

## 2020-01-01 DIAGNOSIS — Z15.89: Primary | ICD-10-CM

## 2020-01-01 DIAGNOSIS — L02.415 CELLULITIS AND ABSCESS OF RIGHT LEG: Primary | ICD-10-CM

## 2020-01-01 DIAGNOSIS — I50.32 CHRONIC DIASTOLIC HEART FAILURE (HCC): Primary | ICD-10-CM

## 2020-01-01 LAB
ALBUMIN SERPL BCP-MCNC: 3.2 G/DL (ref 3.5–5)
ALBUMIN SERPL BCP-MCNC: 3.5 G/DL (ref 3.5–5)
ALP SERPL-CCNC: 118 U/L (ref 46–116)
ALP SERPL-CCNC: 89 U/L (ref 46–116)
ALT SERPL W P-5'-P-CCNC: 43 U/L (ref 12–78)
ALT SERPL W P-5'-P-CCNC: 49 U/L (ref 12–78)
ANION GAP SERPL CALCULATED.3IONS-SCNC: 3 MMOL/L (ref 4–13)
ANION GAP SERPL CALCULATED.3IONS-SCNC: 3 MMOL/L (ref 4–13)
ANION GAP SERPL CALCULATED.3IONS-SCNC: 5 MMOL/L (ref 4–13)
ANION GAP SERPL CALCULATED.3IONS-SCNC: 7 MMOL/L (ref 4–13)
ANION GAP SERPL CALCULATED.3IONS-SCNC: 8 MMOL/L (ref 4–13)
AST SERPL W P-5'-P-CCNC: 36 U/L (ref 5–45)
AST SERPL W P-5'-P-CCNC: 45 U/L (ref 5–45)
BACTERIA UR CULT: NORMAL
BACTERIA UR QL AUTO: ABNORMAL /HPF
BACTERIA UR QL AUTO: ABNORMAL /HPF
BASOPHILS # BLD AUTO: 0.03 THOUSANDS/ΜL (ref 0–0.1)
BASOPHILS # BLD AUTO: 0.04 THOUSANDS/ΜL (ref 0–0.1)
BASOPHILS # BLD AUTO: 0.05 THOUSANDS/ΜL (ref 0–0.1)
BASOPHILS # BLD AUTO: 0.07 THOUSANDS/ΜL (ref 0–0.1)
BASOPHILS NFR BLD AUTO: 1 % (ref 0–1)
BASOPHILS NFR BLD AUTO: 2 % (ref 0–1)
BILIRUB SERPL-MCNC: 0.47 MG/DL (ref 0.2–1)
BILIRUB SERPL-MCNC: 0.54 MG/DL (ref 0.2–1)
BILIRUB UR QL STRIP: NEGATIVE
BILIRUB UR QL STRIP: NEGATIVE
BUN SERPL-MCNC: 11 MG/DL (ref 5–25)
BUN SERPL-MCNC: 12 MG/DL (ref 5–25)
BUN SERPL-MCNC: 15 MG/DL (ref 5–25)
BUN SERPL-MCNC: 17 MG/DL (ref 5–25)
BUN SERPL-MCNC: 18 MG/DL (ref 5–25)
CALCIUM SERPL-MCNC: 9 MG/DL (ref 8.3–10.1)
CALCIUM SERPL-MCNC: 9.1 MG/DL (ref 8.3–10.1)
CALCIUM SERPL-MCNC: 9.2 MG/DL (ref 8.3–10.1)
CALCIUM SERPL-MCNC: 9.5 MG/DL (ref 8.3–10.1)
CALCIUM SERPL-MCNC: 9.7 MG/DL (ref 8.3–10.1)
CAOX CRY URNS QL MICRO: ABNORMAL /HPF
CHLORIDE SERPL-SCNC: 102 MMOL/L (ref 100–108)
CHLORIDE SERPL-SCNC: 102 MMOL/L (ref 100–108)
CHLORIDE SERPL-SCNC: 103 MMOL/L (ref 100–108)
CHLORIDE SERPL-SCNC: 105 MMOL/L (ref 100–108)
CHLORIDE SERPL-SCNC: 108 MMOL/L (ref 100–108)
CLARITY UR: ABNORMAL
CLARITY UR: CLEAR
CO2 SERPL-SCNC: 32 MMOL/L (ref 21–32)
CO2 SERPL-SCNC: 33 MMOL/L (ref 21–32)
CO2 SERPL-SCNC: 36 MMOL/L (ref 21–32)
COLOR UR: YELLOW
COLOR UR: YELLOW
CREAT SERPL-MCNC: 0.84 MG/DL (ref 0.6–1.3)
CREAT SERPL-MCNC: 0.85 MG/DL (ref 0.6–1.3)
CREAT SERPL-MCNC: 0.93 MG/DL (ref 0.6–1.3)
CREAT SERPL-MCNC: 0.93 MG/DL (ref 0.6–1.3)
CREAT SERPL-MCNC: 1.04 MG/DL (ref 0.6–1.3)
EOSINOPHIL # BLD AUTO: 0.13 THOUSAND/ΜL (ref 0–0.61)
EOSINOPHIL # BLD AUTO: 0.2 THOUSAND/ΜL (ref 0–0.61)
EOSINOPHIL # BLD AUTO: 0.22 THOUSAND/ΜL (ref 0–0.61)
EOSINOPHIL # BLD AUTO: 0.23 THOUSAND/ΜL (ref 0–0.61)
EOSINOPHIL NFR BLD AUTO: 2 % (ref 0–6)
EOSINOPHIL NFR BLD AUTO: 4 % (ref 0–6)
EOSINOPHIL NFR BLD AUTO: 5 % (ref 0–6)
EOSINOPHIL NFR BLD AUTO: 5 % (ref 0–6)
ERYTHROCYTE [DISTWIDTH] IN BLOOD BY AUTOMATED COUNT: 13.8 % (ref 11.6–15.1)
ERYTHROCYTE [DISTWIDTH] IN BLOOD BY AUTOMATED COUNT: 13.8 % (ref 11.6–15.1)
ERYTHROCYTE [DISTWIDTH] IN BLOOD BY AUTOMATED COUNT: 14 % (ref 11.6–15.1)
ERYTHROCYTE [DISTWIDTH] IN BLOOD BY AUTOMATED COUNT: 15.1 % (ref 11.6–15.1)
GFR SERPL CREATININE-BSD FRML MDRD: 71 ML/MIN/1.73SQ M
GFR SERPL CREATININE-BSD FRML MDRD: 81 ML/MIN/1.73SQ M
GFR SERPL CREATININE-BSD FRML MDRD: 81 ML/MIN/1.73SQ M
GFR SERPL CREATININE-BSD FRML MDRD: 86 ML/MIN/1.73SQ M
GFR SERPL CREATININE-BSD FRML MDRD: 87 ML/MIN/1.73SQ M
GLUCOSE SERPL-MCNC: 102 MG/DL (ref 65–140)
GLUCOSE SERPL-MCNC: 429 MG/DL (ref 65–140)
GLUCOSE SERPL-MCNC: 80 MG/DL (ref 65–140)
GLUCOSE SERPL-MCNC: 92 MG/DL (ref 65–140)
GLUCOSE SERPL-MCNC: 94 MG/DL (ref 65–140)
GLUCOSE SERPL-MCNC: 95 MG/DL (ref 65–140)
GLUCOSE UR STRIP-MCNC: NEGATIVE MG/DL
GLUCOSE UR STRIP-MCNC: NEGATIVE MG/DL
HCT VFR BLD AUTO: 38.8 % (ref 36.5–49.3)
HCT VFR BLD AUTO: 40 % (ref 36.5–49.3)
HCT VFR BLD AUTO: 44.1 % (ref 36.5–49.3)
HCT VFR BLD AUTO: 44.6 % (ref 36.5–49.3)
HGB BLD-MCNC: 12.2 G/DL (ref 12–17)
HGB BLD-MCNC: 12.4 G/DL (ref 12–17)
HGB BLD-MCNC: 13.5 G/DL (ref 12–17)
HGB BLD-MCNC: 14 G/DL (ref 12–17)
HGB UR QL STRIP.AUTO: ABNORMAL
HGB UR QL STRIP.AUTO: ABNORMAL
HYALINE CASTS #/AREA URNS LPF: ABNORMAL /LPF
IMM GRANULOCYTES # BLD AUTO: 0.01 THOUSAND/UL (ref 0–0.2)
IMM GRANULOCYTES # BLD AUTO: 0.02 THOUSAND/UL (ref 0–0.2)
IMM GRANULOCYTES # BLD AUTO: 0.02 THOUSAND/UL (ref 0–0.2)
IMM GRANULOCYTES # BLD AUTO: 0.03 THOUSAND/UL (ref 0–0.2)
IMM GRANULOCYTES NFR BLD AUTO: 0 % (ref 0–2)
IMM GRANULOCYTES NFR BLD AUTO: 1 % (ref 0–2)
INR PPP: 2.4 (ref 0.84–1.19)
INR PPP: 2.49 (ref 0.84–1.19)
INR PPP: 2.54 (ref 0.84–1.19)
INR PPP: 2.54 (ref 0.84–1.19)
INR PPP: 2.72 (ref 0.84–1.19)
INR PPP: 2.72 (ref 0.84–1.19)
INR PPP: 2.74 (ref 0.84–1.19)
INR PPP: 2.75 (ref 0.84–1.19)
INR PPP: 2.75 (ref 0.84–1.19)
INR PPP: 2.85 (ref 0.84–1.19)
KETONES UR STRIP-MCNC: NEGATIVE MG/DL
KETONES UR STRIP-MCNC: NEGATIVE MG/DL
LDLC SERPL DIRECT ASSAY-MCNC: 98 MG/DL (ref 0–100)
LEUKOCYTE ESTERASE UR QL STRIP: ABNORMAL
LEUKOCYTE ESTERASE UR QL STRIP: NEGATIVE
LYMPHOCYTES # BLD AUTO: 0.95 THOUSANDS/ΜL (ref 0.6–4.47)
LYMPHOCYTES # BLD AUTO: 0.96 THOUSANDS/ΜL (ref 0.6–4.47)
LYMPHOCYTES # BLD AUTO: 1.19 THOUSANDS/ΜL (ref 0.6–4.47)
LYMPHOCYTES # BLD AUTO: 1.35 THOUSANDS/ΜL (ref 0.6–4.47)
LYMPHOCYTES NFR BLD AUTO: 17 % (ref 14–44)
LYMPHOCYTES NFR BLD AUTO: 20 % (ref 14–44)
LYMPHOCYTES NFR BLD AUTO: 26 % (ref 14–44)
LYMPHOCYTES NFR BLD AUTO: 28 % (ref 14–44)
MAGNESIUM SERPL-MCNC: 2.1 MG/DL (ref 1.6–2.6)
MAGNESIUM SERPL-MCNC: 2.3 MG/DL (ref 1.6–2.6)
MCH RBC QN AUTO: 29 PG (ref 26.8–34.3)
MCH RBC QN AUTO: 29.3 PG (ref 26.8–34.3)
MCH RBC QN AUTO: 29.3 PG (ref 26.8–34.3)
MCH RBC QN AUTO: 29.7 PG (ref 26.8–34.3)
MCHC RBC AUTO-ENTMCNC: 30.6 G/DL (ref 31.4–37.4)
MCHC RBC AUTO-ENTMCNC: 31 G/DL (ref 31.4–37.4)
MCHC RBC AUTO-ENTMCNC: 31.4 G/DL (ref 31.4–37.4)
MCHC RBC AUTO-ENTMCNC: 31.4 G/DL (ref 31.4–37.4)
MCV RBC AUTO: 93 FL (ref 82–98)
MCV RBC AUTO: 93 FL (ref 82–98)
MCV RBC AUTO: 94 FL (ref 82–98)
MCV RBC AUTO: 97 FL (ref 82–98)
MONOCYTES # BLD AUTO: 0.49 THOUSAND/ΜL (ref 0.17–1.22)
MONOCYTES # BLD AUTO: 0.49 THOUSAND/ΜL (ref 0.17–1.22)
MONOCYTES # BLD AUTO: 0.51 THOUSAND/ΜL (ref 0.17–1.22)
MONOCYTES # BLD AUTO: 0.54 THOUSAND/ΜL (ref 0.17–1.22)
MONOCYTES NFR BLD AUTO: 10 % (ref 4–12)
MONOCYTES NFR BLD AUTO: 11 % (ref 4–12)
MONOCYTES NFR BLD AUTO: 12 % (ref 4–12)
MONOCYTES NFR BLD AUTO: 9 % (ref 4–12)
NEUTROPHILS # BLD AUTO: 2.66 THOUSANDS/ΜL (ref 1.85–7.62)
NEUTROPHILS # BLD AUTO: 2.74 THOUSANDS/ΜL (ref 1.85–7.62)
NEUTROPHILS # BLD AUTO: 2.95 THOUSANDS/ΜL (ref 1.85–7.62)
NEUTROPHILS # BLD AUTO: 3.96 THOUSANDS/ΜL (ref 1.85–7.62)
NEUTS SEG NFR BLD AUTO: 55 % (ref 43–75)
NEUTS SEG NFR BLD AUTO: 57 % (ref 43–75)
NEUTS SEG NFR BLD AUTO: 63 % (ref 43–75)
NEUTS SEG NFR BLD AUTO: 70 % (ref 43–75)
NITRITE UR QL STRIP: NEGATIVE
NITRITE UR QL STRIP: NEGATIVE
NON-SQ EPI CELLS URNS QL MICRO: ABNORMAL /HPF
NON-SQ EPI CELLS URNS QL MICRO: ABNORMAL /HPF
NRBC BLD AUTO-RTO: 0 /100 WBCS
PH UR STRIP.AUTO: 6 [PH]
PH UR STRIP.AUTO: 7 [PH]
PLATELET # BLD AUTO: 196 THOUSANDS/UL (ref 149–390)
PLATELET # BLD AUTO: 288 THOUSANDS/UL (ref 149–390)
PLATELET # BLD AUTO: 297 THOUSANDS/UL (ref 149–390)
PLATELET # BLD AUTO: 325 THOUSANDS/UL (ref 149–390)
PMV BLD AUTO: 8.2 FL (ref 8.9–12.7)
PMV BLD AUTO: 8.3 FL (ref 8.9–12.7)
PMV BLD AUTO: 8.5 FL (ref 8.9–12.7)
PMV BLD AUTO: 9.5 FL (ref 8.9–12.7)
POTASSIUM SERPL-SCNC: 3.7 MMOL/L (ref 3.5–5.3)
POTASSIUM SERPL-SCNC: 3.8 MMOL/L (ref 3.5–5.3)
POTASSIUM SERPL-SCNC: 3.8 MMOL/L (ref 3.5–5.3)
POTASSIUM SERPL-SCNC: 4 MMOL/L (ref 3.5–5.3)
POTASSIUM SERPL-SCNC: 4 MMOL/L (ref 3.5–5.3)
PROT SERPL-MCNC: 7.1 G/DL (ref 6.4–8.2)
PROT SERPL-MCNC: 7.5 G/DL (ref 6.4–8.2)
PROT UR STRIP-MCNC: ABNORMAL MG/DL
PROT UR STRIP-MCNC: NEGATIVE MG/DL
PROTHROMBIN TIME: 26.4 SECONDS (ref 11.6–14.5)
PROTHROMBIN TIME: 26.4 SECONDS (ref 11.6–14.5)
PROTHROMBIN TIME: 26.8 SECONDS (ref 11.6–14.5)
PROTHROMBIN TIME: 28.6 SECONDS (ref 11.6–14.5)
PROTHROMBIN TIME: 28.7 SECONDS (ref 11.6–14.5)
PROTHROMBIN TIME: 29.6 SECONDS (ref 11.6–14.5)
PROTHROMBIN TIME: 30.5 SECONDS (ref 11.6–14.5)
RBC # BLD AUTO: 4.16 MILLION/UL (ref 3.88–5.62)
RBC # BLD AUTO: 4.27 MILLION/UL (ref 3.88–5.62)
RBC # BLD AUTO: 4.55 MILLION/UL (ref 3.88–5.62)
RBC # BLD AUTO: 4.78 MILLION/UL (ref 3.88–5.62)
RBC #/AREA URNS AUTO: ABNORMAL /HPF
RBC #/AREA URNS AUTO: ABNORMAL /HPF
SL AMB  POCT GLUCOSE, UA: NORMAL
SL AMB LEUKOCYTE ESTERASE,UA: NORMAL
SL AMB POCT BILIRUBIN,UA: NORMAL
SL AMB POCT BLOOD,UA: NORMAL
SL AMB POCT CLARITY,UA: NORMAL
SL AMB POCT COLOR,UA: NORMAL
SL AMB POCT KETONES,UA: 5
SL AMB POCT NITRITE,UA: NORMAL
SL AMB POCT PH,UA: 5
SL AMB POCT SPECIFIC GRAVITY,UA: 1.02
SL AMB POCT URINE PROTEIN: NORMAL
SL AMB POCT UROBILINOGEN: 0.2
SODIUM SERPL-SCNC: 141 MMOL/L (ref 136–145)
SODIUM SERPL-SCNC: 141 MMOL/L (ref 136–145)
SODIUM SERPL-SCNC: 142 MMOL/L (ref 136–145)
SODIUM SERPL-SCNC: 143 MMOL/L (ref 136–145)
SODIUM SERPL-SCNC: 146 MMOL/L (ref 136–145)
SP GR UR STRIP.AUTO: 1.01 (ref 1–1.03)
SP GR UR STRIP.AUTO: 1.02 (ref 1–1.03)
TSH SERPL DL<=0.05 MIU/L-ACNC: 2.98 UIU/ML (ref 0.36–3.74)
UROBILINOGEN UR QL STRIP.AUTO: 0.2 E.U./DL
UROBILINOGEN UR QL STRIP.AUTO: 0.2 E.U./DL
WBC # BLD AUTO: 4.64 THOUSAND/UL (ref 4.31–10.16)
WBC # BLD AUTO: 4.71 THOUSAND/UL (ref 4.31–10.16)
WBC # BLD AUTO: 4.88 THOUSAND/UL (ref 4.31–10.16)
WBC # BLD AUTO: 5.61 THOUSAND/UL (ref 4.31–10.16)
WBC #/AREA URNS AUTO: ABNORMAL /HPF
WBC #/AREA URNS AUTO: ABNORMAL /HPF

## 2020-01-01 PROCEDURE — 81002 URINALYSIS NONAUTO W/O SCOPE: CPT | Performed by: UROLOGY

## 2020-01-01 PROCEDURE — 85610 PROTHROMBIN TIME: CPT | Performed by: FAMILY MEDICINE

## 2020-01-01 PROCEDURE — 99232 SBSQ HOSP IP/OBS MODERATE 35: CPT | Performed by: HOSPITALIST

## 2020-01-01 PROCEDURE — G0463 HOSPITAL OUTPT CLINIC VISIT: HCPCS

## 2020-01-01 PROCEDURE — 3078F DIAST BP <80 MM HG: CPT | Performed by: UROLOGY

## 2020-01-01 PROCEDURE — 97597 DBRDMT OPN WND 1ST 20 CM/<: CPT

## 2020-01-01 PROCEDURE — 99284 EMERGENCY DEPT VISIT MOD MDM: CPT

## 2020-01-01 PROCEDURE — 3078F DIAST BP <80 MM HG: CPT | Performed by: FAMILY MEDICINE

## 2020-01-01 PROCEDURE — 3008F BODY MASS INDEX DOCD: CPT | Performed by: FAMILY MEDICINE

## 2020-01-01 PROCEDURE — 36415 COLL VENOUS BLD VENIPUNCTURE: CPT | Performed by: FAMILY MEDICINE

## 2020-01-01 PROCEDURE — 36415 COLL VENOUS BLD VENIPUNCTURE: CPT | Performed by: PHYSICIAN ASSISTANT

## 2020-01-01 PROCEDURE — 1036F TOBACCO NON-USER: CPT | Performed by: FAMILY MEDICINE

## 2020-01-01 PROCEDURE — 4040F PNEUMOC VAC/ADMIN/RCVD: CPT

## 2020-01-01 PROCEDURE — 10140 I&D HMTMA SEROMA/FLUID COLLJ: CPT | Performed by: PHYSICIAN ASSISTANT

## 2020-01-01 PROCEDURE — 99285 EMERGENCY DEPT VISIT HI MDM: CPT

## 2020-01-01 PROCEDURE — 80053 COMPREHEN METABOLIC PANEL: CPT | Performed by: FAMILY MEDICINE

## 2020-01-01 PROCEDURE — 85610 PROTHROMBIN TIME: CPT | Performed by: HOSPITALIST

## 2020-01-01 PROCEDURE — 85025 COMPLETE CBC W/AUTO DIFF WBC: CPT | Performed by: HOSPITALIST

## 2020-01-01 PROCEDURE — 99214 OFFICE O/P EST MOD 30 MIN: CPT | Performed by: UROLOGY

## 2020-01-01 PROCEDURE — 90732 PPSV23 VACC 2 YRS+ SUBQ/IM: CPT

## 2020-01-01 PROCEDURE — 99232 SBSQ HOSP IP/OBS MODERATE 35: CPT | Performed by: PHYSICIAN ASSISTANT

## 2020-01-01 PROCEDURE — 99223 1ST HOSP IP/OBS HIGH 75: CPT | Performed by: HOSPITALIST

## 2020-01-01 PROCEDURE — 83735 ASSAY OF MAGNESIUM: CPT | Performed by: HOSPITALIST

## 2020-01-01 PROCEDURE — 3008F BODY MASS INDEX DOCD: CPT | Performed by: UROLOGY

## 2020-01-01 PROCEDURE — 80048 BASIC METABOLIC PNL TOTAL CA: CPT | Performed by: HOSPITALIST

## 2020-01-01 PROCEDURE — 84443 ASSAY THYROID STIM HORMONE: CPT | Performed by: FAMILY MEDICINE

## 2020-01-01 PROCEDURE — 99214 OFFICE O/P EST MOD 30 MIN: CPT | Performed by: FAMILY MEDICINE

## 2020-01-01 PROCEDURE — 96365 THER/PROPH/DIAG IV INF INIT: CPT

## 2020-01-01 PROCEDURE — 80053 COMPREHEN METABOLIC PANEL: CPT | Performed by: PHYSICIAN ASSISTANT

## 2020-01-01 PROCEDURE — 1160F RVW MEDS BY RX/DR IN RCRD: CPT | Performed by: UROLOGY

## 2020-01-01 PROCEDURE — 3074F SYST BP LT 130 MM HG: CPT | Performed by: FAMILY MEDICINE

## 2020-01-01 PROCEDURE — 96366 THER/PROPH/DIAG IV INF ADDON: CPT

## 2020-01-01 PROCEDURE — 99495 TRANSJ CARE MGMT MOD F2F 14D: CPT | Performed by: FAMILY MEDICINE

## 2020-01-01 PROCEDURE — 99222 1ST HOSP IP/OBS MODERATE 55: CPT | Performed by: SURGERY

## 2020-01-01 PROCEDURE — 99442 PR PHYS/QHP TELEPHONE EVALUATION 11-20 MIN: CPT | Performed by: UROLOGY

## 2020-01-01 PROCEDURE — 83721 ASSAY OF BLOOD LIPOPROTEIN: CPT | Performed by: FAMILY MEDICINE

## 2020-01-01 PROCEDURE — 99212 OFFICE O/P EST SF 10 MIN: CPT

## 2020-01-01 PROCEDURE — 99284 EMERGENCY DEPT VISIT MOD MDM: CPT | Performed by: PHYSICIAN ASSISTANT

## 2020-01-01 PROCEDURE — 1160F RVW MEDS BY RX/DR IN RCRD: CPT | Performed by: FAMILY MEDICINE

## 2020-01-01 PROCEDURE — G0439 PPPS, SUBSEQ VISIT: HCPCS | Performed by: FAMILY MEDICINE

## 2020-01-01 PROCEDURE — 99285 EMERGENCY DEPT VISIT HI MDM: CPT | Performed by: EMERGENCY MEDICINE

## 2020-01-01 PROCEDURE — G0009 ADMIN PNEUMOCOCCAL VACCINE: HCPCS

## 2020-01-01 PROCEDURE — 80048 BASIC METABOLIC PNL TOTAL CA: CPT | Performed by: FAMILY MEDICINE

## 2020-01-01 PROCEDURE — 81001 URINALYSIS AUTO W/SCOPE: CPT

## 2020-01-01 PROCEDURE — 99213 OFFICE O/P EST LOW 20 MIN: CPT

## 2020-01-01 PROCEDURE — 82948 REAGENT STRIP/BLOOD GLUCOSE: CPT

## 2020-01-01 PROCEDURE — 85025 COMPLETE CBC W/AUTO DIFF WBC: CPT | Performed by: PHYSICIAN ASSISTANT

## 2020-01-01 PROCEDURE — 52000 CYSTOURETHROSCOPY: CPT | Performed by: UROLOGY

## 2020-01-01 PROCEDURE — 36415 COLL VENOUS BLD VENIPUNCTURE: CPT

## 2020-01-01 PROCEDURE — 4040F PNEUMOC VAC/ADMIN/RCVD: CPT | Performed by: UROLOGY

## 2020-01-01 PROCEDURE — 4040F PNEUMOC VAC/ADMIN/RCVD: CPT | Performed by: FAMILY MEDICINE

## 2020-01-01 PROCEDURE — 85610 PROTHROMBIN TIME: CPT | Performed by: PHYSICIAN ASSISTANT

## 2020-01-01 PROCEDURE — 33016 PERICARDIOCENTESIS W/IMAGING: CPT | Performed by: EMERGENCY MEDICINE

## 2020-01-01 PROCEDURE — 85025 COMPLETE CBC W/AUTO DIFF WBC: CPT | Performed by: FAMILY MEDICINE

## 2020-01-01 PROCEDURE — 0H9KXZZ DRAINAGE OF RIGHT LOWER LEG SKIN, EXTERNAL APPROACH: ICD-10-PCS | Performed by: HOSPITALIST

## 2020-01-01 PROCEDURE — 99239 HOSP IP/OBS DSCHRG MGMT >30: CPT | Performed by: HOSPITALIST

## 2020-01-01 PROCEDURE — 87086 URINE CULTURE/COLONY COUNT: CPT | Performed by: UROLOGY

## 2020-01-01 PROCEDURE — 81001 URINALYSIS AUTO W/SCOPE: CPT | Performed by: FAMILY MEDICINE

## 2020-01-01 PROCEDURE — 96375 TX/PRO/DX INJ NEW DRUG ADDON: CPT

## 2020-01-01 PROCEDURE — 74178 CT ABD&PLV WO CNTR FLWD CNTR: CPT

## 2020-01-01 PROCEDURE — 1036F TOBACCO NON-USER: CPT | Performed by: UROLOGY

## 2020-01-01 PROCEDURE — 3075F SYST BP GE 130 - 139MM HG: CPT | Performed by: FAMILY MEDICINE

## 2020-01-01 PROCEDURE — 3079F DIAST BP 80-89 MM HG: CPT | Performed by: FAMILY MEDICINE

## 2020-01-01 PROCEDURE — 3077F SYST BP >= 140 MM HG: CPT | Performed by: UROLOGY

## 2020-01-01 RX ORDER — WARFARIN SODIUM 5 MG/1
TABLET ORAL
Qty: 30 TABLET | Refills: 3 | Status: SHIPPED | OUTPATIENT
Start: 2020-01-01

## 2020-01-01 RX ORDER — FLUPHENAZINE HYDROCHLORIDE 2.5 MG/1
10 TABLET ORAL DAILY
Status: DISCONTINUED | OUTPATIENT
Start: 2020-01-01 | End: 2020-01-01 | Stop reason: HOSPADM

## 2020-01-01 RX ORDER — POTASSIUM CHLORIDE 750 MG/1
TABLET, FILM COATED, EXTENDED RELEASE ORAL
Qty: 23 TABLET | Refills: 0 | Status: SHIPPED | OUTPATIENT
Start: 2020-01-01

## 2020-01-01 RX ORDER — AMANTADINE HYDROCHLORIDE 100 MG/1
CAPSULE, GELATIN COATED ORAL
Qty: 60 CAPSULE | Refills: 0 | OUTPATIENT
Start: 2020-01-01

## 2020-01-01 RX ORDER — POTASSIUM CHLORIDE 750 MG/1
10 TABLET, EXTENDED RELEASE ORAL DAILY
Status: DISCONTINUED | OUTPATIENT
Start: 2020-01-01 | End: 2020-01-01 | Stop reason: HOSPADM

## 2020-01-01 RX ORDER — DOCUSATE SODIUM 100 MG/1
100 CAPSULE, LIQUID FILLED ORAL EVERY OTHER DAY
COMMUNITY

## 2020-01-01 RX ORDER — RISPERIDONE 3 MG/1
TABLET, FILM COATED ORAL
Qty: 45 TABLET | Refills: 0 | OUTPATIENT
Start: 2020-01-01

## 2020-01-01 RX ORDER — AMANTADINE HYDROCHLORIDE 100 MG/1
100 CAPSULE, GELATIN COATED ORAL 2 TIMES DAILY
Status: DISCONTINUED | OUTPATIENT
Start: 2020-01-01 | End: 2020-01-01 | Stop reason: HOSPADM

## 2020-01-01 RX ORDER — POTASSIUM CHLORIDE 750 MG/1
TABLET, FILM COATED, EXTENDED RELEASE ORAL
Qty: 23 TABLET | Refills: 1 | Status: SHIPPED | OUTPATIENT
Start: 2020-01-01 | End: 2020-01-01

## 2020-01-01 RX ORDER — FLUPHENAZINE HYDROCHLORIDE 10 MG/1
TABLET ORAL
Qty: 30 TABLET | Refills: 0 | OUTPATIENT
Start: 2020-01-01

## 2020-01-01 RX ORDER — FLUVOXAMINE MALEATE 50 MG/1
50 TABLET, COATED ORAL 2 TIMES DAILY
Status: DISCONTINUED | OUTPATIENT
Start: 2020-01-01 | End: 2020-01-01 | Stop reason: HOSPADM

## 2020-01-01 RX ORDER — WARFARIN SODIUM 5 MG/1
TABLET ORAL
Qty: 30 TABLET | Refills: 0 | Status: SHIPPED | OUTPATIENT
Start: 2020-01-01 | End: 2020-01-01 | Stop reason: SDUPTHER

## 2020-01-01 RX ORDER — LIDOCAINE HYDROCHLORIDE 10 MG/ML
10 INJECTION, SOLUTION EPIDURAL; INFILTRATION; INTRACAUDAL; PERINEURAL ONCE
Status: COMPLETED | OUTPATIENT
Start: 2020-01-01 | End: 2020-01-01

## 2020-01-01 RX ORDER — CEFAZOLIN SODIUM 1 G/50ML
1000 SOLUTION INTRAVENOUS EVERY 8 HOURS
Status: DISCONTINUED | OUTPATIENT
Start: 2020-01-01 | End: 2020-01-01 | Stop reason: HOSPADM

## 2020-01-01 RX ORDER — AMLODIPINE BESYLATE 5 MG/1
TABLET ORAL
Qty: 90 TABLET | Refills: 3 | Status: SHIPPED | OUTPATIENT
Start: 2020-01-01

## 2020-01-01 RX ORDER — ACETAMINOPHEN 325 MG/1
650 TABLET ORAL EVERY 6 HOURS PRN
Status: DISCONTINUED | OUTPATIENT
Start: 2020-01-01 | End: 2020-01-01 | Stop reason: HOSPADM

## 2020-01-01 RX ORDER — POTASSIUM CHLORIDE 750 MG/1
TABLET, FILM COATED, EXTENDED RELEASE ORAL
Qty: 23 TABLET | Refills: 0 | Status: SHIPPED | OUTPATIENT
Start: 2020-01-01 | End: 2020-01-01

## 2020-01-01 RX ORDER — AMLODIPINE BESYLATE 5 MG/1
5 TABLET ORAL DAILY
Status: DISCONTINUED | OUTPATIENT
Start: 2020-01-01 | End: 2020-01-01 | Stop reason: HOSPADM

## 2020-01-01 RX ORDER — DOCUSATE SODIUM 100 MG/1
100 CAPSULE, LIQUID FILLED ORAL 2 TIMES DAILY
Status: DISCONTINUED | OUTPATIENT
Start: 2020-01-01 | End: 2020-01-01 | Stop reason: HOSPADM

## 2020-01-01 RX ORDER — CALCIUM CHLORIDE 100 MG/ML
SYRINGE (ML) INTRAVENOUS CODE/TRAUMA/SEDATION MEDICATION
Status: COMPLETED | OUTPATIENT
Start: 2020-01-01 | End: 2020-01-01

## 2020-01-01 RX ORDER — TAMSULOSIN HYDROCHLORIDE 0.4 MG/1
0.4 CAPSULE ORAL
Status: DISCONTINUED | OUTPATIENT
Start: 2020-01-01 | End: 2020-01-01 | Stop reason: HOSPADM

## 2020-01-01 RX ORDER — CEFAZOLIN SODIUM 1 G/50ML
1000 SOLUTION INTRAVENOUS ONCE
Status: COMPLETED | OUTPATIENT
Start: 2020-01-01 | End: 2020-01-01

## 2020-01-01 RX ORDER — EPINEPHRINE 0.1 MG/ML
SYRINGE (ML) INJECTION CODE/TRAUMA/SEDATION MEDICATION
Status: COMPLETED | OUTPATIENT
Start: 2020-01-01 | End: 2020-01-01

## 2020-01-01 RX ORDER — SODIUM BICARBONATE 84 MG/ML
INJECTION, SOLUTION INTRAVENOUS CODE/TRAUMA/SEDATION MEDICATION
Status: COMPLETED | OUTPATIENT
Start: 2020-01-01 | End: 2020-01-01

## 2020-01-01 RX ORDER — OXYCODONE HYDROCHLORIDE 5 MG/1
2.5 TABLET ORAL EVERY 6 HOURS PRN
Status: DISCONTINUED | OUTPATIENT
Start: 2020-01-01 | End: 2020-01-01 | Stop reason: HOSPADM

## 2020-01-01 RX ORDER — POTASSIUM CHLORIDE 750 MG/1
TABLET, FILM COATED, EXTENDED RELEASE ORAL
Qty: 30 TABLET | Refills: 0 | Status: SHIPPED | OUTPATIENT
Start: 2020-01-01 | End: 2020-01-01

## 2020-01-01 RX ORDER — AMANTADINE HYDROCHLORIDE 100 MG/1
CAPSULE, GELATIN COATED ORAL
Qty: 60 CAPSULE | Refills: 0 | Status: SHIPPED | OUTPATIENT
Start: 2020-01-01

## 2020-01-01 RX ORDER — CEPHALEXIN 250 MG/1
250 CAPSULE ORAL EVERY 6 HOURS SCHEDULED
Qty: 20 CAPSULE | Refills: 0 | Status: SHIPPED | OUTPATIENT
Start: 2020-01-01 | End: 2020-01-01 | Stop reason: DRUGHIGH

## 2020-01-01 RX ORDER — WARFARIN SODIUM 5 MG/1
5 TABLET ORAL
Status: DISCONTINUED | OUTPATIENT
Start: 2020-01-01 | End: 2020-01-01 | Stop reason: HOSPADM

## 2020-01-01 RX ORDER — RISPERIDONE 3 MG/1
TABLET, FILM COATED ORAL
Qty: 45 TABLET | Refills: 0 | Status: SHIPPED | OUTPATIENT
Start: 2020-01-01

## 2020-01-01 RX ORDER — OXYCODONE HYDROCHLORIDE 5 MG/1
5 TABLET ORAL EVERY 6 HOURS PRN
Status: DISCONTINUED | OUTPATIENT
Start: 2020-01-01 | End: 2020-01-01 | Stop reason: HOSPADM

## 2020-01-01 RX ORDER — CEPHALEXIN 250 MG/1
250 CAPSULE ORAL EVERY 6 HOURS SCHEDULED
Qty: 12 CAPSULE | Refills: 0 | Status: SHIPPED | OUTPATIENT
Start: 2020-01-01 | End: 2020-01-01

## 2020-01-01 RX ORDER — MELATONIN
2000 DAILY
Status: DISCONTINUED | OUTPATIENT
Start: 2020-01-01 | End: 2020-01-01 | Stop reason: HOSPADM

## 2020-01-01 RX ADMIN — AMANTADINE HYDROCHLORIDE 100 MG: 100 CAPSULE, LIQUID FILLED ORAL at 17:22

## 2020-01-01 RX ADMIN — WARFARIN SODIUM 5 MG: 5 TABLET ORAL at 17:22

## 2020-01-01 RX ADMIN — FLUVOXAMINE MALEATE 50 MG: 50 TABLET ORAL at 09:35

## 2020-01-01 RX ADMIN — DOCUSATE SODIUM 100 MG: 100 CAPSULE, LIQUID FILLED ORAL at 09:29

## 2020-01-01 RX ADMIN — POTASSIUM CHLORIDE 10 MEQ: 750 TABLET, EXTENDED RELEASE ORAL at 08:22

## 2020-01-01 RX ADMIN — AMANTADINE HYDROCHLORIDE 100 MG: 100 CAPSULE, LIQUID FILLED ORAL at 09:35

## 2020-01-01 RX ADMIN — RISPERIDONE 4.5 MG: 1 TABLET ORAL at 09:34

## 2020-01-01 RX ADMIN — POTASSIUM CHLORIDE 10 MEQ: 750 TABLET, EXTENDED RELEASE ORAL at 09:34

## 2020-01-01 RX ADMIN — ACETAMINOPHEN 650 MG: 325 TABLET ORAL at 16:50

## 2020-01-01 RX ADMIN — LIDOCAINE HYDROCHLORIDE 10 ML: 10 INJECTION, SOLUTION EPIDURAL; INFILTRATION; INTRACAUDAL; PERINEURAL at 16:01

## 2020-01-01 RX ADMIN — FLUVOXAMINE MALEATE 50 MG: 50 TABLET ORAL at 08:12

## 2020-01-01 RX ADMIN — FLUVOXAMINE MALEATE 50 MG: 50 TABLET ORAL at 16:53

## 2020-01-01 RX ADMIN — DOCUSATE SODIUM 100 MG: 100 CAPSULE, LIQUID FILLED ORAL at 08:08

## 2020-01-01 RX ADMIN — ACETAMINOPHEN 650 MG: 325 TABLET ORAL at 18:10

## 2020-01-01 RX ADMIN — ACETAMINOPHEN 650 MG: 325 TABLET ORAL at 08:22

## 2020-01-01 RX ADMIN — EPINEPHRINE 1 MG: 0.1 INJECTION, SOLUTION ENDOTRACHEAL; INTRACARDIAC; INTRAVENOUS at 22:33

## 2020-01-01 RX ADMIN — CEFAZOLIN SODIUM 1000 MG: 1 SOLUTION INTRAVENOUS at 23:37

## 2020-01-01 RX ADMIN — AMLODIPINE BESYLATE 5 MG: 5 TABLET ORAL at 08:27

## 2020-01-01 RX ADMIN — FLUVOXAMINE MALEATE 50 MG: 50 TABLET ORAL at 20:59

## 2020-01-01 RX ADMIN — FLUPHENAZINE HYDROCHLORIDE 10 MG: 2.5 TABLET, FILM COATED ORAL at 09:35

## 2020-01-01 RX ADMIN — ACETAMINOPHEN 650 MG: 325 TABLET ORAL at 10:17

## 2020-01-01 RX ADMIN — CEFAZOLIN SODIUM 1000 MG: 1 SOLUTION INTRAVENOUS at 16:51

## 2020-01-01 RX ADMIN — CEFAZOLIN SODIUM 1000 MG: 1 SOLUTION INTRAVENOUS at 08:23

## 2020-01-01 RX ADMIN — MELATONIN 2000 UNITS: at 08:21

## 2020-01-01 RX ADMIN — CEFAZOLIN SODIUM 1000 MG: 1 SOLUTION INTRAVENOUS at 00:51

## 2020-01-01 RX ADMIN — TAMSULOSIN HYDROCHLORIDE 0.4 MG: 0.4 CAPSULE ORAL at 18:48

## 2020-01-01 RX ADMIN — EPINEPHRINE 1 MG: 0.1 INJECTION, SOLUTION ENDOTRACHEAL; INTRACARDIAC; INTRAVENOUS at 22:39

## 2020-01-01 RX ADMIN — CEFAZOLIN SODIUM 1000 MG: 1 SOLUTION INTRAVENOUS at 09:34

## 2020-01-01 RX ADMIN — DOCUSATE SODIUM 100 MG: 100 CAPSULE, LIQUID FILLED ORAL at 16:51

## 2020-01-01 RX ADMIN — AMANTADINE HYDROCHLORIDE 100 MG: 100 CAPSULE, LIQUID FILLED ORAL at 08:11

## 2020-01-01 RX ADMIN — CEFAZOLIN SODIUM 1000 MG: 1 SOLUTION INTRAVENOUS at 16:16

## 2020-01-01 RX ADMIN — FLUVOXAMINE MALEATE 50 MG: 50 TABLET ORAL at 17:22

## 2020-01-01 RX ADMIN — MELATONIN 2000 UNITS: at 08:08

## 2020-01-01 RX ADMIN — AMLODIPINE BESYLATE 5 MG: 5 TABLET ORAL at 09:29

## 2020-01-01 RX ADMIN — MELATONIN 2000 UNITS: at 09:29

## 2020-01-01 RX ADMIN — WARFARIN SODIUM 5 MG: 5 TABLET ORAL at 20:59

## 2020-01-01 RX ADMIN — CEFAZOLIN SODIUM 1000 MG: 1 SOLUTION INTRAVENOUS at 16:05

## 2020-01-01 RX ADMIN — EPINEPHRINE 1 MG: 0.1 INJECTION, SOLUTION ENDOTRACHEAL; INTRACARDIAC; INTRAVENOUS at 22:42

## 2020-01-01 RX ADMIN — EPINEPHRINE 1 MG: 0.1 INJECTION, SOLUTION ENDOTRACHEAL; INTRACARDIAC; INTRAVENOUS at 22:30

## 2020-01-01 RX ADMIN — ACETAMINOPHEN 650 MG: 325 TABLET ORAL at 23:36

## 2020-01-01 RX ADMIN — ACETAMINOPHEN 650 MG: 325 TABLET ORAL at 06:27

## 2020-01-01 RX ADMIN — CALCIUM CHLORIDE 1 G: 100 INJECTION PARENTERAL at 22:30

## 2020-01-01 RX ADMIN — IOHEXOL 120 ML: 350 INJECTION, SOLUTION INTRAVENOUS at 16:20

## 2020-01-01 RX ADMIN — FLUPHENAZINE HYDROCHLORIDE 10 MG: 2.5 TABLET, FILM COATED ORAL at 08:12

## 2020-01-01 RX ADMIN — RISPERIDONE 4.5 MG: 1 TABLET ORAL at 08:08

## 2020-01-01 RX ADMIN — AMANTADINE HYDROCHLORIDE 100 MG: 100 CAPSULE, LIQUID FILLED ORAL at 21:00

## 2020-01-01 RX ADMIN — CALCIUM CHLORIDE 1 G: 100 INJECTION PARENTERAL at 22:31

## 2020-01-01 RX ADMIN — EPINEPHRINE 1 MG: 0.1 INJECTION, SOLUTION ENDOTRACHEAL; INTRACARDIAC; INTRAVENOUS at 22:36

## 2020-01-01 RX ADMIN — TAMSULOSIN HYDROCHLORIDE 0.4 MG: 0.4 CAPSULE ORAL at 16:50

## 2020-01-01 RX ADMIN — AMLODIPINE BESYLATE 5 MG: 5 TABLET ORAL at 08:08

## 2020-01-01 RX ADMIN — DOCUSATE SODIUM 100 MG: 100 CAPSULE, LIQUID FILLED ORAL at 08:22

## 2020-01-01 RX ADMIN — TAMSULOSIN HYDROCHLORIDE 0.4 MG: 0.4 CAPSULE ORAL at 16:05

## 2020-01-01 RX ADMIN — FLUPHENAZINE HYDROCHLORIDE 10 MG: 2.5 TABLET, FILM COATED ORAL at 08:33

## 2020-01-01 RX ADMIN — DOCUSATE SODIUM 100 MG: 100 CAPSULE, LIQUID FILLED ORAL at 18:48

## 2020-01-01 RX ADMIN — FLUVOXAMINE MALEATE 50 MG: 50 TABLET ORAL at 08:33

## 2020-01-01 RX ADMIN — RISPERIDONE 4.5 MG: 1 TABLET ORAL at 08:21

## 2020-01-01 RX ADMIN — AMANTADINE HYDROCHLORIDE 100 MG: 100 CAPSULE, LIQUID FILLED ORAL at 16:52

## 2020-01-01 RX ADMIN — CEFAZOLIN SODIUM 1000 MG: 1 SOLUTION INTRAVENOUS at 08:08

## 2020-01-01 RX ADMIN — CEFAZOLIN SODIUM 1000 MG: 1 SOLUTION INTRAVENOUS at 23:46

## 2020-01-01 RX ADMIN — DOCUSATE SODIUM 100 MG: 100 CAPSULE, LIQUID FILLED ORAL at 17:22

## 2020-01-01 RX ADMIN — WARFARIN SODIUM 5 MG: 5 TABLET ORAL at 16:52

## 2020-01-01 RX ADMIN — POTASSIUM CHLORIDE 10 MEQ: 750 TABLET, EXTENDED RELEASE ORAL at 08:08

## 2020-01-01 RX ADMIN — ACETAMINOPHEN 650 MG: 325 TABLET ORAL at 21:57

## 2020-01-01 RX ADMIN — AMANTADINE HYDROCHLORIDE 100 MG: 100 CAPSULE, LIQUID FILLED ORAL at 08:33

## 2020-01-01 RX ADMIN — SODIUM BICARBONATE 100 MEQ: 84 INJECTION, SOLUTION INTRAVENOUS at 22:29

## 2020-01-03 NOTE — PROGRESS NOTES
Left full message regarding protime per Dr [place] Millin  Same dose     2 5mg  mon-fri    5mg other days   next pt/inr 4 weeks

## 2020-01-06 NOTE — TELEPHONE ENCOUNTER
Patient's son called, and gave him instructions per [facility] message  He needs to schedule the 2/14/2020 appointment, would like Danielle Chavira to call when back in the office

## 2020-01-06 NOTE — PROGRESS NOTES
Left message to call back - stay on same dose of coumadin 2 5 mg every Monday and Friday; 5 mg other days of the week  Recheck INR 2/14/20   Need to verify dosage with patient

## 2020-01-07 PROBLEM — I10 ESSENTIAL HYPERTENSION: Status: RESOLVED | Noted: 2019-01-01 | Resolved: 2020-01-01

## 2020-01-07 PROBLEM — K64.8 INTERNAL HEMORRHOID: Status: ACTIVE | Noted: 2020-01-01

## 2020-01-07 NOTE — PROGRESS NOTES
Assessment/Plan:    Internal hemorrhoid  Over the past few months he has noticed a lump at his rectum when he has a bowel movement  Only occasionally he sees a little bit of blood on the toilet tissue  The lump does not cause him any pain and it is their mostly with every bowel movement  Exam today reveals no lump around the rectum or in the anus  YAEL reveals soft stool which is light brown  I do not feel any active internal hemorrhoids but it could be that it pops out when he has a bowel movement  Since it is causing no discomfort and only occasional BR be, I see no reason for referral for treatment  Class 1 obesity due to excess calories without serious comorbidity with body mass index (BMI) of 34 0 to 34 9 in adult  Weight has increased 4# since 9/2019  Exercises in PT (for his neck) but does no exercise at home  Weight reduction is important but difficuklt because of his PSYCH meds  Anticoagulated on warfarin  On this for h/o recurrent DVT  No bleeding issues    Chronic diastolic heart failure (HCC)  Wt Readings from Last 3 Encounters:   01/07/20 87 3 kg (192 lb 8 oz)   09/26/19 86 8 kg (191 lb 4 8 oz)   09/24/19 85 3 kg (188 lb)       Weight has been stable and no increase in CELIS or edema  Essential hypertension  Blood pressure is controlled on present treatment regimen  Patient misses no doses and tolerates the meds without side effects  Patient avoids salt  Discussed diet, exercise and weight control  No change in present meds  Continue HBPM     Benign essential hypertension  Blood pressure is controlled on present treatment regimen  Patient misses no doses and tolerates the meds without side effects  Patient avoids salt  Discussed diet, exercise and weight control  No change in present meds  Continue HBPM     Paranoid schizophrenia ([facility] [address] )  Followed by [facility] every 3 months  History of DVT of lower extremity  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      Fracture of sixth cervical vertebra ([facility] )  Continues with physical therapy under the supervision of Dr Rosanna Hernandez who is his physiatrist   Because of increasing neck pain and headaches patient was back to see the neurosurgeon-Dr Mira Montaño  A CT scan was done but an MRI has been ordered but has yet to be approved by his insurance  I reviewed the report of the CT of the cervical spine that was done 12/26/2019  Diagnoses and all orders for this visit:    Need for vaccination  -     Pneumococcal Polysaccharide Vaccine 23-Valent =>1yo SQ IM    Internal hemorrhoid    Class 1 obesity due to excess calories without serious comorbidity with body mass index (BMI) of 34 0 to 34 9 in adult    Anticoagulated on warfarin    Chronic diastolic heart failure (HCC)  -     CBC and differential; Future    Essential hypertension  -     Comprehensive metabolic panel; Future    Benign essential hypertension  -     UA (URINE) with reflex to Scope; Future  -     TSH, 3rd generation; Future    Paranoid schizophrenia ([facility] )    History of DVT of lower extremity    Closed displaced fracture of sixth cervical vertebra with routine healing, unspecified fracture morphology, subsequent encounter          Subjective:     Chief Complaint   Patient presents with    Hypertension        Patient ID: Radha Henry  is a 69 y o  male  HPI : Multiple Chronic Medical Conditions and a "lump" at rectum  The following portions of the patient's history were reviewed and updated as appropriate: allergies, current medications, past family history, past medical history, past social history, past surgical history and problem list     Review of Systems   Constitutional: Negative for activity change, appetite change, fatigue, fever and unexpected weight change  HENT: Negative for congestion, dental problem and sneezing  Eyes: Negative for discharge and visual disturbance  Respiratory: Negative for cough, chest tightness, shortness of breath, wheezing and stridor  Cardiovascular: Positive for leg swelling  Negative for chest pain and palpitations  Gastrointestinal: Negative for abdominal pain, constipation, diarrhea, nausea and vomiting  Lump at the rectum  Endocrine: Negative for polydipsia and polyuria  Genitourinary: Negative for dysuria and frequency  Musculoskeletal: Positive for gait problem  Negative for arthralgias  Skin: Negative for rash  Allergic/Immunologic: Negative for environmental allergies and food allergies  Neurological: Positive for headaches  Negative for weakness and light-headedness  Hematological: Negative for adenopathy  Psychiatric/Behavioral: Negative for behavioral problems and sleep disturbance  Objective:  Vitals:    01/07/20 0949   BP: 142/82   BP Location: Left arm   Patient Position: Sitting   Cuff Size: Standard   Pulse: 86   Resp: 18   Temp: 98 4 °F (36 9 °C)   TempSrc: Temporal   SpO2: 98%   Weight: 87 3 kg (192 lb 8 oz)   Height: 5' 3" (1 6 m)      Physical Exam   Constitutional: He is oriented to person, place, and time  He appears well-developed and well-nourished  HENT:   Head: Normocephalic  Nose: Nose normal    Mouth/Throat: Oropharynx is clear and moist    Eyes: Pupils are equal, round, and reactive to light  Conjunctivae are normal  Right eye exhibits no discharge  Left eye exhibits no discharge  Neck: Normal range of motion  Neck supple  No thyromegaly present  Cardiovascular: Normal rate, regular rhythm and normal heart sounds  No murmur heard  Pulmonary/Chest: Effort normal and breath sounds normal  No stridor  He has no wheezes  He has no rales  Abdominal: Soft  Bowel sounds are normal  He exhibits no mass  There is no tenderness  Genitourinary: Rectum normal and prostate normal  Rectal exam shows no mass  Musculoskeletal: Normal range of motion  He exhibits edema  He exhibits no tenderness  Lymphadenopathy:     He has no cervical adenopathy     Neurological: He is alert and oriented to person, place, and time  Skin: Skin is warm  No rash noted  No erythema  Psychiatric: He has a normal mood and affect  His behavior is normal  Thought content normal        Patient Instructions   Continue to elevate her legs every chance she get  Continue to avoid salt or adding salt to her food  The only cream that you need to use on your lower legs is a moisturizing cream like Cetaphil cream or even Eucerin cream   Use 1 of these creams every day and apply to the skin and feet of both lower legs  It is particularly important to use this during the wintertime to prevent dryness or cracking of the skin  Pneumococcal Polyvalent Vaccine (By injection)   Pneumococcal Vaccine Polyvalent (ZHK-vog-FSJ-al VAX-een sin-nr-DEC-lent)  Prevents severe infections, such as pneumonia and meningitis  Brand Name(s): Pneumovax 23   There may be other brand names for this medicine  When This Medicine Should Not Be Used: This vaccine is not right for everyone  You should not receive this vaccine if you had an allergic reaction to pneumococcal vaccine  How to Use This Medicine:   Injectable  · A nurse or other health provider will give you this medicine  This vaccine is given as a shot into a muscle or under the skin, usually in the thigh or upper arm  Drugs and Foods to Avoid:   Ask your doctor or pharmacist before using any other medicine, including over-the-counter medicines, vitamins, and herbal products  · Some medicines can affect how this vaccine works  Tell your doctor if you are receiving a treatment or medicine that causes a weak immune system  This includes radiation treatment, steroid medicine (such as hydrocortisone, methylprednisolone, prednisolone, prednisone), or cancer medicine  · You should not receive varicella virus vaccine (Zostavax®) at the same time that you are given pneumococcal vaccine  The vaccines should be given 4 weeks apart    Warnings While Using This Medicine: · Tell the doctor if you are currently sick, or if you have heart disease, blood vessel disease, or lung disease  · Adults and adolescents: Tell your doctor if you are pregnant or breastfeeding  · Tell your doctor if you have a weak immune system  You may not be fully protected by this vaccine  · Tell your doctor if you have any problems with spinal fluid, which could be caused by a birth defect or previous surgery  This vaccine may not prevent meningitis in people who have spinal fluid problems  Possible Side Effects While Using This Medicine:   Call your doctor right away if you notice any of these side effects:  · Allergic reaction: Itching or hives, swelling in your face or hands, swelling or tingling in your mouth or throat, chest tightness, trouble breathing  · High fever  If you notice these less serious side effects, talk with your doctor:   · Headache  · Muscle or joint pain  · Pain, redness, swelling, or tenderness where the shot was given  · Tiredness or weakness  If you notice other side effects that you think are caused by this medicine, tell your doctor  Call your doctor for medical advice about side effects  You may report side effects to FDA at 8-607-FDA-3222  © 2017 [facility] Information is for End User's use only and may not be sold, redistributed or otherwise used for commercial purposes  The above information is an  only  It is not intended as medical advice for individual conditions or treatments  Talk to your doctor, nurse or pharmacist before following any medical regimen to see if it is safe and effective for you  room air

## 2020-01-07 NOTE — PATIENT INSTRUCTIONS
Continue to elevate her legs every chance she get  Continue to avoid salt or adding salt to her food  The only cream that you need to use on your lower legs is a moisturizing cream like Cetaphil cream or even Eucerin cream   Use 1 of these creams every day and apply to the skin and feet of both lower legs  It is particularly important to use this during the wintertime to prevent dryness or cracking of the skin  Pneumococcal Polyvalent Vaccine (By injection)   Pneumococcal Vaccine Polyvalent (ZWV-tun-WMN-al VAX-een reh-qk-AHP-lent)  Prevents severe infections, such as pneumonia and meningitis  Brand Name(s): Pneumovax 23   There may be other brand names for this medicine  When This Medicine Should Not Be Used: This vaccine is not right for everyone  You should not receive this vaccine if you had an allergic reaction to pneumococcal vaccine  How to Use This Medicine:   Injectable  · A nurse or other health provider will give you this medicine  This vaccine is given as a shot into a muscle or under the skin, usually in the thigh or upper arm  Drugs and Foods to Avoid:   Ask your doctor or pharmacist before using any other medicine, including over-the-counter medicines, vitamins, and herbal products  · Some medicines can affect how this vaccine works  Tell your doctor if you are receiving a treatment or medicine that causes a weak immune system  This includes radiation treatment, steroid medicine (such as hydrocortisone, methylprednisolone, prednisolone, prednisone), or cancer medicine  · You should not receive varicella virus vaccine (Zostavax®) at the same time that you are given pneumococcal vaccine  The vaccines should be given 4 weeks apart  Warnings While Using This Medicine:   · Tell the doctor if you are currently sick, or if you have heart disease, blood vessel disease, or lung disease  · Adults and adolescents: Tell your doctor if you are pregnant or breastfeeding    · Tell your doctor if you have a weak immune system  You may not be fully protected by this vaccine  · Tell your doctor if you have any problems with spinal fluid, which could be caused by a birth defect or previous surgery  This vaccine may not prevent meningitis in people who have spinal fluid problems  Possible Side Effects While Using This Medicine:   Call your doctor right away if you notice any of these side effects:  · Allergic reaction: Itching or hives, swelling in your face or hands, swelling or tingling in your mouth or throat, chest tightness, trouble breathing  · High fever  If you notice these less serious side effects, talk with your doctor:   · Headache  · Muscle or joint pain  · Pain, redness, swelling, or tenderness where the shot was given  · Tiredness or weakness  If you notice other side effects that you think are caused by this medicine, tell your doctor  Call your doctor for medical advice about side effects  You may report side effects to FDA at 9-818-FDA-0189  © 2017 [address] Information is for End User's use only and may not be sold, redistributed or otherwise used for commercial purposes  The above information is an  only  It is not intended as medical advice for individual conditions or treatments  Talk to your doctor, nurse or pharmacist before following any medical regimen to see if it is safe and effective for you

## 2020-01-07 NOTE — ASSESSMENT & PLAN NOTE
Wt Readings from Last 3 Encounters:   01/07/20 87 3 kg (192 lb 8 oz)   09/26/19 86 8 kg (191 lb 4 8 oz)   09/24/19 85 3 kg (188 lb)       Weight has been stable and no increase in CELIS or edema

## 2020-01-07 NOTE — ASSESSMENT & PLAN NOTE
Weight has increased 4# since 9/2019  Exercises in PT (for his neck) but does no exercise at home  Weight reduction is important but difficuklt because of his PSYCH meds

## 2020-01-07 NOTE — ASSESSMENT & PLAN NOTE
Over the past few months he has noticed a lump at his rectum when he has a bowel movement  Only occasionally he sees a little bit of blood on the toilet tissue  The lump does not cause him any pain and it is their mostly with every bowel movement  Exam today reveals no lump around the rectum or in the anus  YAEL reveals soft stool which is light brown  I do not feel any active internal hemorrhoids but it could be that it pops out when he has a bowel movement  Since it is causing no discomfort and only occasional BR be, I see no reason for referral for treatment

## 2020-02-06 NOTE — TELEPHONE ENCOUNTER
INR 2 75 (2/5/20) -currently on coumadin 2 mg every Monday and Friday; 5 mg other days of the week   Last INR 2 49 (1/3/20)

## 2020-03-13 PROBLEM — I26.99 PULMONARY EMBOLUS (HCC): Status: ACTIVE | Noted: 2020-01-01

## 2020-03-13 PROBLEM — I26.99 PULMONARY EMBOLUS (HCC): Status: RESOLVED | Noted: 2020-01-01 | Resolved: 2020-01-01

## 2020-03-13 PROBLEM — R31.0 HEMATURIA, GROSS: Status: ACTIVE | Noted: 2020-01-01

## 2020-03-13 PROBLEM — S12.500A FRACTURE OF SIXTH CERVICAL VERTEBRA (HCC): Status: RESOLVED | Noted: 2019-03-04 | Resolved: 2020-01-01

## 2020-03-13 NOTE — ASSESSMENT & PLAN NOTE
Wt Readings from Last 3 Encounters:   03/13/20 87 8 kg (193 lb 8 oz)   01/07/20 87 3 kg (192 lb 8 oz)   09/26/19 86 8 kg (191 lb 4 8 oz)     No significant increase in weight and he has no increase in peripheral edema and no CELIS  Floradelores Oliver

## 2020-03-13 NOTE — ASSESSMENT & PLAN NOTE
Chronic problem with arthritis of cervical spine  Uses acetaminophen 2 to 3 times a day as needed for pain

## 2020-03-13 NOTE — ASSESSMENT & PLAN NOTE
Long-term complaint and is probably multifactorial, particularly caused by his cervical spine arthritis  He uses acetaminophen 2 to 3 times a day as needed for pain

## 2020-03-13 NOTE — ASSESSMENT & PLAN NOTE
Caused by DJD of lumbosacral spine as well as imbalance related to his psych meds  Walks with a rolling walker and is independently ambulatory

## 2020-03-13 NOTE — PROGRESS NOTES
BMI Counseling: Body mass index is 34 28 kg/m²  The BMI is above normal  Nutrition recommendations include reducing portion sizes, decreasing overall calorie intake, 3-5 servings of fruits/vegetables daily, reducing fast food intake, consuming healthier snacks, decreasing soda and/or juice intake, moderation in carbohydrate intake, increasing intake of lean protein, reducing intake of saturated fat and trans fat and reducing intake of cholesterol  Exercise recommendations include moderate aerobic physical activity for 150 minutes/week, exercising 3-5 times per week and strength training exercises  [address]    NAME: Tejas Chapman  AGE: 69 y o  SEX: male  : 1945     DATE: 3/13/2020     Assessment and Plan:     Problem List Items Addressed This Visit        Cardiovascular and Mediastinum    Benign essential hypertension     Labs reviewed from 2020, with patient today  Blood pressure is controlled on present treatment regimen  Patient misses no doses and tolerates the meds without side effects  Patient avoids salt  Discussed diet, exercise and weight control  No change in present meds  Continue HBPM          Chronic diastolic heart failure (HCC)     Wt Readings from Last 3 Encounters:   20 87 8 kg (193 lb 8 oz)   20 87 3 kg (192 lb 8 oz)   19 86 8 kg (191 lb 4 8 oz)     No significant increase in weight and he has no increase in peripheral edema and no CELIS  Henna Serum RESOLVED: Pulmonary embolus (HCC)     This problem plus history of recurrent DVT is the reason why he is on warfarin for life  Musculoskeletal and Integument    Chronic venous stasis dermatitis of both lower extremities     Mild, chronic edema - no worsening or pain - wears compression stockings every day               Genitourinary    Hematuria, gross     Over the past 6 months has had episodes where his urine seems dark or brown  Denies dysuria  Patient has no abdominal discomfort but is on chronic warfarin treatment  2/5/20 - innumerable RBCs on recent urinalysis but asymptomatic  Saw urology in 2017 because of hematuria  Had cystoscopy which was unremarkable and CT scan showed horseshoe kidney with a 7 mm nonobstructive right-sided calculus  Urology talked about follow-up appointment but I do not have any evidence that a follow-up was done after he had the imaging and cystoscopy  Relevant Orders    Ambulatory referral to Urology    UA w Reflex to Microscopic w Reflex to Culture       Other    Class 1 obesity due to excess calories without serious comorbidity with body mass index (BMI) of 34 0 to 34 9 in adult     No change in weight over the past 6 months  No formal exercise or formal diet  Disability of walking     Caused by DJD of lumbosacral spine as well as imbalance related to his psych meds  Walks with a rolling walker and is independently ambulatory  Neck pain without injury     Chronic problem with arthritis of cervical spine  Uses acetaminophen 2 to 3 times a day as needed for pain  Physical deconditioning    Anticoagulated on warfarin    Relevant Orders    Ambulatory referral to Urology    Other fatigue     Labs reviewed from 02/05/2020 and no evidence of anemia or chronic kidney disease  Pressure in head     Long-term complaint and is probably multifactorial, particularly caused by his cervical spine arthritis  He uses acetaminophen 2 to 3 times a day as needed for pain  Other Visit Diagnoses     Annual physical exam    -  Primary    BMI 34 0-34 9,adult              Immunizations and preventive care screenings were discussed with patient today  Appropriate education was printed on patient's after visit summary  Counseling:  · Exercise: the importance of regular exercise/physical activity was discussed   Recommend exercise 3-5 times per week for at least 30 minutes  Return in about 4 months (around 7/13/2020) for Next scheduled follow up  Chief Complaint:     Chief Complaint   Patient presents with    Hypertension    History of DVT    Annual Exam      History of Present Illness:     Adult Annual Physical   Patient here for a comprehensive physical exam  The patient reports problems - epistaxis occasionally, persistent neck pain due to DJD (PT 2x/week), early awakening, concentrated urine       Diet and Physical Activity  · Diet/Nutrition: heart healthy (low sodium) diet, limited junk food and limited fruits/vegetables  · Exercise: no formal exercise  Depression Screening  PHQ-9 Depression Screening    PHQ-9:    Frequency of the following problems over the past two weeks:            General Health  · Sleep: sleeps poorly and gets 4-6 hours of sleep on average  Wakes up during the night and trouble getting back to sleep  · Hearing: normal - bilateral   · Vision: most recent eye exam <1 year ago and wears glasses  · Dental: no dental visits for >1 year and does not brush teeth regularly   Health  · Symptoms include: urinary hesitancy and nocturia and urgency  Sometimes gets leakage  Review of Systems:     Review of Systems   Constitutional: Negative for activity change, appetite change, fatigue, fever and unexpected weight change  HENT: Negative for congestion, dental problem and sneezing  Eyes: Negative for discharge and visual disturbance  Last eye check - 2/2020 - on eyedrops and a vitamin for ARMD    Respiratory: Negative for cough, chest tightness, shortness of breath and wheezing  Cardiovascular: Positive for leg swelling  Negative for chest pain and palpitations  Wears compression stockings on a daily basis  Gastrointestinal: Negative for abdominal pain, constipation, diarrhea, nausea and vomiting  Endocrine: Negative for polydipsia and polyuria     Genitourinary: Negative for dysuria and frequency  Wears pull-ups day and night - some leakage due to urge incontinence  Musculoskeletal: Positive for gait problem and neck pain  Negative for arthralgias  Skin: Negative for rash  Allergic/Immunologic: Negative for environmental allergies and food allergies  Neurological: Positive for tremors and headaches  Negative for light-headedness  Slow speech, bradykinesia, imbalance and mild tremors - most likely related to Psych meds  Walks with RW for safety  Daily, pressure headaches - mostly in occipital area and the neck  Using acetaminophen up to 3 times a day  Hematological: Negative for adenopathy  Psychiatric/Behavioral: Negative for behavioral problems and sleep disturbance        Past Medical History:     Past Medical History:   Diagnosis Date    Ankle edema 04/2004    chronic    Arthritis 03/16/2011    right knee    Ataxic gait 11/14/2011    Baker's cyst 04/2005    (djd)-left knee    Cardiac disease     Cataract     Chronic pain     Coronary artery disease     Depression     DVT (deep venous thrombosis) (HCC)     Epistaxis 01/09/2015    Forgetfulness 3/4/2019    Hand tingling 02/05/2010    tingling right forearm and hand-CTS on EMG    Hematuria 11/22/2016    ER-3 way sandhu to irrigate bladder with 3 L NS    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    Hypertension     Lipoma 01/11/2011    in hepatic flexure    Migraine headache 02/2007    optic    Obesity     Pulmonary embolism ([facility] [address] ) 09/18/2014    bilateral PE + acute DVT LLE     Rib fracture 03/2007    left 8th rib 2nd degree fall    Schizophrenia ([facility] [address] ) 04/2009    acute exacerbation    Sciatic leg pain 07/20/2011    left    Stenosis of cervix     with cervical radiculomyelopathy    Suicidal ideation 04/2009    Syncope 01/09/2015    Tardive dyskinesia 02/28/2011    Torn meniscus 04/2004    lateral, knee, left    Varicose vein of leg 04/2004    Venous insufficiency of leg 04/2004    chronic    Vertigo 05/12/2008    transient-questionable BPPV      Past Surgical History:     Past Surgical History:   Procedure Laterality Date    CATARACT EXTRACTION Right     10/2/2009    CATARACT EXTRACTION      12/4/2009    COLONOSCOPY      with biopsy    IVC FILTER INSERTION      anticoagulation-9/23/2014    KNEE ARTHROCENTESIS       and cortisone injection    KNEE ARTHROSCOPY Left     5/18/2004    LAMINECTOMY      C3-C6 and left sided cervical foraminotomies-10/14/2008    OTHER SURGICAL HISTORY      DONNY x 1    REPLACEMENT TOTAL HIP W/  RESURFACING IMPLANTS      TOTAL HIP ARTHROPLASTY Right     02/07/2017    TOTAL KNEE ARTHROPLASTY Left     3/21/2005      Family History:     Family History   Problem Relation Age of Onset    Coronary artery disease Mother     Diabetes Mother     Depression Mother     Other Mother         tobacco abuse    Other Father         tobacco abuse    Coronary artery disease Father     Other Sister         tobacco abuse    Diabetes Sister     Hypertension Brother     Migraines Other     Breast cancer Other         2009-mastectomy+chemo      Social History:        Social History     Socioeconomic History    Marital status: /Civil Union     Spouse name: None    Number of children: 3    Years of education: None    Highest education level: None   Occupational History    None   Social Needs    Financial resource strain: None    Food insecurity:     Worry: None     Inability: None    Transportation needs:     Medical: None     Non-medical: None   Tobacco Use    Smoking status: Never Smoker    Smokeless tobacco: Never Used    Tobacco comment: no passive smoke exposure   Substance and Sexual Activity    Alcohol use: Not Currently     Alcohol/week: 0 0 standard drinks     Frequency: Never     Binge frequency: Never     Comment: no drinking    Drug use: No    Sexual activity: Not Currently   Lifestyle    Physical activity:     Days per week: None     Minutes per session: None    Stress: None   Relationships    Social connections:     Talks on phone: None     Gets together: None     Attends Shinto service: None     Active member of club or organization: None     Attends meetings of clubs or organizations: None     Relationship status: None    Intimate partner violence:     Fear of current or ex partner: None     Emotionally abused: None     Physically abused: None     Forced sexual activity: None   Other Topics Concern    None   Social History Narrative    None      Current Medications:     Current Outpatient Medications   Medication Sig Dispense Refill    acetaminophen (TYLENOL) 325 mg tablet Take 2 tablets (650 mg total) by mouth 3 (three) times a day as needed for mild pain, moderate pain, severe pain, headaches or fever Take 2 tablets, orally, twice a day, 6 hours apart, every day   100 tablet 0    amantadine (SYMMETREL) 100 mg capsule TAKE 1 CAPSULE BY MOUTH TWICE DAILY 60 capsule 0    amLODIPine (NORVASC) 5 mg tablet Take 1 tablet by mouth daily 90 tablet 3    Blood Pressure Monitoring (BLOOD PRESSURE MONITOR/L CUFF) MISC by Does not apply route daily 1 each 0    cholecalciferol 2000 units TABS Take 1 tablet (2,000 Units total) by mouth daily 100 tablet 0    docusate sodium (COLACE) 100 mg capsule Take 1 capsule (100 mg total) by mouth 2 (two) times a day 60 capsule 0    fluPHENAZine (PROLIXIN) 10 MG tablet Take 10 mg by mouth daily      fluvoxaMINE (LUVOX) 50 mg tablet Take 50 mg by mouth 5 (five) times a day      potassium chloride (K-DUR,KLOR-CON) 10 mEq tablet Take 1 tablet (10 mEq total) by mouth daily 30 tablet 5    risperiDONE (RisperDAL) 3 mg tablet TAKE 1 & 1/2 (ONE & ONE-HALF) TABLETS BY MOUTH DAILY 45 tablet 0    tamsulosin (FLOMAX) 0 4 mg Take 1 capsule (0 4 mg total) by mouth daily with dinner 90 capsule 3    warfarin (COUMADIN) 5 mg tablet Take 1 tablet (5 mg total) by mouth daily Take 0 5 tablet on Monday and Friday and 1 tablet other days of the week 30 tablet 5     No current facility-administered medications for this visit  Allergies: Allergies   Allergen Reactions    Lisinopril Cough      Physical Exam:     /76 (BP Location: Left arm, Patient Position: Sitting, Cuff Size: Large)   Pulse 91   Temp 97 5 °F (36 4 °C) (Tympanic)   Resp 20   Ht 5' 3" (1 6 m)   Wt 87 8 kg (193 lb 8 oz)   SpO2 96%   BMI 34 28 kg/m²     Physical Exam   Constitutional: He is oriented to person, place, and time  He appears well-developed and well-nourished  HENT:   Head: Normocephalic  Nose: Nose normal    Mouth/Throat: Oropharynx is clear and moist    Eyes: Pupils are equal, round, and reactive to light  Conjunctivae are normal  Right eye exhibits no discharge  Left eye exhibits no discharge  Neck: Normal range of motion  Neck supple  No thyromegaly present  Cardiovascular: Normal rate, regular rhythm and normal heart sounds  No murmur heard  Pulmonary/Chest: Effort normal and breath sounds normal  He has no wheezes  He has no rales  Abdominal: Soft  Bowel sounds are normal  He exhibits no mass  There is no hepatosplenomegaly  There is no tenderness  Musculoskeletal: Normal range of motion  He exhibits edema  He exhibits no tenderness or deformity  Feet:    Lymphadenopathy:     He has no cervical adenopathy  Neurological: He is alert and oriented to person, place, and time  Skin: Skin is warm  No rash noted  Psychiatric: He has a normal mood and affect   His behavior is normal        Alex Adam MD  [address]

## 2020-03-13 NOTE — ASSESSMENT & PLAN NOTE
Over the past 6 months has had episodes where his urine seems dark or brown  Denies dysuria  Patient has no abdominal discomfort but is on chronic warfarin treatment  2/5/20 - innumerable RBCs on recent urinalysis but asymptomatic  Saw urology in 2017 because of hematuria  Had cystoscopy which was unremarkable and CT scan showed horseshoe kidney with a 7 mm nonobstructive right-sided calculus  Urology talked about follow-up appointment but I do not have any evidence that a follow-up was done after he had the imaging and cystoscopy

## 2020-03-13 NOTE — ASSESSMENT & PLAN NOTE
Labs reviewed from 02/05/2020, with patient today  Blood pressure is controlled on present treatment regimen  Patient misses no doses and tolerates the meds without side effects  Patient avoids salt  Discussed diet, exercise and weight control  No change in present meds   Continue HBPM

## 2020-03-19 NOTE — TELEPHONE ENCOUNTER
Patient called and would like Priyanka Helms to give him a call back, has a question about his warfarin

## 2020-03-19 NOTE — TELEPHONE ENCOUNTER
Patient needs clarification of his coumadin dosage - given advised to keep same dose of warfarin 2 5 mg Monday and Friday and 5 mg other days of the week

## 2020-04-06 PROBLEM — L03.115 CELLULITIS OF RIGHT LEG WITHOUT FOOT: Status: ACTIVE | Noted: 2020-01-01

## 2020-07-09 PROBLEM — R68.89 FORGETFULNESS: Status: RESOLVED | Noted: 2019-03-04 | Resolved: 2020-01-01
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
## 2020-07-09 PROBLEM — E87.6 HYPOKALEMIA: Status: RESOLVED | Noted: 2019-03-16 | Resolved: 2020-01-01

## 2020-07-09 NOTE — ASSESSMENT & PLAN NOTE
Wt Readings from Last 3 Encounters:   07/09/20 91 kg (200 lb 11 2 oz)   04/14/20 89 4 kg (197 lb 3 2 oz)   04/06/20 90 kg (198 lb 6 6 oz)     Has a scale but does not weigh himself regularly  Denies increase in CELIS or increase in peripheral edema

## 2020-07-09 NOTE — PROGRESS NOTES
Assessment/Plan:    Hematuria, gross  OV - 4/17/20 -tele video visit with Dr Nathan Atkins from Urology  They will be scheduling him for imaging studies and a full workup, including cystoscopy  No having episodes of gross hematuria  Under the care of Urology  06/11/2020-CT scan with renal protocol shows small bilateral nonobstructive kidney stones and also a 1 5 centimeter nonobstructing kidney stone in the right renal pelvis  Rest of study is normal except for the chronic horseshoe kidney  Benign essential hypertension  Blood pressure is controlled on present treatment regimen  Patient misses no doses and tolerates the meds without side effects  Patient avoids salt  Discussed diet, exercise and weight control  No change in present meds  Continue HBPM     Coronary artery disease involving native coronary artery of native heart without angina pectoris  Stable and asymptamatc  Not followed by Cardiology and that would be unnecessary at this point  Chronic diastolic heart failure (HCC)  Wt Readings from Last 3 Encounters:   07/09/20 91 kg (200 lb 11 2 oz)   04/14/20 89 4 kg (197 lb 3 2 oz)   04/06/20 90 kg (198 lb 6 6 oz)     Has a scale but does not weigh himself regularly  Denies increase in CELIS or increase in peripheral edema  Chronic venous stasis dermatitis of both lower extremities  Stable and no skin openings or cellulitis  Paranoid schizophrenia ([address] )  Followed by [facility] every 3 months  No change in meds  Hypokalemia  4/9/20 - BMP=WNL and K=3 7  Cognitive impairment  Stable and no evidence of progression, according to the patient  Disability of walking  Chronic back pain and imbalance related to Psych meds  Self ambulatory with rolling walker  No recent falls  Turned down for more PT  Problem is still mostly about imbalance  Walks with a single-point cane when he is in his house but when he goes outside he uses rolling walker         Diagnoses and all orders for this visit:    Hematuria, gross    History of DVT of lower extremity  -     warfarin (COUMADIN) 5 mg tablet; Take 1/2 tablet every Monday and Friday; 1 tablet every Tuesday; Wednesday, Thursday, Saturday and Sunday    History of pulmonary embolus (PE)  -     warfarin (COUMADIN) 5 mg tablet; Take 1/2 tablet every Monday and Friday; 1 tablet every Tuesday; Wednesday, Thursday, Saturday and Sunday    Benign essential hypertension    Chronic diastolic heart failure (HCC)    Coronary artery disease involving native coronary artery of native heart without angina pectoris    Chronic venous stasis dermatitis of both lower extremities    Physical deconditioning    Paranoid schizophrenia (HCC)    Cognitive impairment    Disability of walking    Other orders  -     docusate sodium (COLACE) 100 mg capsule; Take 100 mg by mouth every other day          Subjective:     Chief Complaint   Patient presents with    Hypertension        Patient ID: Kaylin Ramos  is a 69 y o  male  HPI : Multiple Chronic Medical Conditions    The following portions of the patient's history were reviewed and updated as appropriate: allergies, current medications, past family history, past medical history, past social history, past surgical history and problem list     Review of Systems   Constitutional: Negative for activity change, appetite change, fatigue, fever and unexpected weight change  HENT: Negative for congestion, dental problem and sneezing  Eyes: Negative for discharge and visual disturbance  Respiratory: Negative for cough, chest tightness and wheezing  Cardiovascular: Positive for leg swelling  Negative for chest pain and palpitations  Gastrointestinal: Negative for abdominal pain, constipation, diarrhea, nausea and vomiting  Endocrine: Negative for polydipsia and polyuria  Genitourinary: Negative for dysuria and frequency  Musculoskeletal: Negative for arthralgias  Skin: Negative for rash     Allergic/Immunologic: Negative for environmental allergies and food allergies  Neurological: Negative for weakness and headaches  Hematological: Negative for adenopathy  Psychiatric/Behavioral: Negative for behavioral problems and sleep disturbance  Forgetfulness  Objective:  Vitals:    07/09/20 0949   BP: 126/64   BP Location: Left arm   Patient Position: Sitting   Cuff Size: Large   Pulse: 80   Resp: 18   Temp: (!) 97 3 °F (36 3 °C)   TempSrc: Tympanic   SpO2: 94%   Weight: 91 kg (200 lb 11 2 oz)   Height: 5' 3" (1 6 m)      Physical Exam   Constitutional: He is oriented to person, place, and time  He appears well-developed and well-nourished  HENT:   Head: Normocephalic  Eyes: Conjunctivae are normal  Right eye exhibits no discharge  Left eye exhibits no discharge  Neck: Normal range of motion  Neck supple  No thyromegaly present  Cardiovascular: Normal rate, regular rhythm and normal heart sounds  No murmur heard  Pulmonary/Chest: Effort normal and breath sounds normal  He has no rales  Abdominal: Soft  Bowel sounds are normal  He exhibits no mass  There is no tenderness  Musculoskeletal: Normal range of motion  He exhibits edema  He exhibits no deformity  Legs:  Lymphadenopathy:     He has no cervical adenopathy  Neurological: He is alert and oriented to person, place, and time  Skin: Skin is warm  Rash noted  Psychiatric: He has a normal mood and affect  His behavior is normal        Patient Instructions   No change in present medications  Tried elevate legs as much as possible and follow our recommendations for low-salt and heart healthy diet  We will check blood test today and will call you with those results  Heart Healthy Diet   WHAT YOU NEED TO KNOW:   A heart healthy diet is an eating plan low in total fat, unhealthy fats, and sodium (salt)  A heart healthy diet helps decrease your risk for heart disease and stroke   Limit the amount of fat you eat to 25% to 35% of your total daily calories  Limit sodium to less than 2,300 mg each day  DISCHARGE INSTRUCTIONS:   Healthy fats:  Healthy fats can help improve cholesterol levels  The risk for heart disease is decreased when cholesterol levels are normal  Choose healthy fats, such as the following:  · Unsaturated fat  is found in foods such as soybean, canola, olive, corn, and safflower oils  It is also found in soft tub margarine that is made with liquid vegetable oil  · Omega-3 fat  is found in certain fish, such as salmon, tuna, and trout, and in walnuts and flaxseed  Unhealthy fats:  Unhealthy fats can cause unhealthy cholesterol levels in your blood and increase your risk of heart disease  Limit unhealthy fats, such as the following:  · Cholesterol  is found in animal foods, such as eggs and lobster, and in dairy products made from whole milk  Limit cholesterol to less than 300 milligrams (mg) each day  You may need to limit cholesterol to 200 mg each day if you have heart disease  · Saturated fat  is found in meats, such as dahl and hamburger  It is also found in chicken or turkey skin, whole milk, and butter  Limit saturated fat to less than 7% of your total daily calories  Limit saturated fat to less than 6% if you have heart disease or are at increased risk for it  · Trans fat  is found in packaged foods, such as potato chips and cookies  It is also in hard margarine, some fried foods, and shortening  Avoid trans fats as much as possible    Heart healthy foods and drinks to include:  Ask your dietitian or healthcare provider how many servings to have from each of the following food groups:  · Grains:      ¨ Whole-wheat breads, cereals, and pastas, and brown rice    ¨ Low-fat, low-sodium crackers and chips    · Vegetables:      ¨ Broccoli, green beans, green peas, and spinach    ¨ Collards, kale, and lima beans    ¨ Carrots, sweet potatoes, tomatoes, and peppers    ¨ Canned vegetables with no salt added    · Fruits:      ¨ Bananas, peaches, pears, and pineapple    ¨ Grapes, raisins, and dates    ¨ Oranges, tangerines, grapefruit, orange juice, and grapefruit juice    ¨ Apricots, mangoes, melons, and papaya    ¨ Raspberries and strawberries    ¨ Canned fruit with no added sugar    · Low-fat dairy products:      ¨ Nonfat (skim) milk, 1% milk, and low-fat almond, cashew, or soy milks fortified with calcium    ¨ Low-fat cheese, regular or frozen yogurt, and cottage cheese    · Meats and proteins , such as lean cuts of beef and pork (loin, leg, round), skinless chicken and turkey, legumes, soy products, egg whites, and nuts  Foods and drinks to limit or avoid:  Ask your dietitian or healthcare provider about these and other foods that are high in unhealthy fat, sodium, and sugar:  · Snack or packaged foods , such as frozen dinners, cookies, macaroni and cheese, and cereals with more than 300 mg of sodium per serving    · Canned or dry mixes  for cakes, soups, sauces, or gravies    · Vegetables with added sodium , such as instant potatoes, vegetables with added sauces, or regular canned vegetables    · Other foods high in sodium , such as ketchup, barbecue sauce, salad dressing, pickles, olives, soy sauce, and miso    · High-fat dairy foods  such as whole or 2% milk, cream cheese, or sour cream, and cheeses     · High-fat protein foods  such as high-fat cuts of beef (T-bone steaks, ribs), chicken or turkey with skin, and organ meats, such as liver    · Cured or smoked meats , such as hot dogs, dahl, and sausage    · Unhealthy fats and oils , such as butter, stick margarine, shortening, and cooking oils such as coconut or palm oil    · Food and drinks high in sugar , such as soft drinks (soda), sports drinks, sweetened tea, candy, cake, cookies, pies, and doughnuts  Other diet guidelines to follow:   · Eat more foods containing omega-3 fats  Eat fish high in omega-3 fats at least 2 times a week       · Limit alcohol  Too much alcohol can damage your heart and raise your blood pressure  Women should limit alcohol to 1 drink a day  Men should limit alcohol to 2 drinks a day  A drink of alcohol is 12 ounces of beer, 5 ounces of wine, or 1½ ounces of liquor  · Choose low-sodium foods  High-sodium foods can lead to high blood pressure  Add little or no salt to food you prepare  Use herbs and spices in place of salt  · Eat more fiber  to help lower cholesterol levels  Eat at least 5 servings of fruits and vegetables each day  Eat 3 ounces of whole-grain foods each day  Legumes (beans) are also a good source of fiber  Lifestyle guidelines:   · Do not smoke  Nicotine and other chemicals in cigarettes and cigars can cause lung and heart damage  Ask your healthcare provider for information if you currently smoke and need help to quit  E-cigarettes or smokeless tobacco still contain nicotine  Talk to your healthcare provider before you use these products  · Exercise regularly  to help you maintain a healthy weight and improve your blood pressure and cholesterol levels  Ask your healthcare provider about the best exercise plan for you  Do not start an exercise program without asking your healthcare provider  Follow up with your healthcare provider as directed:  Write down your questions so you remember to ask them during your visits  © 2017 [employer]  Information is for End User's use only and may not be sold, redistributed or otherwise used for commercial purposes  All illustrations and images included in CareNotes® are the copyrighted property of [employer]  or [employer]  The above information is an  only  It is not intended as medical advice for individual conditions or treatments  Talk to your doctor, nurse or pharmacist before following any medical regimen to see if it is safe and effective for you

## 2020-07-09 NOTE — ASSESSMENT & PLAN NOTE
OV - 4/17/20 -tele video visit with Dr Andrew Garcia from Urology  They will be scheduling him for imaging studies and a full workup, including cystoscopy  No having episodes of gross hematuria  Under the care of Urology  06/11/2020-CT scan with renal protocol shows small bilateral nonobstructive kidney stones and also a 1 5 centimeter nonobstructing kidney stone in the right renal pelvis  Rest of study is normal except for the chronic horseshoe kidney

## 2020-07-09 NOTE — ASSESSMENT & PLAN NOTE
Chronic back pain and imbalance related to Psych meds  Self ambulatory with rolling walker  No recent falls  Turned down for more PT  Problem is still mostly about imbalance  Walks with a single-point cane when he is in his house but when he goes outside he uses rolling walker

## 2020-07-09 NOTE — PATIENT INSTRUCTIONS
No change in present medications  Tried elevate legs as much as possible and follow our recommendations for low-salt and heart healthy diet  We will check blood test today and will call you with those results  Heart Healthy Diet   WHAT YOU NEED TO KNOW:   A heart healthy diet is an eating plan low in total fat, unhealthy fats, and sodium (salt)  A heart healthy diet helps decrease your risk for heart disease and stroke  Limit the amount of fat you eat to 25% to 35% of your total daily calories  Limit sodium to less than 2,300 mg each day  DISCHARGE INSTRUCTIONS:   Healthy fats:  Healthy fats can help improve cholesterol levels  The risk for heart disease is decreased when cholesterol levels are normal  Choose healthy fats, such as the following:  · Unsaturated fat  is found in foods such as soybean, canola, olive, corn, and safflower oils  It is also found in soft tub margarine that is made with liquid vegetable oil  · Omega-3 fat  is found in certain fish, such as salmon, tuna, and trout, and in walnuts and flaxseed  Unhealthy fats:  Unhealthy fats can cause unhealthy cholesterol levels in your blood and increase your risk of heart disease  Limit unhealthy fats, such as the following:  · Cholesterol  is found in animal foods, such as eggs and lobster, and in dairy products made from whole milk  Limit cholesterol to less than 300 milligrams (mg) each day  You may need to limit cholesterol to 200 mg each day if you have heart disease  · Saturated fat  is found in meats, such as dahl and hamburger  It is also found in chicken or turkey skin, whole milk, and butter  Limit saturated fat to less than 7% of your total daily calories  Limit saturated fat to less than 6% if you have heart disease or are at increased risk for it  · Trans fat  is found in packaged foods, such as potato chips and cookies  It is also in hard margarine, some fried foods, and shortening   Avoid trans fats as much as possible    Heart healthy foods and drinks to include:  Ask your dietitian or healthcare provider how many servings to have from each of the following food groups:  · Grains:      ¨ Whole-wheat breads, cereals, and pastas, and brown rice    ¨ Low-fat, low-sodium crackers and chips    · Vegetables:      ¨ Broccoli, green beans, green peas, and spinach    ¨ Collards, kale, and lima beans    ¨ Carrots, sweet potatoes, tomatoes, and peppers    ¨ Canned vegetables with no salt added    · Fruits:      ¨ Bananas, peaches, pears, and pineapple    ¨ Grapes, raisins, and dates    ¨ Oranges, tangerines, grapefruit, orange juice, and grapefruit juice    ¨ Apricots, mangoes, melons, and papaya    ¨ Raspberries and strawberries    ¨ Canned fruit with no added sugar    · Low-fat dairy products:      ¨ Nonfat (skim) milk, 1% milk, and low-fat almond, cashew, or soy milks fortified with calcium    ¨ Low-fat cheese, regular or frozen yogurt, and cottage cheese    · Meats and proteins , such as lean cuts of beef and pork (loin, leg, round), skinless chicken and turkey, legumes, soy products, egg whites, and nuts  Foods and drinks to limit or avoid:  Ask your dietitian or healthcare provider about these and other foods that are high in unhealthy fat, sodium, and sugar:  · Snack or packaged foods , such as frozen dinners, cookies, macaroni and cheese, and cereals with more than 300 mg of sodium per serving    · Canned or dry mixes  for cakes, soups, sauces, or gravies    · Vegetables with added sodium , such as instant potatoes, vegetables with added sauces, or regular canned vegetables    · Other foods high in sodium , such as ketchup, barbecue sauce, salad dressing, pickles, olives, soy sauce, and miso    · High-fat dairy foods  such as whole or 2% milk, cream cheese, or sour cream, and cheeses     · High-fat protein foods  such as high-fat cuts of beef (T-bone steaks, ribs), chicken or turkey with skin, and organ meats, such as liver    · Cured or smoked meats , such as hot dogs, dahl, and sausage    · Unhealthy fats and oils , such as butter, stick margarine, shortening, and cooking oils such as coconut or palm oil    · Food and drinks high in sugar , such as soft drinks (soda), sports drinks, sweetened tea, candy, cake, cookies, pies, and doughnuts  Other diet guidelines to follow:   · Eat more foods containing omega-3 fats  Eat fish high in omega-3 fats at least 2 times a week  · Limit alcohol  Too much alcohol can damage your heart and raise your blood pressure  Women should limit alcohol to 1 drink a day  Men should limit alcohol to 2 drinks a day  A drink of alcohol is 12 ounces of beer, 5 ounces of wine, or 1½ ounces of liquor  · Choose low-sodium foods  High-sodium foods can lead to high blood pressure  Add little or no salt to food you prepare  Use herbs and spices in place of salt  · Eat more fiber  to help lower cholesterol levels  Eat at least 5 servings of fruits and vegetables each day  Eat 3 ounces of whole-grain foods each day  Legumes (beans) are also a good source of fiber  Lifestyle guidelines:   · Do not smoke  Nicotine and other chemicals in cigarettes and cigars can cause lung and heart damage  Ask your healthcare provider for information if you currently smoke and need help to quit  E-cigarettes or smokeless tobacco still contain nicotine  Talk to your healthcare provider before you use these products  · Exercise regularly  to help you maintain a healthy weight and improve your blood pressure and cholesterol levels  Ask your healthcare provider about the best exercise plan for you  Do not start an exercise program without asking your healthcare provider  Follow up with your healthcare provider as directed:  Write down your questions so you remember to ask them during your visits     © 2017 [employer] [employer] Information is for End User's use only and may not be sold, redistributed or otherwise used for commercial purposes  All illustrations and images included in CareNotes® are the copyrighted property of [employer]  or [employer]  The above information is an  only  It is not intended as medical advice for individual conditions or treatments  Talk to your doctor, nurse or pharmacist before following any medical regimen to see if it is safe and effective for you

## 2020-07-23 NOTE — PROGRESS NOTES
Patient of Dr Katty Mccann with history of lower urinary tract symptoms and hematuria  He is anticoagulated and has vascular disease  He has been treated in the past with finasteride, currently with tamsulosin  His urinary complaint is primarily urinary incontinence throughout the day and night time  He denies neurologic condition or history of CVA  On examination his prostate is not significantly enlarged  CT scan reviewed personally by me  This reveals a horseshoe kidney with larger right renal calculus measuring 1 5 cm in the renal pelvis  There are small fragments in the left kidney  He denies any flank or abdominal colic  Cystoscopy  Date/Time: 7/23/2020 3:22 PM  Performed by: Severiano Akins MD  Authorized by: Severiano Akins MD     Additional Procedure Details: Flexible cystoscopy note     The patient was prepped and draped in sterile fashion  2% lidocaine jelly was instilled per urethra   The 16 [place] flexible cystoscope was placed per urethra  There is a soft urethral stricture  Prostate is not noted to be obstructing or enlarged  There is no median lobe  Evaluation of the bladder reveals bilateral normal ureteral orifices   There is no evidence of urothelial carcinoma or suspicious lesion   The scope was removed and the procedure was terminated  The patient tolerated the procedure well  Plan  We discussed his symptoms and findings  Fortunately there is no abnormality on cystoscopy  CT scan does reveal horseshoe kidney with large right renal calculus  He would like to opt to observation rather than intervention for his calculi  We discussed the importance of hydration  Regarding urinary symptoms, I recommend adding Myrbetriq  Risks and benefits of this medication were discussed  He wishes to try this  Cause of his overactive bladder symptoms is not clear as he denies neurologic etiology and he does not appear to be obstructed    Recommend follow-up in the next few months for re-evaluation with uroflow and postvoid residual assessment

## 2020-08-08 NOTE — ED PROVIDER NOTES
History  Chief Complaint   Patient presents with    Cardiac Arrest     69 YOM presents via EMS for cardiac arrest  Per EMS, patient fell earlier today and then was a witness cardiac arrest by his wife  Family did not begin CPR  CPR was initiated on fire department arrival  Patient was intubated in the field and ACLS was performed  Patient received 3 doses of epinephrine prior to arrival  Patient arrived in the emergency department with a [facility] device performing CPR  Several rounds of CPR were completed  Patient was initially in PEA which later transitioned to asystole  Multiple additional rounds of epinephrine were administered along with bicarb and calcium  ROSC was never achieved  Patient was pronounced   Prior to Admission Medications   Prescriptions Last Dose Informant Patient Reported? Taking? Blood Pressure Monitoring (BLOOD PRESSURE MONITOR/L CUFF) MISC  Self No No   Sig: by Does not apply route daily   Mirabegron ER 50 MG TB24   No No   Sig: Take 1 tablet (50 mg total) by mouth daily   acetaminophen (TYLENOL) 325 mg tablet  Self No No   Sig: Take 2 tablets (650 mg total) by mouth 3 (three) times a day as needed for mild pain, moderate pain, severe pain, headaches or fever Take 2 tablets, orally, twice a day, 6 hours apart, every day     amLODIPine (NORVASC) 5 mg tablet  Self No No   Sig: Take 1 tablet by mouth daily   amantadine (SYMMETREL) 100 mg capsule  Self No No   Sig: TAKE 1 CAPSULE BY MOUTH TWICE DAILY   cholecalciferol 2000 units TABS  Self No No   Sig: Take 1 tablet (2,000 Units total) by mouth daily   docusate sodium (COLACE) 100 mg capsule  Self Yes No   Sig: Take 100 mg by mouth every other day   fluPHENAZine (PROLIXIN) 10 MG tablet  Self Yes No   Sig: Take 10 mg by mouth daily   fluvoxaMINE (LUVOX) 50 mg tablet  Self Yes No   Sig: Take 50 mg by mouth 5 (five) times a day   potassium chloride (K-DUR) 10 mEq tablet  Self No No   Sig: Take 1 tablet by mouth once daily risperiDONE (RisperDAL) 3 mg tablet  Self No No   Sig: TAKE 1 & 1/2 (ONE & ONE-HALF) TABLETS BY MOUTH DAILY   tamsulosin (FLOMAX) 0 4 mg  Self No No   Sig: Take 1 capsule (0 4 mg total) by mouth daily with dinner   warfarin (COUMADIN) 5 mg tablet  Self No No   Sig: Take 1/2 tablet every Monday and Friday; 1 tablet every Tuesday;  Wednesday, Thursday, Saturday and Sunday      Facility-Administered Medications: None       Past Medical History:   Diagnosis Date    Ankle edema 04/2004    chronic    Arthritis 03/16/2011    right knee    Ataxic gait 11/14/2011    Baker's cyst 04/2005    (djd)-left knee    Cardiac disease     Cataract     Chronic pain     Coronary artery disease     Depression     DVT (deep venous thrombosis) (HCC)     Epistaxis 01/09/2015    Forgetfulness 3/4/2019    Hand tingling 02/05/2010    tingling right forearm and hand-CTS on EMG    Hematuria 11/22/2016    ER-3 way sandhu to irrigate bladder with 3 L NS    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    Hypertension     Lipoma 01/11/2011    in hepatic flexure    Migraine headache 02/2007    optic    Obesity     Pulmonary embolism ([facility] [address] ) 09/18/2014    bilateral PE + acute DVT LLE     Rib fracture 03/2007    left 8th rib 2nd degree fall    Schizophrenia ([facility] [address] ) 04/2009    acute exacerbation    Sciatic leg pain 07/20/2011    left    Stenosis of cervix     with cervical radiculomyelopathy    Suicidal ideation 04/2009    Syncope 01/09/2015    Tardive dyskinesia 02/28/2011    Torn meniscus 04/2004    lateral, knee, left    Varicose vein of leg 04/2004    Venous insufficiency of leg 04/2004    chronic    Vertigo 05/12/2008    transient-questionable BPPV       Past Surgical History:   Procedure Laterality Date    CATARACT EXTRACTION Right     10/2/2009    CATARACT EXTRACTION      12/4/2009    COLONOSCOPY      with biopsy    CYSTOSCOPY  07/23/2020    IVC FILTER INSERTION      anticoagulation-9/23/2014    KNEE ARTHROCENTESIS       and cortisone injection    KNEE ARTHROSCOPY Left     5/18/2004    LAMINECTOMY      C3-C6 and left sided cervical foraminotomies-10/14/2008    OTHER SURGICAL HISTORY      DONNY x 1    REPLACEMENT TOTAL HIP W/  RESURFACING IMPLANTS      TOTAL HIP ARTHROPLASTY Right     02/07/2017    TOTAL KNEE ARTHROPLASTY Left     3/21/2005       Family History   Problem Relation Age of Onset    Coronary artery disease Mother     Diabetes Mother     Depression Mother     Other Mother         tobacco abuse    Other Father         tobacco abuse    Coronary artery disease Father     Other Sister         tobacco abuse    Diabetes Sister     Hypertension Brother     Migraines Other     Breast cancer Other         2009-mastectomy+chemo     I have reviewed and agree with the history as documented  E-Cigarette/Vaping    E-Cigarette Use Never User      E-Cigarette/Vaping Substances    Nicotine No     THC No     CBD No     Flavoring No     Other No     Unknown No      Social History     Tobacco Use    Smoking status: Never Smoker    Smokeless tobacco: Never Used    Tobacco comment: no passive smoke exposure   Substance Use Topics    Alcohol use: Not Currently     Alcohol/week: 0 0 standard drinks     Frequency: Never     Binge frequency: Never     Comment: no drinking    Drug use: No        Review of Systems   Unable to perform ROS: Acuity of condition       Physical Exam  ED Triage Vitals [08/07/20 2242]   Temp Pulse Respirations BP SpO2   -- (!) 156 (!) 121 -- --      Temp src Heart Rate Source Patient Position - Orthostatic VS BP Location FiO2 (%)   -- -- -- -- --      Pain Score       --             Orthostatic Vital Signs  Vitals:    08/07/20 2242 08/07/20 2245   Pulse: (!) 156 (!) 0       Physical Exam  Constitutional:       Interventions: He is intubated  Cardiovascular:      Comments: No palpable femoral or carotid pulses  Pulmonary:      Effort: He is intubated  Skin:     General: Skin is warm and dry  Neurological:      GCS: GCS eye subscore is 1  GCS verbal subscore is 1  GCS motor subscore is 1  ED Medications  Medications   sodium bicarbonate 50 mEq/50 mL injection (100 mEq Intraosseous Given 20)   calcium chloride 1 g/10 mL injection (1 g Intravenous Given 20)   EPINEPHrine (ADRENALIN) injection SOSY (1 mg Intravenous Given 20)       Diagnostic Studies  Results Reviewed     Procedure Component Value Units Date/Time    Fingerstick Glucose (POCT) [[id number]]  (Abnormal) Collected:  20    Lab Status:  Final result Updated:  20     POC Glucose 429 mg/dl                  No orders to display         Procedures  General Procedure    Date/Time: 2020 10:45 PM  Performed by: Samuel Fitzgerald MD  Authorized by: Samuel Fitzgerald MD     Patient location:  ED  Assisting Provider(s): Yes (comment) (Dr Jay Scott)    Consent:     Consent obtained:  Emergent situation  Indications:     Indications:  Cardiac arrest  Procedure Detail:     Equipment used:  20G spinal needle    Procedure note (site, laterality, method, findings):  20G spinal needle was used to puncture just below the xyphoid process and advanced towards the left shoulder under the ribs while aspirating  No pericardial fluid was aspirated  ED Course  ED Course as of Aug 07 2318   Fri Aug 07, 2020   [id number] Family updated  Made aware that patient is  at this time  MDM  Number of Diagnoses or Management Options  Cardiac arrest [facility]): new and requires workup  Diagnosis management comments: 69 YOM who presents in cardiac arrest  After multiple rounds of CPR, multiple doses of epinephrine, bicarb, and calcium patient was in asystole  No pericardial fluid obtained with pericardiocentesis  Patient       Risk of Complications, Morbidity, and/or Mortality  Presenting problems: high  Diagnostic procedures: moderate  Management options: moderate    Patient Progress  Patient progress: other (comment) ()        Disposition  Final diagnoses:   Cardiac arrest [facility])     Time reflects when diagnosis was documented in both MDM as applicable and the Disposition within this note     Time User Action Codes Description Comment    2020 10:56 PM Marcelino Salomon Add [I46 9] Cardiac arrest [facility])       ED Disposition     ED Disposition Condition Date/Time Comment      Fri Aug 7, 2020 10:58 PM       Follow-up Information    None       Date, Time and Cause of Death    Date of Death:  20  Time of Death:  10:46 PM  Preliminary Cause of Death:  Cardiac arrest [facility])       Patient's Medications   Discharge Prescriptions    No medications on file     No discharge procedures on file  PDMP Review     None           ED Provider  Attending physically available and evaluated María Samuels I managed the patient along with the ED Attending      Electronically Signed by         Morris Mcintosh MD  [phone]

## 2020-08-08 NOTE — ED ATTENDING ATTESTATION
8/7/2020  I, Emily Araya MD, saw and evaluated the patient  I have discussed the patient with the resident/non-physician practitioner and agree with the resident's/non-physician practitioner's findings, Plan of Care, and MDM as documented in the resident's/non-physician practitioner's note, except where noted  All available labs and Radiology studies were reviewed  I was present for key portions of any procedure(s) performed by the resident/non-physician practitioner and I was immediately available to provide assistance  At this point I agree with the current assessment done in the Emergency Department  I have conducted an independent evaluation of this patient a history and physical is as follows:      Final Diagnosis:  1  Cardiac arrest [facility])      Chief Complaint   Patient presents with    Cardiac Arrest       This is a 60-year-old male with a history of CAD, hypertension, CHF who presents as a cardiac arrest   The patient had a witnessed fall  CPR was not initiated at that time  When EMS arrived, he did not have a pulse and CPR was initiated  He was noticed to be in asystole and a shock was not advised  He was brought to the emergency department  Patient arrived intubated  On arrival, ACLS was continued  Tube placement confirmed by breath sounds bilaterally  He was given 2 g of calcium and 1 amp of bicarb  Patient did have episodes of PEA  He was then noticed to have asystole persistently  Pericardiocentesis attempted as a last ditch effort  Time of death pronounced at 10:46 p m  On 8/7/20  Death confirmed by no cardiac activity on bedside echo, no pulses, no spontaneous respirations, pupils fixed  PMH:  - CAD, hypertension, CHF  PSH:  - not applicable    PE:   Vitals:    08/07/20 2242 08/07/20 2245   Pulse: (!) 156 (!) 0   Resp: (!) 121 19       Constitutional:  Intubated and unresponsive  Chronically ill-appearing male    HENT:   Head:  Abrasions to left side of scalp   Mouth/Throat:  ET tube in place  Eyes: Pupils fixed and unreactive to light bilaterally  Neck: Trachea normal  No thyroid mass and no thyromegaly present  Cardiovascular:  PEA, no pulses (carotid, femoral)   Pulmonary/Chest:  Intubated  Coarse breath sounds bilaterally  Abdominal: Soft  Normal appearance  MSK:  Abrasions to bilateral knees  Neurological: He is unresponsive  Skin:  Cool and clammy  Patient appears pale  A:  - this is a 67-year-old male with multiple medical comorbidities who presents with a witnessed arrest   Delayed CPR  P:  - time of death 10:46 p m  on 20  Family notified  - 13 point ROS was performed and all are normal unless stated in the history above  - Nursing note reviewed  Vitals reviewed  - Orders placed by myself and/or advanced practitioner / resident     - Previous chart was reviewed  - No language barrier    - History obtained from EMS  - There are limitations to the history obtained  Patient unresponsive and coding   - Critical care time: Not applicable for this patient  Medications   sodium bicarbonate 50 mEq/50 mL injection (100 mEq Intraosseous Given 20)   calcium chloride 1 g/10 mL injection (1 g Intravenous Given 20)   EPINEPHrine (ADRENALIN) injection SOSY (1 mg Intravenous Given 20)     No orders to display     No orders of the defined types were placed in this encounter      Labs Reviewed   POCT GLUCOSE - Abnormal       Result Value Ref Range Status    POC Glucose 429 (*) 65 - 140 mg/dl Final     Time reflects when diagnosis was documented in both MDM as applicable and the Disposition within this note     Time User Action Codes Description Comment    2020 10:56 PM Mike Mcguire [I46 9] Cardiac arrest [facility])       ED Disposition     ED Disposition Condition Date/Time Comment      Fri Aug 7, 2020 10:56 PM       Follow-up Information    None       Patient's Medications   Discharge Prescriptions    No medications on file     No discharge procedures on file  Prior to Admission Medications   Prescriptions Last Dose Informant Patient Reported? Taking? Blood Pressure Monitoring (BLOOD PRESSURE MONITOR/L CUFF) MISC  Self No No   Sig: by Does not apply route daily   Mirabegron ER 50 MG TB24   No No   Sig: Take 1 tablet (50 mg total) by mouth daily   acetaminophen (TYLENOL) 325 mg tablet  Self No No   Sig: Take 2 tablets (650 mg total) by mouth 3 (three) times a day as needed for mild pain, moderate pain, severe pain, headaches or fever Take 2 tablets, orally, twice a day, 6 hours apart, every day  amLODIPine (NORVASC) 5 mg tablet  Self No No   Sig: Take 1 tablet by mouth daily   amantadine (SYMMETREL) 100 mg capsule  Self No No   Sig: TAKE 1 CAPSULE BY MOUTH TWICE DAILY   cholecalciferol 2000 units TABS  Self No No   Sig: Take 1 tablet (2,000 Units total) by mouth daily   docusate sodium (COLACE) 100 mg capsule  Self Yes No   Sig: Take 100 mg by mouth every other day   fluPHENAZine (PROLIXIN) 10 MG tablet  Self Yes No   Sig: Take 10 mg by mouth daily   fluvoxaMINE (LUVOX) 50 mg tablet  Self Yes No   Sig: Take 50 mg by mouth 5 (five) times a day   potassium chloride (K-DUR) 10 mEq tablet  Self No No   Sig: Take 1 tablet by mouth once daily   risperiDONE (RisperDAL) 3 mg tablet  Self No No   Sig: TAKE 1 & 1/2 (ONE & ONE-HALF) TABLETS BY MOUTH DAILY   tamsulosin (FLOMAX) 0 4 mg  Self No No   Sig: Take 1 capsule (0 4 mg total) by mouth daily with dinner   warfarin (COUMADIN) 5 mg tablet  Self No No   Sig: Take 1/2 tablet every Monday and Friday; 1 tablet every Tuesday; Wednesday, Thursday, Saturday and Sunday      Facility-Administered Medications: None       Portions of the record may have been created with voice recognition software  Occasional wrong word or "sound a like" substitutions may have occurred due to the inherent limitations of voice recognition software   Read the chart carefully and recognize, using context, where substitutions have occurred        ED Course         Critical Care Time  Procedures

## 2020-08-10 ENCOUNTER — TELEPHONE (OUTPATIENT)
Dept: FAMILY MEDICINE CLINIC | Facility: CLINIC | Age: 75
End: 2020-08-10
